# Patient Record
Sex: FEMALE | Race: WHITE | Employment: OTHER | URBAN - METROPOLITAN AREA
[De-identification: names, ages, dates, MRNs, and addresses within clinical notes are randomized per-mention and may not be internally consistent; named-entity substitution may affect disease eponyms.]

---

## 2017-06-09 ENCOUNTER — HOSPITAL ENCOUNTER (OUTPATIENT)
Dept: OTHER | Age: 67
Discharge: HOME | End: 2017-06-09
Attending: INTERNAL MEDICINE | Admitting: INTERNAL MEDICINE

## 2017-06-09 VITALS
DIASTOLIC BLOOD PRESSURE: 63 MMHG | RESPIRATION RATE: 16 BRPM | HEART RATE: 75 BPM | SYSTOLIC BLOOD PRESSURE: 131 MMHG | OXYGEN SATURATION: 100 %

## 2017-06-09 DIAGNOSIS — N18.6 END STAGE RENAL DISEASE (HCC): ICD-10-CM

## 2017-06-09 DIAGNOSIS — N18.6 ESRD (END STAGE RENAL DISEASE) (HCC): Primary | ICD-10-CM

## 2017-06-09 LAB
INR BLD: 1 (ref 0.85–1.15)
PLATELET # BLD: 226 K/UL (ref 135–450)
PROTHROMBIN TIME: 11.3 SEC (ref 9.6–13)

## 2017-06-09 RX ORDER — FENTANYL CITRATE 50 UG/ML
INJECTION, SOLUTION INTRAMUSCULAR; INTRAVENOUS
Status: COMPLETED | OUTPATIENT
Start: 2017-06-09 | End: 2017-06-09

## 2017-06-09 RX ORDER — MIDAZOLAM HYDROCHLORIDE 5 MG/ML
INJECTION INTRAMUSCULAR; INTRAVENOUS
Status: COMPLETED | OUTPATIENT
Start: 2017-06-09 | End: 2017-06-09

## 2017-06-09 RX ADMIN — FENTANYL CITRATE 25 MCG: 50 INJECTION, SOLUTION INTRAMUSCULAR; INTRAVENOUS at 15:56

## 2017-06-09 RX ADMIN — FENTANYL CITRATE 25 MCG: 50 INJECTION, SOLUTION INTRAMUSCULAR; INTRAVENOUS at 15:51

## 2017-06-09 RX ADMIN — MIDAZOLAM HYDROCHLORIDE 1 MG: 5 INJECTION INTRAMUSCULAR; INTRAVENOUS at 15:56

## 2017-06-09 RX ADMIN — MIDAZOLAM HYDROCHLORIDE 1 MG: 5 INJECTION INTRAMUSCULAR; INTRAVENOUS at 15:51

## 2017-06-09 ASSESSMENT — PAIN - FUNCTIONAL ASSESSMENT: PAIN_FUNCTIONAL_ASSESSMENT: 0-10

## 2017-09-19 ENCOUNTER — HOSPITAL ENCOUNTER (OUTPATIENT)
Dept: OTHER | Age: 67
Discharge: HOME | End: 2017-09-19
Attending: INTERNAL MEDICINE | Admitting: INTERNAL MEDICINE

## 2017-09-19 DIAGNOSIS — N18.6 ESRD (END STAGE RENAL DISEASE) (HCC): Primary | ICD-10-CM

## 2017-09-20 LAB
HEPATITIS B SURFACE ANTIGEN INTERPRETATION: NORMAL
HEPATITIS C ANTIBODY INTERPRETATION: NORMAL

## 2017-09-21 LAB — HEPATITIS B CORE TOTAL ANTIBODY: NEGATIVE

## 2017-10-24 ENCOUNTER — HOSPITAL ENCOUNTER (OUTPATIENT)
Dept: GENERAL RADIOLOGY | Age: 67
Discharge: HOME | End: 2017-10-24
Attending: NURSE PRACTITIONER | Admitting: NURSE PRACTITIONER

## 2017-10-24 DIAGNOSIS — Z78.0 POSTMENOPAUSAL: ICD-10-CM

## 2017-10-24 DIAGNOSIS — Z78.0 ASYMPTOMATIC MENOPAUSAL STATE: ICD-10-CM

## 2018-04-03 ENCOUNTER — HOSPITAL ENCOUNTER (OUTPATIENT)
Dept: SURGERY | Age: 68
Discharge: HOME | End: 2018-04-03
Attending: OPHTHALMOLOGY | Admitting: OPHTHALMOLOGY

## 2018-04-03 VITALS
OXYGEN SATURATION: 99 % | BODY MASS INDEX: 27.64 KG/M2 | RESPIRATION RATE: 16 BRPM | TEMPERATURE: 98.2 F | DIASTOLIC BLOOD PRESSURE: 70 MMHG | WEIGHT: 171.96 LBS | SYSTOLIC BLOOD PRESSURE: 153 MMHG | HEIGHT: 66 IN | HEART RATE: 73 BPM

## 2018-04-03 LAB
GLUCOSE BLD-MCNC: 95 MG/DL (ref 70–99)
PERFORMED ON: NORMAL

## 2018-04-03 RX ORDER — MOXIFLOXACIN 5 MG/ML
1 SOLUTION/ DROPS OPHTHALMIC SEE ADMIN INSTRUCTIONS
Status: DISCONTINUED | OUTPATIENT
Start: 2018-04-03 | End: 2018-04-04 | Stop reason: HOSPADM

## 2018-04-03 RX ORDER — SODIUM CHLORIDE 9 MG/ML
INJECTION, SOLUTION INTRAVENOUS CONTINUOUS
Status: DISCONTINUED | OUTPATIENT
Start: 2018-04-03 | End: 2018-04-04 | Stop reason: HOSPADM

## 2018-04-03 RX ORDER — PREDNISOLONE ACETATE 10 MG/ML
1 SUSPENSION/ DROPS OPHTHALMIC SEE ADMIN INSTRUCTIONS
Status: DISCONTINUED | OUTPATIENT
Start: 2018-04-03 | End: 2018-04-04 | Stop reason: HOSPADM

## 2018-04-03 RX ORDER — SODIUM CHLORIDE, SODIUM LACTATE, POTASSIUM CHLORIDE, CALCIUM CHLORIDE 600; 310; 30; 20 MG/100ML; MG/100ML; MG/100ML; MG/100ML
INJECTION, SOLUTION INTRAVENOUS CONTINUOUS
Status: DISCONTINUED | OUTPATIENT
Start: 2018-04-03 | End: 2018-04-04 | Stop reason: HOSPADM

## 2018-04-03 RX ORDER — SODIUM CHLORIDE 9 MG/ML
INJECTION, SOLUTION INTRAVENOUS
Status: COMPLETED
Start: 2018-04-03 | End: 2018-04-03

## 2018-04-03 RX ORDER — LIDOCAINE HYDROCHLORIDE 10 MG/ML
2 INJECTION, SOLUTION INFILTRATION; PERINEURAL
Status: COMPLETED | OUTPATIENT
Start: 2018-04-03 | End: 2018-04-03

## 2018-04-03 RX ADMIN — LIDOCAINE HYDROCHLORIDE 2 ML: 10 INJECTION, SOLUTION INFILTRATION; PERINEURAL at 06:39

## 2018-04-03 RX ADMIN — SODIUM CHLORIDE 1000 ML: 9 INJECTION, SOLUTION INTRAVENOUS at 06:53

## 2018-04-03 ASSESSMENT — PAIN SCALES - GENERAL: PAINLEVEL_OUTOF10: 0

## 2019-01-10 ENCOUNTER — HOSPITAL ENCOUNTER (INPATIENT)
Age: 69
LOS: 4 days | Discharge: HOME | DRG: 617 | End: 2019-01-14
Attending: EMERGENCY MEDICINE | Admitting: INTERNAL MEDICINE
Payer: MEDICARE

## 2019-01-10 ENCOUNTER — APPOINTMENT (OUTPATIENT)
Dept: GENERAL RADIOLOGY | Age: 69
DRG: 617 | End: 2019-01-10
Payer: MEDICARE

## 2019-01-10 DIAGNOSIS — Z99.2 ESRD ON DIALYSIS (HCC): ICD-10-CM

## 2019-01-10 DIAGNOSIS — L03.115 CELLULITIS OF RIGHT LEG: ICD-10-CM

## 2019-01-10 DIAGNOSIS — E11.8 TYPE 2 DIABETES MELLITUS WITH COMPLICATION, WITHOUT LONG-TERM CURRENT USE OF INSULIN (HCC): ICD-10-CM

## 2019-01-10 DIAGNOSIS — M86.9 TOE OSTEOMYELITIS, RIGHT (HCC): Primary | ICD-10-CM

## 2019-01-10 DIAGNOSIS — N18.6 ESRD ON DIALYSIS (HCC): ICD-10-CM

## 2019-01-10 LAB
ANION GAP SERPL CALCULATED.3IONS-SCNC: 11 MMOL/L (ref 3–16)
BASOPHILS ABSOLUTE: 0.1 K/UL (ref 0–0.2)
BASOPHILS RELATIVE PERCENT: 2.2 %
BUN BLDV-MCNC: 26 MG/DL (ref 7–20)
CALCIUM SERPL-MCNC: 9.2 MG/DL (ref 8.3–10.6)
CHLORIDE BLD-SCNC: 94 MMOL/L (ref 99–110)
CO2: 34 MMOL/L (ref 21–32)
CREAT SERPL-MCNC: 4.7 MG/DL (ref 0.6–1.2)
EOSINOPHILS ABSOLUTE: 0.2 K/UL (ref 0–0.6)
EOSINOPHILS RELATIVE PERCENT: 4.9 %
GFR AFRICAN AMERICAN: 11
GFR NON-AFRICAN AMERICAN: 9
GLUCOSE BLD-MCNC: 149 MG/DL (ref 70–99)
GLUCOSE BLD-MCNC: 161 MG/DL (ref 70–99)
GLUCOSE BLD-MCNC: 207 MG/DL (ref 70–99)
HCT VFR BLD CALC: 29.2 % (ref 36–48)
HEMOGLOBIN: 9.6 G/DL (ref 12–16)
LYMPHOCYTES ABSOLUTE: 0.8 K/UL (ref 1–5.1)
LYMPHOCYTES RELATIVE PERCENT: 18.3 %
MCH RBC QN AUTO: 30.6 PG (ref 26–34)
MCHC RBC AUTO-ENTMCNC: 32.9 G/DL (ref 31–36)
MCV RBC AUTO: 92.9 FL (ref 80–100)
MONOCYTES ABSOLUTE: 0.4 K/UL (ref 0–1.3)
MONOCYTES RELATIVE PERCENT: 8.2 %
NEUTROPHILS ABSOLUTE: 3.1 K/UL (ref 1.7–7.7)
NEUTROPHILS RELATIVE PERCENT: 66.4 %
PDW BLD-RTO: 16.7 % (ref 12.4–15.4)
PERFORMED ON: ABNORMAL
PERFORMED ON: ABNORMAL
PLATELET # BLD: 275 K/UL (ref 135–450)
PMV BLD AUTO: 8.7 FL (ref 5–10.5)
POTASSIUM SERPL-SCNC: 4.2 MMOL/L (ref 3.5–5.1)
RBC # BLD: 3.14 M/UL (ref 4–5.2)
SODIUM BLD-SCNC: 139 MMOL/L (ref 136–145)
WBC # BLD: 4.6 K/UL (ref 4–11)

## 2019-01-10 PROCEDURE — 6370000000 HC RX 637 (ALT 250 FOR IP): Performed by: PHYSICIAN ASSISTANT

## 2019-01-10 PROCEDURE — 87070 CULTURE OTHR SPECIMN AEROBIC: CPT

## 2019-01-10 PROCEDURE — 6360000002 HC RX W HCPCS: Performed by: EMERGENCY MEDICINE

## 2019-01-10 PROCEDURE — 80048 BASIC METABOLIC PNL TOTAL CA: CPT

## 2019-01-10 PROCEDURE — 83036 HEMOGLOBIN GLYCOSYLATED A1C: CPT

## 2019-01-10 PROCEDURE — 6360000002 HC RX W HCPCS: Performed by: PHYSICIAN ASSISTANT

## 2019-01-10 PROCEDURE — 2580000003 HC RX 258: Performed by: PHYSICIAN ASSISTANT

## 2019-01-10 PROCEDURE — 99223 1ST HOSP IP/OBS HIGH 75: CPT | Performed by: PHYSICIAN ASSISTANT

## 2019-01-10 PROCEDURE — 94761 N-INVAS EAR/PLS OXIMETRY MLT: CPT

## 2019-01-10 PROCEDURE — 2580000003 HC RX 258: Performed by: EMERGENCY MEDICINE

## 2019-01-10 PROCEDURE — 96365 THER/PROPH/DIAG IV INF INIT: CPT

## 2019-01-10 PROCEDURE — 99284 EMERGENCY DEPT VISIT MOD MDM: CPT

## 2019-01-10 PROCEDURE — 85025 COMPLETE CBC W/AUTO DIFF WBC: CPT

## 2019-01-10 PROCEDURE — 36415 COLL VENOUS BLD VENIPUNCTURE: CPT

## 2019-01-10 PROCEDURE — 96375 TX/PRO/DX INJ NEW DRUG ADDON: CPT

## 2019-01-10 PROCEDURE — 87205 SMEAR GRAM STAIN: CPT

## 2019-01-10 PROCEDURE — 1200000000 HC SEMI PRIVATE

## 2019-01-10 PROCEDURE — 73660 X-RAY EXAM OF TOE(S): CPT

## 2019-01-10 PROCEDURE — 87040 BLOOD CULTURE FOR BACTERIA: CPT

## 2019-01-10 RX ORDER — LEVOTHYROXINE SODIUM 0.05 MG/1
50 TABLET ORAL DAILY
Status: DISCONTINUED | OUTPATIENT
Start: 2019-01-11 | End: 2019-01-14 | Stop reason: HOSPADM

## 2019-01-10 RX ORDER — LEVOTHYROXINE SODIUM 0.05 MG/1
50 TABLET ORAL DAILY
COMMUNITY

## 2019-01-10 RX ORDER — ATORVASTATIN CALCIUM 10 MG/1
20 TABLET, FILM COATED ORAL NIGHTLY
Status: DISCONTINUED | OUTPATIENT
Start: 2019-01-11 | End: 2019-01-14 | Stop reason: HOSPADM

## 2019-01-10 RX ORDER — HYDRALAZINE HYDROCHLORIDE 25 MG/1
25 TABLET, FILM COATED ORAL 3 TIMES DAILY
Status: DISCONTINUED | OUTPATIENT
Start: 2019-01-10 | End: 2019-01-14 | Stop reason: HOSPADM

## 2019-01-10 RX ORDER — ONDANSETRON 2 MG/ML
4 INJECTION INTRAMUSCULAR; INTRAVENOUS EVERY 6 HOURS PRN
Status: DISCONTINUED | OUTPATIENT
Start: 2019-01-10 | End: 2019-01-14 | Stop reason: HOSPADM

## 2019-01-10 RX ORDER — METOPROLOL SUCCINATE 50 MG/1
50 TABLET, EXTENDED RELEASE ORAL DAILY
Status: DISCONTINUED | OUTPATIENT
Start: 2019-01-11 | End: 2019-01-14 | Stop reason: HOSPADM

## 2019-01-10 RX ORDER — HEPARIN SODIUM 5000 [USP'U]/ML
5000 INJECTION, SOLUTION INTRAVENOUS; SUBCUTANEOUS EVERY 8 HOURS SCHEDULED
Status: DISCONTINUED | OUTPATIENT
Start: 2019-01-10 | End: 2019-01-14 | Stop reason: HOSPADM

## 2019-01-10 RX ORDER — DEXTROSE MONOHYDRATE 50 MG/ML
100 INJECTION, SOLUTION INTRAVENOUS PRN
Status: DISCONTINUED | OUTPATIENT
Start: 2019-01-10 | End: 2019-01-14 | Stop reason: HOSPADM

## 2019-01-10 RX ORDER — SODIUM CHLORIDE 0.9 % (FLUSH) 0.9 %
10 SYRINGE (ML) INJECTION EVERY 12 HOURS SCHEDULED
Status: DISCONTINUED | OUTPATIENT
Start: 2019-01-10 | End: 2019-01-14 | Stop reason: HOSPADM

## 2019-01-10 RX ORDER — INSULIN GLARGINE 100 [IU]/ML
8 INJECTION, SOLUTION SUBCUTANEOUS EVERY MORNING
Status: DISCONTINUED | OUTPATIENT
Start: 2019-01-11 | End: 2019-01-10

## 2019-01-10 RX ORDER — OXYCODONE HYDROCHLORIDE AND ACETAMINOPHEN 5; 325 MG/1; MG/1
1 TABLET ORAL EVERY 6 HOURS PRN
Status: DISCONTINUED | OUTPATIENT
Start: 2019-01-10 | End: 2019-01-14 | Stop reason: HOSPADM

## 2019-01-10 RX ORDER — GABAPENTIN 300 MG/1
300 CAPSULE ORAL EVERY EVENING
Status: DISCONTINUED | OUTPATIENT
Start: 2019-01-10 | End: 2019-01-14 | Stop reason: HOSPADM

## 2019-01-10 RX ORDER — METOLAZONE 2.5 MG/1
10 TABLET ORAL DAILY
Status: DISCONTINUED | OUTPATIENT
Start: 2019-01-11 | End: 2019-01-14 | Stop reason: HOSPADM

## 2019-01-10 RX ORDER — NICOTINE POLACRILEX 4 MG
15 LOZENGE BUCCAL PRN
Status: DISCONTINUED | OUTPATIENT
Start: 2019-01-10 | End: 2019-01-14 | Stop reason: HOSPADM

## 2019-01-10 RX ORDER — FERROUS SULFATE 325(65) MG
325 TABLET ORAL 2 TIMES DAILY WITH MEALS
Status: DISCONTINUED | OUTPATIENT
Start: 2019-01-10 | End: 2019-01-14 | Stop reason: HOSPADM

## 2019-01-10 RX ORDER — AMLODIPINE BESYLATE 5 MG/1
5 TABLET ORAL DAILY
COMMUNITY
End: 2021-12-01

## 2019-01-10 RX ORDER — INSULIN GLARGINE 100 [IU]/ML
5 INJECTION, SOLUTION SUBCUTANEOUS EVERY MORNING
Status: DISCONTINUED | OUTPATIENT
Start: 2019-01-11 | End: 2019-01-14 | Stop reason: HOSPADM

## 2019-01-10 RX ORDER — LOSARTAN POTASSIUM 100 MG/1
100 TABLET ORAL NIGHTLY
Status: ON HOLD | COMMUNITY
End: 2022-06-22 | Stop reason: HOSPADM

## 2019-01-10 RX ORDER — HYDRALAZINE HYDROCHLORIDE 25 MG/1
25 TABLET, FILM COATED ORAL DAILY
COMMUNITY
End: 2021-12-01

## 2019-01-10 RX ORDER — ASPIRIN 81 MG/1
81 TABLET ORAL DAILY
Status: DISCONTINUED | OUTPATIENT
Start: 2019-01-11 | End: 2019-01-14 | Stop reason: HOSPADM

## 2019-01-10 RX ORDER — AMLODIPINE BESYLATE 5 MG/1
5 TABLET ORAL DAILY
Status: DISCONTINUED | OUTPATIENT
Start: 2019-01-11 | End: 2019-01-14 | Stop reason: HOSPADM

## 2019-01-10 RX ORDER — DEXTROSE MONOHYDRATE 25 G/50ML
12.5 INJECTION, SOLUTION INTRAVENOUS PRN
Status: DISCONTINUED | OUTPATIENT
Start: 2019-01-10 | End: 2019-01-14 | Stop reason: HOSPADM

## 2019-01-10 RX ORDER — SODIUM CHLORIDE 0.9 % (FLUSH) 0.9 %
10 SYRINGE (ML) INJECTION PRN
Status: DISCONTINUED | OUTPATIENT
Start: 2019-01-10 | End: 2019-01-14 | Stop reason: HOSPADM

## 2019-01-10 RX ORDER — LOSARTAN POTASSIUM 100 MG/1
100 TABLET ORAL DAILY
Status: DISCONTINUED | OUTPATIENT
Start: 2019-01-11 | End: 2019-01-14 | Stop reason: HOSPADM

## 2019-01-10 RX ORDER — ACETAMINOPHEN 325 MG/1
650 TABLET ORAL EVERY 4 HOURS PRN
Status: DISCONTINUED | OUTPATIENT
Start: 2019-01-10 | End: 2019-01-14 | Stop reason: HOSPADM

## 2019-01-10 RX ADMIN — FERROUS SULFATE TAB 325 MG (65 MG ELEMENTAL FE) 325 MG: 325 (65 FE) TAB at 16:45

## 2019-01-10 RX ADMIN — INSULIN LISPRO 1 UNITS: 100 INJECTION, SOLUTION INTRAVENOUS; SUBCUTANEOUS at 20:27

## 2019-01-10 RX ADMIN — PIPERACILLIN SODIUM AND TAZOBACTAM SODIUM 3.38 G: 3; .375 INJECTION, POWDER, LYOPHILIZED, FOR SOLUTION INTRAVENOUS at 23:52

## 2019-01-10 RX ADMIN — INSULIN LISPRO 2 UNITS: 100 INJECTION, SOLUTION INTRAVENOUS; SUBCUTANEOUS at 16:41

## 2019-01-10 RX ADMIN — Medication 10 ML: at 23:52

## 2019-01-10 RX ADMIN — GABAPENTIN 300 MG: 300 CAPSULE ORAL at 20:22

## 2019-01-10 RX ADMIN — HEPARIN SODIUM 5000 UNITS: 5000 INJECTION INTRAVENOUS; SUBCUTANEOUS at 15:12

## 2019-01-10 RX ADMIN — PIPERACILLIN SODIUM AND TAZOBACTAM SODIUM 2.25 G: 2; .25 INJECTION, POWDER, LYOPHILIZED, FOR SOLUTION INTRAVENOUS at 12:01

## 2019-01-10 RX ADMIN — HYDRALAZINE HYDROCHLORIDE 25 MG: 25 TABLET, FILM COATED ORAL at 20:22

## 2019-01-10 RX ADMIN — VANCOMYCIN HYDROCHLORIDE 1000 MG: 1 INJECTION, POWDER, LYOPHILIZED, FOR SOLUTION INTRAVENOUS at 12:26

## 2019-01-10 RX ADMIN — HEPARIN SODIUM 5000 UNITS: 5000 INJECTION INTRAVENOUS; SUBCUTANEOUS at 20:23

## 2019-01-10 ASSESSMENT — ENCOUNTER SYMPTOMS
EYES NEGATIVE: 1
RESPIRATORY NEGATIVE: 1
GASTROINTESTINAL NEGATIVE: 1

## 2019-01-11 ENCOUNTER — APPOINTMENT (OUTPATIENT)
Dept: GENERAL RADIOLOGY | Age: 69
DRG: 617 | End: 2019-01-11
Payer: MEDICARE

## 2019-01-11 ENCOUNTER — ANESTHESIA (OUTPATIENT)
Dept: OPERATING ROOM | Age: 69
DRG: 617 | End: 2019-01-11
Payer: MEDICARE

## 2019-01-11 ENCOUNTER — ANESTHESIA EVENT (OUTPATIENT)
Dept: OPERATING ROOM | Age: 69
DRG: 617 | End: 2019-01-11
Payer: MEDICARE

## 2019-01-11 VITALS — SYSTOLIC BLOOD PRESSURE: 145 MMHG | TEMPERATURE: 98.6 F | OXYGEN SATURATION: 100 % | DIASTOLIC BLOOD PRESSURE: 61 MMHG

## 2019-01-11 LAB
ANION GAP SERPL CALCULATED.3IONS-SCNC: 12 MMOL/L (ref 3–16)
BASOPHILS ABSOLUTE: 0.1 K/UL (ref 0–0.2)
BASOPHILS RELATIVE PERCENT: 2.5 %
BUN BLDV-MCNC: 37 MG/DL (ref 7–20)
CALCIUM SERPL-MCNC: 8.8 MG/DL (ref 8.3–10.6)
CHLORIDE BLD-SCNC: 92 MMOL/L (ref 99–110)
CO2: 31 MMOL/L (ref 21–32)
CREAT SERPL-MCNC: 6.4 MG/DL (ref 0.6–1.2)
EOSINOPHILS ABSOLUTE: 0.3 K/UL (ref 0–0.6)
EOSINOPHILS RELATIVE PERCENT: 7.3 %
ESTIMATED AVERAGE GLUCOSE: 111.2 MG/DL
GFR AFRICAN AMERICAN: 8
GFR NON-AFRICAN AMERICAN: 6
GLUCOSE BLD-MCNC: 100 MG/DL (ref 70–99)
GLUCOSE BLD-MCNC: 137 MG/DL (ref 70–99)
GLUCOSE BLD-MCNC: 140 MG/DL (ref 70–99)
GLUCOSE BLD-MCNC: 153 MG/DL (ref 70–99)
GLUCOSE BLD-MCNC: 184 MG/DL (ref 70–99)
GLUCOSE BLD-MCNC: 91 MG/DL (ref 70–99)
HBA1C MFR BLD: 5.5 %
HCT VFR BLD CALC: 26.3 % (ref 36–48)
HEMOGLOBIN: 8.8 G/DL (ref 12–16)
LYMPHOCYTES ABSOLUTE: 1.1 K/UL (ref 1–5.1)
LYMPHOCYTES RELATIVE PERCENT: 26.2 %
MCH RBC QN AUTO: 30.7 PG (ref 26–34)
MCHC RBC AUTO-ENTMCNC: 33.3 G/DL (ref 31–36)
MCV RBC AUTO: 92.3 FL (ref 80–100)
MONOCYTES ABSOLUTE: 0.4 K/UL (ref 0–1.3)
MONOCYTES RELATIVE PERCENT: 8.9 %
NEUTROPHILS ABSOLUTE: 2.4 K/UL (ref 1.7–7.7)
NEUTROPHILS RELATIVE PERCENT: 55.1 %
PDW BLD-RTO: 16.3 % (ref 12.4–15.4)
PERFORMED ON: ABNORMAL
PERFORMED ON: NORMAL
PLATELET # BLD: 255 K/UL (ref 135–450)
PMV BLD AUTO: 8.7 FL (ref 5–10.5)
POTASSIUM REFLEX MAGNESIUM: 3.9 MMOL/L (ref 3.5–5.1)
RBC # BLD: 2.85 M/UL (ref 4–5.2)
SODIUM BLD-SCNC: 135 MMOL/L (ref 136–145)
VANCOMYCIN RANDOM: 13.9 UG/ML
WBC # BLD: 4.3 K/UL (ref 4–11)

## 2019-01-11 PROCEDURE — 36415 COLL VENOUS BLD VENIPUNCTURE: CPT

## 2019-01-11 PROCEDURE — 2580000003 HC RX 258: Performed by: PODIATRIST

## 2019-01-11 PROCEDURE — 0Y6M0ZC DETACHMENT AT RIGHT FOOT, PARTIAL 3RD RAY, OPEN APPROACH: ICD-10-PCS | Performed by: PODIATRIST

## 2019-01-11 PROCEDURE — 80048 BASIC METABOLIC PNL TOTAL CA: CPT

## 2019-01-11 PROCEDURE — 94664 DEMO&/EVAL PT USE INHALER: CPT

## 2019-01-11 PROCEDURE — 6370000000 HC RX 637 (ALT 250 FOR IP): Performed by: PHYSICIAN ASSISTANT

## 2019-01-11 PROCEDURE — 3600000003 HC SURGERY LEVEL 3 BASE: Performed by: PODIATRIST

## 2019-01-11 PROCEDURE — 7100000011 HC PHASE II RECOVERY - ADDTL 15 MIN: Performed by: PODIATRIST

## 2019-01-11 PROCEDURE — 3600000013 HC SURGERY LEVEL 3 ADDTL 15MIN: Performed by: PODIATRIST

## 2019-01-11 PROCEDURE — 3700000000 HC ANESTHESIA ATTENDED CARE: Performed by: PODIATRIST

## 2019-01-11 PROCEDURE — 2500000003 HC RX 250 WO HCPCS: Performed by: PODIATRIST

## 2019-01-11 PROCEDURE — 2580000003 HC RX 258: Performed by: PHYSICIAN ASSISTANT

## 2019-01-11 PROCEDURE — 5A1D70Z PERFORMANCE OF URINARY FILTRATION, INTERMITTENT, LESS THAN 6 HOURS PER DAY: ICD-10-PCS | Performed by: INTERNAL MEDICINE

## 2019-01-11 PROCEDURE — 85025 COMPLETE CBC W/AUTO DIFF WBC: CPT

## 2019-01-11 PROCEDURE — 99232 SBSQ HOSP IP/OBS MODERATE 35: CPT | Performed by: INTERNAL MEDICINE

## 2019-01-11 PROCEDURE — 80202 ASSAY OF VANCOMYCIN: CPT

## 2019-01-11 PROCEDURE — 94761 N-INVAS EAR/PLS OXIMETRY MLT: CPT

## 2019-01-11 PROCEDURE — 6360000002 HC RX W HCPCS: Performed by: PHYSICIAN ASSISTANT

## 2019-01-11 PROCEDURE — 3700000001 HC ADD 15 MINUTES (ANESTHESIA): Performed by: PODIATRIST

## 2019-01-11 PROCEDURE — 2709999900 HC NON-CHARGEABLE SUPPLY: Performed by: PODIATRIST

## 2019-01-11 PROCEDURE — 1200000000 HC SEMI PRIVATE

## 2019-01-11 PROCEDURE — 88311 DECALCIFY TISSUE: CPT

## 2019-01-11 PROCEDURE — 2580000003 HC RX 258: Performed by: ANESTHESIOLOGY

## 2019-01-11 PROCEDURE — 6360000002 HC RX W HCPCS: Performed by: NURSE ANESTHETIST, CERTIFIED REGISTERED

## 2019-01-11 PROCEDURE — 2500000003 HC RX 250 WO HCPCS: Performed by: NURSE ANESTHETIST, CERTIFIED REGISTERED

## 2019-01-11 PROCEDURE — 88305 TISSUE EXAM BY PATHOLOGIST: CPT

## 2019-01-11 PROCEDURE — 7100000010 HC PHASE II RECOVERY - FIRST 15 MIN: Performed by: PODIATRIST

## 2019-01-11 PROCEDURE — 73620 X-RAY EXAM OF FOOT: CPT

## 2019-01-11 DEVICE — PATCH AMNION 2 LAYR PROTCT 4 X 4CM STERISHIELD II: Type: IMPLANTABLE DEVICE | Site: FOOT | Status: FUNCTIONAL

## 2019-01-11 RX ORDER — OXYCODONE HYDROCHLORIDE AND ACETAMINOPHEN 5; 325 MG/1; MG/1
1 TABLET ORAL PRN
Status: DISCONTINUED | OUTPATIENT
Start: 2019-01-11 | End: 2019-01-11 | Stop reason: HOSPADM

## 2019-01-11 RX ORDER — BUPIVACAINE HYDROCHLORIDE 5 MG/ML
INJECTION, SOLUTION EPIDURAL; INTRACAUDAL PRN
Status: DISCONTINUED | OUTPATIENT
Start: 2019-01-11 | End: 2019-01-11 | Stop reason: HOSPADM

## 2019-01-11 RX ORDER — SODIUM CHLORIDE 9 MG/ML
INJECTION, SOLUTION INTRAVENOUS
Status: DISPENSED
Start: 2019-01-11 | End: 2019-01-12

## 2019-01-11 RX ORDER — PROMETHAZINE HYDROCHLORIDE 25 MG/ML
6.25 INJECTION, SOLUTION INTRAMUSCULAR; INTRAVENOUS
Status: DISCONTINUED | OUTPATIENT
Start: 2019-01-11 | End: 2019-01-11 | Stop reason: HOSPADM

## 2019-01-11 RX ORDER — SODIUM CHLORIDE 9 MG/ML
INJECTION, SOLUTION INTRAVENOUS CONTINUOUS
Status: DISCONTINUED | OUTPATIENT
Start: 2019-01-11 | End: 2019-01-12

## 2019-01-11 RX ORDER — PROPOFOL 10 MG/ML
INJECTION, EMULSION INTRAVENOUS PRN
Status: DISCONTINUED | OUTPATIENT
Start: 2019-01-11 | End: 2019-01-11 | Stop reason: SDUPTHER

## 2019-01-11 RX ORDER — MORPHINE SULFATE 10 MG/ML
2 INJECTION, SOLUTION INTRAMUSCULAR; INTRAVENOUS EVERY 5 MIN PRN
Status: DISCONTINUED | OUTPATIENT
Start: 2019-01-11 | End: 2019-01-11 | Stop reason: HOSPADM

## 2019-01-11 RX ORDER — HYDROMORPHONE HCL 110MG/55ML
0.5 PATIENT CONTROLLED ANALGESIA SYRINGE INTRAVENOUS EVERY 5 MIN PRN
Status: DISCONTINUED | OUTPATIENT
Start: 2019-01-11 | End: 2019-01-11 | Stop reason: HOSPADM

## 2019-01-11 RX ORDER — ONDANSETRON 2 MG/ML
4 INJECTION INTRAMUSCULAR; INTRAVENOUS
Status: DISCONTINUED | OUTPATIENT
Start: 2019-01-11 | End: 2019-01-11 | Stop reason: HOSPADM

## 2019-01-11 RX ORDER — HYDRALAZINE HYDROCHLORIDE 20 MG/ML
5 INJECTION INTRAMUSCULAR; INTRAVENOUS EVERY 10 MIN PRN
Status: DISCONTINUED | OUTPATIENT
Start: 2019-01-11 | End: 2019-01-11 | Stop reason: HOSPADM

## 2019-01-11 RX ORDER — LIDOCAINE HYDROCHLORIDE 10 MG/ML
INJECTION, SOLUTION EPIDURAL; INFILTRATION; INTRACAUDAL; PERINEURAL PRN
Status: DISCONTINUED | OUTPATIENT
Start: 2019-01-11 | End: 2019-01-11 | Stop reason: HOSPADM

## 2019-01-11 RX ORDER — LIDOCAINE HYDROCHLORIDE 10 MG/ML
INJECTION, SOLUTION INFILTRATION; PERINEURAL PRN
Status: DISCONTINUED | OUTPATIENT
Start: 2019-01-11 | End: 2019-01-11 | Stop reason: SDUPTHER

## 2019-01-11 RX ORDER — LABETALOL HYDROCHLORIDE 5 MG/ML
5 INJECTION, SOLUTION INTRAVENOUS EVERY 10 MIN PRN
Status: DISCONTINUED | OUTPATIENT
Start: 2019-01-11 | End: 2019-01-11 | Stop reason: HOSPADM

## 2019-01-11 RX ORDER — HYDROMORPHONE HCL 110MG/55ML
0.25 PATIENT CONTROLLED ANALGESIA SYRINGE INTRAVENOUS EVERY 5 MIN PRN
Status: DISCONTINUED | OUTPATIENT
Start: 2019-01-11 | End: 2019-01-11 | Stop reason: HOSPADM

## 2019-01-11 RX ORDER — MEPERIDINE HYDROCHLORIDE 25 MG/ML
12.5 INJECTION INTRAMUSCULAR; INTRAVENOUS; SUBCUTANEOUS EVERY 5 MIN PRN
Status: DISCONTINUED | OUTPATIENT
Start: 2019-01-11 | End: 2019-01-11 | Stop reason: HOSPADM

## 2019-01-11 RX ORDER — OXYCODONE HYDROCHLORIDE AND ACETAMINOPHEN 5; 325 MG/1; MG/1
2 TABLET ORAL PRN
Status: DISCONTINUED | OUTPATIENT
Start: 2019-01-11 | End: 2019-01-11 | Stop reason: HOSPADM

## 2019-01-11 RX ORDER — MIDAZOLAM HYDROCHLORIDE 1 MG/ML
INJECTION INTRAMUSCULAR; INTRAVENOUS PRN
Status: DISCONTINUED | OUTPATIENT
Start: 2019-01-11 | End: 2019-01-11 | Stop reason: SDUPTHER

## 2019-01-11 RX ORDER — DIPHENHYDRAMINE HYDROCHLORIDE 50 MG/ML
12.5 INJECTION INTRAMUSCULAR; INTRAVENOUS
Status: DISCONTINUED | OUTPATIENT
Start: 2019-01-11 | End: 2019-01-11 | Stop reason: HOSPADM

## 2019-01-11 RX ORDER — MORPHINE SULFATE 10 MG/ML
1 INJECTION, SOLUTION INTRAMUSCULAR; INTRAVENOUS EVERY 5 MIN PRN
Status: DISCONTINUED | OUTPATIENT
Start: 2019-01-11 | End: 2019-01-11 | Stop reason: HOSPADM

## 2019-01-11 RX ORDER — MAGNESIUM HYDROXIDE 1200 MG/15ML
LIQUID ORAL CONTINUOUS PRN
Status: COMPLETED | OUTPATIENT
Start: 2019-01-11 | End: 2019-01-11

## 2019-01-11 RX ADMIN — LEVOTHYROXINE SODIUM 50 MCG: 0.05 TABLET ORAL at 06:15

## 2019-01-11 RX ADMIN — HEPARIN SODIUM 5000 UNITS: 5000 INJECTION INTRAVENOUS; SUBCUTANEOUS at 06:16

## 2019-01-11 RX ADMIN — PROPOFOL 60 MG: 10 INJECTION, EMULSION INTRAVENOUS at 12:46

## 2019-01-11 RX ADMIN — SODIUM CHLORIDE: 9 INJECTION, SOLUTION INTRAVENOUS at 12:32

## 2019-01-11 RX ADMIN — HYDRALAZINE HYDROCHLORIDE 25 MG: 25 TABLET, FILM COATED ORAL at 14:19

## 2019-01-11 RX ADMIN — PIPERACILLIN SODIUM AND TAZOBACTAM SODIUM 3.38 G: 3; .375 INJECTION, POWDER, LYOPHILIZED, FOR SOLUTION INTRAVENOUS at 22:24

## 2019-01-11 RX ADMIN — LIDOCAINE HYDROCHLORIDE 40 MG: 10 INJECTION, SOLUTION INFILTRATION; PERINEURAL at 12:46

## 2019-01-11 RX ADMIN — PIPERACILLIN SODIUM AND TAZOBACTAM SODIUM 3.38 G: 3; .375 INJECTION, POWDER, LYOPHILIZED, FOR SOLUTION INTRAVENOUS at 10:57

## 2019-01-11 RX ADMIN — GABAPENTIN 300 MG: 300 CAPSULE ORAL at 20:46

## 2019-01-11 RX ADMIN — ATORVASTATIN CALCIUM 20 MG: 10 TABLET, FILM COATED ORAL at 20:46

## 2019-01-11 RX ADMIN — HEPARIN SODIUM 5000 UNITS: 5000 INJECTION INTRAVENOUS; SUBCUTANEOUS at 14:21

## 2019-01-11 RX ADMIN — HEPARIN SODIUM 5000 UNITS: 5000 INJECTION INTRAVENOUS; SUBCUTANEOUS at 22:23

## 2019-01-11 RX ADMIN — OXYCODONE HYDROCHLORIDE AND ACETAMINOPHEN 1 TABLET: 5; 325 TABLET ORAL at 22:24

## 2019-01-11 RX ADMIN — SODIUM CHLORIDE: 9 INJECTION, SOLUTION INTRAVENOUS at 20:48

## 2019-01-11 RX ADMIN — HYDRALAZINE HYDROCHLORIDE 25 MG: 25 TABLET, FILM COATED ORAL at 20:46

## 2019-01-11 RX ADMIN — MIDAZOLAM 2 MG: 1 INJECTION INTRAMUSCULAR; INTRAVENOUS at 12:37

## 2019-01-11 RX ADMIN — VANCOMYCIN HYDROCHLORIDE 500 MG: 500 INJECTION, POWDER, LYOPHILIZED, FOR SOLUTION INTRAVENOUS at 17:12

## 2019-01-11 RX ADMIN — METOPROLOL SUCCINATE 50 MG: 50 TABLET, EXTENDED RELEASE ORAL at 14:20

## 2019-01-11 RX ADMIN — INSULIN LISPRO 1 UNITS: 100 INJECTION, SOLUTION INTRAVENOUS; SUBCUTANEOUS at 20:58

## 2019-01-11 RX ADMIN — SODIUM CHLORIDE: 9 INJECTION, SOLUTION INTRAVENOUS at 18:43

## 2019-01-11 RX ADMIN — FERROUS SULFATE TAB 325 MG (65 MG ELEMENTAL FE) 325 MG: 325 (65 FE) TAB at 17:12

## 2019-01-11 RX ADMIN — AMLODIPINE BESYLATE 5 MG: 5 TABLET ORAL at 14:20

## 2019-01-11 ASSESSMENT — PULMONARY FUNCTION TESTS
PIF_VALUE: 0

## 2019-01-11 ASSESSMENT — PAIN SCALES - GENERAL
PAINLEVEL_OUTOF10: 4
PAINLEVEL_OUTOF10: 0

## 2019-01-12 LAB
ALBUMIN SERPL-MCNC: 3.3 G/DL (ref 3.4–5)
ANION GAP SERPL CALCULATED.3IONS-SCNC: 8 MMOL/L (ref 3–16)
BUN BLDV-MCNC: 22 MG/DL (ref 7–20)
CALCIUM SERPL-MCNC: 8 MG/DL (ref 8.3–10.6)
CHLORIDE BLD-SCNC: 102 MMOL/L (ref 99–110)
CO2: 29 MMOL/L (ref 21–32)
CREAT SERPL-MCNC: 3.9 MG/DL (ref 0.6–1.2)
GFR AFRICAN AMERICAN: 14
GFR NON-AFRICAN AMERICAN: 11
GLUCOSE BLD-MCNC: 114 MG/DL (ref 70–99)
GLUCOSE BLD-MCNC: 122 MG/DL (ref 70–99)
GLUCOSE BLD-MCNC: 122 MG/DL (ref 70–99)
GLUCOSE BLD-MCNC: 128 MG/DL (ref 70–99)
GLUCOSE BLD-MCNC: 176 MG/DL (ref 70–99)
GRAM STAIN RESULT: ABNORMAL
PERFORMED ON: ABNORMAL
PHOSPHORUS: 4.1 MG/DL (ref 2.5–4.9)
POTASSIUM SERPL-SCNC: 4.8 MMOL/L (ref 3.5–5.1)
SODIUM BLD-SCNC: 139 MMOL/L (ref 136–145)
WOUND/ABSCESS: ABNORMAL

## 2019-01-12 PROCEDURE — 36415 COLL VENOUS BLD VENIPUNCTURE: CPT

## 2019-01-12 PROCEDURE — 2580000003 HC RX 258: Performed by: PHYSICIAN ASSISTANT

## 2019-01-12 PROCEDURE — 80069 RENAL FUNCTION PANEL: CPT

## 2019-01-12 PROCEDURE — 6370000000 HC RX 637 (ALT 250 FOR IP): Performed by: PHYSICIAN ASSISTANT

## 2019-01-12 PROCEDURE — 6360000002 HC RX W HCPCS: Performed by: PHYSICIAN ASSISTANT

## 2019-01-12 PROCEDURE — 1200000000 HC SEMI PRIVATE

## 2019-01-12 PROCEDURE — 2580000003 HC RX 258: Performed by: ANESTHESIOLOGY

## 2019-01-12 PROCEDURE — 2580000003 HC RX 258

## 2019-01-12 RX ORDER — SODIUM CHLORIDE 9 MG/ML
INJECTION, SOLUTION INTRAVENOUS
Status: COMPLETED
Start: 2019-01-12 | End: 2019-01-12

## 2019-01-12 RX ADMIN — PIPERACILLIN SODIUM AND TAZOBACTAM SODIUM 3.38 G: 3; .375 INJECTION, POWDER, LYOPHILIZED, FOR SOLUTION INTRAVENOUS at 22:42

## 2019-01-12 RX ADMIN — HYDRALAZINE HYDROCHLORIDE 25 MG: 25 TABLET, FILM COATED ORAL at 14:06

## 2019-01-12 RX ADMIN — ASPIRIN 81 MG: 81 TABLET, COATED ORAL at 08:37

## 2019-01-12 RX ADMIN — FERROUS SULFATE TAB 325 MG (65 MG ELEMENTAL FE) 325 MG: 325 (65 FE) TAB at 08:37

## 2019-01-12 RX ADMIN — SERTRALINE HYDROCHLORIDE 25 MG: 50 TABLET ORAL at 08:37

## 2019-01-12 RX ADMIN — SODIUM CHLORIDE: 9 INJECTION, SOLUTION INTRAVENOUS at 07:14

## 2019-01-12 RX ADMIN — METOLAZONE 10 MG: 2.5 TABLET ORAL at 08:37

## 2019-01-12 RX ADMIN — LEVOTHYROXINE SODIUM 50 MCG: 0.05 TABLET ORAL at 05:55

## 2019-01-12 RX ADMIN — METOPROLOL SUCCINATE 50 MG: 50 TABLET, EXTENDED RELEASE ORAL at 08:36

## 2019-01-12 RX ADMIN — HYDRALAZINE HYDROCHLORIDE 25 MG: 25 TABLET, FILM COATED ORAL at 20:29

## 2019-01-12 RX ADMIN — ATORVASTATIN CALCIUM 20 MG: 10 TABLET, FILM COATED ORAL at 20:29

## 2019-01-12 RX ADMIN — SODIUM CHLORIDE 250 ML: 9 INJECTION, SOLUTION INTRAVENOUS at 22:50

## 2019-01-12 RX ADMIN — GABAPENTIN 300 MG: 300 CAPSULE ORAL at 20:29

## 2019-01-12 RX ADMIN — LOSARTAN POTASSIUM 100 MG: 100 TABLET ORAL at 08:37

## 2019-01-12 RX ADMIN — HEPARIN SODIUM 5000 UNITS: 5000 INJECTION INTRAVENOUS; SUBCUTANEOUS at 14:06

## 2019-01-12 RX ADMIN — PIPERACILLIN SODIUM AND TAZOBACTAM SODIUM 3.38 G: 3; .375 INJECTION, POWDER, LYOPHILIZED, FOR SOLUTION INTRAVENOUS at 11:47

## 2019-01-12 RX ADMIN — HEPARIN SODIUM 5000 UNITS: 5000 INJECTION INTRAVENOUS; SUBCUTANEOUS at 22:41

## 2019-01-12 RX ADMIN — SODIUM CHLORIDE: 9 INJECTION, SOLUTION INTRAVENOUS at 11:48

## 2019-01-12 RX ADMIN — INSULIN GLARGINE 5 UNITS: 100 INJECTION, SOLUTION SUBCUTANEOUS at 08:46

## 2019-01-12 RX ADMIN — HEPARIN SODIUM 5000 UNITS: 5000 INJECTION INTRAVENOUS; SUBCUTANEOUS at 05:55

## 2019-01-12 RX ADMIN — Medication 10 ML: at 20:30

## 2019-01-12 RX ADMIN — Medication 10 ML: at 08:38

## 2019-01-12 RX ADMIN — HYDRALAZINE HYDROCHLORIDE 25 MG: 25 TABLET, FILM COATED ORAL at 08:37

## 2019-01-12 RX ADMIN — AMLODIPINE BESYLATE 5 MG: 5 TABLET ORAL at 08:37

## 2019-01-12 RX ADMIN — INSULIN LISPRO 1 UNITS: 100 INJECTION, SOLUTION INTRAVENOUS; SUBCUTANEOUS at 17:39

## 2019-01-12 ASSESSMENT — PAIN SCALES - GENERAL
PAINLEVEL_OUTOF10: 0

## 2019-01-13 LAB
GLUCOSE BLD-MCNC: 104 MG/DL (ref 70–99)
GLUCOSE BLD-MCNC: 113 MG/DL (ref 70–99)
GLUCOSE BLD-MCNC: 141 MG/DL (ref 70–99)
GLUCOSE BLD-MCNC: 184 MG/DL (ref 70–99)
PERFORMED ON: ABNORMAL

## 2019-01-13 PROCEDURE — 2580000003 HC RX 258: Performed by: PHYSICIAN ASSISTANT

## 2019-01-13 PROCEDURE — 6360000002 HC RX W HCPCS: Performed by: PHYSICIAN ASSISTANT

## 2019-01-13 PROCEDURE — 6370000000 HC RX 637 (ALT 250 FOR IP): Performed by: PHYSICIAN ASSISTANT

## 2019-01-13 PROCEDURE — 1200000000 HC SEMI PRIVATE

## 2019-01-13 RX ADMIN — LEVOTHYROXINE SODIUM 50 MCG: 0.05 TABLET ORAL at 06:01

## 2019-01-13 RX ADMIN — INSULIN GLARGINE 5 UNITS: 100 INJECTION, SOLUTION SUBCUTANEOUS at 08:14

## 2019-01-13 RX ADMIN — HEPARIN SODIUM 5000 UNITS: 5000 INJECTION INTRAVENOUS; SUBCUTANEOUS at 06:01

## 2019-01-13 RX ADMIN — HYDRALAZINE HYDROCHLORIDE 25 MG: 25 TABLET, FILM COATED ORAL at 14:32

## 2019-01-13 RX ADMIN — PIPERACILLIN SODIUM AND TAZOBACTAM SODIUM 3.38 G: 3; .375 INJECTION, POWDER, LYOPHILIZED, FOR SOLUTION INTRAVENOUS at 23:15

## 2019-01-13 RX ADMIN — METOPROLOL SUCCINATE 50 MG: 50 TABLET, EXTENDED RELEASE ORAL at 08:13

## 2019-01-13 RX ADMIN — LOSARTAN POTASSIUM 100 MG: 100 TABLET ORAL at 08:12

## 2019-01-13 RX ADMIN — Medication 10 ML: at 21:09

## 2019-01-13 RX ADMIN — INSULIN LISPRO 1 UNITS: 100 INJECTION, SOLUTION INTRAVENOUS; SUBCUTANEOUS at 21:12

## 2019-01-13 RX ADMIN — Medication 10 ML: at 08:13

## 2019-01-13 RX ADMIN — PIPERACILLIN SODIUM AND TAZOBACTAM SODIUM 3.38 G: 3; .375 INJECTION, POWDER, LYOPHILIZED, FOR SOLUTION INTRAVENOUS at 11:22

## 2019-01-13 RX ADMIN — HYDRALAZINE HYDROCHLORIDE 25 MG: 25 TABLET, FILM COATED ORAL at 21:08

## 2019-01-13 RX ADMIN — INSULIN LISPRO 1 UNITS: 100 INJECTION, SOLUTION INTRAVENOUS; SUBCUTANEOUS at 11:42

## 2019-01-13 RX ADMIN — AMLODIPINE BESYLATE 5 MG: 5 TABLET ORAL at 08:12

## 2019-01-13 RX ADMIN — ASPIRIN 81 MG: 81 TABLET, COATED ORAL at 08:13

## 2019-01-13 RX ADMIN — HYDRALAZINE HYDROCHLORIDE 25 MG: 25 TABLET, FILM COATED ORAL at 08:13

## 2019-01-13 RX ADMIN — HEPARIN SODIUM 5000 UNITS: 5000 INJECTION INTRAVENOUS; SUBCUTANEOUS at 14:32

## 2019-01-13 RX ADMIN — SERTRALINE HYDROCHLORIDE 25 MG: 50 TABLET ORAL at 08:13

## 2019-01-13 RX ADMIN — GABAPENTIN 300 MG: 300 CAPSULE ORAL at 21:09

## 2019-01-13 RX ADMIN — ATORVASTATIN CALCIUM 20 MG: 10 TABLET, FILM COATED ORAL at 21:09

## 2019-01-13 RX ADMIN — HEPARIN SODIUM 5000 UNITS: 5000 INJECTION INTRAVENOUS; SUBCUTANEOUS at 21:11

## 2019-01-13 RX ADMIN — METOLAZONE 10 MG: 2.5 TABLET ORAL at 08:13

## 2019-01-13 ASSESSMENT — PAIN SCALES - GENERAL
PAINLEVEL_OUTOF10: 0

## 2019-01-14 VITALS
RESPIRATION RATE: 16 BRPM | TEMPERATURE: 97.9 F | WEIGHT: 178.79 LBS | BODY MASS INDEX: 28.73 KG/M2 | SYSTOLIC BLOOD PRESSURE: 174 MMHG | HEART RATE: 80 BPM | DIASTOLIC BLOOD PRESSURE: 84 MMHG | HEIGHT: 66 IN | OXYGEN SATURATION: 92 %

## 2019-01-14 LAB
ALBUMIN SERPL-MCNC: 3.1 G/DL (ref 3.4–5)
ANION GAP SERPL CALCULATED.3IONS-SCNC: 16 MMOL/L (ref 3–16)
BASOPHILS ABSOLUTE: 0.1 K/UL (ref 0–0.2)
BASOPHILS RELATIVE PERCENT: 1.4 %
BUN BLDV-MCNC: 43 MG/DL (ref 7–20)
CALCIUM SERPL-MCNC: 8.7 MG/DL (ref 8.3–10.6)
CHLORIDE BLD-SCNC: 103 MMOL/L (ref 99–110)
CO2: 22 MMOL/L (ref 21–32)
CREAT SERPL-MCNC: 7.5 MG/DL (ref 0.6–1.2)
EOSINOPHILS ABSOLUTE: 0.4 K/UL (ref 0–0.6)
EOSINOPHILS RELATIVE PERCENT: 8.6 %
GFR AFRICAN AMERICAN: 6
GFR NON-AFRICAN AMERICAN: 5
GLUCOSE BLD-MCNC: 100 MG/DL (ref 70–99)
GLUCOSE BLD-MCNC: 103 MG/DL (ref 70–99)
GLUCOSE BLD-MCNC: 155 MG/DL (ref 70–99)
GLUCOSE BLD-MCNC: 97 MG/DL (ref 70–99)
HCT VFR BLD CALC: 25 % (ref 36–48)
HEMOGLOBIN: 8.1 G/DL (ref 12–16)
LYMPHOCYTES ABSOLUTE: 1 K/UL (ref 1–5.1)
LYMPHOCYTES RELATIVE PERCENT: 21.7 %
MCH RBC QN AUTO: 30.5 PG (ref 26–34)
MCHC RBC AUTO-ENTMCNC: 32.5 G/DL (ref 31–36)
MCV RBC AUTO: 93.6 FL (ref 80–100)
MONOCYTES ABSOLUTE: 0.3 K/UL (ref 0–1.3)
MONOCYTES RELATIVE PERCENT: 6.4 %
NEUTROPHILS ABSOLUTE: 2.9 K/UL (ref 1.7–7.7)
NEUTROPHILS RELATIVE PERCENT: 61.9 %
PDW BLD-RTO: 16.4 % (ref 12.4–15.4)
PERFORMED ON: ABNORMAL
PERFORMED ON: ABNORMAL
PERFORMED ON: NORMAL
PHOSPHORUS: 5.9 MG/DL (ref 2.5–4.9)
PLATELET # BLD: 118 K/UL (ref 135–450)
PMV BLD AUTO: 10 FL (ref 5–10.5)
POTASSIUM SERPL-SCNC: 4.6 MMOL/L (ref 3.5–5.1)
RBC # BLD: 2.67 M/UL (ref 4–5.2)
SODIUM BLD-SCNC: 141 MMOL/L (ref 136–145)
VANCOMYCIN RANDOM: 11.3 UG/ML
WBC # BLD: 4.6 K/UL (ref 4–11)

## 2019-01-14 PROCEDURE — 6360000002 HC RX W HCPCS: Performed by: PHYSICIAN ASSISTANT

## 2019-01-14 PROCEDURE — 2580000003 HC RX 258: Performed by: PHYSICIAN ASSISTANT

## 2019-01-14 PROCEDURE — 80069 RENAL FUNCTION PANEL: CPT

## 2019-01-14 PROCEDURE — 6370000000 HC RX 637 (ALT 250 FOR IP): Performed by: PHYSICIAN ASSISTANT

## 2019-01-14 PROCEDURE — 80202 ASSAY OF VANCOMYCIN: CPT

## 2019-01-14 PROCEDURE — 85025 COMPLETE CBC W/AUTO DIFF WBC: CPT

## 2019-01-14 PROCEDURE — 99238 HOSP IP/OBS DSCHRG MGMT 30/<: CPT | Performed by: INTERNAL MEDICINE

## 2019-01-14 PROCEDURE — 36415 COLL VENOUS BLD VENIPUNCTURE: CPT

## 2019-01-14 RX ORDER — CIPROFLOXACIN 500 MG/1
250 TABLET, FILM COATED ORAL 2 TIMES DAILY
Qty: 5 TABLET | Refills: 0 | Status: SHIPPED | OUTPATIENT
Start: 2019-01-14 | End: 2019-01-19

## 2019-01-14 RX ADMIN — HYDRALAZINE HYDROCHLORIDE 25 MG: 25 TABLET, FILM COATED ORAL at 08:43

## 2019-01-14 RX ADMIN — INSULIN GLARGINE 5 UNITS: 100 INJECTION, SOLUTION SUBCUTANEOUS at 08:47

## 2019-01-14 RX ADMIN — HEPARIN SODIUM 5000 UNITS: 5000 INJECTION INTRAVENOUS; SUBCUTANEOUS at 05:45

## 2019-01-14 RX ADMIN — SERTRALINE HYDROCHLORIDE 25 MG: 50 TABLET ORAL at 08:44

## 2019-01-14 RX ADMIN — LOSARTAN POTASSIUM 100 MG: 100 TABLET ORAL at 08:44

## 2019-01-14 RX ADMIN — METOPROLOL SUCCINATE 50 MG: 50 TABLET, EXTENDED RELEASE ORAL at 08:44

## 2019-01-14 RX ADMIN — Medication 10 ML: at 08:44

## 2019-01-14 RX ADMIN — METOLAZONE 10 MG: 2.5 TABLET ORAL at 08:43

## 2019-01-14 RX ADMIN — ASPIRIN 81 MG: 81 TABLET, COATED ORAL at 08:44

## 2019-01-14 RX ADMIN — AMLODIPINE BESYLATE 5 MG: 5 TABLET ORAL at 08:44

## 2019-01-14 RX ADMIN — LEVOTHYROXINE SODIUM 50 MCG: 0.05 TABLET ORAL at 05:45

## 2019-01-14 ASSESSMENT — PAIN SCALES - GENERAL: PAINLEVEL_OUTOF10: 0

## 2019-01-15 LAB
BLOOD CULTURE, ROUTINE: NORMAL
CULTURE, BLOOD 2: NORMAL

## 2021-05-25 ENCOUNTER — OFFICE VISIT (OUTPATIENT)
Dept: ORTHOPEDIC SURGERY | Age: 71
End: 2021-05-25
Payer: MEDICARE

## 2021-05-25 VITALS — BODY MASS INDEX: 28.61 KG/M2 | HEIGHT: 66 IN | WEIGHT: 178 LBS

## 2021-05-25 DIAGNOSIS — M17.12 PRIMARY OSTEOARTHRITIS OF LEFT KNEE: Primary | ICD-10-CM

## 2021-05-25 DIAGNOSIS — M25.562 ACUTE PAIN OF LEFT KNEE: ICD-10-CM

## 2021-05-25 PROCEDURE — 20610 DRAIN/INJ JOINT/BURSA W/O US: CPT | Performed by: ORTHOPAEDIC SURGERY

## 2021-05-25 PROCEDURE — 1036F TOBACCO NON-USER: CPT | Performed by: ORTHOPAEDIC SURGERY

## 2021-05-25 PROCEDURE — 99203 OFFICE O/P NEW LOW 30 MIN: CPT | Performed by: ORTHOPAEDIC SURGERY

## 2021-05-25 PROCEDURE — 4040F PNEUMOC VAC/ADMIN/RCVD: CPT | Performed by: ORTHOPAEDIC SURGERY

## 2021-05-25 PROCEDURE — 1123F ACP DISCUSS/DSCN MKR DOCD: CPT | Performed by: ORTHOPAEDIC SURGERY

## 2021-05-25 PROCEDURE — G8417 CALC BMI ABV UP PARAM F/U: HCPCS | Performed by: ORTHOPAEDIC SURGERY

## 2021-05-25 PROCEDURE — 3017F COLORECTAL CA SCREEN DOC REV: CPT | Performed by: ORTHOPAEDIC SURGERY

## 2021-05-25 PROCEDURE — 1090F PRES/ABSN URINE INCON ASSESS: CPT | Performed by: ORTHOPAEDIC SURGERY

## 2021-05-25 PROCEDURE — G8427 DOCREV CUR MEDS BY ELIG CLIN: HCPCS | Performed by: ORTHOPAEDIC SURGERY

## 2021-05-25 PROCEDURE — G8399 PT W/DXA RESULTS DOCUMENT: HCPCS | Performed by: ORTHOPAEDIC SURGERY

## 2021-05-25 RX ORDER — LIDOCAINE HYDROCHLORIDE 10 MG/ML
3 INJECTION, SOLUTION INFILTRATION; PERINEURAL ONCE
Status: COMPLETED | OUTPATIENT
Start: 2021-05-25 | End: 2021-05-25

## 2021-05-25 RX ADMIN — LIDOCAINE HYDROCHLORIDE 3 ML: 10 INJECTION, SOLUTION INFILTRATION; PERINEURAL at 09:45

## 2021-05-25 NOTE — PROGRESS NOTES
KNEE VISIT      HISTORY OF PRESENT ILLNESS    Marcel Herndon is a 66 y.o. female who presents for evaluation of her left knee. 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 She grades her pain 8/10 but says it is hurting her feet and other joints. ROS    Well-documented patient history form dated 5/25/2021  All other ROS negative except for above. Past Surgical history    Past Surgical History:   Procedure Laterality Date    DIALYSIS FISTULA CREATION Left 08/10/2016    Dr. Juan Bowden - upper arm, basilic vein transposition    EYE SURGERY Left 04/03/2018    catarct removal with lens implant     OTHER SURGICAL HISTORY Right 01/11/2019    partial foot amputation    TOE AMPUTATION Right 1/11/2019    PARTIAL FOOT AMPUTATION WITH GRAFT APPLICATION performed by Katherine Reyez DPM at [facility]    Past Medical History:   Diagnosis Date    Chronic kidney disease     Diabetes mellitus ([facility] [address]     Hyperkalemia 7/13/2016    Hyperlipidemia     Hypertension     Retinopathy     Sepsis (HCC)        Allergies    No Known Allergies    Meds    Current Outpatient Medications   Medication Sig Dispense Refill    levothyroxine (SYNTHROID) 50 MCG tablet Take 50 mcg by mouth Daily      amLODIPine (NORVASC) 5 MG tablet Take 5 mg by mouth daily      hydrALAZINE (APRESOLINE) 25 MG tablet Take 25 mg by mouth daily       losartan (COZAAR) 100 MG tablet Take 100 mg by mouth daily      insulin glargine (LANTUS) 100 UNIT/ML injection vial Inject 8 Units into the skin every morning 20 units if bs 150 or above, 15 units if below bs is below.  1 vial 0    metolazone (ZAROXOLYN) 10 MG tablet Take 1 tablet by mouth daily 30 tablet 1    aspirin 81 MG EC tablet Take 1 tablet by file.    PHYSICAL EXAM    Vital Signs:  Ht 5' 6\" (1.676 m)   Wt 178 lb (80.7 kg)   BMI 28.73 kg/m²   General Appearance:  Normal body habitus. Alert and oriented to person, place, and time. Affect:  Normal.   Gait: Antalgic with severe genu valgum on her left knee. Poor balance and coordination. Skin:  Intact. Sensation:  Intact. Strength:  Intact. Reflexes:  Intact. Pulses:  Intact. Knee Exam:    Effusion: 3+    Range of Motion Right Left   Extension 0 -5   Flexion 115 110     Provocative Test Right Left    Positive Negative Positive Negative   Anterior drawer [] [] [] []   Lachman [] [] [] []   Posterior drawer [] [] [] []   Varus testing [] [x] [x] []   Valgus testing [] [x] [x] []   Joint line tenderness [x] [] [x] []     Additional Exam Comments: Her neurocirculatory lymphatic exam appears normal symmetric both lower extremities. She has effusion crepitus in probably 20 degree valgus deformity of her left knee with mild stress. She is partially correctable. IMAGING STUDIES    X-rays 3 views of her left knee demonstrate severe end-stage lateral compartment arthritis with substantial 18 to 20 degree valgus deformity with stance. IMPRESSION    Left knee pain and effusion secondary to severe lateral compartment arthritis    PLAN      1. Conservative care options including physical therapy, NSAIDs, bracing, and activity modification were discussed. 2.  The indications for therapeutic injections were discussed. 3.  The indications for additional imaging studies were discussed. 4.  After considering the various options discussed, the patient elected to pursue a course that includes proceeding with left knee aspiration with Visco supplement injection. I told her that I would not recommend cortisone because of her diabetes and kidney issues or anti-inflammatories either.   I will give her a  brace to try to unload the lateral compartment as much as possible, but she will need to return to have that applied or fit and measured for. She will take this under advisement and would like to proceed with the recommendations above and hopefully avoid surgery    After informed consent was received from the patient, the lateral suprapatellar aspect of the left knee was then sterilely prepped using Betadine and draped using 1% Xylocaine as a local anesthetic and ethyl chloride as a topical refrigerant. The skin was numbed and then  a 60 mL syringe with an 18-gauge needle was then inserted into the region and the fluid was aspirated. .  The procedure was then aborted and the wound was cleaned and dressed. The patient appeared to tolerate this procedure well. Encounter Diagnoses   Name Primary?  Acute pain of left knee     Primary osteoarthritis of left knee Yes      Orders Placed This Encounter   Procedures    XR KNEE LEFT (3 VIEWS)     Standing Status:   Future     Number of Occurrences:   1     Standing Expiration Date:   5/24/2022    TX ARTHROCENTESIS ASPIR&/INJ MAJOR JT/BURSA W/O US      Recommendation is for viscosupplementation using Durolane. The left knee was injected with 3ml of Durolane from an anterolateral joint line approach using a 22-gauge needle under sterile Betadine prep, using ethyl chloride as a topical refrigerant, for a diagnosis of osteoarthritis. The patient appeared to tolerate it well. The patient should return here in 2 months.

## 2021-06-10 DIAGNOSIS — M17.12 PRIMARY OSTEOARTHRITIS OF LEFT KNEE: Primary | ICD-10-CM

## 2021-06-11 ENCOUNTER — TELEPHONE (OUTPATIENT)
Dept: ORTHOPEDIC SURGERY | Age: 71
End: 2021-06-11

## 2021-06-11 NOTE — TELEPHONE ENCOUNTER
6/11/21 DME    NO PRECERT REQUIRED      DEDUCTIBLE: NONE                              CO INSURANCE: 80/20%    OOP: $4200 IND/$4125.47 MET               PER NANCY TAYLOR @ [facility]  REF # [id number]  MP

## 2021-06-15 ENCOUNTER — CLINICAL DOCUMENTATION (OUTPATIENT)
Dept: OTHER | Age: 71
End: 2021-06-15

## 2021-06-17 ENCOUNTER — TELEPHONE (OUTPATIENT)
Dept: ORTHOPEDIC SURGERY | Age: 71
End: 2021-06-17

## 2021-06-24 ENCOUNTER — TELEPHONE (OUTPATIENT)
Dept: ORTHOPEDIC SURGERY | Age: 71
End: 2021-06-24

## 2021-06-29 DIAGNOSIS — M17.12 PRIMARY OSTEOARTHRITIS OF LEFT KNEE: ICD-10-CM

## 2021-06-29 PROCEDURE — L1851 KO SINGLE UPRIGHT PREFAB OTS: HCPCS | Performed by: ORTHOPAEDIC SURGERY

## 2021-08-03 ENCOUNTER — OFFICE VISIT (OUTPATIENT)
Dept: ORTHOPEDIC SURGERY | Age: 71
End: 2021-08-03
Payer: MEDICARE

## 2021-08-03 VITALS — WEIGHT: 178 LBS | BODY MASS INDEX: 28.61 KG/M2 | HEIGHT: 66 IN

## 2021-08-03 DIAGNOSIS — M17.12 PRIMARY OSTEOARTHRITIS OF LEFT KNEE: Primary | ICD-10-CM

## 2021-08-03 PROCEDURE — 1123F ACP DISCUSS/DSCN MKR DOCD: CPT | Performed by: ORTHOPAEDIC SURGERY

## 2021-08-03 PROCEDURE — 3017F COLORECTAL CA SCREEN DOC REV: CPT | Performed by: ORTHOPAEDIC SURGERY

## 2021-08-03 PROCEDURE — 1090F PRES/ABSN URINE INCON ASSESS: CPT | Performed by: ORTHOPAEDIC SURGERY

## 2021-08-03 PROCEDURE — G8417 CALC BMI ABV UP PARAM F/U: HCPCS | Performed by: ORTHOPAEDIC SURGERY

## 2021-08-03 PROCEDURE — G8399 PT W/DXA RESULTS DOCUMENT: HCPCS | Performed by: ORTHOPAEDIC SURGERY

## 2021-08-03 PROCEDURE — 4040F PNEUMOC VAC/ADMIN/RCVD: CPT | Performed by: ORTHOPAEDIC SURGERY

## 2021-08-03 PROCEDURE — 99212 OFFICE O/P EST SF 10 MIN: CPT | Performed by: ORTHOPAEDIC SURGERY

## 2021-08-03 PROCEDURE — G8427 DOCREV CUR MEDS BY ELIG CLIN: HCPCS | Performed by: ORTHOPAEDIC SURGERY

## 2021-08-03 PROCEDURE — 1036F TOBACCO NON-USER: CPT | Performed by: ORTHOPAEDIC SURGERY

## 2021-08-03 NOTE — LETTER
[address] [place] [address]                                                            PRE OP ORDERS  Date:  8/3/2021    Name: Lillian Alexander    : 1950    Please take this form with you. [facility]-3 WEEKS PRIOR TO SURGERY    THE FOLLOWING LABS NEED TO BE COMPLETED:    _x__UA  _x__URINE C/S (THIS NEEDS TO BE DONE EVEN IF THE UA IS NORMAL)  _x__CBC W/ DIFF  _x__PT/INR  _x__PTT  _x__TRANSFERRIN  _x__ALBUMIN  _x__CHEM 7  _x__HEMAGLOBIN A1-C if patient is diabetic  _x___EKG if NOT already completed by PCP/Cardiologist                           Diagnosis:  Left knee OSTEOARTHRITIS            RT KNEE OA M17.11      LT KNEE OA M17.12         RT HIP OA  M16.11         LT HIP OA M16.12         RT SHLD OA  M19.011   LT SHLD OA  M19.012             Z01.812  (Pre-op examination code)              Yuri Chris.  Kirk Ortiz MD        8/3/2021 11:25 AM

## 2021-08-03 NOTE — PROGRESS NOTES
She presents for reevaluation of her left knee. She has had multiple conservative interventions including injections bracing therapy and medication. At this time she persistent having pain and the inability to do anything that revolves around long-term outside community activities. Basically she says as long she just stays home and sits she is okay but anytime she goes get up despite the  brace she has she has persistent pain. I did review the x-rays that she had prior with her which shows end-stage arthritis mostly in the lateral compartment of the knee. She does have a valgus thrust with gait. In consideration of her failure to improve with 6+ months of conservative care having end-stage osteoarthritis with substantial community activity and ambulatory deficits and deficiencies, I would recommend proceeding with left total knee replacement. The patient was counseled at length about the risks of shayy Covid-19 during their perioperative period and any recovery window from their procedure. The patient was made aware that shayy Covid-19  may worsen their prognosis for recovering from their procedure  and lend to a higher morbidity and/or mortality risk. All material risks, benefits, and reasonable alternatives including postponing the procedure were discussed. The patient does wish to proceed with the procedure at this time. INFORMED CONSENT NOTE        We discussed the risks, benefits, and alternatives to the proposed procedure, as well as the necessity of other members of the healthcare team participating in the procedure. All questions were answered and the patient elected to proceed with the proposed procedure and signed the informed consent form.

## 2021-08-18 ENCOUNTER — TELEPHONE (OUTPATIENT)
Dept: ORTHOPEDIC SURGERY | Age: 71
End: 2021-08-18

## 2021-08-19 ENCOUNTER — HOSPITAL ENCOUNTER (OUTPATIENT)
Dept: GENERAL RADIOLOGY | Age: 71
Discharge: HOME | End: 2021-08-19
Payer: MEDICARE

## 2021-08-19 DIAGNOSIS — Z78.0 POST-MENOPAUSAL: ICD-10-CM

## 2021-08-19 PROCEDURE — 77080 DXA BONE DENSITY AXIAL: CPT

## 2021-08-20 ENCOUNTER — TELEPHONE (OUTPATIENT)
Dept: ORTHOPEDIC SURGERY | Age: 71
End: 2021-08-20

## 2021-08-27 DIAGNOSIS — M17.12 PRIMARY OSTEOARTHRITIS OF LEFT KNEE: Primary | ICD-10-CM

## 2021-09-02 ENCOUNTER — TELEPHONE (OUTPATIENT)
Dept: ORTHOPEDIC SURGERY | Age: 71
End: 2021-09-02

## 2021-09-02 DIAGNOSIS — M17.12 PRIMARY OSTEOARTHRITIS OF LEFT KNEE: Primary | ICD-10-CM

## 2021-09-02 NOTE — TELEPHONE ENCOUNTER
DID LEAVE MESSAGE FOR PATIENT, WILL NEED COVID TEST DONE 7 DAYS PRIOR TO SX, DID LEAVE DATE AND TIMES FOR LAB AT [facility] FOR TEST.

## 2021-09-14 ENCOUNTER — HOSPITAL ENCOUNTER (OUTPATIENT)
Age: 71
Discharge: HOME | End: 2021-09-14
Payer: MEDICARE

## 2021-09-14 ENCOUNTER — TELEPHONE (OUTPATIENT)
Dept: ORTHOPEDIC SURGERY | Age: 71
End: 2021-09-14

## 2021-09-14 LAB
ALBUMIN SERPL-MCNC: 4.1 G/DL (ref 3.4–5)
ANION GAP SERPL CALCULATED.3IONS-SCNC: 17 MMOL/L (ref 3–16)
APTT: 38.6 SEC (ref 26.2–38.6)
BACTERIA: ABNORMAL /HPF
BASOPHILS ABSOLUTE: 0.1 K/UL (ref 0–0.2)
BASOPHILS RELATIVE PERCENT: 1 %
BILIRUBIN URINE: NEGATIVE
BLOOD, URINE: ABNORMAL
BUN BLDV-MCNC: 28 MG/DL (ref 7–20)
CALCIUM SERPL-MCNC: 9.8 MG/DL (ref 8.3–10.6)
CHLORIDE BLD-SCNC: 97 MMOL/L (ref 99–110)
CLARITY: ABNORMAL
CO2: 22 MMOL/L (ref 21–32)
COLOR: YELLOW
CREAT SERPL-MCNC: 6 MG/DL (ref 0.6–1.2)
EKG ATRIAL RATE: 79 BPM
EKG DIAGNOSIS: NORMAL
EKG P AXIS: 62 DEGREES
EKG P-R INTERVAL: 144 MS
EKG Q-T INTERVAL: 386 MS
EKG QRS DURATION: 90 MS
EKG QTC CALCULATION (BAZETT): 442 MS
EKG R AXIS: 36 DEGREES
EKG T AXIS: 46 DEGREES
EKG VENTRICULAR RATE: 79 BPM
EOSINOPHILS ABSOLUTE: 0.3 K/UL (ref 0–0.6)
EOSINOPHILS RELATIVE PERCENT: 4.9 %
EPITHELIAL CELLS, UA: ABNORMAL /HPF (ref 0–5)
GFR AFRICAN AMERICAN: 8
GFR NON-AFRICAN AMERICAN: 7
GLUCOSE BLD-MCNC: 163 MG/DL (ref 70–99)
GLUCOSE URINE: NEGATIVE MG/DL
HCT VFR BLD CALC: 36.1 % (ref 36–48)
HEMOGLOBIN: 11.6 G/DL (ref 12–16)
INR BLD: 0.94 (ref 0.88–1.12)
KETONES, URINE: NEGATIVE MG/DL
LEUKOCYTE ESTERASE, URINE: ABNORMAL
LYMPHOCYTES ABSOLUTE: 1.4 K/UL (ref 1–5.1)
LYMPHOCYTES RELATIVE PERCENT: 22.4 %
MCH RBC QN AUTO: 31.3 PG (ref 26–34)
MCHC RBC AUTO-ENTMCNC: 32.3 G/DL (ref 31–36)
MCV RBC AUTO: 96.9 FL (ref 80–100)
MICROSCOPIC EXAMINATION: YES
MONOCYTES ABSOLUTE: 0.4 K/UL (ref 0–1.3)
MONOCYTES RELATIVE PERCENT: 6.4 %
NEUTROPHILS ABSOLUTE: 4.1 K/UL (ref 1.7–7.7)
NEUTROPHILS RELATIVE PERCENT: 65.3 %
NITRITE, URINE: NEGATIVE
PDW BLD-RTO: 17 % (ref 12.4–15.4)
PH UA: 6.5 (ref 5–8)
PLATELET # BLD: 207 K/UL (ref 135–450)
PMV BLD AUTO: 8.5 FL (ref 5–10.5)
POTASSIUM SERPL-SCNC: 4.7 MMOL/L (ref 3.5–5.1)
PROTEIN UA: >=300 MG/DL
PROTHROMBIN TIME: 10.6 SEC (ref 9.9–12.7)
RBC # BLD: 3.72 M/UL (ref 4–5.2)
RBC UA: ABNORMAL /HPF (ref 0–4)
SODIUM BLD-SCNC: 136 MMOL/L (ref 136–145)
SPECIFIC GRAVITY UA: 1.01 (ref 1–1.03)
TRANSFERRIN: 122 MG/DL (ref 200–360)
URINE TYPE: ABNORMAL
UROBILINOGEN, URINE: 0.2 E.U./DL
WBC # BLD: 6.4 K/UL (ref 4–11)
WBC UA: >100 /HPF (ref 0–5)

## 2021-09-14 PROCEDURE — 93010 ELECTROCARDIOGRAM REPORT: CPT | Performed by: INTERNAL MEDICINE

## 2021-09-14 PROCEDURE — 87088 URINE BACTERIA CULTURE: CPT

## 2021-09-14 PROCEDURE — 87186 SC STD MICRODIL/AGAR DIL: CPT

## 2021-09-14 PROCEDURE — 83036 HEMOGLOBIN GLYCOSYLATED A1C: CPT

## 2021-09-14 PROCEDURE — 82040 ASSAY OF SERUM ALBUMIN: CPT

## 2021-09-14 PROCEDURE — 85025 COMPLETE CBC W/AUTO DIFF WBC: CPT

## 2021-09-14 PROCEDURE — 84466 ASSAY OF TRANSFERRIN: CPT

## 2021-09-14 PROCEDURE — 81001 URINALYSIS AUTO W/SCOPE: CPT

## 2021-09-14 PROCEDURE — 36415 COLL VENOUS BLD VENIPUNCTURE: CPT

## 2021-09-14 PROCEDURE — 80048 BASIC METABOLIC PNL TOTAL CA: CPT

## 2021-09-14 PROCEDURE — 93005 ELECTROCARDIOGRAM TRACING: CPT | Performed by: ORTHOPAEDIC SURGERY

## 2021-09-14 PROCEDURE — 85730 THROMBOPLASTIN TIME PARTIAL: CPT

## 2021-09-14 PROCEDURE — 85610 PROTHROMBIN TIME: CPT

## 2021-09-14 PROCEDURE — 87086 URINE CULTURE/COLONY COUNT: CPT

## 2021-09-15 LAB
ORGANISM: ABNORMAL
URINE CULTURE, ROUTINE: ABNORMAL

## 2021-09-16 NOTE — TELEPHONE ENCOUNTER
Caitlyn Dawson [id number]    Date: 10/04/2021  Type of SX:  Inpatient  [place] [facility]  CPT: 72352   DX Code: M17.12  SX area: Lt knee  Insurance: [facility] Medicare

## 2021-09-17 ENCOUNTER — TELEPHONE (OUTPATIENT)
Dept: ORTHOPEDIC SURGERY | Age: 71
End: 2021-09-17

## 2021-09-17 NOTE — TELEPHONE ENCOUNTER
DID LEAVE MESSAGE FOR PATIENT THAT INPATIENT ELECTIVE SURGERIES  MAY BE CX PER [facility]. I WILL LET PATIENT KNOW NEXT WEEK. DID ASK FOR A RETURN CALL THAT SHE RECEIVED THIS MESSAGE.

## 2021-09-20 ENCOUNTER — TELEPHONE (OUTPATIENT)
Dept: ORTHOPEDIC SURGERY | Age: 71
End: 2021-09-20

## 2021-09-20 LAB
ESTIMATED AVERAGE GLUCOSE: 128.4 MG/DL
HBA1C MFR BLD: 6.1 %

## 2021-09-20 NOTE — TELEPHONE ENCOUNTER
DID LEAVE MESSAGE FOR PATIENT, ELECTIVE INPATIENT SX ARE CX PER [facility], WILL CONTACT WHEN WE CAN RESCHEDULE SX.

## 2021-10-26 ENCOUNTER — OFFICE VISIT (OUTPATIENT)
Dept: ORTHOPEDIC SURGERY | Age: 71
End: 2021-10-26
Payer: MEDICARE

## 2021-10-26 VITALS — HEIGHT: 66 IN | BODY MASS INDEX: 28.61 KG/M2 | WEIGHT: 178 LBS

## 2021-10-26 DIAGNOSIS — M17.12 PRIMARY OSTEOARTHRITIS OF LEFT KNEE: Primary | ICD-10-CM

## 2021-10-26 PROCEDURE — G8484 FLU IMMUNIZE NO ADMIN: HCPCS | Performed by: ORTHOPAEDIC SURGERY

## 2021-10-26 PROCEDURE — G8427 DOCREV CUR MEDS BY ELIG CLIN: HCPCS | Performed by: ORTHOPAEDIC SURGERY

## 2021-10-26 PROCEDURE — 99212 OFFICE O/P EST SF 10 MIN: CPT | Performed by: ORTHOPAEDIC SURGERY

## 2021-10-26 PROCEDURE — G8417 CALC BMI ABV UP PARAM F/U: HCPCS | Performed by: ORTHOPAEDIC SURGERY

## 2021-10-26 PROCEDURE — 1036F TOBACCO NON-USER: CPT | Performed by: ORTHOPAEDIC SURGERY

## 2021-10-26 PROCEDURE — 4040F PNEUMOC VAC/ADMIN/RCVD: CPT | Performed by: ORTHOPAEDIC SURGERY

## 2021-10-26 PROCEDURE — 3017F COLORECTAL CA SCREEN DOC REV: CPT | Performed by: ORTHOPAEDIC SURGERY

## 2021-10-26 PROCEDURE — G8399 PT W/DXA RESULTS DOCUMENT: HCPCS | Performed by: ORTHOPAEDIC SURGERY

## 2021-10-26 PROCEDURE — 1123F ACP DISCUSS/DSCN MKR DOCD: CPT | Performed by: ORTHOPAEDIC SURGERY

## 2021-10-26 PROCEDURE — 1090F PRES/ABSN URINE INCON ASSESS: CPT | Performed by: ORTHOPAEDIC SURGERY

## 2021-10-26 NOTE — PROGRESS NOTES
Basically she came today because she has been counseled for a left total knee replacement because of Covid admissions at [facility], where the hospital decided to cancel all elective surgeries that would require an overnight stay or an inpatient stay. I told her that I am backed up for the foreseeable future and cannot tell her why I would be able to do it at [place]. I did tell her though that Dr. Ricki Nuñez, one of our partners, and does operated [facility] and I will arrange for her to have an appointment with him next week and hopefully be scheduled for knee replacement surgery in the near future.

## 2021-11-02 ENCOUNTER — OFFICE VISIT (OUTPATIENT)
Dept: ORTHOPEDIC SURGERY | Age: 71
End: 2021-11-02
Payer: MEDICARE

## 2021-11-02 VITALS — HEIGHT: 66 IN | BODY MASS INDEX: 28.61 KG/M2 | WEIGHT: 178 LBS

## 2021-11-02 DIAGNOSIS — M17.12 PRIMARY OSTEOARTHRITIS OF LEFT KNEE: Primary | ICD-10-CM

## 2021-11-02 PROCEDURE — 3017F COLORECTAL CA SCREEN DOC REV: CPT | Performed by: ORTHOPAEDIC SURGERY

## 2021-11-02 PROCEDURE — G8417 CALC BMI ABV UP PARAM F/U: HCPCS | Performed by: ORTHOPAEDIC SURGERY

## 2021-11-02 PROCEDURE — G8484 FLU IMMUNIZE NO ADMIN: HCPCS | Performed by: ORTHOPAEDIC SURGERY

## 2021-11-02 PROCEDURE — 1036F TOBACCO NON-USER: CPT | Performed by: ORTHOPAEDIC SURGERY

## 2021-11-02 PROCEDURE — 1123F ACP DISCUSS/DSCN MKR DOCD: CPT | Performed by: ORTHOPAEDIC SURGERY

## 2021-11-02 PROCEDURE — 4040F PNEUMOC VAC/ADMIN/RCVD: CPT | Performed by: ORTHOPAEDIC SURGERY

## 2021-11-02 PROCEDURE — G8399 PT W/DXA RESULTS DOCUMENT: HCPCS | Performed by: ORTHOPAEDIC SURGERY

## 2021-11-02 PROCEDURE — G8427 DOCREV CUR MEDS BY ELIG CLIN: HCPCS | Performed by: ORTHOPAEDIC SURGERY

## 2021-11-02 PROCEDURE — 99214 OFFICE O/P EST MOD 30 MIN: CPT | Performed by: ORTHOPAEDIC SURGERY

## 2021-11-02 PROCEDURE — 1090F PRES/ABSN URINE INCON ASSESS: CPT | Performed by: ORTHOPAEDIC SURGERY

## 2021-11-03 ASSESSMENT — ENCOUNTER SYMPTOMS: COLOR CHANGE: 0

## 2021-11-03 NOTE — PROGRESS NOTES
ORTHOPAEDIC SURGERY INITIAL EVALUATION NOTE  Chief Complaint   Patient presents with    New Patient     L knee - Ref by Antonio Mcgregor for Total       HISTORY OF PRESENT ILLNESS:  69-year-old female presents for evaluation of longstanding left knee pain and deformity. She has had prior conservative management with Dr. Elyse Russell including bracing, oral anti-inflammatories, and injections. She has been managed with this over the past year or so. She continues to endorse significant pain interfering with her activities of daily living. She did not have dysesthesias. She does have some neuropathy in her lower extremities. She is diabetic but has had good control of her hemoglobin A1c. She is also is a renal dialysis patient with a fistula in her left upper extremity. She was previously scheduled for total knee arthroplasty with Dr. Elyse Russell, however due to the COVID-19 pandemic, she is unable to have this performed at [facility].  She experiences a significant sense of instability. She feels her gait is significantly impaired by her knee. She did have her preoperative testing which included a positive urinalysis. She has been treated with a course of antibiotics for this. Past Medical History:   Diagnosis Date    Chronic kidney disease     Diabetes mellitus ([facility] [address]     Hyperkalemia 7/13/2016    Hyperlipidemia     Hypertension     Retinopathy     Sepsis (HCC)        Current Outpatient Medications   Medication Sig Dispense Refill    mupirocin (BACTROBAN) 2 % ointment APPLY 1/2 INCH TO INSIDE OF EACH NOSTRIL Q 12 HR STARTING 5 DAYS PRIOR TO SURGERY INCLUDING MORNING OF SURGERY.  1 Tube 0    levothyroxine (SYNTHROID) 50 MCG tablet Take 50 mcg by mouth Daily      amLODIPine (NORVASC) 5 MG tablet Take 5 mg by mouth daily      hydrALAZINE (APRESOLINE) 25 MG tablet Take 25 mg by mouth daily       losartan (COZAAR) 100 MG tablet Take 100 mg by mouth daily      insulin glargine (LANTUS) 100 UNIT/ML injection vial Inject 8 Units into the skin every morning 20 units if bs 150 or above, 15 units if below bs is below. 1 vial 0    metolazone (ZAROXOLYN) 10 MG tablet Take 1 tablet by mouth daily 30 tablet 1    aspirin 81 MG EC tablet Take 1 tablet by mouth daily Take with food 30 tablet 0    metoprolol (TOPROL-XL) 50 MG XL tablet Take 50 mg by mouth daily      gabapentin (NEURONTIN) 300 MG capsule Take 300 mg by mouth every evening Take 1-2 tablets every evening before bed.  atorvastatin (LIPITOR) 20 MG tablet Take 20 mg by mouth daily      sertraline (ZOLOFT) 25 MG tablet Take 25 mg by mouth daily       No current facility-administered medications for this visit. Past Surgical History:   Procedure Laterality Date    DIALYSIS FISTULA CREATION Left 08/10/2016    Dr. Richard Serrano - upper arm, basilic vein transposition    EYE SURGERY Left 2018    catarct removal with lens implant     OTHER SURGICAL HISTORY Right 2019    partial foot amputation    TOE AMPUTATION Right 2019    PARTIAL FOOT AMPUTATION WITH GRAFT APPLICATION performed by Antonette Garcia DPM at [facility]       No Known Allergies    History reviewed. No pertinent family history.     Social History     Socioeconomic History    Marital status:      Spouse name: Not on file    Number of children: Not on file    Years of education: Not on file    Highest education level: Not on file   Occupational History    Not on file   Tobacco Use    Smoking status: Former Smoker     Quit date: 2014     Years since quittin.3    Smokeless tobacco: Never Used   Substance and Sexual Activity    Alcohol use: No    Drug use: No    Sexual activity: Never   Other Topics Concern    Not on file   Social History Narrative    Not on file     Social Determinants of Health     Financial Resource Strain:     Difficulty of Paying Living Expenses:    Food Insecurity:     Worried About Running Out of Food in the Last Year:     Felicia of Food in the Last Year:    Transportation Needs:     Lack of Transportation (Medical):  Lack of Transportation (Non-Medical):    Physical Activity:     Days of Exercise per Week:     Minutes of Exercise per Session:    Stress:     Feeling of Stress :    Social Connections:     Frequency of Communication with Friends and Family:     Frequency of Social Gatherings with Friends and Family:     Attends Shinto Services:     Active Member of Clubs or Organizations:     Attends Club or Organization Meetings:     Marital Status:    Intimate Partner Violence:     Fear of Current or Ex-Partner:     Emotionally Abused:     Physically Abused:     Sexually Abused:        Review of Systems   Constitutional: Negative for chills and fever. Cardiovascular: Positive for leg swelling. Musculoskeletal: Positive for arthralgias, gait problem, joint swelling and myalgias. Skin: Negative for color change, pallor, rash and wound. Neurological: Positive for weakness and numbness. PHYSICAL EXAM  Gen: awake, alert, oriented  HEENT: atraumatic, normocephalic  Neck: supple  Resp: equal chest rise bilaterally, no audible wheezes, no accessory muscle use. CV: Lower extremities warm and well perfused. Abd: soft, nontender   Focused examination of the left knee:  Clinically, there is severe valgus malalignment approximating 30 degrees. There are no cuts, open wounds, or abrasions to the left knee. There is a moderate effusion. There is tenderness to palpation about the lateral joint line. There is tenderness along the MCL. Valgus deformity is passively correctable with varus stress. This is correctable to near anatomic alignment. There is minor further valgus opening with valgus stress. There is a firm endpoint. There is negative anterior and posterior drawer test.  Negative Lachman. There is range of motion from full extension to 110 degrees of flexion. There is no hyperextension.  Sensation is intact to light touch in deep peroneal, superficial peroneal, tibial, sural, and saphenous nerve distributions. She does endorse chronic minor neuropathy to her lower extremity. Motor function is intact to EHL, FHL, tibialis anterior, and gastroc. There is brisk capillary refill to the toes and a strong palpable dorsalis pedis pulse. Compartments are soft and compressible. There is no calf tenderness and a negative Homans' sign. LABS:  WBC 6.4  Hemoglobin 11.6  Platelets 883  INR 6.30  Hemoglobin A1c 6.1  Albumin 4.1  Creatinine 6.0  Urinalysis 9/14/2021 demonstrates positive bacteria    RADIOGRAPHIC EXAM:  4 views of the left knee including weightbearing AP, tunnel, lateral, and sunrise x-rays demonstrate severe valgus deformity of the left knee approximating 30 degrees as measured in AP projection. There is complete loss of the lateral joint space with bone to bone apposition. There is erosion of the lateral tibial plateau. There is osteophyte formation in this area. There is a significant increase in medial joint space consistent with significant incompetence of the MCL. There is vascular calcifications present. There is subchondral cystic formation and sclerosis in this area. There is moderate anterior subluxation of the tibia in relation to the distal femur. Patellar tracking is appropriate. No significant lateral subluxation or tilt. ASSESSEMENT AND PLAN    66 y.o. female with the following orthopaedic surgery problems:    1. Left knee severe valgus deformity with degenerative joint disease. Recommend surgical intervention in the form of a total knee arthroplasty. I discussed the risks, benefits, and alternatives to surgical intervention. I discussed the nuances with a valgus deformity knee. We discussed the possibility of a foot drop and a common peroneal nerve injury. We discussed the continued incompetence of the MCL and the potential for instability.   Would recommend a increased level of constraint for her total knee arthroplasty. Hinged knee would be on standby in the event of continued ligamentous instability after positioning of the trial components. She was given the opportunity to ask questions. Informed consent was obtained. She will need to perform her preoperative testing since these lab results are more than 30 days in the past.  She will also need to see her primary care physician for operative clearance. We would plan to perform the surgery at [facility] in the near future. All questions were answered to the best of my ability.     Jabari Munson MD

## 2021-11-10 DIAGNOSIS — M17.12 PRIMARY OSTEOARTHRITIS OF LEFT KNEE: ICD-10-CM

## 2021-11-10 NOTE — PROGRESS NOTES
Marvel Leon    Age 66 y.o.    female    1950    MRN [id number]    12/6/2021  Arrival Time_____________  OR Time____________[address]     Procedure(s):  LEFT TOTAL KNEE ARTHROPLASTY               CLAIRE AVUGHN                      Spinal     Surgeon(s):  Fiordaliza Diaz, MD      DAY ADMIT ___  SDS/OP ___  OUTPT IN BED ___         Phone [phone] (home)    PCP _____________________ Phone_________________ Epic ( ) Epic CE ( ) Appt ________    ADDITIONAL INFO __________________________________ Cardio/Consult _____________    NOTES _____________________________________________________________________    ____________________________________________________________________________    PAT APPT DATE:________ TIME: ________  FAXED QAD: _______  (__) H&P w/ hospitalist  ____________________________________________________________________________    COVID TEST: Date/Location______________        NURSING HISTORY COMPLETE: _______  (__) CBC       (__) W/ DIFF ___________  (__)  ECHO    __________  (__) Hgb A1C    ___________  (__) CHEST X RAY   __________  (__) LIPID PROFILE  ___________  (__) EKG   __________  (__) PT/PTT   ___________  (__) PFT's   __________  (__) BMP   ___________  (__) CAROTIDS  __________  (__) CMP   ___________  (__) VEIN MAPPING  __________  (__) U/A   ___________  (__) HISTORY & PHYSICAL __________  (__) URINE C & S  ___________  (__) CARDIAC CLEARANCE __________  (__) U/A W/ FLEX  ___________  (__) PULM.  CLEARANCE __________  (__) SERUM PREGNANCY ___________  (__) Check Epic DOS orders __________  (__) TYPE & SCREEN ________ repeat ( ) (__)  __________________ __________  (__) ALBUMIN   ___________  (__)  __________________ __________  (__) TRANSFERRIN  ___________  (__)  __________________ __________  (__) LIVER PROFILE  ___________  (__)  __________________ __________  (__) CARBOXY HGB  ___________  (__) URINE PREG DOS __________  (__) NICOTINE & MET.  ___________  (__) BLOOD SUGAR DOS __________  (__) PREALBUMIN  ___________    (__) MRSA NASAL SWAB ___________  (__) BLOOD THINNERS __________  (__) ACE/ ARBS: _____________________    (__) BETABLOCKERS ___________________

## 2021-11-16 ENCOUNTER — TELEPHONE (OUTPATIENT)
Dept: ORTHOPEDIC SURGERY | Age: 71
End: 2021-11-16

## 2021-11-16 NOTE — TELEPHONE ENCOUNTER
Surgery cancelled due to elevated potassium on day of surgery. Pt is dialysis pt- MWF schedule. RN spoke with NP Lex Cook and Dr. Meg Caraballo prior to surgery regarding dialysis schedule following surgery. Dr. Flaco Bailon aware and to consult nephro when pt gets admitted. COVID:11/30/21- Negative      Orthopedic Nurse Navigator Summary  -  Patient Name: William Dao  Anticipated Date of Surgery:  Using [employer]? Yes, Are they Registered: Yes  Attended Pre-Op Education Class: No  If No, why not? PCP:  Phone #:  Date of PCP Visit for H&P: 11/23/2021  Any Noted Concerns from PCP prior to surgery: No  If Yes, what concerns?:  Is the Patient in a Pain Management Program?: No  Review of Past Medical History Reveals History of:  -  Critical Lab Values:  - Hemoglobin (g/dL): Date Value 11.6  - Hematocrit (%): Date Value  - HgbA1C : Date Value 6.1  - Albumin : Date Value 4.1  - BUN (mg/dL) : Date Value 28.0  - CREATININE (mg/dL) : Date Value 6.0  - BMI (kg/m2) : Date Value  -  Coronary Artery Disease/HTN/CHF History:  Cardiologist:  Cardiac Clearance Necessary:  Date of Cardiac Clearance Appt: On Plavix? If YES, when will they stop taking? Final Cardiac Recommendations: On any anticoagulation: No  -  Diabetes History: Yes  Most Recent HgbA1C: 6.1  PCP or Endocrine Recommendations:  Nutritionist/Dietician Consult Scheduled:  Final Plan For Diabetic Control:  Pulmonary: COPD/Emphysema/ Use of home oxygen: NONE  Alcohol use: Non-Drinker  -  DVT Risk Stratification: Low Risk  Vascular Consult Ordered:  Date of Vascular Appt:  Hematology/Oncology Consult Ordered:  Date of Hematology/Oncology Appt:  Final Recommendation For DVT Prophylaxis:  Smoking history: Non-Smoker  Use of Estrogen:  -  BMI Greater than 40 at time of scheduling?: No  Has Surgeon been notified of BMI concern?  No  Weight Loss Clinic Consult Ordered No  Date of Wt Loss Clinic Appt:  BMI at time of surgery (if went through [facility]):  -  Additional Medical Concerns:   Additional Recommendations for above concerns:  Attended Pre-Hab Program: Yes  Anticipated Discharge Disposition: Home with Aruna Shultz  Who will be with patient at home following discharge? son and daughter in law  Equipment patient already has: walker w wheels  Bedroom on first or second floor: first  Bathroom on first or second floor: first  Weight bearing status: full  Pre-op ambulatory status:  Number of entry steps: Ramp on back of home  Caregiver assistance: full time  [facility]  12/06/2021

## 2021-11-18 ENCOUNTER — TELEPHONE (OUTPATIENT)
Dept: ORTHOPEDIC SURGERY | Age: 71
End: 2021-11-18

## 2021-11-22 ENCOUNTER — HOSPITAL ENCOUNTER (OUTPATIENT)
Age: 71
Discharge: HOME | End: 2021-11-22
Payer: MEDICARE

## 2021-11-22 DIAGNOSIS — M17.12 PRIMARY OSTEOARTHRITIS OF LEFT KNEE: ICD-10-CM

## 2021-11-22 LAB
ALBUMIN SERPL-MCNC: 4.1 G/DL (ref 3.4–5)
ANION GAP SERPL CALCULATED.3IONS-SCNC: 16 MMOL/L (ref 3–16)
APTT: 35.9 SEC (ref 26.2–38.6)
BASOPHILS ABSOLUTE: 0.1 K/UL (ref 0–0.2)
BASOPHILS RELATIVE PERCENT: 1.3 %
BUN BLDV-MCNC: 22 MG/DL (ref 7–20)
CALCIUM SERPL-MCNC: 9.4 MG/DL (ref 8.3–10.6)
CHLORIDE BLD-SCNC: 99 MMOL/L (ref 99–110)
CO2: 23 MMOL/L (ref 21–32)
CREAT SERPL-MCNC: 5 MG/DL (ref 0.6–1.2)
EOSINOPHILS ABSOLUTE: 0.3 K/UL (ref 0–0.6)
EOSINOPHILS RELATIVE PERCENT: 5.5 %
GFR AFRICAN AMERICAN: 10
GFR NON-AFRICAN AMERICAN: 8
GLUCOSE BLD-MCNC: 116 MG/DL (ref 70–99)
HCT VFR BLD CALC: 31.6 % (ref 36–48)
HEMOGLOBIN: 10.1 G/DL (ref 12–16)
INR BLD: 0.94 (ref 0.88–1.12)
LYMPHOCYTES ABSOLUTE: 1.2 K/UL (ref 1–5.1)
LYMPHOCYTES RELATIVE PERCENT: 21.5 %
MCH RBC QN AUTO: 30.1 PG (ref 26–34)
MCHC RBC AUTO-ENTMCNC: 32.1 G/DL (ref 31–36)
MCV RBC AUTO: 93.9 FL (ref 80–100)
MONOCYTES ABSOLUTE: 0.4 K/UL (ref 0–1.3)
MONOCYTES RELATIVE PERCENT: 6.9 %
NEUTROPHILS ABSOLUTE: 3.5 K/UL (ref 1.7–7.7)
NEUTROPHILS RELATIVE PERCENT: 64.8 %
PDW BLD-RTO: 17.2 % (ref 12.4–15.4)
PLATELET # BLD: 236 K/UL (ref 135–450)
PMV BLD AUTO: 9.7 FL (ref 5–10.5)
POTASSIUM SERPL-SCNC: 4.6 MMOL/L (ref 3.5–5.1)
PROTHROMBIN TIME: 10.6 SEC (ref 9.9–12.7)
RBC # BLD: 3.36 M/UL (ref 4–5.2)
SODIUM BLD-SCNC: 138 MMOL/L (ref 136–145)
WBC # BLD: 5.5 K/UL (ref 4–11)

## 2021-11-22 PROCEDURE — 82040 ASSAY OF SERUM ALBUMIN: CPT

## 2021-11-22 PROCEDURE — 85610 PROTHROMBIN TIME: CPT

## 2021-11-22 PROCEDURE — 36415 COLL VENOUS BLD VENIPUNCTURE: CPT

## 2021-11-22 PROCEDURE — 85730 THROMBOPLASTIN TIME PARTIAL: CPT

## 2021-11-22 PROCEDURE — 83036 HEMOGLOBIN GLYCOSYLATED A1C: CPT

## 2021-11-22 PROCEDURE — 80048 BASIC METABOLIC PNL TOTAL CA: CPT

## 2021-11-22 PROCEDURE — 85025 COMPLETE CBC W/AUTO DIFF WBC: CPT

## 2021-11-23 LAB
ESTIMATED AVERAGE GLUCOSE: 139.9 MG/DL
HBA1C MFR BLD: 6.5 %

## 2021-11-30 ENCOUNTER — HOSPITAL ENCOUNTER (OUTPATIENT)
Age: 71
Discharge: HOME | End: 2021-11-30
Payer: MEDICARE

## 2021-11-30 ENCOUNTER — HOSPITAL ENCOUNTER (OUTPATIENT)
Dept: PHYSICAL THERAPY | Age: 71
Setting detail: THERAPIES SERIES
Discharge: HOME | End: 2021-11-30
Payer: MEDICARE

## 2021-11-30 PROCEDURE — 97110 THERAPEUTIC EXERCISES: CPT | Performed by: PHYSICAL THERAPIST

## 2021-11-30 PROCEDURE — 97161 PT EVAL LOW COMPLEX 20 MIN: CPT | Performed by: PHYSICAL THERAPIST

## 2021-11-30 PROCEDURE — U0003 INFECTIOUS AGENT DETECTION BY NUCLEIC ACID (DNA OR RNA); SEVERE ACUTE RESPIRATORY SYNDROME CORONAVIRUS 2 (SARS-COV-2) (CORONAVIRUS DISEASE [COVID-19]), AMPLIFIED PROBE TECHNIQUE, MAKING USE OF HIGH THROUGHPUT TECHNOLOGIES AS DESCRIBED BY CMS-2020-01-R: HCPCS

## 2021-11-30 PROCEDURE — U0005 INFEC AGEN DETEC AMPLI PROBE: HCPCS

## 2021-11-30 PROCEDURE — 97112 NEUROMUSCULAR REEDUCATION: CPT | Performed by: PHYSICAL THERAPIST

## 2021-11-30 NOTE — PLAN OF CARE
[address], [facility] [place]  Phone: [phone]  Fax [phone]     Physical Therapy Certification    Dear Referring Practitioner: Lindy Ba,    We had the pleasure of evaluating the following patient for physical therapy services at [address] [place] [address]. A summary of our findings can be found in the initial assessment below. This includes our plan of care. If you have any questions or concerns regarding these findings, please do not hesitate to contact me at the office phone number checked above. Thank you for the referral.       Physician Signature:_______________________________Date:__________________  By signing above (or electronic signature), therapists plan is approved by physician    Patient: Huong Ross   : 1950   MRN: [id number]  Referring Physician: Referring Practitioner: Lindy Ba      Evaluation Date: 2021      Medical Diagnosis Information:  Diagnosis: Left knee OA / Martine Sanchez / TKA   Treatment Diagnosis: M17.12                                         Insurance information: PT Insurance Information: [facility] Medicare     Precautions/ Contra-indications:     C-SSRS Triggered by Intake questionnaire (Past 2 wk assessment):   [x] No, Questionnaire did not trigger screening.   [] Yes, Patient intake triggered further evaluation      [] C-SSRS Screening completed  [] PCP notified via Plan of Care  [] Emergency services notified     Latex Allergy:  [x]NO      []YES  Preferred Language for Healthcare:   [x]English       []other:    SUBJECTIVE: Patient stated complaint: patient is here for her left knee prehab visit in hopes to have a TKA by Dr. Alyssa Mcdermott.      Relevant Medical History:Chronic knee OA  Functional Disability Index: LEFS: 73% (22/80)    Pain Scale: 6/10  Easing factors: rest, cane use on occasion  Provocative factors: walking, stairs, car mobility, bathing, cleaning Type: []Constant   []Intermittent  []Radiating []Localized []other:     Numbness/Tingling: Neuropathy B LE    Occupation/School: retired    Living Status/Prior Level of Function: Independent with ADLs and IADLs          Reflexes/Myotomes:   [x]Dermatomes/Myotomes intact    [x]Reflexes equal and normal bilaterally   []Other:    Joint mobility:    []Normal    []Hypo   []Hyper    Palpation: NT    Functional Mobility/Transfers: IND w/ SBA    Posture: Severe genu valgum (Left)    Bandages/Dressings/Incisions: na    Gait: (include devices/WB status) Increased left knee valgum, B trendelenburg     Orthopedic Special Tests: NT                       [x] Patient history, allergies, meds reviewed. Medical chart reviewed. See intake form. Review Of Systems (ROS):  [x]Performed Review of systems (Integumentary, CardioPulmonary, Neurological) by intake and observation. Intake form has been scanned into medical record. Patient has been instructed to contact their primary care physician regarding ROS issues if not already being addressed at this time.       Co-morbidities/Complexities (which will affect course of rehabilitation):   []None           Arthritic conditions   []Rheumatoid arthritis (M05.9)  [x]Osteoarthritis (M19.91)   Cardiovascular conditions   [x]Hypertension (I10)  []Hyperlipidemia (E78.5)  []Angina pectoris (I20)  []Atherosclerosis (I70)   Musculoskeletal conditions   []Disc pathology   []Congenital spine pathologies   []Prior surgical intervention  []Osteoporosis (M81.8)  []Osteopenia (M85.8)   Endocrine conditions   []Hypothyroid (E03.9)  []Hyperthyroid Gastrointestinal conditions   []Constipation (U50.77)   Metabolic conditions   []Morbid obesity (E66.01)  [x]Diabetes type 1(E10.65) or 2 (E11.65)   []Neuropathy (G60.9)     Pulmonary conditions   []Asthma (J45)  []Coughing   []COPD (J44.9)   Psychological Disorders  []Anxiety (F41.9)  []Depression (F32.9)   []Other:   []Other:          Barriers to/and or personal factors that will affect rehab potential:              []Age  []Sex              []Motivation/Lack of Motivation                        []Co-Morbidities              []Cognitive Function, education/learning barriers              []Environmental, home barriers              []profession/work barriers  []past PT/medical experience  []other:  Justification:     Falls Risk Assessment (30 days): See TUG above  [] Falls Risk assessed and no intervention required. [x] Falls Risk assessed and Patient requires intervention due to being higher risk   TUG score (>12s at risk):        ASSESSMENT:   Functional Impairments:     []Noted lumbar/proximal hip/LE joint hypomobility   [x]Decreased LE functional ROM   [x]Decreased core/proximal hip strength and neuromuscular control   [x]Decreased LE functional strength   [x]Reduced balance/proprioceptive control   []other:      Functional Activity Limitations (from functional questionnaire and intake)   []Reduced ability to tolerate prolonged functional positions   [x]Reduced ability or difficulty with changes of positions or transfers between positions   [x]Reduced ability to maintain good posture and demonstrate good body mechanics with sitting, bending, and lifting   [x]Reduced ability to sleep   [x] Reduced ability or tolerance with driving and/or computer work   [x]Reduced ability to perform lifting, carrying tasks   [x]Reduced ability to squat   [x]Reduced ability to forward bend   [x]Reduced ability to ambulate prolonged functional periods/distances/surfaces   [x]Reduced ability to ascend/descend stairs   [x]Reduced ability to run, hop, cut or jump   []other:    Participation Restrictions   []Reduced participation in self care activities   [x]Reduced participation in home management activities   [x]Reduced participation in work activities   [x]Reduced participation in social activities. [x]Reduced participation in sport/recreation activities.     Classification : []Signs/symptoms consistent with post-surgical status including decreased ROM, strength and function. []Signs/symptoms consistent with joint sprain/strain   []Signs/symptoms consistent with patella-femoral syndrome   [x]Signs/symptoms consistent with knee OA/hip OA   []Signs/symptoms consistent with internal derangement of knee/Hip   []Signs/symptoms consistent with functional hip weakness/NMR control      []Signs/symptoms consistent with tendinitis/tendinosis    []signs/symptoms consistent with pathology which may benefit from Dry needling      []other:      Prognosis/Rehab Potential:      []Excellent   [x]Good    []Fair   []Poor    Tolerance of evaluation/treatment:    []Excellent   [x]Good    []Fair   []Poor  Physical Therapy Evaluation Complexity Justification  [x] A history of present problem with:  [] no personal factors and/or comorbidities that impact the plan of care;  [x]1-2 personal factors and/or comorbidities that impact the plan of care  []3 personal factors and/or comorbidities that impact the plan of care  [x] An examination of body systems using standardized tests and measures addressing any of the following: body structures and functions (impairments), activity limitations, and/or participation restrictions;:  [] a total of 1-2 or more elements   [x] a total of 3 or more elements   [] a total of 4 or more elements   [x] A clinical presentation with:  [x] stable and/or uncomplicated characteristics   [] evolving clinical presentation with changing characteristics  [] unstable and unpredictable characteristics;   [x] Clinical decision making of [x] low, [] moderate, [] high complexity using standardized patient assessment instrument and/or measurable assessment of functional outcome.     [x] EVAL (LOW) 16967 (typically 20 minutes face-to-face)  [] EVAL (MOD) 12726 (typically 30 minutes face-to-face)  [] EVAL (HIGH) 69854 (typically 45 minutes face-to-face)  [] RE-EVAL     PLAN: Frequency/Duration:  1-2 days per week for 1-2 Weeks:  Interventions:  [x]  Therapeutic exercise including: strength training, ROM, for Lower extremity and core   [x]  NMR activation and proprioception for LE, Glutes and Core   [x]  Manual therapy as indicated for LE, Hip and spine to include: Dry Needling/IASTM, STM, PROM, Gr I-IV mobilizations, manipulation. [x] Modalities as needed that may include: thermal agents, E-stim, Biofeedback, US, iontophoresis as indicated  [x] Patient education on joint protection, postural re-education, activity modification, progression of HEP. [x] Patient appears to be functionally prepared for surgery and will continue with a home exercise program until surgery date. [] Patient will be ready for surgery with a short period of structured physical therapy  [] Patient does not appear to be functionally ready for surgery and requires a prolonged  structure program.    At this point, it appears patient's discharge status from hospital should be:   [] Home with home exercise program  [x] Home with home health care  [] Skilled nursing facility    GOALS:  Patient stated goal: \"Prepared for Surgery\"    Therapist goals for Patient:   Short Term Goals: To be achieved in: 2 weeks  1. Independent in HEP and progression per patient tolerance, in order to prevent re-injury.      Electronically signed by:  Gayathri Gayle PT

## 2021-11-30 NOTE — FLOWSHEET NOTE
[facility], [address] [place] [address] [address]  Phone: [phone]   Fax:     [phone]    Physical Therapy Daily Treatment Note  Date:  2021    Patient Name:  Eddie Mejia    :  1950  MRN: [id number]  Restrictions/Precautions:    Medical/Treatment Diagnosis Information:  · Diagnosis: Left knee OA / Prehab / TKA  · Treatment Diagnosis: Y91.38  Insurance/Certification information:  PT Insurance Information: [facility] Medicare  Physician Information:  Referring Practitioner: Otis Velazquez  Plan of care signed (Y/N):     Date of Patient follow up with Physician:     Progress Note: [x]  Yes  []  No      If Yes:  Date Range for reporting period:  Initial Eval: 2021  Beginnin2021 --- Endin21    Progress report will be due (10 Rx or 30 days whichever is less):      Recertification will be due (POC Duration  / 90 days whichever is less):       Latex Allergy:  [x]NO      []YES  Preferred Language for Healthcare:   [x]English       []other:     Visit # Insurance Allowable Auth Required   In Person 1 BMN []  Yes     []  No    [facility] 0  []  Yes     []  No    Total 1       Pain level:  5-6/10     SUBJECTIVE:  See eval    OBJECTIVE: See eval   Observation:    Test measurements:      RESTRICTIONS/PRECAUTIONS:     Exercises/Interventions:     Therapeutic Ex Sets/reps Notes        Seated HS stretch 3x30 sec HEP   Seated calf stretch 3x30 sec HEP   Seated QS BLEs 10 sec 10x HEP   SLR FLX supine 1x10 HEP   Seated heelslide with strap 10 sec 10x HEP   LAQ  1x10 HEP   minisquats Pain - avoided                                            Manual Intervention                         Patient education 5 min Provided biomechanics/ergonomics training to reduce stress across injured/healing structures.         Therapeutic Exercise and NMR EXR  [x] (48885) Provided verbal/tactile cueing for activities related to strengthening, flexibility, endurance, ROM for improvements in LE, proximal hip, and core control with self care, mobility, lifting, ambulation.  [] (09138) Provided verbal/tactile cueing for activities related to improving balance, coordination, kinesthetic sense, posture, motor skill, proprioception  to assist with LE, proximal hip, and core control in self care, mobility, lifting, ambulation and eccentric single leg control. NMR and Therapeutic Activities:    [] (00972 or 64404) Provided verbal/tactile cueing for activities related to improving balance, coordination, kinesthetic sense, posture, motor skill, proprioception and motor activation to allow for proper function of core, proximal hip and LE with self care and ADLs  [] (29573) Gait Re-education- Provided training and instruction to the patient for proper LE, core and proximal hip recruitment and positioning and eccentric body weight control with ambulation re-education including up and down stairs     Home Exercise Program:    [x] (68377) Reviewed/Progressed HEP activities related to strengthening, flexibility, endurance, ROM of core, proximal hip and LE for functional self-care, mobility, lifting and ambulation/stair navigation   [] (30879)Reviewed/Progressed HEP activities related to improving balance, coordination, kinesthetic sense, posture, motor skill, proprioception of core, proximal hip and LE for self care, mobility, lifting, and ambulation/stair navigation      Manual Treatments:  PROM / STM / Oscillations-Mobs:  G-I, II, III, IV (PA's, Inf., Post.)  [] (10815) Provided manual therapy to mobilize LE, proximal hip and/or LS spine soft tissue/joints for the purpose of modulating pain, promoting relaxation,  increasing ROM, reducing/eliminating soft tissue swelling/inflammation/restriction, improving soft tissue extensibility and allowing for proper ROM for normal function with self care, mobility, lifting and ambulation.      Modalities: Charges:  Timed Code Treatment Minutes: 30   Total Treatment Minutes:  50   BWC:  TE TIME:  NMR TIME:  MANUAL TIME:  UNTIMED MINUTES:  Medicare Total:                 [x] EVAL (LOW) 86495 (typically 20 minutes face-to-face)  [] EVAL (MOD) 20693 (typically 30 minutes face-to-face)  [] EVAL (HIGH) 00885 (typically 45 minutes face-to-face)  [] RE-EVAL     [x] FH(84601) x 1    [] IONTO  [x] NMR (60440) x 1     [] VASO  [] Manual (82510) x      [] Other:  [] TA x      [] Mech Traction (90976)  [] ES(attended) (59589)      [] ES (un) (75937):     GOALS:   Patient stated goal: To be prepared for surgery      Therapist goals for Patient:    Short Term Goals: To be achieved in: 1 week  1. Independent in HEP and progression per patient tolerance, in order to prevent re-injury and to prepare for surgery by strength and flexibility therex . Progression Towards Functional goals:  [] Patient is progressing as expected towards functional goals listed. [] Progression is slowed due to complexities listed. [] Progression has been slowed due to co-morbidities.   [x] Plan just implemented, too soon to assess goals progression  [] Other:     ASSESSMENT:  See eval    Treatment/Activity Tolerance:  [] Patient tolerated treatment well [] Patient limited by fatique  [] Patient limited by pain  [] Patient limited by other medical complications  [] Other:     Prognosis: [] Good [] Fair  [] Poor          Patient Requires Follow-up: [] Yes  [] No    PLAN: See eval       [] Continue per plan of care [] Alter current plan (see comments)  [] Plan of care initiated [] Discharge     Electronically signed by: Kevin Fields, PT, PT

## 2021-12-01 LAB — SARS-COV-2: NOT DETECTED

## 2021-12-01 RX ORDER — CARVEDILOL 12.5 MG/1
25 TABLET ORAL 2 TIMES DAILY WITH MEALS
Status: ON HOLD | COMMUNITY
End: 2022-06-22 | Stop reason: HOSPADM

## 2021-12-01 RX ORDER — FERRIC CITRATE 210 MG/1
3 TABLET, COATED ORAL
COMMUNITY

## 2021-12-01 NOTE — PROGRESS NOTES
Preoperative Screening for Elective Surgery/Invasive Procedures While COVID-19 present in the community     Have you had any of the following symptoms?none  o Fever, chills  o Cough  o Shortness of breath  o Muscle aches/pain  o Diarrhea  o Abdominal pain, nausea, vomiting  o Loss or decrease in taste and / or smell   Risk of Exposure  o Have you recently been hospitalized for COVID-19 or flu-like illness, if so when?no  o Recently diagnosed with COVID-19, if so when?no  o Recently tested for COVID-19, if so when?yes 11/30/21 not detected for preop  o Have you been in close contact with a person or family member who currently has or recently had COVID-19? If yes, when and in what context?no  o Do you live with anybody who in the last 14 days has had fever, chills, shortness of breath, muscle aches, flu-like illness?no  o Do you have any close contacts or family members who are currently in the hospital for COVID-19 or flu-like illness? If yes, assess recent close contact with this person. no    Indicate if the patient has a positive screen by answering yes to one or more of the above questions. Patients who test positive or screen positive prior to surgery or on the day of surgery should be evaluated in conjunction with the surgeon/proceduralist/anesthesiologist to determine the urgency of the procedure.

## 2021-12-01 NOTE — PROGRESS NOTES
1. Do not eat or drink anything after 12 midnight prior to surgery. This includes no water, chewing gum mints, or ice chips. You may brush your teeth and gargle the day of surgery but DO NOT SWALLOW THE WATER. 2. Please see your family doctor/pediatrician for a history and physical and/or concerning medications. Bring any test results/reports from your physician's office. If you are under the care of a heart doctor or specialist please be aware that you may be asked to see him or her for clearance. 3. You may be asked to stop blood thinners such as Coumadin, Plavix, Fragmin, and Lovenox or Anti-inflammatories such as Aspirin, Ibuprofen, Advil, and Naproxen prior to your surgery. Please check with your doctor before stopping these or any other medications. 4. Do not smoke, and do not drink any alcoholic beverages 24 hours prior to surgery. 5. You MUST make arrangements for a responsible adult to take you home after your surgery. For your safety, you will not be allowed to leave alone or drive yourself home. Your surgery will be cancelled if you do not have a ride home. Also for your safety, it is strongly suggested someone stay with you the first 24 hrs after your surgery. 6. A parent/legal guardian must accompany a child scheduled for surgery and plan to stay at the hospital until the child is discharged. Please do not bring other children with you. 7. For your comfort,please wear simple, loose fitting clothing to the hospital.  Please do not bring valuables (money, credit cards, checkbooks, etc.) Do not wear any makeup (including no eye makeup) or nail polish on your fingers or toes. 8. For your safety, please DO NOT wear any jewelry or piercings on day of surgery. All body piercing jewelry must be removed. 9. If you have dentures, they will be removed before going to the OR; for your convenience we will provide you with a container.   If you wear contact lenses or glasses, they will be removed, they will be removed, please bring a case for them. 10. If appicable,Please see your family doctor/pediatrician for a history & physical and/or concerning medications. Bring any test results/reports from your physician's office. 11. Remember to bring Blood Bank bracelet to the hospital on the day of surgery. 12. If you have a Living Will and Durable Power of  for Healthcare, please bring in a copy. 15. Notify your Surgeon if you develop any illness between now and surgery  time, cough, cold, fever, sore throat, nausea, vomiting, etc.  Please notify your surgeon if you experience dizziness, shortness of breath or blurred vision between now & the time of your surgery   14. DO NOT shave your operative site 96 hours prior to surgery. For face & neck surgery, men may use an electric razor 48 hours prior to surgery. 15. Shower the night before surgery with _x__Antibacterial soap _x__Hibiclens   16. To provide excellent care visitors will be limited to one in the room at any given time. 17.  Please bring picture ID and insurance card. 18.  Visit our web site for additional information:  [email or web address]. Patient instructed to take carvedilol am of surgery, no insulin am of surgery.  And to hold Losartan night before surgery per anesthesia request

## 2021-12-06 ENCOUNTER — HOSPITAL ENCOUNTER (INPATIENT)
Age: 71
LOS: 11 days | Discharge: NURSING, REHAB OR LONG-TERM CARE | DRG: 469 | End: 2021-12-17
Attending: ORTHOPAEDIC SURGERY | Admitting: ORTHOPAEDIC SURGERY
Payer: MEDICARE

## 2021-12-06 ENCOUNTER — ANESTHESIA EVENT (OUTPATIENT)
Dept: OPERATING ROOM | Age: 71
DRG: 469 | End: 2021-12-06
Payer: MEDICARE

## 2021-12-06 DIAGNOSIS — Z96.652 STATUS POST TOTAL LEFT KNEE REPLACEMENT: Primary | ICD-10-CM

## 2021-12-06 LAB
ABO/RH: NORMAL
ANION GAP SERPL CALCULATED.3IONS-SCNC: 13 MMOL/L (ref 3–16)
ANION GAP SERPL CALCULATED.3IONS-SCNC: 15 MMOL/L (ref 3–16)
ANTIBODY SCREEN: NORMAL
BUN BLDV-MCNC: 54 MG/DL (ref 7–20)
BUN BLDV-MCNC: 54 MG/DL (ref 7–20)
CALCIUM SERPL-MCNC: 9.2 MG/DL (ref 8.3–10.6)
CALCIUM SERPL-MCNC: 9.5 MG/DL (ref 8.3–10.6)
CHLORIDE BLD-SCNC: 100 MMOL/L (ref 99–110)
CHLORIDE BLD-SCNC: 101 MMOL/L (ref 99–110)
CO2: 19 MMOL/L (ref 21–32)
CO2: 22 MMOL/L (ref 21–32)
CREAT SERPL-MCNC: 7.1 MG/DL (ref 0.6–1.2)
CREAT SERPL-MCNC: 7.3 MG/DL (ref 0.6–1.2)
GFR AFRICAN AMERICAN: 7
GFR AFRICAN AMERICAN: 7
GFR NON-AFRICAN AMERICAN: 5
GFR NON-AFRICAN AMERICAN: 6
GLUCOSE BLD-MCNC: 123 MG/DL (ref 70–99)
GLUCOSE BLD-MCNC: 131 MG/DL (ref 70–99)
GLUCOSE BLD-MCNC: 140 MG/DL (ref 70–99)
PERFORMED ON: ABNORMAL
POTASSIUM REFLEX MAGNESIUM: 6.6 MMOL/L (ref 3.5–5.1)
POTASSIUM SERPL-SCNC: 5.7 MMOL/L (ref 3.5–5.1)
POTASSIUM SERPL-SCNC: 6.4 MMOL/L (ref 3.5–5.1)
SODIUM BLD-SCNC: 134 MMOL/L (ref 136–145)
SODIUM BLD-SCNC: 136 MMOL/L (ref 136–145)

## 2021-12-06 PROCEDURE — 36415 COLL VENOUS BLD VENIPUNCTURE: CPT

## 2021-12-06 PROCEDURE — 2580000003 HC RX 258: Performed by: ORTHOPAEDIC SURGERY

## 2021-12-06 PROCEDURE — 1200000000 HC SEMI PRIVATE

## 2021-12-06 PROCEDURE — 80048 BASIC METABOLIC PNL TOTAL CA: CPT

## 2021-12-06 PROCEDURE — 84132 ASSAY OF SERUM POTASSIUM: CPT

## 2021-12-06 PROCEDURE — 86900 BLOOD TYPING SEROLOGIC ABO: CPT

## 2021-12-06 PROCEDURE — 90935 HEMODIALYSIS ONE EVALUATION: CPT

## 2021-12-06 PROCEDURE — 86901 BLOOD TYPING SEROLOGIC RH(D): CPT

## 2021-12-06 PROCEDURE — 5A1D70Z PERFORMANCE OF URINARY FILTRATION, INTERMITTENT, LESS THAN 6 HOURS PER DAY: ICD-10-PCS | Performed by: ORTHOPAEDIC SURGERY

## 2021-12-06 PROCEDURE — 86850 RBC ANTIBODY SCREEN: CPT

## 2021-12-06 PROCEDURE — 3E0T3BZ INTRODUCTION OF ANESTHETIC AGENT INTO PERIPHERAL NERVES AND PLEXI, PERCUTANEOUS APPROACH: ICD-10-PCS | Performed by: ORTHOPAEDIC SURGERY

## 2021-12-06 PROCEDURE — 0SRD0J9 REPLACEMENT OF LEFT KNEE JOINT WITH SYNTHETIC SUBSTITUTE, CEMENTED, OPEN APPROACH: ICD-10-PCS | Performed by: ORTHOPAEDIC SURGERY

## 2021-12-06 RX ORDER — LIDOCAINE HYDROCHLORIDE 10 MG/ML
1 INJECTION, SOLUTION EPIDURAL; INFILTRATION; INTRACAUDAL; PERINEURAL
Status: DISCONTINUED | OUTPATIENT
Start: 2021-12-06 | End: 2021-12-06 | Stop reason: HOSPADM

## 2021-12-06 RX ORDER — POLYETHYLENE GLYCOL 3350 17 G/17G
17 POWDER, FOR SOLUTION ORAL DAILY PRN
Status: DISCONTINUED | OUTPATIENT
Start: 2021-12-06 | End: 2021-12-17 | Stop reason: HOSPADM

## 2021-12-06 RX ORDER — SODIUM CHLORIDE 0.9 % (FLUSH) 0.9 %
5-40 SYRINGE (ML) INJECTION EVERY 12 HOURS SCHEDULED
Status: DISCONTINUED | OUTPATIENT
Start: 2021-12-06 | End: 2021-12-10

## 2021-12-06 RX ORDER — SODIUM CHLORIDE 9 MG/ML
25 INJECTION, SOLUTION INTRAVENOUS PRN
Status: DISCONTINUED | OUTPATIENT
Start: 2021-12-06 | End: 2021-12-10

## 2021-12-06 RX ORDER — CALCIUM GLUCONATE 94 MG/ML
1000 INJECTION, SOLUTION INTRAVENOUS ONCE
Status: DISCONTINUED | OUTPATIENT
Start: 2021-12-06 | End: 2021-12-13

## 2021-12-06 RX ORDER — SODIUM CHLORIDE, SODIUM LACTATE, POTASSIUM CHLORIDE, CALCIUM CHLORIDE 600; 310; 30; 20 MG/100ML; MG/100ML; MG/100ML; MG/100ML
INJECTION, SOLUTION INTRAVENOUS CONTINUOUS
Status: DISCONTINUED | OUTPATIENT
Start: 2021-12-06 | End: 2021-12-07

## 2021-12-06 RX ORDER — SODIUM CHLORIDE 0.9 % (FLUSH) 0.9 %
10 SYRINGE (ML) INJECTION PRN
Status: DISCONTINUED | OUTPATIENT
Start: 2021-12-06 | End: 2021-12-06 | Stop reason: HOSPADM

## 2021-12-06 RX ORDER — ONDANSETRON 2 MG/ML
4 INJECTION INTRAMUSCULAR; INTRAVENOUS EVERY 6 HOURS PRN
Status: DISCONTINUED | OUTPATIENT
Start: 2021-12-06 | End: 2021-12-10

## 2021-12-06 RX ORDER — SODIUM CHLORIDE 0.9 % (FLUSH) 0.9 %
10 SYRINGE (ML) INJECTION EVERY 12 HOURS SCHEDULED
Status: DISCONTINUED | OUTPATIENT
Start: 2021-12-06 | End: 2021-12-06 | Stop reason: HOSPADM

## 2021-12-06 RX ORDER — SODIUM CHLORIDE 0.9 % (FLUSH) 0.9 %
5-40 SYRINGE (ML) INJECTION PRN
Status: DISCONTINUED | OUTPATIENT
Start: 2021-12-06 | End: 2021-12-17 | Stop reason: HOSPADM

## 2021-12-06 RX ORDER — SODIUM CHLORIDE 9 MG/ML
25 INJECTION, SOLUTION INTRAVENOUS PRN
Status: DISCONTINUED | OUTPATIENT
Start: 2021-12-06 | End: 2021-12-06 | Stop reason: HOSPADM

## 2021-12-06 RX ORDER — ONDANSETRON 8 MG/1
4 TABLET, ORALLY DISINTEGRATING ORAL EVERY 8 HOURS PRN
Status: DISCONTINUED | OUTPATIENT
Start: 2021-12-06 | End: 2021-12-10

## 2021-12-06 RX ORDER — HYDROCODONE BITARTRATE AND ACETAMINOPHEN 5; 325 MG/1; MG/1
1 TABLET ORAL EVERY 4 HOURS PRN
Status: DISCONTINUED | OUTPATIENT
Start: 2021-12-06 | End: 2021-12-10

## 2021-12-06 RX ORDER — HYDROCODONE BITARTRATE AND ACETAMINOPHEN 5; 325 MG/1; MG/1
2 TABLET ORAL EVERY 4 HOURS PRN
Status: DISCONTINUED | OUTPATIENT
Start: 2021-12-06 | End: 2021-12-10

## 2021-12-06 RX ADMIN — SODIUM CHLORIDE, PRESERVATIVE FREE 10 ML: 5 INJECTION INTRAVENOUS at 22:40

## 2021-12-06 ASSESSMENT — PAIN SCALES - GENERAL: PAINLEVEL_OUTOF10: 0

## 2021-12-06 ASSESSMENT — PAIN - FUNCTIONAL ASSESSMENT: PAIN_FUNCTIONAL_ASSESSMENT: 0-10

## 2021-12-06 NOTE — PROGRESS NOTES
Potassium repeat is high at 6.6  And no hemolysis  Ordered stat calcium gluconate  Spoke to dialysis co-ordinator  And requested to arrange dialysis ASAP.

## 2021-12-06 NOTE — CONSULTS
[facility]    [facility]              [phone]                         Interval History and plan:      Her potassium was high at 6.4 but hemolyzed  Repeat is pending  Plan:  If the repeat potassium is normal is okay to proceed with surgery otherwise we will do dialysis  Surgery done today will do dialysis after the surgery today  She is normally regular with dialysis and her potassium is usually normal                   Assessment :        ESRD: on hemodialysis  Dialysis center/schedule:   Monday Wednesday Friday at [facility]     Volume status  Diego Haney- will get from center   2D Echo: 45 to 50% EF and grade 1 diastolic dysfunction on 3/2790    Hypertension  BP: (185)/(73)  Pulse:  [68]   Goal around 287-529 systolic    Anemia of CKD  Hgb:   Hb on admission - NA  Optimize with iron, aranesp    Renal Osteodystrophy  PO4- goal of below 5.5  Monitor and adjust meds    Dialysis Access  AV fistula    Nutrition on dialysis  Lab Results   Component Value Date    LABALBU 4.1 11/22/2021     Monitor, and will give nepro when possible             [facility] would like to thank Gini Dance, MD   for opportunity to serve this patient      Please call with questions at-   24 Hrs Answering service [phone] or  7 am- 5 pm via Perfect serve or cell phone  Sj Gan MD          CC/reason for consult :     ESRD     HPI :     Martha Reeder is a 66 y.o. female presented to   the hospital on 12/6/2021  for elective knee replacement surgery. She is known to have ESRD gets dialysis Monday Wednesday Friday under my care at [facility] . She is very regular with dialysis and has no problem with potassium. Before the elective surgery, potassium was checked and it was high but hemolyzed because of which it is being repeated.     We are consulted for ESRD and related issues    ROS:     Seen with-no family    positives in bold   Constitutional:  fever, chills, weakness, flush 0.9 % injection 10 mL, 10 mL, IntraVENous, 2 times per day  sodium chloride flush 0.9 % injection 10 mL, 10 mL, IntraVENous, PRN  0.9 % sodium chloride infusion, 25 mL, IntraVENous, PRN  lidocaine PF 1 % injection 1 mL, 1 mL, IntraDERmal, Once PRN  CEFAZOLIN 2000 MG D5W 100 ML IVPB IVPB, , ,   sodium chloride flush 0.9 % injection 5-40 mL, 5-40 mL, IntraVENous, 2 times per day  sodium chloride flush 0.9 % injection 5-40 mL, 5-40 mL, IntraVENous, PRN  0.9 % sodium chloride infusion, 25 mL, IntraVENous, PRN  ondansetron (ZOFRAN-ODT) disintegrating tablet 4 mg, 4 mg, Oral, Q8H PRN **OR** ondansetron (ZOFRAN) injection 4 mg, 4 mg, IntraVENous, Q6H PRN  polyethylene glycol (GLYCOLAX) packet 17 g, 17 g, Oral, Daily PRN  HYDROcodone-acetaminophen (NORCO) 5-325 MG per tablet 1 tablet, 1 tablet, Oral, Q4H PRN **OR** HYDROcodone-acetaminophen (NORCO) 5-325 MG per tablet 2 tablet, 2 tablet, Oral, Q4H PRN       Vitals :     Vitals:    12/06/21 0816   BP: (!) 185/73   Pulse: 68   Resp: 18   Temp: 98.2 °F (36.8 °C)   SpO2: 99%       I & O :     No intake or output data in the 24 hours ending 12/06/21 1146     Physical Examination :     General appearance: Anxious- no, distressed- no, in good spirits- no  HEENT: Lips- normal, teeth- ok , oral mucosa- moist  Neck : Mass- no, appears symmetrical, JVD- not visible  Respiratory: Respiratory effort-  normal, wheeze- no, crackles - none  Cardiovascular:  Ausculation- No M/R/G, Edema none  Abdomen: visible mass- no, distention- no, scar- no, tenderness- no                            hepatosplenomegaly-  no  Musculoskeletal:  clubbing no,cyanosis- no , digital ischemia- no                           muscle strength- grossly normal , tone - grossly normal  Skin: rashes- no , ulcers- no, induration- no, tightening - no  Psychiatric:  Judgement and insight- normal           AAO X 3  Additional finding: -      LABS:     No results for input(s): WBC, HGB, HCT, PLT in the last 72 hours.   Recent Labs     12/06/21  0825   *   K 6.4*      CO2 19*   BUN 54*   CREATININE 7.1*   GLUCOSE 131*

## 2021-12-06 NOTE — PROGRESS NOTES
Anesthesia aware of K+. Case cancelled.  Surgeon to come to bedside and discuss further plans as pt needs dialysis

## 2021-12-06 NOTE — PROGRESS NOTES
NEPHROLOGIST DIALYSIS NOTE:    Seen during dialysis  Vitals and labs as given below. Potassium high  Doing with 2 K  BP high  Pull fluids as tolerated. Please see today's consult note for remaining details    Vitals:    12/06/21 1630   BP: (!) 188/86   Pulse: 79   Resp: 18   Temp: 97.5 °F (36.4 °C)   SpO2:      Lab Results   Component Value Date     12/06/2021    K 5.7 12/06/2021    K 6.6 12/06/2021     12/06/2021    CO2 22 12/06/2021    BUN 54 12/06/2021    CREATININE 7.3 12/06/2021    GLUCOSE 140 12/06/2021    CALCIUM 9.5 12/06/2021           Please call with questions at-  24 Hrs Answering service [phone]  Perfect serve, or cell phone 7 am - 5pm  Rick Mcallister MD   [facility] nephrology  [email or web address]

## 2021-12-06 NOTE — PROGRESS NOTES
Dr. Héctor Soto called about critical result potassium 6.6 and creatinine 7.3 and that lab states there is no hemolysis.  Awaiting further orders

## 2021-12-07 LAB
ANION GAP SERPL CALCULATED.3IONS-SCNC: 11 MMOL/L (ref 3–16)
BASOPHILS ABSOLUTE: 0.1 K/UL (ref 0–0.2)
BASOPHILS RELATIVE PERCENT: 1.4 %
BUN BLDV-MCNC: 29 MG/DL (ref 7–20)
CALCIUM SERPL-MCNC: 9.3 MG/DL (ref 8.3–10.6)
CHLORIDE BLD-SCNC: 102 MMOL/L (ref 99–110)
CO2: 20 MMOL/L (ref 21–32)
CREAT SERPL-MCNC: 5.2 MG/DL (ref 0.6–1.2)
EKG ATRIAL RATE: 79 BPM
EKG DIAGNOSIS: NORMAL
EKG P AXIS: 38 DEGREES
EKG P-R INTERVAL: 150 MS
EKG Q-T INTERVAL: 384 MS
EKG QRS DURATION: 84 MS
EKG QTC CALCULATION (BAZETT): 440 MS
EKG R AXIS: 16 DEGREES
EKG T AXIS: 28 DEGREES
EKG VENTRICULAR RATE: 79 BPM
EOSINOPHILS ABSOLUTE: 0.3 K/UL (ref 0–0.6)
EOSINOPHILS RELATIVE PERCENT: 5.1 %
ESTIMATED AVERAGE GLUCOSE: 119.8 MG/DL
GFR AFRICAN AMERICAN: 10
GFR NON-AFRICAN AMERICAN: 8
GLUCOSE BLD-MCNC: 113 MG/DL (ref 70–99)
GLUCOSE BLD-MCNC: 125 MG/DL (ref 70–99)
GLUCOSE BLD-MCNC: 131 MG/DL (ref 70–99)
GLUCOSE BLD-MCNC: 138 MG/DL (ref 70–99)
GLUCOSE BLD-MCNC: 147 MG/DL (ref 70–99)
HBA1C MFR BLD: 5.8 %
HCT VFR BLD CALC: 31.5 % (ref 36–48)
HEMOGLOBIN: 10.5 G/DL (ref 12–16)
LYMPHOCYTES ABSOLUTE: 1.1 K/UL (ref 1–5.1)
LYMPHOCYTES RELATIVE PERCENT: 20.3 %
MCH RBC QN AUTO: 31 PG (ref 26–34)
MCHC RBC AUTO-ENTMCNC: 33.3 G/DL (ref 31–36)
MCV RBC AUTO: 93.3 FL (ref 80–100)
MONOCYTES ABSOLUTE: 0.4 K/UL (ref 0–1.3)
MONOCYTES RELATIVE PERCENT: 8.1 %
NEUTROPHILS ABSOLUTE: 3.4 K/UL (ref 1.7–7.7)
NEUTROPHILS RELATIVE PERCENT: 65.1 %
PDW BLD-RTO: 17.8 % (ref 12.4–15.4)
PERFORMED ON: ABNORMAL
PLATELET # BLD: 244 K/UL (ref 135–450)
PMV BLD AUTO: 8.4 FL (ref 5–10.5)
POTASSIUM REFLEX MAGNESIUM: 5 MMOL/L (ref 3.5–5.1)
RBC # BLD: 3.37 M/UL (ref 4–5.2)
SODIUM BLD-SCNC: 133 MMOL/L (ref 136–145)
WBC # BLD: 5.2 K/UL (ref 4–11)

## 2021-12-07 PROCEDURE — 36415 COLL VENOUS BLD VENIPUNCTURE: CPT

## 2021-12-07 PROCEDURE — 6370000000 HC RX 637 (ALT 250 FOR IP): Performed by: INTERNAL MEDICINE

## 2021-12-07 PROCEDURE — 93010 ELECTROCARDIOGRAM REPORT: CPT | Performed by: INTERNAL MEDICINE

## 2021-12-07 PROCEDURE — 85025 COMPLETE CBC W/AUTO DIFF WBC: CPT

## 2021-12-07 PROCEDURE — 93005 ELECTROCARDIOGRAM TRACING: CPT | Performed by: NURSE PRACTITIONER

## 2021-12-07 PROCEDURE — 80048 BASIC METABOLIC PNL TOTAL CA: CPT

## 2021-12-07 PROCEDURE — 2580000003 HC RX 258: Performed by: ORTHOPAEDIC SURGERY

## 2021-12-07 PROCEDURE — 6360000002 HC RX W HCPCS: Performed by: INTERNAL MEDICINE

## 2021-12-07 PROCEDURE — 1200000000 HC SEMI PRIVATE

## 2021-12-07 PROCEDURE — 83036 HEMOGLOBIN GLYCOSYLATED A1C: CPT

## 2021-12-07 RX ORDER — LOSARTAN POTASSIUM 25 MG/1
50 TABLET ORAL DAILY
Status: DISCONTINUED | OUTPATIENT
Start: 2021-12-07 | End: 2021-12-14

## 2021-12-07 RX ORDER — LEVOTHYROXINE SODIUM 0.05 MG/1
50 TABLET ORAL DAILY
Status: DISCONTINUED | OUTPATIENT
Start: 2021-12-07 | End: 2021-12-17 | Stop reason: HOSPADM

## 2021-12-07 RX ORDER — HYDRALAZINE HYDROCHLORIDE 20 MG/ML
10 INJECTION INTRAMUSCULAR; INTRAVENOUS EVERY 4 HOURS PRN
Status: DISCONTINUED | OUTPATIENT
Start: 2021-12-07 | End: 2021-12-17 | Stop reason: HOSPADM

## 2021-12-07 RX ORDER — GABAPENTIN 300 MG/1
300 CAPSULE ORAL EVERY EVENING
Status: DISCONTINUED | OUTPATIENT
Start: 2021-12-07 | End: 2021-12-17 | Stop reason: HOSPADM

## 2021-12-07 RX ORDER — CARVEDILOL 6.25 MG/1
12.5 TABLET ORAL 2 TIMES DAILY WITH MEALS
Status: DISCONTINUED | OUTPATIENT
Start: 2021-12-07 | End: 2021-12-17 | Stop reason: HOSPADM

## 2021-12-07 RX ORDER — ATORVASTATIN CALCIUM 10 MG/1
10 TABLET, FILM COATED ORAL NIGHTLY
Status: DISCONTINUED | OUTPATIENT
Start: 2021-12-07 | End: 2021-12-17 | Stop reason: HOSPADM

## 2021-12-07 RX ORDER — LABETALOL HYDROCHLORIDE 5 MG/ML
20 INJECTION, SOLUTION INTRAVENOUS EVERY 4 HOURS PRN
Status: DISCONTINUED | OUTPATIENT
Start: 2021-12-07 | End: 2021-12-17 | Stop reason: HOSPADM

## 2021-12-07 RX ORDER — DEXTROSE MONOHYDRATE 50 MG/ML
100 INJECTION, SOLUTION INTRAVENOUS PRN
Status: DISCONTINUED | OUTPATIENT
Start: 2021-12-07 | End: 2021-12-17 | Stop reason: HOSPADM

## 2021-12-07 RX ORDER — DEXTROSE MONOHYDRATE 25 G/50ML
12.5 INJECTION, SOLUTION INTRAVENOUS PRN
Status: DISCONTINUED | OUTPATIENT
Start: 2021-12-07 | End: 2021-12-17 | Stop reason: HOSPADM

## 2021-12-07 RX ORDER — INSULIN GLARGINE 100 [IU]/ML
15 INJECTION, SOLUTION SUBCUTANEOUS NIGHTLY
Status: DISCONTINUED | OUTPATIENT
Start: 2021-12-07 | End: 2021-12-11

## 2021-12-07 RX ORDER — NICOTINE POLACRILEX 4 MG
15 LOZENGE BUCCAL PRN
Status: DISCONTINUED | OUTPATIENT
Start: 2021-12-07 | End: 2021-12-17 | Stop reason: HOSPADM

## 2021-12-07 RX ADMIN — GABAPENTIN 300 MG: 300 CAPSULE ORAL at 18:04

## 2021-12-07 RX ADMIN — HYDRALAZINE HYDROCHLORIDE 10 MG: 20 INJECTION INTRAMUSCULAR; INTRAVENOUS at 05:23

## 2021-12-07 RX ADMIN — LOSARTAN POTASSIUM 50 MG: 25 TABLET, FILM COATED ORAL at 10:20

## 2021-12-07 RX ADMIN — CARVEDILOL 12.5 MG: 6.25 TABLET, FILM COATED ORAL at 18:05

## 2021-12-07 RX ADMIN — HYDRALAZINE HYDROCHLORIDE 10 MG: 20 INJECTION INTRAMUSCULAR; INTRAVENOUS at 12:38

## 2021-12-07 RX ADMIN — CARVEDILOL 12.5 MG: 6.25 TABLET, FILM COATED ORAL at 09:01

## 2021-12-07 RX ADMIN — INSULIN LISPRO 4 UNITS: 100 INJECTION, SOLUTION INTRAVENOUS; SUBCUTANEOUS at 18:10

## 2021-12-07 RX ADMIN — INSULIN LISPRO 4 UNITS: 100 INJECTION, SOLUTION INTRAVENOUS; SUBCUTANEOUS at 09:04

## 2021-12-07 RX ADMIN — SODIUM CHLORIDE, PRESERVATIVE FREE 10 ML: 5 INJECTION INTRAVENOUS at 10:21

## 2021-12-07 RX ADMIN — ATORVASTATIN CALCIUM 10 MG: 10 TABLET, FILM COATED ORAL at 21:11

## 2021-12-07 RX ADMIN — INSULIN GLARGINE 15 UNITS: 100 INJECTION, SOLUTION SUBCUTANEOUS at 21:11

## 2021-12-07 RX ADMIN — SERTRALINE 25 MG: 50 TABLET, FILM COATED ORAL at 09:01

## 2021-12-07 RX ADMIN — LEVOTHYROXINE SODIUM 50 MCG: 0.05 TABLET ORAL at 06:36

## 2021-12-07 RX ADMIN — INSULIN LISPRO 4 UNITS: 100 INJECTION, SOLUTION INTRAVENOUS; SUBCUTANEOUS at 12:41

## 2021-12-07 RX ADMIN — SODIUM CHLORIDE, PRESERVATIVE FREE 10 ML: 5 INJECTION INTRAVENOUS at 23:42

## 2021-12-07 ASSESSMENT — PAIN SCALES - GENERAL
PAINLEVEL_OUTOF10: 0

## 2021-12-07 ASSESSMENT — ENCOUNTER SYMPTOMS: COLOR CHANGE: 0

## 2021-12-07 NOTE — PROGRESS NOTES
[facility]    [email or web address]              [phone]                         Interval History and plan:     Urgent dialysis done yesterday  Surgery had to be canceled  Potassium now 5  Blood pressure very high    Plan:  She is on Coreg 12.5 twice daily and losartan 50 mg daily at home( verified from dialysis center)  Resume dose  Also will give a dose of clonidine  We will do dialysis early morning tomorrow and remove fluid  If blood pressure continues to be high she will need clonidine as needed                   Assessment :        ESRD: on hemodialysis  Dialysis center/schedule:   Monday Wednesday Friday at [facility]    Volume status  [facility]- will get from center   2D Echo: 45 to 50% EF and grade 1 diastolic dysfunction on 7/2460    Hypertension  BP: (172-197)/(74-84)  Pulse:  [79-82]   Goal around 830-005 systolic    Anemia of CKD  Hgb:   Hb on admission - NA  Optimize with iron, aranesp    Renal Osteodystrophy  PO4- goal of below 5.5  Monitor and adjust meds    Dialysis Access  AV fistula    Nutrition on dialysis  Lab Results   Component Value Date    LABALBU 4.1 11/22/2021     Monitor, and will give nepro when possible             [facility] would like to thank Liz Wells MD   for opportunity to serve this patient      Please call with questions at-   24 Hrs Answering service [phone] or  7 am- 5 pm via Perfect serve or cell phone  Larry Hernandez MD          CC/reason for consult :     ESRD     HPI :     Cheryl Stearns is a 66 y.o. female presented to   the hospital on 12/6/2021  for elective knee replacement surgery. She is known to have ESRD gets dialysis Monday Wednesday Friday under my care at [facility]. She is very regular with dialysis and has no problem with potassium. Before the elective surgery, potassium was checked and it was high but hemolyzed because of which it is being repeated.     We are consulted for ESRD and related issues    ROS:     Seen with-no family    positives in bold   Constitutional:  fever, chills, weakness, weight change, fatigue  Skin:  rash, pruritus, hair loss, bruising, dry skin, petechiae  Head, Face, Neck   headaches, swelling,  cervical adenopathy  Respiratory: shortness of breath, cough, or wheezing  Cardiovascular: chest pain, palpitations, dizzy, edema  Gastrointestinal: nausea, vomiting, diarrhea, constipation,belly pain    Yellow skin, blood in stool  Musculoskeletal:  back pain, muscle weakness, gait problems,       joint pain or swelling. Genitourinary:  dysuria, poor urine flow, flank pain, blood in urine  Neurologic:  vertigo, TIA'S, syncope, seizures, focal weakness  Psychosocial:  insomnia, anxiety, or depression. Additional positive findings:                        All other remaining systems are negative or unable to obtain        PMH/PSH/SH/Family History:     Past Medical History:   Diagnosis Date    Arthritis     Chronic kidney disease     [facility]     Dialysis patient ([facility]     M W F    ESRD (end stage renal disease) ([facility]     Hemodialysis patient ([facility]     M-W-F    Hyperkalemia 07/13/2016    Hyperlipidemia     Hypertension     OA (osteoarthritis)     Retinopathy     Sepsis ([facility]     Thyroid disease     Wears dentures        Past Surgical History:   Procedure Laterality Date    DIALYSIS FISTULA CREATION Left 08/10/2016    Dr. Litzy Greenberg - upper arm, basilic vein transposition    EYE SURGERY Left 04/03/2018    catarct removal with lens implant     OTHER SURGICAL HISTORY Right 01/11/2019    partial foot amputation    TOE AMPUTATION Right 1/11/2019    PARTIAL FOOT AMPUTATION WITH GRAFT APPLICATION performed by Taylor Lambert DPM at [facility]        reports that she quit smoking about 7 years ago. She has never used smokeless tobacco. She reports that she does not drink alcohol and does not use drugs. family history is not on file.          Medication:     Current per 24 hour   Intake 500 ml   Output 1900 ml   Net -1400 ml        Physical Examination :     General appearance: Anxious- no, distressed- no, in good spirits- no  HEENT: Lips- normal, teeth- ok , oral mucosa- moist  Neck : Mass- no, appears symmetrical, JVD- not visible  Respiratory: Respiratory effort-  normal, wheeze- no, crackles - none  Cardiovascular:  Ausculation- No M/R/G, Edema none  Abdomen: visible mass- no, distention- no, scar- no, tenderness- no                            hepatosplenomegaly-  no  Musculoskeletal:  clubbing no,cyanosis- no , digital ischemia- no                           muscle strength- grossly normal , tone - grossly normal  Skin: rashes- no , ulcers- no, induration- no, tightening - no  Psychiatric:  Judgement and insight- normal           AAO X 3  Additional finding: -      LABS:     Recent Labs     12/07/21  0724   WBC 5.2   HGB 10.5*   HCT 31.5*        Recent Labs     12/06/21  0825 12/06/21  1109 12/06/21  1300 12/07/21  0724   * 136  --  133*   K 6.4* 6.6* 5.7* 5.0    101  --  102   CO2 19* 22  --  20*   BUN 54* 54*  --  29*   CREATININE 7.1* 7.3*  --  5.2*   GLUCOSE 131* 140*  --  147*

## 2021-12-07 NOTE — CONSULTS
Hospital Medicine  Consult History & Physical        Chief Complaint:  Left knee pain, Elevated BP    Date of Service: Pt seen/examined in consultation on 12/7/2021    History Of Present Illness:      66 y.o. female with a PMH of OA, ESRD on HD, HTN, Hypothyroidism, and DM who we are asked to see/evaluate by Liz Wells MD for medical management. Patient admitted for elective left knee replacement surgery. Surgery postponed due to abnl labs-hyperkalemia. Patient has dialysis on F. She had dialysis on 12/6, blood pressure remains elevated. Plan for OR on 12/8. Patient a/ox4, nad, denies N/V/D/F/C. Past Medical History:        Diagnosis Date    Arthritis     Chronic kidney disease     [facility]     [facility]ialysis patient [facility])     M W F    ESRD (end stage renal disease) [facility]     Hemodialysis patient ([facility]     M-W-F    Hyperkalemia 07/13/2016    Hyperlipidemia     Hypertension     OA (osteoarthritis)     Retinopathy     Sepsis ([facility]     Thyroid disease     Wears dentures        Past Surgical History:        Procedure Laterality Date    DIALYSIS FISTULA CREATION Left 08/10/2016    Dr. Rachel Randhawa - upper arm, basilic vein transposition    EYE SURGERY Left 04/03/2018    catarct removal with lens implant     OTHER SURGICAL HISTORY Right 01/11/2019    partial foot amputation    TOE AMPUTATION Right 1/11/2019    PARTIAL FOOT AMPUTATION WITH GRAFT APPLICATION performed [facility] Lang Cantu DPM at [facility]       Medications Prior to Admission:    Prior to Admission medications    Medication Sig Start Date End Date Taking?  Authorizing Provider   carvedilol (COREG) 12.5 MG tablet Take 12.5 mg by mouth 2 times daily (with meals)   Yes Historical Provider, MD   Ferric Citrate (AURYXIA) 1  MG(Fe) TABS Take 3 tablets by mouth 3 times daily (before meals)   Yes Historical Provider, MD   Lactobacillus-Inulin (CULTURELLE ADULT ULT BALANCE PO) Take 1 tablet by mouth daily as needed   Yes Historical Provider, MD   mupirocin (BACTROBAN) 2 % ointment APPLY 1/2 INCH TO INSIDE OF EACH NOSTRIL Q 12 HR STARTING 5 DAYS PRIOR TO SURGERY INCLUDING MORNING OF SURGERY. 9/29/21  Yes Kiley Koehler MD   levothyroxine (SYNTHROID) 50 MCG tablet Take 50 mcg by mouth Daily   Yes Historical Provider, MD   insulin glargine (LANTUS) 100 UNIT/ML injection vial Inject 8 Units into the skin every morning 20 units if bs 150 or above, 15 units if below bs is below. Patient taking differently: Inject 15 Units into the skin every morning 18 units if bs 150 or above, 15 units if below bs is below. 8/16/16  Yes Esther Stewart MD   gabapentin (NEURONTIN) 300 MG capsule Take 300 mg by mouth every evening Take 1-2 tablets every evening before bed. Yes Historical Provider, MD   sertraline (ZOLOFT) 25 MG tablet Take 25 mg by mouth daily   Yes Historical Provider, MD   losartan (COZAAR) 100 MG tablet Take 100 mg by mouth nightly     Historical Provider, MD   aspirin 81 MG EC tablet Take 1 tablet by mouth daily Take with food 1/11/16   Maryan Morel MD   atorvastatin (LIPITOR) 10 MG tablet Take 10 mg by mouth nightly     Historical Provider, MD       Allergies:  Patient has no known allergies. Social History:      The patient currently lives at home    TOBACCO:   reports that she quit smoking about 7 years ago. She has never used smokeless tobacco.  ETOH:   reports no history of alcohol use. Family History:     Reviewed in detail and negative for DM, CAD, Cancer, CVA. Positive as follows:    History reviewed. No pertinent family history. REVIEW OF SYSTEMS COMPLETED:   Pertinent positives as noted in the HPI. All other systems reviewed and negative. PHYSICAL EXAM PERFORMED:  BP (!) 172/74   Pulse 82   Temp 98.3 °F (36.8 °C) (Oral)   Resp 18   Ht 5' 7\" (1.702 m)   Wt 181 lb (82.1 kg)   SpO2 98%   BMI 28.35 kg/m²   General appearance: No apparent distress, appears stated age and cooperative.   HEENT: Normal cephalic, atraumatic without obvious deformity. Pupils equal, round, and reactive to light. Extra ocular muscles intact. Conjunctivae/corneas clear. Neck: Supple, with full range of motion. No jugular venous distention. Trachea midline. Respiratory:  Normal respiratory effort. Clear to auscultation, bilaterally without Rales/Wheezes/Rhonchi. Cardiovascular: Regular rate and rhythm with normal S1/S2 without murmurs, rubs or gallops. Abdomen: Soft, non-tender, non-distended with normal bowel sounds. Musculoskeletal: Left knee decreased rom, OA  Skin: Skin color, texture, turgor normal.  No rashes or lesions. Neurologic:  Neurovascularly intact without any focal sensory/motor deficits. Cranial nerves: II-XII intact, grossly non-focal.  Psychiatric: Alert and oriented, thought content appropriate, normal insight  Capillary Refill: Brisk,3 seconds, normal   Peripheral Pulses: +2 palpable, equal bilaterally     Labs:     Recent Labs     12/07/21  0724   WBC 5.2   HGB 10.5*   HCT 31.5*        Recent Labs     12/06/21  0825 12/06/21  1109 12/06/21  1300 12/07/21  0724   * 136  --  133*   K 6.4* 6.6* 5.7* 5.0    101  --  102   CO2 19* 22  --  20*   BUN 54* 54*  --  29*   CREATININE 7.1* 7.3*  --  5.2*   CALCIUM 9.2 9.5  --  9.3     No results for input(s): AST, ALT, BILIDIR, BILITOT, ALKPHOS in the last 72 hours. No results for input(s): INR in the last 72 hours. No results for input(s): Patrisha Meigs in the last 72 hours.     Urinalysis:    Lab Results   Component Value Date    NITRU Negative 09/14/2021    WBCUA >100 09/14/2021    BACTERIA 3+ 09/14/2021    RBCUA 11-20 09/14/2021    BLOODU MODERATE 09/14/2021    SPECGRAV 1.015 09/14/2021    GLUCOSEU Negative 09/14/2021       Radiology: I have reviewed the radiology reports with the following interpretation:     No orders to display          EKG:  I have reviewed the EKG with the following interpretation:          ASSESSMENT:    Active Hospital Problems Diagnosis Date Noted    Primary osteoarthritis of left knee [M17.12] 05/25/2021       PLAN:    ESRD on HD  -Nephrology following    HTN  -Resume cozaar/bb    Left knee OA  -Plan for left total knee replacement 12/8  -pain control  -PT/OT    DM  -Fsbs ac/hs  -Lantus  -SSI  -CCHO diet  -Neurontin    HLD  -Statin    Hypothyroidism  -Synthroid    DVT Prophylaxis: scds  Diet: ADULT DIET; Regular; 3 carb choices (45 gm/meal); Low Fat/Low Chol/High Fiber/MEGHAN; Low Potassium (Less than 3000 mg/day);  Low Phosphorus (Less than 1000 mg)  Code Status: Full Code    PT/OT Eval Status: Active and ongoing    Dispo - possibly Friday 12/10    Thank you for the consultation, will follow up as needed    Electronically signed by ELANA Roberto - CNP on 12/7/21 at 8:52 AM EST

## 2021-12-07 NOTE — CARE COORDINATION
CASE MANAGEMENT INITIAL ASSESSMENT      Reviewed chart and completed assessment with patient:   Explained Case Management role/services. Primary contact information: Daughter [facility] Decision Maker :   Primary Decision Maker: Isrrael Veronica - Shannan - [phone]          Can this person be reached and be able to respond quickly, such as within a few minutes or hours? Yes    Admit date/status: IP, 12/7/21  Diagnosis: Primary osteoarthritis of left knee  Is this a Readmission?:  Yes      Insurance: [facility] Medicare   Precert required for SNF: Yes       3 night stay required: No    Living arrangements, Adls, care needs, prior to admission: Lives in a ranch house with ramp entrance alone. Son and daughter in law lives two house away and can help when needed. Transportation: family     Durable Medical Equipment at home:  Walker_X_Cane_X_RTS__ BSC__Shower Chair_X_  02__ HHN__ CPAP__  BiPap__  Hospital Bed__ W/C___ Other__________    Services in the home and/or outpatient, prior to admission: [employer], transportation. Dialysis Facility (if applicable)   · Name: Heriberto Conklin  · Address: [facility]  · Dialysis Schedule:MWF 0930  · Phone: [phone]  · Fax:    PT/OT recs: None seen at this time. Hospital Exemption Notification (HEN): Needed for SNF, not initiated. Barriers to discharge: None    Plan/comments: CM met with pt at bedside for initial assessment. Per Ortho, \"Will plan for surgery tomorrow after her dialysis\" pt planning on returning home with skilled [place] and transportation thru [facility] and son to help as well. Pt has no skilled [place] agency preference. CM will follow post-op course and watch for therapy recs.  CM following-Armida Hilliard RN      ECOC on chart for MD signature

## 2021-12-07 NOTE — PROGRESS NOTES
Department of Orthopedic Surgery  Physician Assistant   Progress Note    Subjective:       Systemic or Specific Complaints: Surgery cancelled yesterday due to renal issues with high K.  Had dialysis yesterday. Objective:     Patient Vitals for the past 24 hrs:   BP Temp Temp src Pulse Resp SpO2 Weight   12/07/21 1247 (!) 170/69 -- -- 79 -- -- --   12/07/21 1240 (!) 177/72 -- -- 78 -- -- --   12/07/21 1233 (!) 182/63 98.4 °F (36.9 °C) Oral 79 14 97 % --   12/07/21 1019 (!) 161/72 -- -- 81 -- -- --   12/07/21 0759 (!) 172/74 98.3 °F (36.8 °C) Oral 82 18 98 % --   12/07/21 0458 (!) 197/74 98.1 °F (36.7 °C) Oral 79 16 -- --   12/07/21 0457 (!) 197/84 -- -- 79 -- -- --   12/07/21 0340 (!) 197/84 98.1 °F (36.7 °C) Oral 79 16 98 % --   12/06/21 2302 (!) 190/74 98.3 °F (36.8 °C) Oral 80 14 97 % --   12/06/21 1830 (!) 195/81 97.9 °F (36.6 °C) Oral 82 12 99 % --   12/06/21 1630 (!) 188/86 97.5 °F (36.4 °C) -- 79 18 -- 181 lb (82.1 kg)       General: alert, appears stated age and cooperative   Wound: Motion: Painful range of Motion in affected extremity   DVT Exam: No evidence of DVT seen on physical exam.     Additional exam:     Data Review  CBC:   Lab Results   Component Value Date    WBC 5.2 12/07/2021    RBC 3.37 12/07/2021    HGB 10.5 12/07/2021    HCT 31.5 12/07/2021     12/07/2021       Renal:   Lab Results   Component Value Date     12/07/2021    K 5.0 12/07/2021     12/07/2021    CO2 20 12/07/2021    BUN 29 12/07/2021    CREATININE 5.2 12/07/2021    GLUCOSE 147 12/07/2021    CALCIUM 9.3 12/07/2021            Assessment:      left knee OA. Plan:      1:  Will plan for surgery tomorrow after her dialysis. NPO after MN. Consent previously obtained. Ancef on call.   2:  Continue Deep venous thrombosis prophylaxis  3:  Continue Pain Control    BRIGETTE Salazar

## 2021-12-08 ENCOUNTER — ANESTHESIA (OUTPATIENT)
Dept: OPERATING ROOM | Age: 71
DRG: 469 | End: 2021-12-08
Payer: MEDICARE

## 2021-12-08 ENCOUNTER — APPOINTMENT (OUTPATIENT)
Dept: GENERAL RADIOLOGY | Age: 71
DRG: 469 | End: 2021-12-08
Attending: ORTHOPAEDIC SURGERY
Payer: MEDICARE

## 2021-12-08 VITALS
SYSTOLIC BLOOD PRESSURE: 144 MMHG | DIASTOLIC BLOOD PRESSURE: 66 MMHG | OXYGEN SATURATION: 100 % | RESPIRATION RATE: 3 BRPM | TEMPERATURE: 100.6 F

## 2021-12-08 LAB
ALBUMIN SERPL-MCNC: 3.5 G/DL (ref 3.4–5)
ANION GAP SERPL CALCULATED.3IONS-SCNC: 12 MMOL/L (ref 3–16)
BASOPHILS ABSOLUTE: 0.1 K/UL (ref 0–0.2)
BASOPHILS RELATIVE PERCENT: 1.1 %
BUN BLDV-MCNC: 42 MG/DL (ref 7–20)
CALCIUM SERPL-MCNC: 8.8 MG/DL (ref 8.3–10.6)
CHLORIDE BLD-SCNC: 100 MMOL/L (ref 99–110)
CO2: 20 MMOL/L (ref 21–32)
CREAT SERPL-MCNC: 7.3 MG/DL (ref 0.6–1.2)
EOSINOPHILS ABSOLUTE: 0.2 K/UL (ref 0–0.6)
EOSINOPHILS RELATIVE PERCENT: 4.8 %
GFR AFRICAN AMERICAN: 7
GFR NON-AFRICAN AMERICAN: 5
GLUCOSE BLD-MCNC: 108 MG/DL (ref 70–99)
GLUCOSE BLD-MCNC: 133 MG/DL (ref 70–99)
GLUCOSE BLD-MCNC: 148 MG/DL (ref 70–99)
GLUCOSE BLD-MCNC: 162 MG/DL (ref 70–99)
GLUCOSE BLD-MCNC: 221 MG/DL (ref 70–99)
HCT VFR BLD CALC: 33.3 % (ref 36–48)
HEMOGLOBIN: 10.7 G/DL (ref 12–16)
LYMPHOCYTES ABSOLUTE: 1.2 K/UL (ref 1–5.1)
LYMPHOCYTES RELATIVE PERCENT: 23.9 %
MCH RBC QN AUTO: 30.5 PG (ref 26–34)
MCHC RBC AUTO-ENTMCNC: 32.1 G/DL (ref 31–36)
MCV RBC AUTO: 95.1 FL (ref 80–100)
MONOCYTES ABSOLUTE: 0.5 K/UL (ref 0–1.3)
MONOCYTES RELATIVE PERCENT: 9.3 %
NEUTROPHILS ABSOLUTE: 3.1 K/UL (ref 1.7–7.7)
NEUTROPHILS RELATIVE PERCENT: 60.9 %
PDW BLD-RTO: 18.1 % (ref 12.4–15.4)
PERFORMED ON: ABNORMAL
PHOSPHORUS: 4.4 MG/DL (ref 2.5–4.9)
PLATELET # BLD: 245 K/UL (ref 135–450)
PMV BLD AUTO: 7.9 FL (ref 5–10.5)
POTASSIUM SERPL-SCNC: 5.6 MMOL/L (ref 3.5–5.1)
RBC # BLD: 3.5 M/UL (ref 4–5.2)
SODIUM BLD-SCNC: 132 MMOL/L (ref 136–145)
WBC # BLD: 5.1 K/UL (ref 4–11)

## 2021-12-08 PROCEDURE — 7100000000 HC PACU RECOVERY - FIRST 15 MIN: Performed by: ORTHOPAEDIC SURGERY

## 2021-12-08 PROCEDURE — 2580000003 HC RX 258: Performed by: ANESTHESIOLOGY

## 2021-12-08 PROCEDURE — 3600000014 HC SURGERY LEVEL 4 ADDTL 15MIN: Performed by: ORTHOPAEDIC SURGERY

## 2021-12-08 PROCEDURE — 7100000001 HC PACU RECOVERY - ADDTL 15 MIN: Performed by: ORTHOPAEDIC SURGERY

## 2021-12-08 PROCEDURE — 73560 X-RAY EXAM OF KNEE 1 OR 2: CPT

## 2021-12-08 PROCEDURE — 94761 N-INVAS EAR/PLS OXIMETRY MLT: CPT

## 2021-12-08 PROCEDURE — 6360000002 HC RX W HCPCS: Performed by: ORTHOPAEDIC SURGERY

## 2021-12-08 PROCEDURE — 2720000010 HC SURG SUPPLY STERILE: Performed by: ORTHOPAEDIC SURGERY

## 2021-12-08 PROCEDURE — 2500000003 HC RX 250 WO HCPCS: Performed by: ORTHOPAEDIC SURGERY

## 2021-12-08 PROCEDURE — 6360000002 HC RX W HCPCS: Performed by: NURSE ANESTHETIST, CERTIFIED REGISTERED

## 2021-12-08 PROCEDURE — 2709999900 HC NON-CHARGEABLE SUPPLY: Performed by: ORTHOPAEDIC SURGERY

## 2021-12-08 PROCEDURE — C1776 JOINT DEVICE (IMPLANTABLE): HCPCS | Performed by: ORTHOPAEDIC SURGERY

## 2021-12-08 PROCEDURE — 6360000002 HC RX W HCPCS: Performed by: ANESTHESIOLOGY

## 2021-12-08 PROCEDURE — 6370000000 HC RX 637 (ALT 250 FOR IP): Performed by: INTERNAL MEDICINE

## 2021-12-08 PROCEDURE — 2580000003 HC RX 258: Performed by: ORTHOPAEDIC SURGERY

## 2021-12-08 PROCEDURE — 90935 HEMODIALYSIS ONE EVALUATION: CPT

## 2021-12-08 PROCEDURE — 2500000003 HC RX 250 WO HCPCS: Performed by: ANESTHESIOLOGY

## 2021-12-08 PROCEDURE — 85025 COMPLETE CBC W/AUTO DIFF WBC: CPT

## 2021-12-08 PROCEDURE — 6370000000 HC RX 637 (ALT 250 FOR IP): Performed by: ORTHOPAEDIC SURGERY

## 2021-12-08 PROCEDURE — 2700000000 HC OXYGEN THERAPY PER DAY

## 2021-12-08 PROCEDURE — 36415 COLL VENOUS BLD VENIPUNCTURE: CPT

## 2021-12-08 PROCEDURE — C1713 ANCHOR/SCREW BN/BN,TIS/BN: HCPCS | Performed by: ORTHOPAEDIC SURGERY

## 2021-12-08 PROCEDURE — 6360000002 HC RX W HCPCS: Performed by: PHYSICIAN ASSISTANT

## 2021-12-08 PROCEDURE — 80069 RENAL FUNCTION PANEL: CPT

## 2021-12-08 PROCEDURE — 2580000003 HC RX 258: Performed by: NURSE ANESTHETIST, CERTIFIED REGISTERED

## 2021-12-08 PROCEDURE — 3700000000 HC ANESTHESIA ATTENDED CARE: Performed by: ORTHOPAEDIC SURGERY

## 2021-12-08 PROCEDURE — 2500000003 HC RX 250 WO HCPCS: Performed by: NURSE ANESTHETIST, CERTIFIED REGISTERED

## 2021-12-08 PROCEDURE — 3600000004 HC SURGERY LEVEL 4 BASE: Performed by: ORTHOPAEDIC SURGERY

## 2021-12-08 PROCEDURE — 1200000000 HC SEMI PRIVATE

## 2021-12-08 PROCEDURE — C9290 INJ, BUPIVACAINE LIPOSOME: HCPCS | Performed by: ORTHOPAEDIC SURGERY

## 2021-12-08 PROCEDURE — 3700000001 HC ADD 15 MINUTES (ANESTHESIA): Performed by: ORTHOPAEDIC SURGERY

## 2021-12-08 DEVICE — LEGION POSTERIOR STABILIZED                                    OXINIUM FEMORAL SIZE 5 LEFT
Type: IMPLANTABLE DEVICE | Site: KNEE | Status: FUNCTIONAL
Brand: LEGION

## 2021-12-08 DEVICE — LEGION REVISION TIBIAL BASEPLATE                                    SIZE 4 LEFT
Type: IMPLANTABLE DEVICE | Site: KNEE | Status: FUNCTIONAL
Brand: LEGION

## 2021-12-08 DEVICE — GENESIS II CONSTRAINED ARTICULAR                                    INSERT SIZE 3-4 11MM
Type: IMPLANTABLE DEVICE | Site: KNEE | Status: FUNCTIONAL
Brand: GENESIS II

## 2021-12-08 DEVICE — LEGION LOCKING SET SCREW
Type: IMPLANTABLE DEVICE | Site: KNEE | Status: FUNCTIONAL
Brand: LEGION

## 2021-12-08 DEVICE — LEGION PRESSFIT STEM 16MM X 160MM
Type: IMPLANTABLE DEVICE | Site: KNEE | Status: FUNCTIONAL
Brand: LEGION

## 2021-12-08 DEVICE — RALLY HV BONE CEMENT 40 GRAMS
Type: IMPLANTABLE DEVICE | Site: KNEE | Status: FUNCTIONAL
Brand: RALLY

## 2021-12-08 DEVICE — GENESIS II RESURFACING PATELLAR 35MM
Type: IMPLANTABLE DEVICE | Site: KNEE | Status: FUNCTIONAL
Brand: GENESIS II

## 2021-12-08 RX ORDER — SODIUM CHLORIDE 0.9 % (FLUSH) 0.9 %
5-40 SYRINGE (ML) INJECTION PRN
Status: DISCONTINUED | OUTPATIENT
Start: 2021-12-08 | End: 2021-12-17 | Stop reason: HOSPADM

## 2021-12-08 RX ORDER — OXYCODONE HYDROCHLORIDE AND ACETAMINOPHEN 5; 325 MG/1; MG/1
2 TABLET ORAL PRN
Status: DISCONTINUED | OUTPATIENT
Start: 2021-12-08 | End: 2021-12-08 | Stop reason: HOSPADM

## 2021-12-08 RX ORDER — SODIUM CHLORIDE 9 MG/ML
25 INJECTION, SOLUTION INTRAVENOUS PRN
Status: DISCONTINUED | OUTPATIENT
Start: 2021-12-08 | End: 2021-12-17 | Stop reason: HOSPADM

## 2021-12-08 RX ORDER — ONDANSETRON 4 MG/1
4 TABLET, ORALLY DISINTEGRATING ORAL EVERY 8 HOURS PRN
Status: DISCONTINUED | OUTPATIENT
Start: 2021-12-08 | End: 2021-12-17 | Stop reason: HOSPADM

## 2021-12-08 RX ORDER — SODIUM CHLORIDE 0.9 % (FLUSH) 0.9 %
10 SYRINGE (ML) INJECTION EVERY 12 HOURS SCHEDULED
Status: DISCONTINUED | OUTPATIENT
Start: 2021-12-08 | End: 2021-12-08 | Stop reason: SDUPTHER

## 2021-12-08 RX ORDER — VANCOMYCIN HYDROCHLORIDE 1 G/20ML
INJECTION, POWDER, LYOPHILIZED, FOR SOLUTION INTRAVENOUS PRN
Status: DISCONTINUED | OUTPATIENT
Start: 2021-12-08 | End: 2021-12-08 | Stop reason: HOSPADM

## 2021-12-08 RX ORDER — ONDANSETRON 2 MG/ML
4 INJECTION INTRAMUSCULAR; INTRAVENOUS
Status: DISCONTINUED | OUTPATIENT
Start: 2021-12-08 | End: 2021-12-08 | Stop reason: HOSPADM

## 2021-12-08 RX ORDER — SODIUM CHLORIDE 9 MG/ML
25 INJECTION, SOLUTION INTRAVENOUS PRN
Status: DISCONTINUED | OUTPATIENT
Start: 2021-12-08 | End: 2021-12-08 | Stop reason: SDUPTHER

## 2021-12-08 RX ORDER — OXYCODONE HYDROCHLORIDE 5 MG/1
10 TABLET ORAL EVERY 4 HOURS PRN
Status: DISCONTINUED | OUTPATIENT
Start: 2021-12-08 | End: 2021-12-17 | Stop reason: HOSPADM

## 2021-12-08 RX ORDER — ONDANSETRON 2 MG/ML
4 INJECTION INTRAMUSCULAR; INTRAVENOUS EVERY 6 HOURS PRN
Status: DISCONTINUED | OUTPATIENT
Start: 2021-12-08 | End: 2021-12-17 | Stop reason: HOSPADM

## 2021-12-08 RX ORDER — MEPERIDINE HYDROCHLORIDE 50 MG/ML
12.5 INJECTION INTRAMUSCULAR; INTRAVENOUS; SUBCUTANEOUS EVERY 5 MIN PRN
Status: DISCONTINUED | OUTPATIENT
Start: 2021-12-08 | End: 2021-12-08 | Stop reason: HOSPADM

## 2021-12-08 RX ORDER — FENTANYL CITRATE 50 UG/ML
INJECTION, SOLUTION INTRAMUSCULAR; INTRAVENOUS PRN
Status: DISCONTINUED | OUTPATIENT
Start: 2021-12-08 | End: 2021-12-08 | Stop reason: SDUPTHER

## 2021-12-08 RX ORDER — SODIUM CHLORIDE 9 MG/ML
INJECTION, SOLUTION INTRAVENOUS CONTINUOUS PRN
Status: DISCONTINUED | OUTPATIENT
Start: 2021-12-08 | End: 2021-12-08 | Stop reason: SDUPTHER

## 2021-12-08 RX ORDER — MORPHINE SULFATE 2 MG/ML
2 INJECTION, SOLUTION INTRAMUSCULAR; INTRAVENOUS EVERY 5 MIN PRN
Status: DISCONTINUED | OUTPATIENT
Start: 2021-12-08 | End: 2021-12-08 | Stop reason: HOSPADM

## 2021-12-08 RX ORDER — SODIUM CHLORIDE 0.9 % (FLUSH) 0.9 %
10 SYRINGE (ML) INJECTION PRN
Status: DISCONTINUED | OUTPATIENT
Start: 2021-12-08 | End: 2021-12-08 | Stop reason: SDUPTHER

## 2021-12-08 RX ORDER — CLONIDINE HYDROCHLORIDE 0.1 MG/1
0.1 TABLET ORAL ONCE
Status: COMPLETED | OUTPATIENT
Start: 2021-12-08 | End: 2021-12-08

## 2021-12-08 RX ORDER — SODIUM CHLORIDE 0.9 % (FLUSH) 0.9 %
5-40 SYRINGE (ML) INJECTION EVERY 12 HOURS SCHEDULED
Status: DISCONTINUED | OUTPATIENT
Start: 2021-12-08 | End: 2021-12-17 | Stop reason: HOSPADM

## 2021-12-08 RX ORDER — MORPHINE SULFATE 2 MG/ML
1 INJECTION, SOLUTION INTRAMUSCULAR; INTRAVENOUS EVERY 5 MIN PRN
Status: DISCONTINUED | OUTPATIENT
Start: 2021-12-08 | End: 2021-12-08 | Stop reason: HOSPADM

## 2021-12-08 RX ORDER — DEXAMETHASONE SODIUM PHOSPHATE 4 MG/ML
INJECTION, SOLUTION INTRA-ARTICULAR; INTRALESIONAL; INTRAMUSCULAR; INTRAVENOUS; SOFT TISSUE PRN
Status: DISCONTINUED | OUTPATIENT
Start: 2021-12-08 | End: 2021-12-08 | Stop reason: SDUPTHER

## 2021-12-08 RX ORDER — OXYCODONE HYDROCHLORIDE AND ACETAMINOPHEN 5; 325 MG/1; MG/1
1 TABLET ORAL PRN
Status: DISCONTINUED | OUTPATIENT
Start: 2021-12-08 | End: 2021-12-08 | Stop reason: HOSPADM

## 2021-12-08 RX ORDER — ACETAMINOPHEN 325 MG/1
650 TABLET ORAL EVERY 6 HOURS
Status: DISCONTINUED | OUTPATIENT
Start: 2021-12-08 | End: 2021-12-14

## 2021-12-08 RX ORDER — MAGNESIUM HYDROXIDE 1200 MG/15ML
LIQUID ORAL CONTINUOUS PRN
Status: COMPLETED | OUTPATIENT
Start: 2021-12-08 | End: 2021-12-08

## 2021-12-08 RX ORDER — PROPOFOL 10 MG/ML
INJECTION, EMULSION INTRAVENOUS PRN
Status: DISCONTINUED | OUTPATIENT
Start: 2021-12-08 | End: 2021-12-08 | Stop reason: SDUPTHER

## 2021-12-08 RX ORDER — ROCURONIUM BROMIDE 10 MG/ML
INJECTION, SOLUTION INTRAVENOUS PRN
Status: DISCONTINUED | OUTPATIENT
Start: 2021-12-08 | End: 2021-12-08 | Stop reason: SDUPTHER

## 2021-12-08 RX ORDER — SODIUM CHLORIDE, SODIUM LACTATE, POTASSIUM CHLORIDE, CALCIUM CHLORIDE 600; 310; 30; 20 MG/100ML; MG/100ML; MG/100ML; MG/100ML
INJECTION, SOLUTION INTRAVENOUS CONTINUOUS
Status: DISCONTINUED | OUTPATIENT
Start: 2021-12-08 | End: 2021-12-08

## 2021-12-08 RX ORDER — OXYCODONE HYDROCHLORIDE 5 MG/1
5 TABLET ORAL EVERY 4 HOURS PRN
Status: DISCONTINUED | OUTPATIENT
Start: 2021-12-08 | End: 2021-12-17 | Stop reason: HOSPADM

## 2021-12-08 RX ORDER — ONDANSETRON 2 MG/ML
INJECTION INTRAMUSCULAR; INTRAVENOUS PRN
Status: DISCONTINUED | OUTPATIENT
Start: 2021-12-08 | End: 2021-12-08 | Stop reason: SDUPTHER

## 2021-12-08 RX ORDER — LIDOCAINE HYDROCHLORIDE 20 MG/ML
INJECTION, SOLUTION INFILTRATION; PERINEURAL PRN
Status: DISCONTINUED | OUTPATIENT
Start: 2021-12-08 | End: 2021-12-08 | Stop reason: SDUPTHER

## 2021-12-08 RX ADMIN — SERTRALINE 25 MG: 50 TABLET, FILM COATED ORAL at 07:45

## 2021-12-08 RX ADMIN — INSULIN LISPRO 1 UNITS: 100 INJECTION, SOLUTION INTRAVENOUS; SUBCUTANEOUS at 20:58

## 2021-12-08 RX ADMIN — SODIUM CHLORIDE: 9 INJECTION, SOLUTION INTRAVENOUS at 14:00

## 2021-12-08 RX ADMIN — FAMOTIDINE 20 MG: 10 INJECTION, SOLUTION INTRAVENOUS at 13:26

## 2021-12-08 RX ADMIN — GABAPENTIN 300 MG: 300 CAPSULE ORAL at 18:50

## 2021-12-08 RX ADMIN — SODIUM CHLORIDE, POTASSIUM CHLORIDE, SODIUM LACTATE AND CALCIUM CHLORIDE: 600; 310; 30; 20 INJECTION, SOLUTION INTRAVENOUS at 18:24

## 2021-12-08 RX ADMIN — FENTANYL CITRATE 50 MCG: 50 INJECTION, SOLUTION INTRAMUSCULAR; INTRAVENOUS at 15:51

## 2021-12-08 RX ADMIN — LEVOTHYROXINE SODIUM 50 MCG: 0.05 TABLET ORAL at 06:17

## 2021-12-08 RX ADMIN — PROPOFOL 140 MG: 10 INJECTION, EMULSION INTRAVENOUS at 14:05

## 2021-12-08 RX ADMIN — FENTANYL CITRATE 50 MCG: 50 INJECTION, SOLUTION INTRAMUSCULAR; INTRAVENOUS at 16:41

## 2021-12-08 RX ADMIN — DEXAMETHASONE SODIUM PHOSPHATE 8 MG: 4 INJECTION, SOLUTION INTRAMUSCULAR; INTRAVENOUS at 14:15

## 2021-12-08 RX ADMIN — CEFAZOLIN 2000 MG: 10 INJECTION, POWDER, FOR SOLUTION INTRAVENOUS at 06:18

## 2021-12-08 RX ADMIN — ROCURONIUM BROMIDE 10 MG: 10 SOLUTION INTRAVENOUS at 15:14

## 2021-12-08 RX ADMIN — CLONIDINE HYDROCHLORIDE 0.1 MG: 0.1 TABLET ORAL at 11:11

## 2021-12-08 RX ADMIN — FENTANYL CITRATE 50 MCG: 50 INJECTION, SOLUTION INTRAMUSCULAR; INTRAVENOUS at 14:42

## 2021-12-08 RX ADMIN — HYDROMORPHONE HYDROCHLORIDE 0.5 MG: 1 INJECTION, SOLUTION INTRAMUSCULAR; INTRAVENOUS; SUBCUTANEOUS at 17:26

## 2021-12-08 RX ADMIN — LIDOCAINE HYDROCHLORIDE 60 MG: 20 INJECTION, SOLUTION INFILTRATION; PERINEURAL at 14:05

## 2021-12-08 RX ADMIN — FENTANYL CITRATE 50 MCG: 50 INJECTION, SOLUTION INTRAMUSCULAR; INTRAVENOUS at 14:12

## 2021-12-08 RX ADMIN — ATORVASTATIN CALCIUM 10 MG: 10 TABLET, FILM COATED ORAL at 20:42

## 2021-12-08 RX ADMIN — ROCURONIUM BROMIDE 10 MG: 10 SOLUTION INTRAVENOUS at 15:50

## 2021-12-08 RX ADMIN — CARVEDILOL 12.5 MG: 6.25 TABLET, FILM COATED ORAL at 18:50

## 2021-12-08 RX ADMIN — CEFAZOLIN 2000 MG: 10 INJECTION, POWDER, FOR SOLUTION INTRAVENOUS at 21:55

## 2021-12-08 RX ADMIN — SODIUM CHLORIDE, PRESERVATIVE FREE 10 ML: 5 INJECTION INTRAVENOUS at 07:45

## 2021-12-08 RX ADMIN — HYDROCODONE BITARTRATE AND ACETAMINOPHEN 2 TABLET: 5; 325 TABLET ORAL at 21:04

## 2021-12-08 RX ADMIN — CEFAZOLIN 2 G: 10 INJECTION, POWDER, FOR SOLUTION INTRAVENOUS at 14:00

## 2021-12-08 RX ADMIN — ROCURONIUM BROMIDE 50 MG: 10 SOLUTION INTRAVENOUS at 14:05

## 2021-12-08 RX ADMIN — FENTANYL CITRATE 50 MCG: 50 INJECTION, SOLUTION INTRAMUSCULAR; INTRAVENOUS at 14:05

## 2021-12-08 RX ADMIN — LOSARTAN POTASSIUM 50 MG: 25 TABLET, FILM COATED ORAL at 07:45

## 2021-12-08 RX ADMIN — ONDANSETRON 4 MG: 2 INJECTION INTRAMUSCULAR; INTRAVENOUS at 16:08

## 2021-12-08 RX ADMIN — SODIUM CHLORIDE, PRESERVATIVE FREE 10 ML: 5 INJECTION INTRAVENOUS at 20:43

## 2021-12-08 RX ADMIN — INSULIN GLARGINE 15 UNITS: 100 INJECTION, SOLUTION SUBCUTANEOUS at 20:58

## 2021-12-08 RX ADMIN — SUGAMMADEX 200 MG: 100 INJECTION, SOLUTION INTRAVENOUS at 16:36

## 2021-12-08 RX ADMIN — CARVEDILOL 12.5 MG: 6.25 TABLET, FILM COATED ORAL at 07:44

## 2021-12-08 ASSESSMENT — PULMONARY FUNCTION TESTS
PIF_VALUE: 8
PIF_VALUE: 2
PIF_VALUE: 22
PIF_VALUE: 21
PIF_VALUE: 18
PIF_VALUE: 22
PIF_VALUE: 22
PIF_VALUE: 19
PIF_VALUE: 22
PIF_VALUE: 22
PIF_VALUE: 21
PIF_VALUE: 22
PIF_VALUE: 19
PIF_VALUE: 23
PIF_VALUE: 21
PIF_VALUE: 22
PIF_VALUE: 21
PIF_VALUE: 21
PIF_VALUE: 22
PIF_VALUE: 1
PIF_VALUE: 22
PIF_VALUE: 22
PIF_VALUE: 21
PIF_VALUE: 22
PIF_VALUE: 28
PIF_VALUE: 22
PIF_VALUE: 9
PIF_VALUE: 22
PIF_VALUE: 19
PIF_VALUE: 23
PIF_VALUE: 21
PIF_VALUE: 1
PIF_VALUE: 22
PIF_VALUE: 21
PIF_VALUE: 21
PIF_VALUE: 22
PIF_VALUE: 21
PIF_VALUE: 22
PIF_VALUE: 21
PIF_VALUE: 22
PIF_VALUE: 22
PIF_VALUE: 21
PIF_VALUE: 21
PIF_VALUE: 22
PIF_VALUE: 21
PIF_VALUE: 21
PIF_VALUE: 22
PIF_VALUE: 22
PIF_VALUE: 21
PIF_VALUE: 22
PIF_VALUE: 21
PIF_VALUE: 21
PIF_VALUE: 23
PIF_VALUE: 21
PIF_VALUE: 22
PIF_VALUE: 22
PIF_VALUE: 21
PIF_VALUE: 23
PIF_VALUE: 22
PIF_VALUE: 21
PIF_VALUE: 22
PIF_VALUE: 22
PIF_VALUE: 21
PIF_VALUE: 22
PIF_VALUE: 21
PIF_VALUE: 22
PIF_VALUE: 22
PIF_VALUE: 21
PIF_VALUE: 22
PIF_VALUE: 21
PIF_VALUE: 22
PIF_VALUE: 21
PIF_VALUE: 18
PIF_VALUE: 21
PIF_VALUE: 22
PIF_VALUE: 22
PIF_VALUE: 9
PIF_VALUE: 22
PIF_VALUE: 4
PIF_VALUE: 22
PIF_VALUE: 22
PIF_VALUE: 20
PIF_VALUE: 19
PIF_VALUE: 22
PIF_VALUE: 22
PIF_VALUE: 14
PIF_VALUE: 22
PIF_VALUE: 21
PIF_VALUE: 22
PIF_VALUE: 22
PIF_VALUE: 21
PIF_VALUE: 22
PIF_VALUE: 18
PIF_VALUE: 22
PIF_VALUE: 21
PIF_VALUE: 18
PIF_VALUE: 21
PIF_VALUE: 21
PIF_VALUE: 22
PIF_VALUE: 22
PIF_VALUE: 21
PIF_VALUE: 19
PIF_VALUE: 22
PIF_VALUE: 12
PIF_VALUE: 22
PIF_VALUE: 1
PIF_VALUE: 13
PIF_VALUE: 19
PIF_VALUE: 26
PIF_VALUE: 18
PIF_VALUE: 21
PIF_VALUE: 4
PIF_VALUE: 21
PIF_VALUE: 22
PIF_VALUE: 22
PIF_VALUE: 18
PIF_VALUE: 22
PIF_VALUE: 25
PIF_VALUE: 22
PIF_VALUE: 21
PIF_VALUE: 21
PIF_VALUE: 22
PIF_VALUE: 21
PIF_VALUE: 22
PIF_VALUE: 22
PIF_VALUE: 21
PIF_VALUE: 22
PIF_VALUE: 21
PIF_VALUE: 22
PIF_VALUE: 22
PIF_VALUE: 21
PIF_VALUE: 13
PIF_VALUE: 22
PIF_VALUE: 21
PIF_VALUE: 21
PIF_VALUE: 22
PIF_VALUE: 22
PIF_VALUE: 1

## 2021-12-08 ASSESSMENT — PAIN SCALES - GENERAL
PAINLEVEL_OUTOF10: 7
PAINLEVEL_OUTOF10: 0
PAINLEVEL_OUTOF10: 7
PAINLEVEL_OUTOF10: 7
PAINLEVEL_OUTOF10: 0
PAINLEVEL_OUTOF10: 0

## 2021-12-08 ASSESSMENT — PAIN DESCRIPTION - PAIN TYPE
TYPE: SURGICAL PAIN
TYPE: SURGICAL PAIN

## 2021-12-08 ASSESSMENT — PAIN DESCRIPTION - LOCATION
LOCATION: KNEE
LOCATION: KNEE

## 2021-12-08 ASSESSMENT — PAIN DESCRIPTION - ORIENTATION: ORIENTATION: LEFT

## 2021-12-08 ASSESSMENT — ENCOUNTER SYMPTOMS: SHORTNESS OF BREATH: 0

## 2021-12-08 ASSESSMENT — LIFESTYLE VARIABLES: SMOKING_STATUS: 0

## 2021-12-08 NOTE — FLOWSHEET NOTE
Treatment time:3.15 hours  Net UF: 1902 ml    Pre weight: 83.3 kg   Post weight: 80.4 kg  EDW: 82.5 kg    Access used: KIRK AVF  Access function: Good with  ml/min    Medications or blood products given: Clonidine 0.1mg po    Regular outpatient schedule: MWF    Summary of response to treatment: Blood returned with rinseback, cannulation needles d/c'd, hemastasis achieved, dressings to sites noted to be clean, dry and intact upon d/c from [facility], VSS, alert, pt leaving room to go to same day surgery for L knee replacement, report called to primary nurse and to Davi Betancourt in same day surgery. Copy of dialysis treatment record placed in chart, to be scanned into EMR.        12/08/21 0853 12/08/21 1214   Vital Signs   BP (!) 162/86 (!) 148/69   Temp 98.6 °F (37 °C) 98.2 °F (36.8 °C)   Pulse 81 76   Resp 16 16   Weight 183 lb 10.3 oz (83.3 kg) 177 lb 4 oz (80.4 kg)   Weight Method Standing scale  --    Percent Weight Change 1.46 -3.48   Dry Weight 181 lb 14.1 oz (82.5 kg) 181 lb 14.1 oz (82.5 kg)

## 2021-12-08 NOTE — ANESTHESIA PRE PROCEDURE
Department of Anesthesiology  Preprocedure Note       Name:  Radha Tran   Age:  66 y.o.  :  1950                                          MRN:  [id number]         Date:  2021      Surgeon: Za Crespo):  Leon Alberto MD    Procedure: Procedure(s):  LEFT TOTAL KNEE ARTHROPLASTY WITH ADDUCTOR CANAL BLOCK FOR PAIN CONTROL               [employer]    Medications prior to admission:   Prior to Admission medications    Medication Sig Start Date End Date Taking? Authorizing Provider   carvedilol (COREG) 12.5 MG tablet Take 12.5 mg by mouth 2 times daily (with meals)   Yes Historical Provider, MD   Ferric Citrate (AURYXIA) 1  MG(Fe) TABS Take 3 tablets by mouth 3 times daily (before meals)   Yes Historical Provider, MD   Lactobacillus-Inulin (CULTURELLE ADULT ULT BALANCE PO) Take 1 tablet by mouth daily as needed   Yes Historical Provider, MD   mupirocin (BACTROBAN) 2 % ointment APPLY 1/2 INCH TO INSIDE OF EACH NOSTRIL Q 12 HR STARTING 5 DAYS PRIOR TO SURGERY INCLUDING MORNING OF SURGERY. 21  Yes Lisbet Oakes MD   levothyroxine (SYNTHROID) 50 MCG tablet Take 50 mcg by mouth Daily   Yes Historical Provider, MD   insulin glargine (LANTUS) 100 UNIT/ML injection vial Inject 8 Units into the skin every morning 20 units if bs 150 or above, 15 units if below bs is below. Patient taking differently: Inject 15 Units into the skin every morning 18 units if bs 150 or above, 15 units if below bs is below. 16  Yes Manju Chopra MD   gabapentin (NEURONTIN) 300 MG capsule Take 300 mg by mouth every evening Take 1-2 tablets every evening before bed.    Yes Historical Provider, MD   sertraline (ZOLOFT) 25 MG tablet Take 25 mg by mouth daily   Yes Historical Provider, MD   losartan (COZAAR) 100 MG tablet Take 100 mg by mouth nightly     Historical Provider, MD   aspirin 81 MG EC tablet Take 1 tablet by mouth daily Take with food 16   Kirstin Tubbs MD   atorvastatin (LIPITOR) 10 MG tablet Take 10 mg by mouth nightly     Historical MD Rona       Current medications:    Current Facility-Administered Medications   Medication Dose Route Frequency Provider Last Rate Last Admin    lactated ringers infusion   IntraVENous Continuous Que Burch MD        atorvastatin (LIPITOR) tablet 10 mg  10 mg Oral Nightly Naina Irving MD   10 mg at 12/07/21 2111    carvedilol (COREG) tablet 12.5 mg  12.5 mg Oral BID  Naina Irving MD   12.5 mg at 12/08/21 0744    gabapentin (NEURONTIN) capsule 300 mg  300 mg Oral QPM Naina Irving MD   300 mg at 12/07/21 1804    levothyroxine (SYNTHROID) tablet 50 mcg  50 mcg Oral Daily Naina Irving MD   50 mcg at 12/08/21 0617    sertraline (ZOLOFT) tablet 25 mg  25 mg Oral Daily Naina Irving MD   25 mg at 12/08/21 0745    glucose (GLUTOSE) 40 % oral gel 15 g  15 g Oral PRN Naina Irving MD        dextrose 50 % IV solution  12.5 g IntraVENous PRN Naina Irving MD        glucagon (rDNA) injection 1 mg  1 mg IntraMUSCular PRN Naina Irving MD        dextrose 5 % solution  100 mL/hr IntraVENous PRN Naina Irving MD        insulin glargine (LANTUS) injection vial 15 Units  15 Units SubCUTAneous Nightly Naina Irving MD   15 Units at 12/07/21 2111    insulin lispro (HUMALOG) injection vial 4 Units  4 Units SubCUTAneous TID BRUCE Irving MD   4 Units at 12/07/21 1810    insulin lispro (HUMALOG) injection vial 0-6 Units  0-6 Units SubCUTAneous TID BRUCE Irving MD        insulin lispro (HUMALOG) injection vial 0-3 Units  0-3 Units SubCUTAneous Nightly Naina Irving MD        hydrALAZINE (APRESOLINE) injection 10 mg  10 mg IntraVENous Q4H PRN Naina Irving MD   10 mg at 12/07/21 1238    labetalol (NORMODYNE;TRANDATE) injection 20 mg  20 mg IntraVENous Q4H PRN Naina Irving MD        losartan (COZAAR) tablet 50 mg  50 mg Oral Daily Betty Brower MD   50 mg at 12/08/21 0745    sodium chloride flush 0.9 % injection 5-40 mL  5-40 mL IntraVENous 2 times per day Soraya Martinez MD   10 mL at 12/08/21 0745    sodium chloride flush 0.9 % injection 5-40 mL  5-40 mL IntraVENous PRN Soraya Martinez MD        0.9 % sodium chloride infusion  25 mL IntraVENous PRN Soraya Martinez MD        ondansetron (ZOFRAN-ODT) disintegrating tablet 4 mg  4 mg Oral Q8H PRN Soraya Martinez MD        Or    ondansetron [facility] injection 4 mg  4 mg IntraVENous Q6H PRN Soraya Martinez MD        polyethylene glycol [facility]) packet 17 g  17 g Oral Daily PRN Soraya Martinez MD        HYDROcodone-acetaminophen [facility]) 5-325 MG per tablet 1 tablet  1 tablet Oral Q4H PRN Soraya Martinez MD        Or    HYDROcodone-acetaminophen [facility]) 5-325 MG per tablet 2 tablet  2 tablet Oral Q4H PRN Soraya Martinez MD        calcium gluconate 10 % injection 1,000 mg  1,000 mg IntraVENous Once Stef Isaac MD           Allergies:  No Known Allergies    Problem List:    Patient Active Problem List   Diagnosis Code    Cellulitis L03.90    MEY (acute kidney injury) ([facility] [address] N17.9    Type 2 diabetes mellitus with complication, without long-term current use of insulin ([facility] [address] E11.8    Secondary hypertension I15.9    Hyperlipidemia E78.5    Hypoglycemia E16.2    Anxiety about health F41.8    Nephrotic syndrome N04.9    Essential hypertension I10    Hyperkalemia E87.5    Diabetic ulcer of left foot associated with type 2 diabetes mellitus ([facility] [address] E11.621, L97.529    CKD (chronic kidney disease), stage IV (HCC) S38.4    Diastolic dysfunction with acute on chronic heart failure (HCC) I50.33    Acute on chronic diastolic congestive heart failure (HCC) I50.33    ESRD on dialysis ([facility] [address] N18.6, Z99.2    Hypoxia R09.02    Hypervolemia E87.70    Anemia due to chronic kidney disease N18.9, D63.1    Chronic kidney disease (CKD), stage V (HCC) N18.5    Toe osteomyelitis, right (HCC) M86.9    Cellulitis of right foot L03.115    Cellulitis of right leg L03.115    Primary osteoarthritis of left knee M17.12    Acute pain of left knee M25.562       Past Medical History:        Diagnosis Date    Arthritis     Chronic kidney disease     Diabetes mellitus ([facility] [facility]     Dialysis patient ([facility] [address] [facility]     M W F    ESRD (end stage renal disease) ([facility] [facility]     Hemodialysis patient ([facility] [address] [facility]     M-W-F    Hyperkalemia 2016    Hyperlipidemia     Hypertension     OA (osteoarthritis)     Retinopathy     Sepsis ([facility] [address]     Thyroid disease     Wears dentures        Past Surgical History:        Procedure Laterality Date    DIALYSIS FISTULA CREATION Left 08/10/2016    Dr. Irvin Fofana - upper arm, basilic vein transposition    EYE SURGERY Left 2018    catarct removal with lens implant     OTHER SURGICAL HISTORY Right 2019    partial foot amputation    TOE AMPUTATION Right 2019    PARTIAL FOOT AMPUTATION WITH GRAFT APPLICATION performed by Fabrizio Thacker DPM at [place] [address] History:    Social History     Tobacco Use    Smoking status: Former Smoker     Quit date: 2014     Years since quittin.4    Smokeless tobacco: Never Used   Substance Use Topics    Alcohol use:  No                                Counseling given: Not Answered      Vital Signs (Current):   Vitals:    21 0730 21 0853 21 1214 21 1327   BP: (!) 186/82 (!) 162/86 (!) 148/69 (!) 173/71   Pulse: 82 81 76 82   Resp: 18 16 16    Temp: 98 °F (36.7 °C) 98.6 °F (37 °C) 98.2 °F (36.8 °C)    TempSrc: Oral      SpO2: 96%      Weight:  183 lb 10.3 oz (83.3 kg) 177 lb 4 oz (80.4 kg)    Height:                                                  BP Readings from Last 3 Encounters:   21 (!) 173/71   19 (!) 174/84   19 (!) 145/61       NPO Status: Time of last liquid consumption: 2200                        Time of last solid consumption: 1800                        Date of last liquid consumption: 21                        Date of last solid food consumption: 12/05/21    BMI:   Wt Readings from Last 3 Encounters:   12/08/21 177 lb 4 oz (80.4 kg)   11/02/21 178 lb (80.7 kg)   10/26/21 178 lb (80.7 kg)     Body mass index is 27.76 kg/m². CBC:   Lab Results   Component Value Date    WBC 5.1 12/08/2021    RBC 3.50 12/08/2021    HGB 10.7 12/08/2021    HCT 33.3 12/08/2021    MCV 95.1 12/08/2021    RDW 18.1 12/08/2021     12/08/2021       CMP:   Lab Results   Component Value Date     12/08/2021    K 5.6 12/08/2021    K 5.0 12/07/2021     12/08/2021    CO2 20 12/08/2021    BUN 42 12/08/2021    CREATININE 7.3 12/08/2021    GFRAA 7 12/08/2021    AGRATIO 0.6 08/06/2016    LABGLOM 5 12/08/2021    GLUCOSE 148 12/08/2021    PROT 6.9 08/06/2016    CALCIUM 8.8 12/08/2021    BILITOT 0.3 08/06/2016    ALKPHOS 161 08/06/2016    AST 43 08/06/2016    ALT 51 08/06/2016       POC Tests:   Recent Labs     12/08/21  1330   POCGLU 108*       Coags:   Lab Results   Component Value Date    PROTIME 10.6 11/22/2021    INR 0.94 11/22/2021    APTT 35.9 11/22/2021       HCG (If Applicable): No results found for: PREGTESTUR, PREGSERUM, HCG, HCGQUANT     ABGs: No results found for: PHART, PO2ART, VBE7QML, ECE0JFT, BEART, G6WWLRLK     Type & Screen (If Applicable):  No results found for: LABABO, LABRH    Drug/Infectious Status (If Applicable):  No results found for: HIV, HEPCAB    COVID-19 Screening (If Applicable):   Lab Results   Component Value Date    COVID19 Not Detected 11/30/2021           Anesthesia Evaluation  Patient summary reviewed and Nursing notes reviewed no history of anesthetic complications:   Airway: Mallampati: II  TM distance: >3 FB   Neck ROM: full  Mouth opening: > = 3 FB Dental:    (+) upper dentures and lower dentures      Pulmonary:Negative Pulmonary ROS breath sounds clear to auscultation      (-) COPD, asthma, shortness of breath, recent URI, sleep apnea and not a current smoker          Patient did not smoke on day of surgery. Cardiovascular:    (+) hypertension:, CHF: diastolic, hyperlipidemia    (-) pacemaker, valvular problems/murmurs, past MI, CAD, CABG/stent, dysrhythmias and  angina    ECG reviewed  Rhythm: regular  Rate: normal  Echocardiogram reviewed         Beta Blocker:  Dose within 24 Hrs         Neuro/Psych:   (+) neuromuscular disease (FEET, NEUROPATHY):,    (-) seizures, TIA, CVA and psychiatric history           GI/Hepatic/Renal:   (+) renal disease (HD TODAY): ESRD and dialysis,      (-) GERD, liver disease, bowel prep and no morbid obesity       Endo/Other:    (+) DiabetesType II DM, , blood dyscrasia (ANEMIA): arthritis:., no malignancy/cancer. (-) hypothyroidism, hyperthyroidism, no malignancy/cancer               Abdominal:       Abdomen: soft. Vascular: negative vascular ROS. Other Findings: LUE AVF , HD , PALP THRILL            Anesthesia Plan      general     ASA 3       Induction: intravenous. MIPS: Postoperative opioids intended and Prophylactic antiemetics administered. Anesthetic plan and risks discussed with patient. Plan discussed with CRNA. This pre-anesthesia assessment may be used as a history and physical.    DOS STAFF ADDENDUM:    Pt seen and examined, chart reviewed (including anesthesia, drug and allergy history). No interval changes to history and physical examination. Anesthetic plan, risks, benefits, alternatives, and personnel involved discussed with patient. Patient verbalized an understanding and agrees to proceed.       Adriana Gudino MD  December 8, 2021  1:44 PM      Adriana Gudino MD   12/8/2021

## 2021-12-08 NOTE — BRIEF OP NOTE
Brief Postoperative Note      Patient: Compa Nevarez  YOB: 1950  MRN: [id number]    Date of Procedure: 12/6/2021    Pre-Op Diagnosis: LEFT KNEE VALGUS DEFORMITY, OSTEOARTHRITIS, INSTABILITY    Post-Op Diagnosis: Same       Procedure(s):  LEFT TOTAL KNEE ARTHROPLASTY WITH ADDUCTOR CANAL BLOCK FOR PAIN CONTROL               Alva Muniz & RODERICK    Surgeon(s):  Suyapa Kc MD    Assistant:  * No surgical staff found *    Anesthesia: General    Estimated Blood Loss (mL): 180     Complications: None    Specimens:   * No specimens in log *    Implants:  * No implants in log *      Tourniquet time: 2 hours. Drains:   Hemodialysis Arteriovenous fistula Left Arm (Active)   Site Assessment Clean; Dry; Intact 12/08/21 1214   Thrill Present 12/08/21 1214   Bruit Present 12/08/21 1214   Access Interventions Aseptic Technique 12/08/21 1214   Dressing Status Clean; Dry; Intact 12/08/21 1214       Findings: severe valgus deformity. Absent ACL, contracted tight lateral tissues. Lax MCL. severe osteoarthritis with erosion of posterolateral plateau.     Electronically signed by Suyapa Kc MD on 12/8/2021 at 4:29 PM

## 2021-12-08 NOTE — PLAN OF CARE
Problem: Falls - Risk of:  Goal: Will remain free from falls  Description: Will remain free from falls  12/8/2021 1031 by Rex Mcclelland RN  Outcome: Ongoing     Problem: Falls - Risk of:  Goal: Absence of physical injury  Description: Absence of physical injury  Outcome: Ongoing

## 2021-12-08 NOTE — PROGRESS NOTES
Patient admitted from OR to PACU. Bedside report received. Patient immediatly hooked up to heart monitor. Sachin Chan RN

## 2021-12-08 NOTE — PROGRESS NOTES
Hospitalist Progress Note      PCP: Raad West, APRN - CNP    Date of Admission: 12/6/2021    Chief Complaint: left knee pain. Elevated bp    Hospital Course: 66 y.o. female with a PMH of OA, ESRD on HD, HTN, Hypothyroidism, and DM who we are asked to see/evaluate by Fiordaliza Diaz MD for medical management. Patient admitted for elective left knee replacement surgery. Surgery postponed due to abnl labs-hyperkalemia. Patient has dialysis on MWF. She had dialysis on 12/6, blood pressure remains elevated. Plan for OR on 12/8. Patient a/ox4, nad, denies N/V/D/F/C.     Subjective: HD today, then anticipate left knee surgery      Medications:  Reviewed    Infusion Medications    dextrose      sodium chloride       Scheduled Medications    cloNIDine  0.1 mg Oral Once    atorvastatin  10 mg Oral Nightly    carvedilol  12.5 mg Oral BID WC    gabapentin  300 mg Oral QPM    levothyroxine  50 mcg Oral Daily    sertraline  25 mg Oral Daily    insulin glargine  15 Units SubCUTAneous Nightly    insulin lispro  4 Units SubCUTAneous TID WC    insulin lispro  0-6 Units SubCUTAneous TID WC    insulin lispro  0-3 Units SubCUTAneous Nightly    losartan  50 mg Oral Daily    sodium chloride flush  5-40 mL IntraVENous 2 times per day    calcium gluconate  1,000 mg IntraVENous Once     PRN Meds: glucose, dextrose, glucagon (rDNA), dextrose, hydrALAZINE, labetalol, sodium chloride flush, sodium chloride, ondansetron **OR** ondansetron, polyethylene glycol, HYDROcodone 5 mg - acetaminophen **OR** HYDROcodone 5 mg - acetaminophen      Intake/Output Summary (Last 24 hours) at 12/8/2021 1051  Last data filed at 12/7/2021 1412  Gross per 24 hour   Intake 240 ml   Output --   Net 240 ml       Physical Exam Performed:    BP (!) 162/86   Pulse 81   Temp 98.6 °F (37 °C)   Resp 16   Ht 5' 7\" (1.702 m)   Wt 183 lb 10.3 oz (83.3 kg)   SpO2 96%   BMI 28.76 kg/m²     General appearance: No apparent distress, appears stated age and cooperative. HEENT: Normal cephalic, atraumatic without obvious deformity. Pupils equal, round, and reactive to light. Extra ocular muscles intact. Conjunctivae/corneas clear. Neck: Supple, with full range of motion. No jugular venous distention. Trachea midline. Respiratory:  Normal respiratory effort. Clear to auscultation, bilaterally without Rales/Wheezes/Rhonchi. Cardiovascular: Regular rate and rhythm with normal S1/S2 without murmurs, rubs or gallops. Abdomen: Soft, non-tender, non-distended with normal bowel sounds. Musculoskeletal: Left knee decreased rom, OA  Skin: Skin color, texture, turgor normal.  No rashes or lesions. Neurologic:  Neurovascularly intact without any focal sensory/motor deficits. Cranial nerves: II-XII intact, grossly non-focal.  Psychiatric: Alert and oriented, thought content appropriate, normal insight  Capillary Refill: Brisk,3 seconds, normal   Peripheral Pulses: +2 palpable, equal bilaterally    Labs:   Recent Labs     12/07/21  0724 12/08/21  0813   WBC 5.2 5.1   HGB 10.5* 10.7*   HCT 31.5* 33.3*    245     Recent Labs     12/06/21  0825 12/06/21  1109 12/06/21  1300 12/07/21  0724 12/08/21  0813   NA  --  136  --  133* 132*   K   < > 6.6* 5.7* 5.0 5.6*   CL  --  101  --  102 100   CO2  --  22  --  20* 20*   BUN  --  54*  --  29* 42*   CREATININE  --  7.3*  --  5.2* 7.3*   CALCIUM  --  9.5  --  9.3 8.8   PHOS  --   --   --   --  4.4    < > = values in this interval not displayed. No results for input(s): AST, ALT, BILIDIR, BILITOT, ALKPHOS in the last 72 hours. No results for input(s): INR in the last 72 hours. No results for input(s): [place] in the last 72 hours.     Urinalysis:      Lab Results   Component Value Date    NITRU Negative 09/14/2021    WBCUA >100 09/14/2021    BACTERIA 3+ 09/14/2021    RBCUA 11-20 09/14/2021    BLOODU MODERATE 09/14/2021    SPECGRAV 1.015 09/14/2021    GLUCOSEU Negative 09/14/2021       Radiology:  No orders

## 2021-12-08 NOTE — INTERVAL H&P NOTE
Update History & Physical    The patient's History and Physical of December 8, 2021 was reviewed with the patient and I examined the patient. There was no change. The surgical site was confirmed by the patient and me. Plan: The risks, benefits, expected outcome, and alternative to the recommended procedure have been discussed with the patient. Patient understands and wants to proceed with the procedure. Plan to proceed with L TKA today under general anesthesia. She had dialysis this morning.     Electronically signed by Gini Dance, MD on 12/8/2021 at 1:45 PM

## 2021-12-08 NOTE — PROGRESS NOTES
Patient arrived to . Pt alert and oriented, lethargic, VSS, bp elevated 174/68. Pt nauseous 2 episodes of emesis. Antiemetic cannot be given due to adminstration in PACU at 1608.  SCDs applied and IS given and shown how to use

## 2021-12-08 NOTE — H&P
ORTHOPAEDIC SURGERY HISTORY AND PHYSICAL      CHIEF COMPLAINT: Left leg pain    HISTORY OF PRESENT ILLNESS:  69-year-old female presents to the hospital for surgical intervention of her left knee. She has had longstanding history of knee pain and deformity. She has had prior conservative management with Dr. Mitzi Rivas including bracing oral anti-inflammatories and injections. She has had pain that interferes with her activities of daily living. Upon presentation for elective total knee arthroplasty, she was noted to have a elevated potassium level. She is a renal dialysis patient with a fistula in her upper extremity. She is also diabetic patient but has had good control of her hemoglobin A1c. It was felt that the patient would be best served by admission for dialysis given her elevated potassium with attempts at surgical intervention if this can be corrected. She continues to have severe knee pain and a sense of instability. Her gait is significantly impaired by her knee. She was previously scheduled for total knee arthroplasty, however this was canceled by the COVID-19 pandemic.     Past Medical History:   Diagnosis Date    Arthritis     Chronic kidney disease     Diabetes mellitus ([facility]     Dialysis patient ([facility]     M W F    ESRD (end stage renal disease) ([facility]     Hemodialysis patient ([facility]     M-W-F    Hyperkalemia 07/13/2016    Hyperlipidemia     Hypertension     OA (osteoarthritis)     Retinopathy     Sepsis ([facility]     Thyroid disease     Wears dentures        Current Facility-Administered Medications   Medication Dose Route Frequency Provider Last Rate Last Admin    atorvastatin (LIPITOR) tablet 10 mg  10 mg Oral Nightly Chi Cross MD   10 mg at 12/07/21 2111    carvedilol (COREG) tablet 12.5 mg  12.5 mg Oral BID  Chi Cross MD   12.5 mg at 12/07/21 1805    gabapentin (NEURONTIN) capsule 300 mg  300 mg Oral QPM Chi Cross MD   300 mg at 12/07/21 1804    levothyroxine (SYNTHROID) tablet 50 mcg  50 mcg Oral Daily Jeri Andino MD   50 mcg at 12/07/21 0636    sertraline (ZOLOFT) tablet 25 mg  25 mg Oral Daily Jeri Andino MD   25 mg at 12/07/21 0901    glucose (GLUTOSE) 40 % oral gel 15 g  15 g Oral PRN Jeri Andino MD        dextrose 50 % IV solution  12.5 g IntraVENous PRN Jeri Andino MD        glucagon (rDNA) injection 1 mg  1 mg IntraMUSCular PRN Jeri Andino MD        dextrose 5 % solution  100 mL/hr IntraVENous PRN Jeri Andino MD        insulin glargine (LANTUS) injection vial 15 Units  15 Units SubCUTAneous Nightly Jeri Andino MD   15 Units at 12/07/21 2111    insulin lispro (HUMALOG) injection vial 4 Units  4 Units SubCUTAneous TID  Jeri Andino MD   4 Units at 12/07/21 1810    insulin lispro (HUMALOG) injection vial 0-6 Units  0-6 Units SubCUTAneous TID  Jeri Andino MD        insulin lispro (HUMALOG) injection vial 0-3 Units  0-3 Units SubCUTAneous Nightly Jeri Andino MD        hydrALAZINE (APRESOLINE) injection 10 mg  10 mg IntraVENous Q4H PRN Jeri Andino MD   10 mg at 12/07/21 1238    labetalol (NORMODYNE;TRANDATE) injection 20 mg  20 mg IntraVENous Q4H PRN Jeri Andino MD        losartan (COZAAR) tablet 50 mg  50 mg Oral Daily James Glass MD   50 mg at 12/07/21 1020    [START ON 12/8/2021] ceFAZolin (ANCEF) 2000 mg in dextrose 5 % 100 mL IVPB  2,000 mg IntraVENous On Call to Wilver Mares, PA        sodium chloride flush 0.9 % injection 5-40 mL  5-40 mL IntraVENous 2 times per day Gopi Wang MD   10 mL at 12/07/21 1021    sodium chloride flush 0.9 % injection 5-40 mL  5-40 mL IntraVENous PRN Gopi Wang MD        0.9 % sodium chloride infusion  25 mL IntraVENous PRN Gopi Wang MD        ondansetron (ZOFRAN-ODT) disintegrating tablet 4 mg  4 mg Oral Q8H PRN Gopi Wang MD        Or    ondansetron [facility]) injection 4 mg  4 mg IntraVENous Q6H PRN Gopi Wang MD  polyethylene glycol (GLYCOLAX) packet 17 g  17 g Oral Daily PRN Lindy Ba MD        HYDROcodone-acetaminophen [facility]) 5-325 MG per tablet 1 tablet  1 tablet Oral Q4H PRN Lindy Ba MD        Or    HYDROcodone-acetaminophen [facility]) 5-325 MG per tablet 2 tablet  2 tablet Oral Q4H PRN Lindy Ba MD        calcium gluconate 10 % injection 1,000 mg  1,000 mg IntraVENous Once Steph Baugh MD            Past Surgical History:   Procedure Laterality Date    DIALYSIS FISTULA CREATION Left 08/10/2016    Dr. Litzy Greenberg - upper arm, basilic vein transposition    EYE SURGERY Left 2018    catarct removal with lens implant     OTHER SURGICAL HISTORY Right 2019    partial foot amputation    TOE AMPUTATION Right 2019    PARTIAL FOOT AMPUTATION WITH GRAFT APPLICATION performed by Taylor Lambert DPM at [facility]       No Known Allergies    History reviewed. No pertinent family history. Social History     Socioeconomic History    Marital status:      Spouse name: Not on file    Number of children: Not on file    Years of education: Not on file    Highest education level: Not on file   Occupational History    Not on file   Tobacco Use    Smoking status: Former Smoker     Quit date: 2014     Years since quittin.4    Smokeless tobacco: Never Used   Vaping Use    Vaping Use: Never used   Substance and Sexual Activity    Alcohol use: No    Drug use: No    Sexual activity: Never   Other Topics Concern    Not on file   Social History Narrative    Not on file     Social Determinants of Health     Financial Resource Strain:     Difficulty of Paying Living Expenses: Not on file   Food Insecurity:     Worried About [address] in the Last Year: Not on file    Felicia of Food in the Last Year: Not on file   Transportation Needs:     Lack of Transportation (Medical): Not on file    Lack of Transportation (Non-Medical):  Not on file   Physical Activity:     Days of Exercise per Week: Not on file    Minutes of Exercise per Session: Not on file   Stress:     Feeling of Stress : Not on file   Social Connections:     Frequency of Communication with Friends and Family: Not on file    Frequency of Social Gatherings with Friends and Family: Not on file    Attends Zoroastrian Services: Not on file    Active Member of Clubs or Organizations: Not on file    Attends Club or Organization Meetings: Not on file    Marital Status: Not on file   Intimate Partner Violence:     Fear of Current or Ex-Partner: Not on file    Emotionally Abused: Not on file    Physically Abused: Not on file    Sexually Abused: Not on file   Housing Stability:     Unable to Pay for Housing in the Last Year: Not on file    Number of [place] in the Last Year: Not on file    Unstable Housing in the Last Year: Not on file       Review of Systems   Constitutional: Negative for chills and fever. Cardiovascular: Positive for leg swelling. Musculoskeletal: Positive for arthralgias, gait problem, joint swelling and myalgias. Skin: Negative for color change, pallor, rash and wound. Neurological: Positive for weakness. Negative for numbness. PHYSICAL EXAM  Gen: awake, alert, oriented  HEENT: atraumatic, normocephalic  Neck: supple  Resp: equal chest rise bilaterally, no audible wheezes, no accessory muscle use. CV: Lower extremities warm and well perfused. Abd: soft, nontender   Focused examination of the left knee:  Clinically, there is severe valgus malalignment approximating 30 degrees. There are no cuts, open wounds, or abrasions to the left knee. There is a moderate effusion. There is tenderness to palpation about the lateral joint line. There is tenderness along the MCL. Valgus deformity is passively correctable with varus stress. This is correctable to near anatomic alignment. There is minor further valgus opening with valgus stress. There is a firm endpoint.   There is negative anterior and posterior drawer test.  Negative Lachman. There is range of motion from full extension to 110 degrees of flexion. There is no hyperextension. Sensation is intact to light touch in deep peroneal, superficial peroneal, tibial, sural, and saphenous nerve distributions. She does endorse chronic minor neuropathy to her lower extremity. Motor function is intact to EHL, FHL, tibialis anterior, and gastroc.  There is brisk capillary refill to the toes and a strong palpable dorsalis pedis pulse.  Compartments are soft and compressible. There is no calf tenderness and a negative Homans' sign. LABS:  Sodium 134  Potassium 6.4  Chloride 100  CO2 19  Anion gap 15  Glucose 131  BUN 54  Creatinine 7.1  GFR 6  Calcium 9.2  COVID-19 negative     RADIOGRAPHIC EXAM:  No new x-rays obtained today. ASSESSEMENT AND PLAN    66 y.o. female with the following orthopaedic surgery problems:    1. Left knee osteoarthritis, severe valgus deformity  2. Hyperkalemia    The patient will be admitted to the hospital for renal dialysis today. Her nephrology team will be consulted as well as internal medicine. The patient will be optimized for surgery. Plan to proceed with surgery on Wednesday, 12/8/2021 at 1 PM if medically optimized. This was discussed with the patient. She will be allowed full weightbearing as tolerated in the interim. She will be n.p.o. at midnight in preparation for her procedure. She will receive IV Ancef on-call to the OR. Hold anticoagulants the morning of surgery. All questions were answered to the best my ability.     Otis Velazquez MD

## 2021-12-09 LAB
ANION GAP SERPL CALCULATED.3IONS-SCNC: 14 MMOL/L (ref 3–16)
BUN BLDV-MCNC: 29 MG/DL (ref 7–20)
CALCIUM SERPL-MCNC: 8.3 MG/DL (ref 8.3–10.6)
CHLORIDE BLD-SCNC: 99 MMOL/L (ref 99–110)
CO2: 18 MMOL/L (ref 21–32)
CREAT SERPL-MCNC: 5.4 MG/DL (ref 0.6–1.2)
GFR AFRICAN AMERICAN: 9
GFR NON-AFRICAN AMERICAN: 8
GLUCOSE BLD-MCNC: 159 MG/DL (ref 70–99)
GLUCOSE BLD-MCNC: 170 MG/DL (ref 70–99)
GLUCOSE BLD-MCNC: 195 MG/DL (ref 70–99)
GLUCOSE BLD-MCNC: 223 MG/DL (ref 70–99)
GLUCOSE BLD-MCNC: 228 MG/DL (ref 70–99)
GLUCOSE BLD-MCNC: 245 MG/DL (ref 70–99)
HCT VFR BLD CALC: 30 % (ref 36–48)
HEMOGLOBIN: 9.6 G/DL (ref 12–16)
MCH RBC QN AUTO: 31.1 PG (ref 26–34)
MCHC RBC AUTO-ENTMCNC: 32.2 G/DL (ref 31–36)
MCV RBC AUTO: 96.8 FL (ref 80–100)
PDW BLD-RTO: 18.5 % (ref 12.4–15.4)
PERFORMED ON: ABNORMAL
PLATELET # BLD: 247 K/UL (ref 135–450)
PMV BLD AUTO: 8.5 FL (ref 5–10.5)
POTASSIUM SERPL-SCNC: 5.2 MMOL/L (ref 3.5–5.1)
POTASSIUM SERPL-SCNC: 5.4 MMOL/L (ref 3.5–5.1)
POTASSIUM SERPL-SCNC: 5.8 MMOL/L (ref 3.5–5.1)
RBC # BLD: 3.1 M/UL (ref 4–5.2)
SODIUM BLD-SCNC: 131 MMOL/L (ref 136–145)
WBC # BLD: 8.4 K/UL (ref 4–11)

## 2021-12-09 PROCEDURE — 27447 TOTAL KNEE ARTHROPLASTY: CPT | Performed by: ORTHOPAEDIC SURGERY

## 2021-12-09 PROCEDURE — 1200000000 HC SEMI PRIVATE

## 2021-12-09 PROCEDURE — 85027 COMPLETE CBC AUTOMATED: CPT

## 2021-12-09 PROCEDURE — 97535 SELF CARE MNGMENT TRAINING: CPT

## 2021-12-09 PROCEDURE — 97530 THERAPEUTIC ACTIVITIES: CPT

## 2021-12-09 PROCEDURE — 6360000002 HC RX W HCPCS: Performed by: ORTHOPAEDIC SURGERY

## 2021-12-09 PROCEDURE — 84132 ASSAY OF SERUM POTASSIUM: CPT

## 2021-12-09 PROCEDURE — 97167 OT EVAL HIGH COMPLEX 60 MIN: CPT

## 2021-12-09 PROCEDURE — 2580000003 HC RX 258: Performed by: ORTHOPAEDIC SURGERY

## 2021-12-09 PROCEDURE — 80048 BASIC METABOLIC PNL TOTAL CA: CPT

## 2021-12-09 PROCEDURE — 6370000000 HC RX 637 (ALT 250 FOR IP): Performed by: ORTHOPAEDIC SURGERY

## 2021-12-09 PROCEDURE — 6370000000 HC RX 637 (ALT 250 FOR IP): Performed by: INTERNAL MEDICINE

## 2021-12-09 PROCEDURE — 97163 PT EVAL HIGH COMPLEX 45 MIN: CPT

## 2021-12-09 PROCEDURE — 97116 GAIT TRAINING THERAPY: CPT

## 2021-12-09 PROCEDURE — 36415 COLL VENOUS BLD VENIPUNCTURE: CPT

## 2021-12-09 PROCEDURE — 97110 THERAPEUTIC EXERCISES: CPT

## 2021-12-09 RX ORDER — HEPARIN SODIUM 5000 [USP'U]/ML
5000 INJECTION, SOLUTION INTRAVENOUS; SUBCUTANEOUS EVERY 8 HOURS SCHEDULED
Status: DISCONTINUED | OUTPATIENT
Start: 2021-12-09 | End: 2021-12-17 | Stop reason: HOSPADM

## 2021-12-09 RX ORDER — HYDROCODONE BITARTRATE AND ACETAMINOPHEN 5; 325 MG/1; MG/1
1 TABLET ORAL EVERY 4 HOURS PRN
Qty: 42 TABLET | Refills: 0 | Status: SHIPPED | OUTPATIENT
Start: 2021-12-09 | End: 2021-12-15 | Stop reason: HOSPADM

## 2021-12-09 RX ADMIN — INSULIN LISPRO 4 UNITS: 100 INJECTION, SOLUTION INTRAVENOUS; SUBCUTANEOUS at 08:23

## 2021-12-09 RX ADMIN — OXYCODONE 5 MG: 5 TABLET ORAL at 00:26

## 2021-12-09 RX ADMIN — OXYCODONE 5 MG: 5 TABLET ORAL at 05:47

## 2021-12-09 RX ADMIN — ACETAMINOPHEN 650 MG: 325 TABLET ORAL at 12:52

## 2021-12-09 RX ADMIN — ACETAMINOPHEN 650 MG: 325 TABLET ORAL at 20:27

## 2021-12-09 RX ADMIN — INSULIN LISPRO 1 UNITS: 100 INJECTION, SOLUTION INTRAVENOUS; SUBCUTANEOUS at 20:22

## 2021-12-09 RX ADMIN — INSULIN LISPRO 2 UNITS: 100 INJECTION, SOLUTION INTRAVENOUS; SUBCUTANEOUS at 17:59

## 2021-12-09 RX ADMIN — HYDROCODONE BITARTRATE AND ACETAMINOPHEN 1 TABLET: 5; 325 TABLET ORAL at 14:57

## 2021-12-09 RX ADMIN — ACETAMINOPHEN 650 MG: 325 TABLET ORAL at 00:26

## 2021-12-09 RX ADMIN — INSULIN LISPRO 2 UNITS: 100 INJECTION, SOLUTION INTRAVENOUS; SUBCUTANEOUS at 12:01

## 2021-12-09 RX ADMIN — GABAPENTIN 300 MG: 300 CAPSULE ORAL at 20:26

## 2021-12-09 RX ADMIN — SODIUM ZIRCONIUM CYCLOSILICATE 10 G: 10 POWDER, FOR SUSPENSION ORAL at 12:48

## 2021-12-09 RX ADMIN — HYDROMORPHONE HYDROCHLORIDE 0.5 MG: 1 INJECTION, SOLUTION INTRAMUSCULAR; INTRAVENOUS; SUBCUTANEOUS at 13:48

## 2021-12-09 RX ADMIN — HEPARIN SODIUM 5000 UNITS: 5000 INJECTION INTRAVENOUS; SUBCUTANEOUS at 20:43

## 2021-12-09 RX ADMIN — INSULIN LISPRO 4 UNITS: 100 INJECTION, SOLUTION INTRAVENOUS; SUBCUTANEOUS at 12:02

## 2021-12-09 RX ADMIN — INSULIN LISPRO 1 UNITS: 100 INJECTION, SOLUTION INTRAVENOUS; SUBCUTANEOUS at 08:23

## 2021-12-09 RX ADMIN — SODIUM CHLORIDE, PRESERVATIVE FREE 10 ML: 5 INJECTION INTRAVENOUS at 21:42

## 2021-12-09 RX ADMIN — CARVEDILOL 12.5 MG: 6.25 TABLET, FILM COATED ORAL at 18:06

## 2021-12-09 RX ADMIN — SODIUM ZIRCONIUM CYCLOSILICATE 10 G: 10 POWDER, FOR SUSPENSION ORAL at 21:38

## 2021-12-09 RX ADMIN — INSULIN GLARGINE 15 UNITS: 100 INJECTION, SOLUTION SUBCUTANEOUS at 20:21

## 2021-12-09 RX ADMIN — Medication 10 ML: at 21:41

## 2021-12-09 RX ADMIN — SERTRALINE 25 MG: 50 TABLET, FILM COATED ORAL at 08:19

## 2021-12-09 RX ADMIN — HEPARIN SODIUM 5000 UNITS: 5000 INJECTION INTRAVENOUS; SUBCUTANEOUS at 10:50

## 2021-12-09 RX ADMIN — INSULIN LISPRO 4 UNITS: 100 INJECTION, SOLUTION INTRAVENOUS; SUBCUTANEOUS at 17:58

## 2021-12-09 RX ADMIN — ACETAMINOPHEN 650 MG: 325 TABLET ORAL at 05:48

## 2021-12-09 RX ADMIN — LEVOTHYROXINE SODIUM 50 MCG: 0.05 TABLET ORAL at 05:47

## 2021-12-09 RX ADMIN — CEFAZOLIN 2000 MG: 10 INJECTION, POWDER, FOR SOLUTION INTRAVENOUS at 05:48

## 2021-12-09 RX ADMIN — ATORVASTATIN CALCIUM 10 MG: 10 TABLET, FILM COATED ORAL at 20:26

## 2021-12-09 RX ADMIN — Medication 10 ML: at 08:20

## 2021-12-09 ASSESSMENT — PAIN SCALES - GENERAL
PAINLEVEL_OUTOF10: 6
PAINLEVEL_OUTOF10: 5
PAINLEVEL_OUTOF10: 10
PAINLEVEL_OUTOF10: 6
PAINLEVEL_OUTOF10: 10
PAINLEVEL_OUTOF10: 0
PAINLEVEL_OUTOF10: 5
PAINLEVEL_OUTOF10: 5
PAINLEVEL_OUTOF10: 10
PAINLEVEL_OUTOF10: 5
PAINLEVEL_OUTOF10: 6
PAINLEVEL_OUTOF10: 4
PAINLEVEL_OUTOF10: 10
PAINLEVEL_OUTOF10: 6
PAINLEVEL_OUTOF10: 0

## 2021-12-09 ASSESSMENT — PAIN DESCRIPTION - PAIN TYPE
TYPE: ACUTE PAIN
TYPE: SURGICAL PAIN
TYPE: ACUTE PAIN
TYPE: ACUTE PAIN

## 2021-12-09 ASSESSMENT — PAIN DESCRIPTION - LOCATION
LOCATION: FOOT
LOCATION: KNEE
LOCATION: KNEE
LOCATION: FOOT
LOCATION: KNEE
LOCATION: KNEE
LOCATION: FOOT

## 2021-12-09 ASSESSMENT — PAIN DESCRIPTION - DESCRIPTORS: DESCRIPTORS: BURNING

## 2021-12-09 ASSESSMENT — PAIN DESCRIPTION - ORIENTATION
ORIENTATION: LEFT

## 2021-12-09 NOTE — PROGRESS NOTES
Physical Therapy    Facility/Department: [facility] - MED SURG/ORTHO  Initial Assessment and treatment    NAME: Juju Chirinos  : 1950  MRN: [id number]    Date of Service: 2021    Discharge Recommendations:  IP Rehab   PT Equipment Recommendations  Equipment Needed: No (defer)    Assessment   Body structures, Functions, Activity limitations: Decreased functional mobility ; Decreased endurance; Decreased ROM; Decreased balance; Decreased strength  Assessment: Pt referred for PT evaluation during current hospital stay s/p L TKA on . Pt is easily distractible with tangential conversation and is currently functioning well below baseline. Pt is currently at [facility] for bed mobility, but requiring mod A for sit<>stand. Pt is unable to place enough weight through LLE to ambulate, himanshu with cues to unweight LLE with UE's. Pt took 1 very small step with each of 2 trials with min to mod A and blocking of L knee. Pt reports she is not afraid that it will hurt but is not confident that her leg will hold her. Pt would benefit from skilled acute PT to address deficits. Recommend IP rehab at [facility] from acute setting as she lives alone and is independent at [facility]. Treatment Diagnosis: impaired mobility  Prognosis: Good  Decision Making: High Complexity  PT Education: Goals; Gait Training; PT Role; Disease Specific Education; Plan of Care;  Functional Mobility Training; Home Exercise Program; Precautions; Transfer Training; Weight-bearing Education  Patient Education: pt verbalized understanding of importance of OOB activity  Barriers to Learning: no  REQUIRES PT FOLLOW UP: Yes  Activity Tolerance  Activity Tolerance: Patient limited by fatigue  Activity Tolerance: SpO2 95% on RA, HR 82; BP seated /52; seated EOB after standing for approx 5 min 84/44; seated EOB after 2nd stand 97/58; semireclined in bed at /58, felt lightheaded and shaky during first standing trial, no symptoms with 2nd standing trial Patient Diagnosis(es): The encounter diagnosis was Status post total left knee replacement. has a past medical history of Arthritis, Chronic kidney disease, Diabetes mellitus ([facility] [address] Dialysis patient ([facility] [address] ESRD (end stage renal disease) ([facility] [address] [facility] Hemodialysis patient ([facility] [address] [facility] Hyperkalemia, Hyperlipidemia, Hypertension, OA (osteoarthritis), Retinopathy, Sepsis ([facility] [address] Thyroid disease, and Wears dentures. has a past surgical history that includes Dialysis fistula creation (Left, 08/10/2016); eye surgery (Left, 04/03/2018); other surgical history (Right, 01/11/2019); Toe amputation (Right, 1/11/2019); and Total knee arthroplasty (Left, 12/8/2021). Restrictions  Restrictions/Precautions  Restrictions/Precautions: Weight Bearing  Lower Extremity Weight Bearing Restrictions  Left Lower Extremity Weight Bearing: Weight Bearing As Tolerated  Position Activity Restriction  Other position/activity restrictions: HD M/W/F; 1)  Dangle at edge of bed, progress to stand, and take a few steps with assistive device. 2)  Encourage ankle pumps and quad sets. 3)  Up in bedside chair as tolerated. 4)  Commode privileges with assistance.   Vision/Hearing  Vision: Impaired  Vision Exceptions: Wears glasses for reading  Hearing: Within functional limits     Subjective  General  Chart Reviewed: Yes  Patient assessed for rehabilitation services?: Yes  Response To Previous Treatment: Not applicable  Family / Caregiver Present: No  Referring Practitioner: Otis Velazquez MD  Referral Date : 12/08/21  Diagnosis: left knee OA  Follows Commands: Within Functional Limits  General Comment  Comments: Pt resting in bed upon entry, RN cleared pt for therapy  Subjective  Subjective: Pt agreeable to PT  Pain Screening  Patient Currently in Pain: No  Pain Assessment  Pain Assessment: 0-10  Pain Level: 5  Pain Type: Surgical pain  Pain Location: Knee  Pain Orientation: Left  Non-Pharmaceutical Pain Intervention(s): Ambulation/Increased Activity; Repositioned; Cold applied; Elevation; Therapeutic presence  Vital Signs  Patient Currently in Pain: Yes  Pre Treatment Pain Screening  Intervention List: Patient able to continue with treatment    Orientation  Orientation  Overall Orientation Status: Within Normal Limits  Social/Functional History  Social/Functional History  Lives With: Alone  Type of Home: House  Home Layout: One level  Home Access: Ramped entrance  Entrance Stairs - Number of Steps: ramp through garage  Bathroom Shower/Tub: Tub/Shower unit, Shower chair with back  [employer]: Handicap height  Bathroom Equipment: Grab bars in shower, Tub transfer bench, Shower chair, 3-in-1 commode (pt was not using DME, has from daughter who passed away)  Home Equipment: [address], Rolling walker  ADL Assistance: Independent  Homemaking Assistance: Independent  Homemaking Responsibilities: Yes  Ambulation Assistance: Independent  Transfer Assistance: Independent  Active : Yes  Occupation: Retired  Type of occupation: insurance  Leisure & Hobbies: see my granddaughters play sports, flowers  Additional Comments: pt reports family lives nearby and that she is \"refusing to go to rehab\"         Objective          AROM RLE (degrees)  RLE AROM: WNL  AROM LLE (degrees)  LLE General AROM: decreased at knee due to recent sx, hip and ankle [facility]  Strength RLE  Strength RLE: WNL  Comment: grossly 4+ to 5/5 through  Strength LLE  Comment: not formally assessed, observed to be at least 3+/5 throughout        Bed mobility  Supine to Sit: Stand by assistance  Sit to Supine: Stand by assistance  Scooting: Stand by assistance (to EOB and laterally while EOB)  Transfers  Sit to Stand: Moderate Assistance  Stand to sit: Contact guard assistance  Bed to Chair: Unable to assess (pt unable to place enough weight through LLE to take steps for ambulation)  Ambulation  Ambulation? :  (pt took 1 very small step during 2 separate trials of ambulation, unable to consistently bear weight through LLE to actually ambulate any distance, performed weight shifting 2 x 10 reps for pre gait activity but not able to place much weight through LLE)  WB Status: FWBAT  Stairs/Curb  Stairs?: No     Balance  Posture: Fair  Sitting - Static: Good  Sitting - Dynamic: Good  Standing - Static: Fair; -  Standing - Dynamic: Fair; -  Exercises  Straight Leg Raise: x 10 L  Quad Sets: x 10 B  Heelslides: x 10 L with assist  Gluteal Sets: x 10 B  Knee Short Arc Quad: x 10 B  Ankle Pumps: x 10 B     Plan   Plan  Times per week: BID x 7  Times per day: Twice a day  Current Treatment Recommendations: Strengthening, Home Exercise Program, ROM, Balance Training, Endurance Training, Functional Mobility Training, Transfer Training, Gait Training, Stair training  Safety Devices  Type of devices:  All fall risk precautions in place, Bed alarm in place, Call light within reach, Gait belt, Left in bed, Nurse notified      AM-PAC Score  AM-PAC Inpatient Mobility Raw Score : 11 (12/09/21 1314)  AM-PAC Inpatient T-Scale Score : 33.86 (12/09/21 1314)  Mobility Inpatient CMS 0-100% Score: 72.57 (12/09/21 1314)  Mobility Inpatient CMS G-Code Modifier : CL (12/09/21 1314)          Goals  Short term goals  Time Frame for Short term goals: 12/13/21 unless noted  Short term goal 1: Pt will perform bed mobility with SBA by 12/12/21  Short term goal 2: Pt will perform transfers with SBA  Short term goal 3: Pt will ambulate 50 ft with RW and SBA  Short term goal 4: Pt will negotiate 1 curb step with LRAD and CGA  Short term goal 5: Pt will perform 12+ reps of LE exercise for strengthening and balance  Patient Goals   Patient goals : \"to be able to get up to the chair with only touching help by tomorrow (12/10/21)\"       Therapy Time   Individual Concurrent Group Co-treatment   Time In 0942         Time Out 1045         Minutes 63         Timed Code Treatment Minutes: 53 Minutes    If pt is discharged prior to next therapy session, this note will serve as discharge summary.     Eleni Lin, PT

## 2021-12-09 NOTE — PROGRESS NOTES
Physical Therapy  Facility/Department: [facility] C5 - MED SURG/ORTHO  Daily Treatment Note  NAME: Tessa Zamora  : 1950  MRN: [id number]    Date of Service: 2021    Discharge Recommendations:  IP Rehab   PT Equipment Recommendations  Equipment Needed: No (defer)    Assessment   Body structures, Functions, Activity limitations: Decreased functional mobility ; Decreased endurance; Decreased ROM; Decreased balance; Decreased strength  Assessment: Pt seen for second session this date; and reporting 10/10 foot pain with inability to perform ankle pumps. Removed ACE wrap and surgical dressing with relief by EOS and pt able to perform ankle pumps. Pt still unable to ambulate this date, and required Ninfa for transfers and mod A for static standing with RW. Cont per POC 2 x /day to address deficits  Treatment Diagnosis: impaired mobility  Prognosis: Good  Decision Making: High Complexity  PT Education: Goals; Gait Training; PT Role; Disease Specific Education; Plan of Care; Functional Mobility Training; Home Exercise Program; Precautions; Transfer Training; Weight-bearing Education  Patient Education: pt verbalized understanding of importance of OOB activity  Barriers to Learning: no  REQUIRES PT FOLLOW UP: Yes  Activity Tolerance  Activity Tolerance: Patient limited by pain  Activity Tolerance: BP resting in bed 155/64; seated EOB after standing  no symptoms 89/53; seated EOB after second trial of standing 109/61     Patient Diagnosis(es): The encounter diagnosis was Status post total left knee replacement. has a past medical history of Arthritis, Chronic kidney disease, Diabetes mellitus ([facility] [address] Dialysis patient ([facility] [address] ESRD (end stage renal disease) ([facility] [address] Hemodialysis patient ([facility] [address] Hyperkalemia, Hyperlipidemia, Hypertension, OA (osteoarthritis), Retinopathy, Sepsis ([address] Thyroid disease, and Wears dentures.    has a past surgical history that includes Dialysis fistula creation (Left, 08/10/2016); eye surgery (Left, 04/03/2018); other surgical history (Right, 01/11/2019); Toe amputation (Right, 1/11/2019); and Total knee arthroplasty (Left, 12/8/2021). Restrictions  Restrictions/Precautions  Restrictions/Precautions: Weight Bearing  Lower Extremity Weight Bearing Restrictions  Left Lower Extremity Weight Bearing: Weight Bearing As Tolerated  Position Activity Restriction  Other position/activity restrictions: HD M/W/F; 1)  Dangle at edge of bed, progress to stand, and take a few steps with assistive device. 2)  Encourage ankle pumps and quad sets. 3)  Up in bedside chair as tolerated. 4)  Commode privileges with assistance. Subjective   General  Chart Reviewed: Yes  Response To Previous Treatment: Patient reporting fatigue but able to participate. Family / Caregiver Present: No  Referring Practitioner: Allen Kiran MD  Subjective  Subjective: Pt agreeable to PT, reporting unable to do her ankle pumps and has 10/10 foot pain. Removed ACE wrap with pt reporting decrease in foot pain to 0/10 at EOB with pt able to perform ankle pumps. General Comment  Comments: Pt resting in bed upon entry, RN cleared pt for therapy  Pain Screening  Patient Currently in Pain: Yes  Pain Assessment  Pain Assessment: 0-10  Pain Level: 10  Pain Type: Acute pain  Pain Location: Foot  Pain Orientation: Left  Pain Descriptors: Burning  Non-Pharmaceutical Pain Intervention(s): Repositioned; Ambulation/Increased Activity;  Therapeutic presence (removed ACE wrap and surgical dressing)  Response to Pain Intervention: Patient Satisfied  Vital Signs  Patient Currently in Pain: Yes  Pre Treatment Pain Screening  Intervention List: Patient able to continue with treatment    Orientation  Orientation  Overall Orientation Status: Within Normal Limits       Objective   Bed mobility  Supine to Sit: Stand by assistance  Sit to Supine: Stand by assistance  Scooting: Stand by assistance (to EOB)  Transfers  Sit to Stand: Minimal Assistance  Stand to sit: Minimal Assistance  Bed to Chair: Unable to assess (pt unable to balance with RW or put weight through LLE to take steps to chair)  Ambulation  Ambulation? :  (performed pre gait weight shifting, attempted several times to take a step, pt unable to plac enough weight through LLE to take step even with WB through UE's with walker)  WB Status: FWBAT  Stairs/Curb  Stairs?: No     Balance  Posture: Fair  Sitting - Static: Good  Sitting - Dynamic: Good  Standing - Static: Poor; +  Standing - Dynamic: Poor; +  Comments: mod A for standing balance with RW, leaning R  Exercises  Straight Leg Raise: x 10 L  Quad Sets: x 10 B  Heelslides: x 10 L with assist  Gluteal Sets: x 10 B  Knee Short Arc Quad: x 10 B  Ankle Pumps: x 10 B with assist, x 10 B without assist at EOS after ACE wrap removed   AROM RLE (degrees)  RLE AROM: WNL  AROM LLE (degrees)  LLE General AROM: decreased at knee due to recent sx, hip and ankle [facility]  Strength RLE  Strength RLE: WNL  Comment: grossly 4+ to 5/5 through  Strength LLE  Comment: not formally assessed, observed to be at least 3+/5 throughout       AM-PAC Score  AM-PAC Inpatient Mobility Raw Score : 11 (12/09/21 1709)  AM-PAC Inpatient T-Scale Score : 33.86 (12/09/21 1709)  Mobility Inpatient CMS 0-100% Score: 72.57 (12/09/21 1709)  Mobility Inpatient CMS G-Code Modifier : CL (12/09/21 1709)          Goals  Short term goals  Time Frame for Short term goals: 12/13/21 unless noted  Short term goal 1: Pt will perform bed mobility with SBA by 12/12/21  Short term goal 2: Pt will perform transfers with SBA; 12/9 PM Ninfa  Short term goal 3: Pt will ambulate 50 ft with RW and SBA; 12/9 pt unable  Short term goal 4: Pt will negotiate 1 curb step with LRAD and CGA; 12/9 pt unable  Short term goal 5: Pt will perform 12+ reps of LE exercise for strengthening and balance; 12/9 10 reps  Patient Goals   Patient goals : \"to be able to get up to the chair with only touching help by tomorrow (12/10/21)\"    Plan    Plan  Times per week: BID x 7  Times per day: Twice a day  Current Treatment Recommendations: Strengthening, Home Exercise Program, ROM, Balance Training, Endurance Training, Functional Mobility Training, Transfer Training, Gait Training, Stair training  Safety Devices  Type of devices: All fall risk precautions in place, Bed alarm in place, Call light within reach, Gait belt, Left in bed, Nurse notified     Therapy Time   Individual Concurrent Group Co-treatment   Time In 1505         Time Out 1602         Minutes 57         Timed Code Treatment Minutes: 62 Minutes    If pt is discharged prior to next therapy session, this note will serve as discharge summary.     Faisal Geller, PT

## 2021-12-09 NOTE — PROGRESS NOTES
12/9/21 3891 -Physical medicine rehab consult completed and RNs number provided for callback.    -Evangelina Read, PCA

## 2021-12-09 NOTE — CARE COORDINATION
CM notes therapy recs for IPR, spoke to LUCAS Frazier NP and OK for referral to A/ARU spoke to [facility] in admissions and PMR order placed. Met with pt at bedside as well and she is in agreement.  CM following-Armida Hilliard RN

## 2021-12-09 NOTE — PROGRESS NOTES
ORTHO NOTE    S: Pain well controlled with oral medications. Did have a couple episodes of emesis last night. No fevers, chills, night sweats. She has not been out of bed yet with therapy. Denies numbness or tingling. She does have some baseline neuropathy but this is unchanged. O:  Vitals:    12/08/21 1815 12/08/21 1855 12/08/21 2032 12/09/21 0815   BP: (!) 174/68 (!) 167/67 125/85 (!) 97/50   Pulse: 85 78 81 78   Resp: 16 16 18 18   Temp: 98.1 °F (36.7 °C) 97.9 °F (36.6 °C) 98.2 °F (36.8 °C) 98.2 °F (36.8 °C)   TempSrc: Oral Oral Oral Oral   SpO2: 97% 99%  98%   Weight:       Height:         LLE: Dressing clean, dry, and intact. No saturation compartments soft and compressible. Knee positioned in 20 degrees of flexion. Sensation is intact to light touch in deep peroneal, superficial peroneal, tibial, sural, and saphenous nerve distributions. Motor function is intact to EHL, FHL, tibialis anterior, and gastroc. There is brisk capillary refill to the toes and a strong palpable dorsalis pedis pulse. Compartments are soft and compressible. There is no calf tenderness and a negative Homans' sign. Labs:  WBC 8.4  Hemoglobin 9.6  Platelets 750  Sodium 131  Potassium 5.8   creatinine 5.4    Radiographic exam:  AP and lateral projections of the left knee demonstrate total knee arthroplasty in anatomic alignment. There is improvement in valgus deformity. Implants are well fixed. No evidence of loosening. No periimplant fractures. Diffuse soft tissue swelling consistent with prior surgical intervention.     A/P:  POD #1 status post left total knee arthroplasty     WBAT LLE  PT/OT  Pain control  Ancef IV x24 hours postop for procedural prophylaxis  DVT prophylaxis: Subcutaneous heparin, ambulation, SCDs  Mepilex dressing to remain in place x7 days postop   Anticipate discharge later today versus tomorrow morning pending progress with pain control, medical stability, progress with physical

## 2021-12-09 NOTE — CARE COORDINATION
After review, this patient is felt to be:       [x]  Appropriate for Acute Inpatient Rehab    []  Appropriate for Acute Inpatient Rehab Pending Insurance Authorization    []  Not appropriate for Acute Inpatient Rehab    []  Not appropriate at this time, however evaluation ongoing          Precert initiated 29/6 for ARU admission. Pt in agreement per  conversation with pt this AM.  Will notify DCP with further updates.  Thank you for the referral.       Zack Rich MPH, RRT  Clinical Liaison [address] [place] [address]  H)[phone] (o)[phone]   Electronically signed by Zack Rich on 12/9/2021 at 4:16 PM

## 2021-12-09 NOTE — PROGRESS NOTES
[facility]    [facility]              [phone]                         Interval History and plan:     Dialysis done yesterday  Also had surgery done yesterday  Potassium is still high at 5.8-on regular diet  Blood pressure has come down significantly since yesterday  Plan:  Change diet to low potassium diet  She also has hyperglycemia-correcting that will help hyperkalemia  She is on heparin which can also cause hyperkalemia  Start her on Lokelma to help hyperkalemia  Will dialysis first shift in the morning  We will repeat potassium to make sure is not getting worse                   Assessment :        ESRD: on hemodialysis  Dialysis center/schedule:   Monday Wednesday Friday at [facility]    Volume status  [facility]- will get from center   2D Echo: 45 to 50% EF and grade 1 diastolic dysfunction on 8/5941    Hypertension  BP: (97)/(50)  Pulse:  [78]   Goal around 239-803 systolic    Anemia of CKD  Hgb:   Hb on admission - NA  Optimize with iron, aranesp    Renal Osteodystrophy  PO4- goal of below 5.5  Monitor and adjust meds    Dialysis Access  AV fistula    Nutrition on dialysis  Lab Results   Component Value Date    LABALBU 3.5 12/08/2021     Monitor, and will give nepro when possible             [facility] would like to thank Ricki Nuñez MD   for opportunity to serve this patient      Please call with questions at-   24 Hrs Answering service [phone] or  7 am- 5 pm via Perfect serve or cell phone  Tanisha Stovall MD          CC/reason for consult :     ESRD     HPI :     Francisco Fry is a 66 y.o. female presented to   the hospital on 12/6/2021  for elective knee replacement surgery. She is known to have ESRD gets dialysis Monday Wednesday Friday under my care at [facility]. She is very regular with dialysis and has no problem with potassium.   Before the elective surgery, potassium was checked and it was high but hemolyzed because of which it is being repeated. We are consulted for ESRD and related issues    ROS:     Seen with-no family    positives in bold   Constitutional:  fever, chills, weakness, weight change, fatigue  Skin:  rash, pruritus, hair loss, bruising, dry skin, petechiae  Head, Face, Neck   headaches, swelling,  cervical adenopathy  Respiratory: shortness of breath, cough, or wheezing  Cardiovascular: chest pain, palpitations, dizzy, edema  Gastrointestinal: nausea, vomiting, diarrhea, constipation,belly pain    Yellow skin, blood in stool  Musculoskeletal:  back pain, muscle weakness, gait problems,       joint pain or swelling. Genitourinary:  dysuria, poor urine flow, flank pain, blood in urine  Neurologic:  vertigo, TIA'S, syncope, seizures, focal weakness  Psychosocial:  insomnia, anxiety, or depression. Additional positive findings:                        All other remaining systems are negative or unable to obtain        PMH/PSH/SH/Family History:     Past Medical History:   Diagnosis Date    Arthritis     Chronic kidney disease     Diabetes mellitus ([facility] [address]     Dialysis patient ([facility] [address]     M W F    ESRD (end stage renal disease) ([facility] [address]     Hemodialysis patient ([facility] [address]     M-W-F    Hyperkalemia 07/13/2016    Hyperlipidemia     Hypertension     OA (osteoarthritis)     Retinopathy     Sepsis ([address]     Thyroid disease     Wears dentures        Past Surgical History:   Procedure Laterality Date    DIALYSIS FISTULA CREATION Left 08/10/2016    Dr. Any Crawford - upper arm, basilic vein transposition    EYE SURGERY Left 04/03/2018    catarct removal with lens implant     OTHER SURGICAL HISTORY Right 01/11/2019    partial foot amputation    TOE AMPUTATION Right 1/11/2019    PARTIAL FOOT AMPUTATION WITH GRAFT APPLICATION performed by Marylou Sifuentes DPM at [facility]        reports that she quit smoking about 7 years ago. She has never used smokeless tobacco. She reports that she does not drink alcohol and does not use drugs.     family history is not on file.          Medication:     Current Facility-Administered Medications: heparin (porcine) injection 5,000 Units, 5,000 Units, SubCUTAneous, 3 times per day  NONFORMULARY 10 g, 10 g, Oral, BID  sodium chloride flush 0.9 % injection 5-40 mL, 5-40 mL, IntraVENous, 2 times per day  sodium chloride flush 0.9 % injection 5-40 mL, 5-40 mL, IntraVENous, PRN  0.9 % sodium chloride infusion, 25 mL, IntraVENous, PRN  acetaminophen (TYLENOL) tablet 650 mg, 650 mg, Oral, Q6H  ondansetron (ZOFRAN-ODT) disintegrating tablet 4 mg, 4 mg, Oral, Q8H PRN **OR** ondansetron (ZOFRAN) injection 4 mg, 4 mg, IntraVENous, Q6H PRN  oxyCODONE (ROXICODONE) immediate release tablet 5 mg, 5 mg, Oral, Q4H PRN **OR** oxyCODONE (ROXICODONE) immediate release tablet 10 mg, 10 mg, Oral, Q4H PRN  HYDROmorphone (DILAUDID) injection 0.5 mg, 0.5 mg, IntraVENous, Q3H PRN  atorvastatin (LIPITOR) tablet 10 mg, 10 mg, Oral, Nightly  carvedilol (COREG) tablet 12.5 mg, 12.5 mg, Oral, BID WC  gabapentin (NEURONTIN) capsule 300 mg, 300 mg, Oral, QPM  levothyroxine (SYNTHROID) tablet 50 mcg, 50 mcg, Oral, Daily  sertraline (ZOLOFT) tablet 25 mg, 25 mg, Oral, Daily  glucose (GLUTOSE) 40 % oral gel 15 g, 15 g, Oral, PRN  dextrose 50 % IV solution, 12.5 g, IntraVENous, PRN  glucagon (rDNA) injection 1 mg, 1 mg, IntraMUSCular, PRN  dextrose 5 % solution, 100 mL/hr, IntraVENous, PRN  insulin glargine (LANTUS) injection vial 15 Units, 15 Units, SubCUTAneous, Nightly  insulin lispro (HUMALOG) injection vial 4 Units, 4 Units, SubCUTAneous, TID WC  insulin lispro (HUMALOG) injection vial 0-6 Units, 0-6 Units, SubCUTAneous, TID WC  insulin lispro (HUMALOG) injection vial 0-3 Units, 0-3 Units, SubCUTAneous, Nightly  hydrALAZINE (APRESOLINE) injection 10 mg, 10 mg, IntraVENous, Q4H PRN  labetalol (NORMODYNE;TRANDATE) injection 20 mg, 20 mg, IntraVENous, Q4H PRN  losartan (COZAAR) tablet 50 mg, 50 mg, Oral, Daily  sodium chloride flush 0.9 % injection 5-40 mL, 5-40 mL, IntraVENous, 2 times per day  sodium chloride flush 0.9 % injection 5-40 mL, 5-40 mL, IntraVENous, PRN  0.9 % sodium chloride infusion, 25 mL, IntraVENous, PRN  ondansetron (ZOFRAN-ODT) disintegrating tablet 4 mg, 4 mg, Oral, Q8H PRN **OR** ondansetron (ZOFRAN) injection 4 mg, 4 mg, IntraVENous, Q6H PRN  polyethylene glycol (GLYCOLAX) packet 17 g, 17 g, Oral, Daily PRN  HYDROcodone-acetaminophen (NORCO) 5-325 MG per tablet 1 tablet, 1 tablet, Oral, Q4H PRN **OR** HYDROcodone-acetaminophen (NORCO) 5-325 MG per tablet 2 tablet, 2 tablet, Oral, Q4H PRN  calcium gluconate 10 % injection 1,000 mg, 1,000 mg, IntraVENous, Once       Vitals :     Vitals:    12/09/21 0815   BP: (!) 97/50   Pulse: 78   Resp: 18   Temp: 98.2 °F (36.8 °C)   SpO2: 98%       I & O :       Intake/Output Summary (Last 24 hours) at 12/9/2021 1047  Last data filed at 12/8/2021 1807  Gross per 24 hour   Intake 525 ml   Output --   Net 525 ml        Physical Examination :     General appearance: Anxious- no, distressed- no, in good spirits- no  HEENT: Lips- normal, teeth- ok , oral mucosa- moist  Neck : Mass- no, appears symmetrical, JVD- not visible  Respiratory: Respiratory effort-  normal, wheeze- no, crackles - none  Cardiovascular:  Ausculation- No M/R/G, Edema none  Abdomen: visible mass- no, distention- no, scar- no, tenderness- no                            hepatosplenomegaly-  no  Musculoskeletal:  clubbing no,cyanosis- no , digital ischemia- no                           muscle strength- grossly normal , tone - grossly normal  Skin: rashes- no , ulcers- no, induration- no, tightening - no  Psychiatric:  Judgement and insight- normal           AAO X 3  Additional finding: -      LABS:     Recent Labs     12/07/21  0724 12/08/21  0813 12/09/21  0632   WBC 5.2 5.1 8.4   HGB 10.5* 10.7* 9.6*   HCT 31.5* 33.3* 30.0*    245 247     Recent Labs     12/07/21  0724 12/08/21  0813 12/09/21  0632   * 132* 131*   K 5.0 5.6* 5.8*  100 99   CO2 20* 20* 18*   BUN 29* 42* 29*   CREATININE 5.2* 7.3* 5.4*   GLUCOSE 147* 148* 223*   PHOS  --  4.4  --

## 2021-12-09 NOTE — OP NOTE
Operative Note      Patient: Adiel Dunlap  YOB: 1950  MRN: [id number]    Date of Procedure: 12/6/2021    Pre-Op Diagnosis: LEFT KNEE VALGUS OSTEOARTHRITIS, INSTABILITY    Post-Op Diagnosis: Same       Procedure(s):  LEFT TOTAL KNEE ARTHROPLASTY WITH ADDUCTOR CANAL BLOCK FOR PAIN CONTROL               CLAIRE & NEPHTWAN    Surgeon(s):  Mala Angel MD    Assistant:   * No surgical staff found *    Anesthesia: General    Estimated Blood Loss (mL): 629     Complications: None    Specimens:   * No specimens in log *    Implants:  * No implants in log *      Drains:   Hemodialysis Arteriovenous fistula Left Arm (Active)   Site Assessment Clean; Dry; Intact 12/08/21 1214   Thrill Present 12/08/21 1214   Bruit Present 12/08/21 1214   Access Interventions Aseptic Technique 12/08/21 1214   Dressing Status Clean; Dry; Intact 12/08/21 1214       Findings: severe valgus deformity. Absent ACL, contracted tight lateral tissues. Lax MCL. severe osteoarthritis with erosion of posterolateral plateau. Detailed Description of Procedure:   Patient was positively identified in the preoperative holding area by attending surgeon. Informed consent was verified and placed on the chart. The left lower extremity was marked appropriately. Patient was then taken to the operating room by the anesthesia team.  General anesthesia was induced. She was laid supine with all bony prominences well-padded. 2 g of IV Ancef were given for antibiotic prophylaxis. A nonsterile tourniquet was placed high on the left thigh but not yet inflated. At this point the knee was examined. There was severe valgus instability to the knee as well as a resting valgus malalignment approximately 30 degrees clinically. This is passively correctable to near neutral.  There is anterior subluxation of the tibia. At this point, the left lower extremity was prepped and draped in standard sterile fashion.   A timeout was held to verify the correct patient, operative site, laterality, and procedure. The limb was exsanguinated via an Esmarch and the tourniquet was inflated to 300 mmHg. Everyone in the room was in agreement. At this point, a standard anterior midline approach the knee was utilized extending from the medial aspect of the tibial tubercle to 2 fingerbreadths proximal to the superior pole of the patella. Dissection was carried sharply through the skin and subcutaneous tissues down to the level of the extensor mechanism. Conservative medial lateral skin flaps were raised for adequate visualization. Next, a standard medial parapatellar arthrotomy was carried out. There was a large effusion of normal-appearing joint fluid. This was evacuated from the knee. There was underlying significant synovitis. This was excised using combination of scalpel and rondure. At this point, the patella was everted and the knee was flexed. The ACL was absent. The PCL was sacrificed at this point. A  hole was drilled in the distal femur 1 cm anterior to the PCL insertion. Next, a intramedullary jose c was placed down the femoral canal.  A 6 degree valgus cutting block was provisionally pinned in place. An audra wing was used to double check the resection. Next, the distal femoral cut was made. There is noted to the lateral femoral condyle was hypoplastic. There is severe osteoarthritis with eburnated bone in the lateral compartment. There was deficiency of the posterior lateral tibial plateau as a result of osteoarthritic wear/instability. At this point, the posterior referencing block was applied to the distal femur. It was externally rotated 60 degrees in order to align this with the epicondylar axis and perpendicular to [employer]. The femur was sized to a size 5 using the stylus.  holes were made in the distal femur. The 4-in-1 cutting block size #5 was provisionally pinned to the femur.   The anterior, posterior, and chamfer cuts were made. Excess bone was removed using flat osteotome. A curved osteotome was used to remove posterior condylar osteophytes. The trial femoral component was placed and it seemed to fit well. The box was reamed for the posterior stabilized femoral component. Excess bone was removed. Next, attention was turned to the tibia. A PCL retractor was placed to sublux the tibia forward. Hohmann retractors were placed on the medial and lateral aspects of the tibial plateau. The medial and lateral meniscus were excised sharply using a scalpel. Next, a  hole was made in the tibial plateau 1 cm posterior to the anterior articular margin and in line with the lateral tibial spine. Intramedullary referencing was also used. The revision tibial component was utilized. Next, hand reamers were used to ream the intramedullary canal to a depth of 120. This was sequentially done until endosteal chatter was achieved. A size 16 was felt to be appropriate. This intramedullary reamer was left in place and the tibial cutting block for the revision tibial component was placed using this for reference. 2 mm revision stylus was utilized for a cutting guide. This was referenced off the medial side which was the low side. At this point the cut was made. It was felt that this did not adequately resect to the lateral tibial plateau even though this was deficient. The tibial guide block ultimately was dropped an additional 4 mm to achieve an appropriate tibial resection. The gap block was placed to ensure that appropriate extension could be achieved. Once this was done all soft tissue was excised from the tibial plateau. The tibia was sized to a #4. Great care was taken to reproduce appropriate external rotation in line with the medial third of the tibial tubercle. There is adequate coverage with a size 4. The tibial component was adequately positioned without need for an offset . This was pinned in place. Next trial components were inserted into the knee. The lateral side of the knee was significantly tight. A pie crusting technique was utilized to release the lateral soft tissues including the lateral collateral ligament and iliotibial band. A 15 blade scalpel was used for this. This did appropriately improve gap balancing. Ultimately, the 11 mm thickness constrained insert was able to be inserted with a reasonably balanced knee. There was full extension and excellent range of motion. Native patellar tracking was appropriate. Next, attention was turned to the patellar component. A freehand technique was used to resect the articular surface. The patella was sized and lug holes were punched. All components were removed from the knee. The knee was copiously irrigated with sterile saline solution via pulse lavage. The implants were sequentially cemented into position taking great care to remove excess cement. The trial component was inserted while the cement was allowed to cure. Once this was done. Excess cement was removed. The knee was trialed with different thickness polyethylene components. Ultimately the 11 mm thickness constrained insert reproduced full extension, excellent flexion, excellent patellar tracking, and ligamentous stability to varus/valgus stress. The wound was once again copiously irrigated with sterile saline solution. An Exparel/Marcaine mixture was distributed about the soft tissues of the knee. 1 g of vancomycin powder was placed deep within the wound. The arthrotomy was closed using 0 Vicryl in a figure-of-eight fashion. This was reinforced with #1 strata fix. The wound was then closed in a layered fashion using 2-0 Vicryl a subcuticular 3-0 Monocryl and Dermabond glue. A sterile Mepilex dressing was applied. The tourniquet was let down. The patient was then awakened from general anesthesia and transitioned back to the hospital bed.   She was taken to the postoperative recovery area in apparent stable condition. There were no immediate complications. All sponge and needle counts were correct. Postoperative plan:  Weightbearing as tolerated left lower extremity  PT/OT  Pain control  Ancef IV x24 hours postop  DVT prophylaxis: Subcutaneous heparin, ambulation, SCDs. Mepilex to remain in place x7 days postoperatively unless saturation. Anticipate discharge in 1 to 2 days pending progress with therapy, pain control, medical stability.     Electronically signed by Martínez Bangura MD on 12/9/2021 at 10:20 AM

## 2021-12-09 NOTE — CARE COORDINATION
After review, this patient is felt to be:       []  Appropriate for Acute Inpatient Rehab    []  Appropriate for Acute Inpatient Rehab Pending Insurance Authorization    []  Not appropriate for Acute Inpatient Rehab    [x]  Evaluation ongoing           Referral received for IPR. Await OT note. Will notify DCP with further updates.  Thank you for the referral.    Julia Menjivar MPH, RRT  Clinical Liaison [facility]  (h)[phone] (q)[phone]   Electronically signed by Julia Menjivar on 12/9/2021 at 3:58 PM

## 2021-12-09 NOTE — PROGRESS NOTES
Occupational Therapy   Occupational Therapy Initial Assessment and treatment    Date: 2021   Patient Name: Mary Lemons  MRN: [id number]     : 1950    Date of Service: 2021    Discharge Recommendations:  Subacute/Skilled Nursing Facility       Assessment   Performance deficits / Impairments: Decreased functional mobility ; Decreased ADL status; Decreased strength; Decreased safe awareness; Decreased cognition; Decreased endurance; Decreased balance  Assessment: Pt admitted for elective L TKR. Pt anxious and talkative, needs redirection to task. Pt orthostatic with positional changes this date and only able to tolerate brief standing before becoming symptomatic. RN notified. Recommend SNF at discharge, as pt lives alone and needs assist with care and mobility at this time. Prognosis: Fair; Good  OT Education: OT Role; Plan of Care; ADL Adaptive Strategies; Transfer Training  Patient Education: disease specific: ADls, transfers, mobility, WB status  Barriers to Learning: anxiety  REQUIRES OT FOLLOW UP: Yes  Activity Tolerance  Activity Tolerance: Treatment limited secondary to medical complications (free text)  Activity Tolerance: [id number] HR 82, /66 HR 86, seated after brief stand 82/43 HR 86, in semifowlers 90/46 HR 97, RN notified  Safety Devices  Safety Devices in place: Yes  Type of devices: Gait belt; Bed alarm in place; Call light within reach; Left in bed; Nurse notified           Patient Diagnosis(es): The encounter diagnosis was Status post total left knee replacement. has a past medical history of Arthritis, Chronic kidney disease, Diabetes mellitus ([address] Dialysis patient ([address] ESRD (end stage renal disease) ([address] Hemodialysis patient ([address] Hyperkalemia, Hyperlipidemia, Hypertension, OA (osteoarthritis), Retinopathy, Sepsis ([address] Thyroid disease, and Wears dentures.    has a past surgical history that includes Dialysis fistula creation (Left, 08/10/2016); eye surgery (Left, 04/03/2018); other surgical history (Right, 01/11/2019); Toe amputation (Right, 1/11/2019); and Total knee arthroplasty (Left, 12/8/2021). Restrictions  Restrictions/Precautions  Restrictions/Precautions: Weight Bearing  Lower Extremity Weight Bearing Restrictions  Left Lower Extremity Weight Bearing: Weight Bearing As Tolerated  Position Activity Restriction  Other position/activity restrictions: HD M/W/F; 1)  Dangle at edge of bed, progress to stand, and take a few steps with assistive device. 2)  Encourage ankle pumps and quad sets. 3)  Up in bedside chair as tolerated. 4)  Commode privileges with assistance. Subjective   General  Chart Reviewed: Yes  Patient assessed for rehabilitation services?: Yes  Subjective  Subjective: Columba Briscoe MD  General Comment  Comments: L TKA  Patient Currently in Pain: Yes  Pain Assessment  Pain Assessment: 0-10  Pain Level: 10  Pain Type: Acute pain  Pain Location: Foot  Pain Orientation: Left  Pain Descriptors: Burning  Non-Pharmaceutical Pain Intervention(s): Repositioned; Ambulation/Increased Activity;  Therapeutic presence (removed ACE wrap and surgical dressing)  Response to Pain Intervention: Patient Satisfied  Vital Signs  Patient Currently in Pain: Yes  Social/Functional History  Social/Functional History  Lives With: Alone  Type of Home: House  Home Layout: One level  Home Access: Ramped entrance  Entrance Stairs - Number of Steps: ramp through garage  Bathroom Shower/Tub: Tub/Shower unit, Shower chair with back  [employer]: Handicap height  Bathroom Equipment: Grab bars in shower, Tub transfer bench, Shower chair, 3-in-1 commode (pt was not using DME, has from daughter who passed away)  Home Equipment: [employer], Rolling walker  ADL Assistance: [address]: Independent  Homemaking Responsibilities: Yes  Ambulation Assistance: Independent  Transfer Assistance: Independent  Active : Yes  Occupation: Retired  Type of occupation: insurance  Leisure & Hobbies: see my granddaughters play sports, flowers  Additional Comments: pt reports family lives nearby and that she is \"refusing to go to rehab\"       Objective   Vision: Impaired  Vision Exceptions: Wears glasses for reading  Hearing: Within functional limits             ADL  Feeding: Independent  Grooming: Supervision  LE Dressing: Maximum assistance  Toileting:  (pt reports she makes minimal urine on HD)           Transfers  Sit to stand: Contact guard assistance; Minimal assistance  Stand to sit: Contact guard assistance; Minimal assistance  Transfer Comments: poor standing and activity tolerance, unable to tolerate taking steps d/t symtomatic and orthostatic BPs     Cognition  Overall Cognitive Status: Exceptions  Following Commands: Follows multistep commands with repitition; Follows multistep commands with increased time  Attention Span: Attends with cues to redirect  Memory: Decreased short term memory  Safety Judgement: Decreased awareness of need for safety  Problem Solving: Decreased awareness of errors  Insights: Decreased awareness of deficits  Initiation: Requires cues for some  Sequencing: Requires cues for some  Cognition Comment: pt anxious, talkative, needs redirection to task.  pt able to attend with redirection to task                 LUE AROM (degrees)  LUE AROM : WFL  Left Hand AROM (degrees)  Left Hand AROM: WFL  RUE AROM (degrees)  RUE AROM : WFL  Right Hand AROM (degrees)  Right Hand AROM: WFL  LUE Strength  Gross LUE Strength: WFL  RUE Strength  Gross RUE Strength: WFL        Plan   Plan  Times per week: 4-6x    AM-PAC Score        AM-[facility] Inpatient Daily Activity Raw Score: 19 (12/09/21 1025)  AM-PAC Inpatient ADL T-Scale Score : 40.22 (12/09/21 1025)  ADL Inpatient CMS 0-100% Score: 42.8 (12/09/21 1025)  ADL Inpatient CMS G-Code Modifier : CK (12/09/21 1025)    Goals  Short term goals  Time Frame for Short term goals: 1 week by 12/16  Short term goal 1: Pt will complete LB ADls with SPV with AE as needed  Short term goal 2: Pt will complete toileting with SPV  Short term goal 3: Pt will complete functional transfers wtih SPV  Short term goal 4: Pt wilson verb/demo car transfer tech with min cues  Short term goal 5: Pt will complete B UE ex x 20 reps w/o fatigue  Patient Goals   Patient goals : \"Walk to the bathroom with a walker\"       Therapy Time   Individual Concurrent Group Co-treatment   Time In 0814         Time Out 0918         Minutes 64         Timed Code Treatment Minutes: 54 Minutes (10 min eval)      If pt discharges prior to next session, this note will serve as discharge summary. See case management note for discharge disposition.      Era Rivera, OT

## 2021-12-09 NOTE — PLAN OF CARE
Problem: Falls - Risk of:  Goal: Will remain free from falls  Description: Will remain free from falls  12/9/2021 0356 by Lesley Brothers  Outcome: Ongoing  12/9/2021 0356 by Lesley Brothers  Outcome: Ongoing     Problem: Skin Integrity:  Goal: Will show no infection signs and symptoms  Description: Will show no infection signs and symptoms  Outcome: Ongoing     Problem: Pain:  Description: Pain management should include both nonpharmacologic and pharmacologic interventions.   Goal: Pain level will decrease  Description: Pain level will decrease  12/9/2021 0356 by Lesley Brothers  Outcome: Ongoing  12/9/2021 0356 by Lesley Brothers  Outcome: Ongoing     Problem: Discharge Planning:  Goal: Discharged to appropriate level of care  Description: Discharged to appropriate level of care  Outcome: Ongoing     Problem: Mobility - Impaired:  Goal: Mobility will improve  Description: Mobility will improve  Outcome: Ongoing

## 2021-12-09 NOTE — PROGRESS NOTES
Hospitalist Progress Note      PCP: Demetrio Sifuentes, ELANA - CNP    Date of Admission: 12/6/2021    Chief Complaint: left knee pain. Elevated bp    Hospital Course: 66 y.o. female with a PMH of OA, ESRD on HD, HTN, Hypothyroidism, and DM who we are asked to see/evaluate by Lubna Lewis MD for medical management. Patient admitted for elective left knee replacement surgery. Surgery postponed due to abnl labs-hyperkalemia. Patient has dialysis on MWF. She had dialysis on 12/6, blood pressure remains elevated. Plan for OR on 12/8. Patient a/ox4, nad, denies N/V/D/F/C. Subjective: HD/ s/p left knee surgery 12/8, Patient glucose was low this am, felt dizzy with PT, now doing better.  Plan for HD 12/10      Medications:  Reviewed    Infusion Medications    sodium chloride      dextrose      sodium chloride       Scheduled Medications    heparin (porcine)  5,000 Units SubCUTAneous 3 times per day    sodium zirconium cyclosilicate  10 g Oral BID    sodium chloride flush  5-40 mL IntraVENous 2 times per day    acetaminophen  650 mg Oral Q6H    atorvastatin  10 mg Oral Nightly    carvedilol  12.5 mg Oral BID WC    gabapentin  300 mg Oral QPM    levothyroxine  50 mcg Oral Daily    sertraline  25 mg Oral Daily    insulin glargine  15 Units SubCUTAneous Nightly    insulin lispro  4 Units SubCUTAneous TID WC    insulin lispro  0-6 Units SubCUTAneous TID WC    insulin lispro  0-3 Units SubCUTAneous Nightly    losartan  50 mg Oral Daily    sodium chloride flush  5-40 mL IntraVENous 2 times per day    calcium gluconate  1,000 mg IntraVENous Once     PRN Meds: sodium chloride flush, sodium chloride, ondansetron **OR** ondansetron, oxyCODONE **OR** oxyCODONE, HYDROmorphone, glucose, dextrose, glucagon (rDNA), dextrose, hydrALAZINE, labetalol, sodium chloride flush, sodium chloride, ondansetron **OR** ondansetron, polyethylene glycol, HYDROcodone 5 mg - acetaminophen **OR** HYDROcodone 5 mg - acetaminophen      Intake/Output Summary (Last 24 hours) at 12/9/2021 1301  Last data filed at 12/8/2021 1807  Gross per 24 hour   Intake 525 ml   Output --   Net 525 ml       Physical Exam Performed:    BP (!) 115/57   Pulse 82   Temp 98.3 °F (36.8 °C) (Oral)   Resp 18   Ht 5' 7\" (1.702 m)   Wt 177 lb 4 oz (80.4 kg)   SpO2 94%   BMI 27.76 kg/m²     General appearance: No apparent distress, appears stated age and cooperative. HEENT: Normal cephalic, atraumatic without obvious deformity. Pupils equal, round, and reactive to light. Extra ocular muscles intact. Conjunctivae/corneas clear. Neck: Supple, with full range of motion. No jugular venous distention. Trachea midline. Respiratory:  Normal respiratory effort. Clear to auscultation, bilaterally without Rales/Wheezes/Rhonchi. Cardiovascular: Regular rate and rhythm with normal S1/S2 without murmurs, rubs or gallops. Abdomen: Soft, non-tender, non-distended with normal bowel sounds. Musculoskeletal: Left knee decreased rom, DSG CDI  Skin: Skin color, texture, turgor normal.  No rashes or lesions. Neurologic:  Neurovascularly intact without any focal sensory/motor deficits. Cranial nerves: II-XII intact, grossly non-focal.  Psychiatric: Alert and oriented, thought content appropriate, normal insight  Capillary Refill: Brisk,3 seconds, normal   Peripheral Pulses: +2 palpable, equal bilaterally    Labs:   Recent Labs     12/07/21  0724 12/08/21  0813 12/09/21  0632   WBC 5.2 5.1 8.4   HGB 10.5* 10.7* 9.6*   HCT 31.5* 33.3* 30.0*    245 247     Recent Labs     12/07/21  0724 12/08/21  0813 12/09/21  0632   * 132* 131*   K 5.0 5.6* 5.8*    100 99   CO2 20* 20* 18*   BUN 29* 42* 29*   CREATININE 5.2* 7.3* 5.4*   CALCIUM 9.3 8.8 8.3   PHOS  --  4.4  --      No results for input(s): AST, ALT, BILIDIR, BILITOT, ALKPHOS in the last 72 hours. No results for input(s): INR in the last 72 hours.   No results for input(s): Shital Cameron in the last 72 hours. Urinalysis:      Lab Results   Component Value Date    NITRU Negative 09/14/2021    WBCUA >100 09/14/2021    BACTERIA 3+ 09/14/2021    RBCUA 11-20 09/14/2021    BLOODU MODERATE 09/14/2021    SPECGRAV 1.015 09/14/2021    GLUCOSEU Negative 09/14/2021       Radiology:  XR KNEE LEFT (1-2 VIEWS)   Final Result      Left total knee arthroplasty which is in anatomic alignment with no evidence   of fracture or loosening. Soft tissue air is consistent with the recent   surgery. Head                 Assessment/Plan:    Active Hospital Problems    Diagnosis     Primary osteoarthritis of left knee [M17.12]      ESRD on HD, Hyperkalemia  -Nephrology following  -Dialysis today  -K 5.8 lokelma ordered    HTN  -Resume cozaar/bb  -Clonidine     Left knee OA  -Plan for left total knee replacement 12/8  -pain control  -PT/OT     DM  -Fsbs ac/hs  -Lantus  -SSI  -CCHO diet  -Neurontin     HLD  -Statin     Hypothyroidism  -Synthroid    DVT Prophylaxis: heparin sq post op  Diet: ADULT DIET;  Regular; Low Potassium (Less than 3000 mg/day)  Code Status: Full Code    PT/OT Eval Status: consulted    Dispo - pending course, anticipate 12/10    ELANA Wilkinson - CNP

## 2021-12-09 NOTE — PROGRESS NOTES
12/9/21 1600 -Physical medicine rehab consult perfect served to Dr. Derrell Gaspar and RNs number provided for callback.    -Noyola Fraction, PCA

## 2021-12-09 NOTE — PLAN OF CARE
PT discharge reccs- Inpatient rehab  Spoke with Dr Alva Canchola, he is agreeable to IPR for pt if she qualifies  Spoke with CM, they will start precert

## 2021-12-10 LAB
ANION GAP SERPL CALCULATED.3IONS-SCNC: 13 MMOL/L (ref 3–16)
BASOPHILS ABSOLUTE: 0.1 K/UL (ref 0–0.2)
BASOPHILS RELATIVE PERCENT: 1.3 %
BUN BLDV-MCNC: 34 MG/DL (ref 7–20)
CALCIUM SERPL-MCNC: 8.2 MG/DL (ref 8.3–10.6)
CHLORIDE BLD-SCNC: 102 MMOL/L (ref 99–110)
CO2: 18 MMOL/L (ref 21–32)
CREAT SERPL-MCNC: 5.2 MG/DL (ref 0.6–1.2)
EOSINOPHILS ABSOLUTE: 0.1 K/UL (ref 0–0.6)
EOSINOPHILS RELATIVE PERCENT: 2.6 %
GFR AFRICAN AMERICAN: 10
GFR NON-AFRICAN AMERICAN: 8
GLUCOSE BLD-MCNC: 124 MG/DL (ref 70–99)
GLUCOSE BLD-MCNC: 144 MG/DL (ref 70–99)
GLUCOSE BLD-MCNC: 148 MG/DL (ref 70–99)
GLUCOSE BLD-MCNC: 149 MG/DL (ref 70–99)
GLUCOSE BLD-MCNC: 159 MG/DL (ref 70–99)
HCT VFR BLD CALC: 22.6 % (ref 36–48)
HEMOGLOBIN: 7.5 G/DL (ref 12–16)
LYMPHOCYTES ABSOLUTE: 0.8 K/UL (ref 1–5.1)
LYMPHOCYTES RELATIVE PERCENT: 15 %
MCH RBC QN AUTO: 31.3 PG (ref 26–34)
MCHC RBC AUTO-ENTMCNC: 33.1 G/DL (ref 31–36)
MCV RBC AUTO: 94.4 FL (ref 80–100)
MONOCYTES ABSOLUTE: 0.5 K/UL (ref 0–1.3)
MONOCYTES RELATIVE PERCENT: 9.4 %
NEUTROPHILS ABSOLUTE: 3.8 K/UL (ref 1.7–7.7)
NEUTROPHILS RELATIVE PERCENT: 71.7 %
PDW BLD-RTO: 18.1 % (ref 12.4–15.4)
PERFORMED ON: ABNORMAL
PLATELET # BLD: 161 K/UL (ref 135–450)
PMV BLD AUTO: 8.5 FL (ref 5–10.5)
POTASSIUM REFLEX MAGNESIUM: 3.9 MMOL/L (ref 3.5–5.1)
RBC # BLD: 2.4 M/UL (ref 4–5.2)
SODIUM BLD-SCNC: 133 MMOL/L (ref 136–145)
WBC # BLD: 5.2 K/UL (ref 4–11)

## 2021-12-10 PROCEDURE — 6360000002 HC RX W HCPCS: Performed by: ORTHOPAEDIC SURGERY

## 2021-12-10 PROCEDURE — 84100 ASSAY OF PHOSPHORUS: CPT

## 2021-12-10 PROCEDURE — 80048 BASIC METABOLIC PNL TOTAL CA: CPT

## 2021-12-10 PROCEDURE — 97530 THERAPEUTIC ACTIVITIES: CPT

## 2021-12-10 PROCEDURE — 80053 COMPREHEN METABOLIC PANEL: CPT

## 2021-12-10 PROCEDURE — 6370000000 HC RX 637 (ALT 250 FOR IP): Performed by: ORTHOPAEDIC SURGERY

## 2021-12-10 PROCEDURE — 83735 ASSAY OF MAGNESIUM: CPT

## 2021-12-10 PROCEDURE — 90935 HEMODIALYSIS ONE EVALUATION: CPT

## 2021-12-10 PROCEDURE — 36415 COLL VENOUS BLD VENIPUNCTURE: CPT

## 2021-12-10 PROCEDURE — 2580000003 HC RX 258: Performed by: ORTHOPAEDIC SURGERY

## 2021-12-10 PROCEDURE — 97535 SELF CARE MNGMENT TRAINING: CPT

## 2021-12-10 PROCEDURE — 1200000000 HC SEMI PRIVATE

## 2021-12-10 PROCEDURE — 97110 THERAPEUTIC EXERCISES: CPT

## 2021-12-10 PROCEDURE — 85025 COMPLETE CBC W/AUTO DIFF WBC: CPT

## 2021-12-10 RX ADMIN — ACETAMINOPHEN 650 MG: 325 TABLET ORAL at 14:31

## 2021-12-10 RX ADMIN — GABAPENTIN 300 MG: 300 CAPSULE ORAL at 17:45

## 2021-12-10 RX ADMIN — INSULIN LISPRO 4 UNITS: 100 INJECTION, SOLUTION INTRAVENOUS; SUBCUTANEOUS at 13:14

## 2021-12-10 RX ADMIN — LOSARTAN POTASSIUM 50 MG: 25 TABLET, FILM COATED ORAL at 11:57

## 2021-12-10 RX ADMIN — CARVEDILOL 12.5 MG: 6.25 TABLET, FILM COATED ORAL at 11:57

## 2021-12-10 RX ADMIN — SERTRALINE 25 MG: 50 TABLET, FILM COATED ORAL at 11:58

## 2021-12-10 RX ADMIN — LEVOTHYROXINE SODIUM 50 MCG: 0.05 TABLET ORAL at 05:45

## 2021-12-10 RX ADMIN — ACETAMINOPHEN 650 MG: 325 TABLET ORAL at 05:45

## 2021-12-10 RX ADMIN — HYDROCODONE BITARTRATE AND ACETAMINOPHEN 2 TABLET: 5; 325 TABLET ORAL at 09:37

## 2021-12-10 RX ADMIN — HYDROCODONE BITARTRATE AND ACETAMINOPHEN 1 TABLET: 5; 325 TABLET ORAL at 02:13

## 2021-12-10 RX ADMIN — HEPARIN SODIUM 5000 UNITS: 5000 INJECTION INTRAVENOUS; SUBCUTANEOUS at 15:20

## 2021-12-10 RX ADMIN — ACETAMINOPHEN 650 MG: 325 TABLET ORAL at 02:13

## 2021-12-10 RX ADMIN — ATORVASTATIN CALCIUM 10 MG: 10 TABLET, FILM COATED ORAL at 20:46

## 2021-12-10 RX ADMIN — CARVEDILOL 12.5 MG: 6.25 TABLET, FILM COATED ORAL at 16:42

## 2021-12-10 RX ADMIN — HEPARIN SODIUM 5000 UNITS: 5000 INJECTION INTRAVENOUS; SUBCUTANEOUS at 20:46

## 2021-12-10 RX ADMIN — INSULIN LISPRO 1 UNITS: 100 INJECTION, SOLUTION INTRAVENOUS; SUBCUTANEOUS at 18:51

## 2021-12-10 RX ADMIN — INSULIN LISPRO 4 UNITS: 100 INJECTION, SOLUTION INTRAVENOUS; SUBCUTANEOUS at 18:51

## 2021-12-10 RX ADMIN — OXYCODONE 10 MG: 5 TABLET ORAL at 16:41

## 2021-12-10 RX ADMIN — INSULIN LISPRO 1 UNITS: 100 INJECTION, SOLUTION INTRAVENOUS; SUBCUTANEOUS at 20:49

## 2021-12-10 RX ADMIN — HEPARIN SODIUM 5000 UNITS: 5000 INJECTION INTRAVENOUS; SUBCUTANEOUS at 05:45

## 2021-12-10 RX ADMIN — INSULIN GLARGINE 15 UNITS: 100 INJECTION, SOLUTION SUBCUTANEOUS at 20:49

## 2021-12-10 RX ADMIN — Medication 10 ML: at 21:01

## 2021-12-10 ASSESSMENT — PAIN SCALES - WONG BAKER: WONGBAKER_NUMERICALRESPONSE: 4

## 2021-12-10 ASSESSMENT — PAIN SCALES - GENERAL
PAINLEVEL_OUTOF10: 4
PAINLEVEL_OUTOF10: 4
PAINLEVEL_OUTOF10: 10
PAINLEVEL_OUTOF10: 8
PAINLEVEL_OUTOF10: 7
PAINLEVEL_OUTOF10: 3
PAINLEVEL_OUTOF10: 4

## 2021-12-10 ASSESSMENT — PAIN DESCRIPTION - ORIENTATION
ORIENTATION: LEFT
ORIENTATION: LEFT

## 2021-12-10 ASSESSMENT — PAIN DESCRIPTION - LOCATION
LOCATION: FOOT
LOCATION: KNEE

## 2021-12-10 ASSESSMENT — PAIN DESCRIPTION - PAIN TYPE: TYPE: CHRONIC PAIN

## 2021-12-10 NOTE — PROGRESS NOTES
Department of Orthopedic Surgery  Physician Assistant   Progress Note    Subjective:       Systemic or Specific Complaints:Pain Control and feeling very overwhelmed. Pt was tearful. She had dialysis this AM and did not sleep well. She feels overwhelmed with the amount of people she has caring for her. She was unable to participate with PT today, OT at bedside at time of interview    Objective:     Patient Vitals for the past 24 hrs:   BP Temp Temp src Pulse Resp SpO2 Weight   12/10/21 1313 136/60 -- -- -- -- 98 % --   12/10/21 1132 (!) 136/55 98.1 °F (36.7 °C) Oral 91 16 95 % --   12/10/21 0740 (!) 125/53 98.1 °F (36.7 °C) -- 82 18 -- 186 lb 11.7 oz (84.7 kg)   12/10/21 0330 (!) 111/51 100 °F (37.8 °C) Oral 83 16 95 % --   12/10/21 0055 (!) 127/56 98.6 °F (37 °C) Oral 82 16 98 % --   12/09/21 2015 -- -- Oral -- 18 -- --   12/09/21 1802 118/63 -- -- -- -- -- --   12/09/21 1438 (!) 144/71 98.2 °F (36.8 °C) Oral -- -- 97 % --       General: alert, appears stated age and cooperative   Wound: Wound clean and dry no evidence of infection. and small nickel sized dried blood at distal end of mepilex   Motion: Painful range of Motion in affected extremity, knee is held in approx 20 degrees of flexion   DVT Exam: No evidence of DVT seen on physical exam.  Negative Evans's sign.      Additional exam: Pt is laying in bed, knee in slight flexed position  Moderate swelling to left leg, no erythema or ecchymosis  Diffusely palpably tender throughout left lower extremity  FHL, EHL, ant tib, and gastroc motor intact  Sensation intact to LLE, pt has neuropathy at baseline  Distal pulses 2+    Data Review  CBC:   Lab Results   Component Value Date    WBC 5.2 12/10/2021    RBC 2.40 12/10/2021    HGB 7.5 12/10/2021    HCT 22.6 12/10/2021     12/10/2021       Renal:   Lab Results   Component Value Date     12/10/2021    K 3.9 12/10/2021     12/10/2021    CO2 18 12/10/2021    BUN 34 12/10/2021    CREATININE 5.2 12/10/2021    GLUCOSE 159 12/10/2021    CALCIUM 8.2 12/10/2021            Assessment:     s/p left total knee arthroplasty. POD2    Plan:      1:  WBAT LLE, pt will need to continue to work with PT/OT. ARU consulted, but unclear at this time if pt will be motivated for ARU criteria.  Possible SNF placement  2:  Continue Deep venous thrombosis prophylaxis- heparin, SCDs, ambulation  3:  Continue Pain Control- BRIGETTE Bruce

## 2021-12-10 NOTE — PROGRESS NOTES
Physical Therapy  Facility/Department: [place] [address] - MED SURG/ORTHO  Daily Treatment Note  NAME: Eddie Mejia  : 1950  MRN: [id number]    Date of Service: 12/10/2021    Discharge Recommendations:  Continue to assess pending progress   PT Equipment Recommendations  Equipment Needed: No    Assessment   Assessment: Pt is pleasant and non combative but not progressing well and has a severe mental block with getting OOB. Pt states \" I can't I'm too weak\" resisting assistance from therapist when attempting pivot transfers. Pt was returned to the supine position; RN was made aware of performance. Treatment Diagnosis: impaired mobility  Prognosis: Good  Decision Making: High Complexity  PT Education: Goals; Gait Training; PT Role; Disease Specific Education; Plan of Care; Functional Mobility Training; Home Exercise Program; Precautions; Transfer Training; Weight-bearing Education  Patient Education: pt verbalized understanding of importance of OOB activity  Barriers to Learning: no  REQUIRES PT FOLLOW UP: Yes     Patient Diagnosis(es): The encounter diagnosis was Status post total left knee replacement. has a past medical history of Arthritis, Chronic kidney disease, Diabetes mellitus ([address] Dialysis patient ([address] ESRD (end stage renal disease) ([address] Hemodialysis patient ([address] Hyperkalemia, Hyperlipidemia, Hypertension, OA (osteoarthritis), Retinopathy, Sepsis ([address] Thyroid disease, and Wears dentures. has a past surgical history that includes Dialysis fistula creation (Left, 08/10/2016); eye surgery (Left, 2018); other surgical history (Right, 2019); Toe amputation (Right, 2019); and Total knee arthroplasty (Left, 2021).     Restrictions  Restrictions/Precautions  Restrictions/Precautions: Weight Bearing  Lower Extremity Weight Bearing Restrictions  Left Lower Extremity Weight Bearing: Weight Bearing As Tolerated  Position Activity Restriction  Other position/activity restrictions: HD M/W/F; 1)  Dangle at edge of bed, progress to stand, and take a few steps with assistive device. 2)  Encourage ankle pumps and quad sets. 3)  Up in bedside chair as tolerated. 4)  Commode privileges with assistance. Subjective   General  Chart Reviewed: Yes  Referring Practitioner: Kaia Argueta MD  Subjective  Subjective: Pt was agreeable upon arrival with moderate pain. Orientation  Orientation  Overall Orientation Status: Within Normal Limits  Objective   Bed mobility  Supine to Sit: Stand by assistance (lots of cues)  Sit to supine: Min assist  Scooting: Stand by assistance  Transfers  Sit to Stand: Minimal Assistance  Stand to sit: Minimal Assistance  Bed to Chair: Unable to assess (refused)  Stand Pivot Transfers: Unable to assess (refused)  Squat Pivot Transfers: Unable to assess (refused)  Ambulation  Ambulation?: No  Balance  Sitting - Static: Good  Sitting - Dynamic: Fair  Standing - Static: Poor  Standing - Dynamic: Poor  Exercises  Straight Leg Raise: x10 PROM L  Gluteal Sets: 15x5 sec B  Extension hang with foot prop: x2 mins   Comments: sitting EOB for approx 30 mins attempting to get OOB.    AM-PAC Score  -PAC Inpatient Mobility Raw Score : 9 (12/10/21 1307)  -PAC Inpatient T-Scale Score : 30.55 (12/10/21 1307)  Mobility Inpatient CMS 0-100% Score: 81.38 (12/10/21 1307)  Mobility Inpatient CMS G-Code Modifier : CM (12/10/21 1307)    Goals  Short term goals  Time Frame for Short term goals: 12/13/21 unless noted  Short term goal 1: Pt will perform bed mobility with SBA by 12/12/21  Short term goal 2: Pt will perform transfers with SBA; 12/9 PM Ninfa  Short term goal 3: Pt will ambulate 50 ft with RW and SBA; 12/9 pt unable  Short term goal 4: Pt will negotiate 1 curb step with LRAD and CGA; 12/9 pt unable  Short term goal 5: Pt will perform 12+ reps of LE exercise for strengthening and balance; 12/9 10 reps  Patient Goals   Patient goals : \"to be able to get up to the chair with only touching help by tomorrow (12/10/21)\"    Plan    Plan  Times per week: BID x 7  Times per day: Twice a day  Current Treatment Recommendations: Strengthening, Home Exercise Program, ROM, Balance Training, Endurance Training, Functional Mobility Training, Transfer Training, Gait Training, Stair training  Safety Devices  Type of devices:  All fall risk precautions in place, Bed alarm in place, Call light within reach, Gait belt, Left in bed, Nurse notified     Therapy Time   Individual Concurrent Group Co-treatment   Time In 1200         Time Out 1255         Minutes 55         Timed Code Treatment Minutes: [address], PTA

## 2021-12-10 NOTE — PLAN OF CARE
Problem: Mobility - Impaired:  Goal: Mobility will improve  Description: Mobility will improve  Outcome: Ongoing   PT/OT consulted. Pt able to sit up in chair. Return to bed with an assist x2 with gait belt and walker.

## 2021-12-10 NOTE — PROGRESS NOTES
chills, weakness, weight change, fatigue  Skin:  rash, pruritus, hair loss, bruising, dry skin, petechiae  Head, Face, Neck   headaches, swelling,  cervical adenopathy  Respiratory: shortness of breath, cough, or wheezing  Cardiovascular: chest pain, palpitations, dizzy, edema  Gastrointestinal: nausea, vomiting, diarrhea, constipation,belly pain    Yellow skin, blood in stool  Musculoskeletal:  back pain, muscle weakness, gait problems,       joint pain or swelling. Genitourinary:  dysuria, poor urine flow, flank pain, blood in urine  Neurologic:  vertigo, TIA'S, syncope, seizures, focal weakness  Psychosocial:  insomnia, anxiety, or depression. Additional positive findings:                        All other remaining systems are negative or unable to obtain        PMH/PSH/SH/Family History:     Past Medical History:   Diagnosis Date    Arthritis     Chronic kidney disease     Diabetes mellitus ([facility]     [facility] patient [facility])     M W F    ESRD (end stage renal disease) ([facility]     Hemodialysis patient ([facility]     M-W-F    Hyperkalemia 07/13/2016    Hyperlipidemia     Hypertension     OA (osteoarthritis)     Retinopathy     [facility]     Thyroid disease     Wears dentures        Past Surgical History:   Procedure Laterality Date    DIALYSIS FISTULA CREATION Left 08/10/2016    Dr. Julienne Collins - upper arm, basilic vein transposition    EYE SURGERY Left 04/03/2018    catarct removal with lens implant     OTHER SURGICAL HISTORY Right 01/11/2019    partial foot amputation    TOE AMPUTATION Right 1/11/2019    PARTIAL FOOT AMPUTATION WITH GRAFT APPLICATION performed by Jax Bajwa DPM at [address] [place] [address] Left 12/8/2021    LEFT TOTAL KNEE ARTHROPLASTY performed by Mala Angel MD at [facility]        reports that she quit smoking about 7 years ago. She has never used smokeless tobacco. She reports that she does not drink alcohol and does not use drugs. family history is not on file. Medication:     Current Facility-Administered Medications: heparin (porcine) injection 5,000 Units, 5,000 Units, SubCUTAneous, 3 times per day  sodium chloride flush 0.9 % injection 5-40 mL, 5-40 mL, IntraVENous, 2 times per day  sodium chloride flush 0.9 % injection 5-40 mL, 5-40 mL, IntraVENous, PRN  0.9 % sodium chloride infusion, 25 mL, IntraVENous, PRN  acetaminophen (TYLENOL) tablet 650 mg, 650 mg, Oral, Q6H  ondansetron (ZOFRAN-ODT) disintegrating tablet 4 mg, 4 mg, Oral, Q8H PRN **OR** ondansetron (ZOFRAN) injection 4 mg, 4 mg, IntraVENous, Q6H PRN  oxyCODONE (ROXICODONE) immediate release tablet 5 mg, 5 mg, Oral, Q4H PRN **OR** oxyCODONE (ROXICODONE) immediate release tablet 10 mg, 10 mg, Oral, Q4H PRN  HYDROmorphone (DILAUDID) injection 0.5 mg, 0.5 mg, IntraVENous, Q3H PRN  atorvastatin (LIPITOR) tablet 10 mg, 10 mg, Oral, Nightly  carvedilol (COREG) tablet 12.5 mg, 12.5 mg, Oral, BID WC  gabapentin (NEURONTIN) capsule 300 mg, 300 mg, Oral, QPM  levothyroxine (SYNTHROID) tablet 50 mcg, 50 mcg, Oral, Daily  sertraline (ZOLOFT) tablet 25 mg, 25 mg, Oral, Daily  glucose (GLUTOSE) 40 % oral gel 15 g, 15 g, Oral, PRN  dextrose 50 % IV solution, 12.5 g, IntraVENous, PRN  glucagon (rDNA) injection 1 mg, 1 mg, IntraMUSCular, PRN  dextrose 5 % solution, 100 mL/hr, IntraVENous, PRN  insulin glargine (LANTUS) injection vial 15 Units, 15 Units, SubCUTAneous, Nightly  insulin lispro (HUMALOG) injection vial 4 Units, 4 Units, SubCUTAneous, TID WC  insulin lispro (HUMALOG) injection vial 0-6 Units, 0-6 Units, SubCUTAneous, TID WC  insulin lispro (HUMALOG) injection vial 0-3 Units, 0-3 Units, SubCUTAneous, Nightly  hydrALAZINE (APRESOLINE) injection 10 mg, 10 mg, IntraVENous, Q4H PRN  labetalol (NORMODYNE;TRANDATE) injection 20 mg, 20 mg, IntraVENous, Q4H PRN  losartan (COZAAR) tablet 50 mg, 50 mg, Oral, Daily  sodium chloride flush 0.9 % injection 5-40 mL, 5-40 mL, IntraVENous, 2 times per day  sodium chloride flush 0.9 % injection 5-40 mL, 5-40 mL, IntraVENous, PRN  0.9 % sodium chloride infusion, 25 mL, IntraVENous, PRN  ondansetron (ZOFRAN-ODT) disintegrating tablet 4 mg, 4 mg, Oral, Q8H PRN **OR** ondansetron (ZOFRAN) injection 4 mg, 4 mg, IntraVENous, Q6H PRN  polyethylene glycol (GLYCOLAX) packet 17 g, 17 g, Oral, Daily PRN  HYDROcodone-acetaminophen (NORCO) 5-325 MG per tablet 1 tablet, 1 tablet, Oral, Q4H PRN **OR** HYDROcodone-acetaminophen (NORCO) 5-325 MG per tablet 2 tablet, 2 tablet, Oral, Q4H PRN  calcium gluconate 10 % injection 1,000 mg, 1,000 mg, IntraVENous, Once       Vitals :     Vitals:    12/10/21 0740   BP: (!) 125/53   Pulse: 82   Resp: 18   Temp: 98.1 °F (36.7 °C)   SpO2:        I & O :     No intake or output data in the 24 hours ending 12/10/21 1114     Physical Examination :     General appearance: Anxious- no, distressed- no, in good spirits- no  HEENT: Lips- normal, teeth- ok , oral mucosa- moist  Neck : Mass- no, appears symmetrical, JVD- not visible  Respiratory: Respiratory effort-  normal, wheeze- no, crackles - none  Cardiovascular:  Ausculation- No M/R/G, Edema none  Abdomen: visible mass- no, distention- no, scar- no, tenderness- no                            hepatosplenomegaly-  no  Musculoskeletal:  clubbing no,cyanosis- no , digital ischemia- no                           muscle strength- grossly normal , tone - grossly normal  Skin: rashes- no , ulcers- no, induration- no, tightening - no  Psychiatric:  Judgement and insight- normal           AAO X 3  Additional finding: -      LABS:     Recent Labs     12/08/21  0813 12/09/21  0632 12/10/21  0821   WBC 5.1 8.4 5.2   HGB 10.7* 9.6* 7.5*   HCT 33.3* 30.0* 22.6*    247 161     Recent Labs     12/08/21  0813 12/08/21  0813 12/09/21  6717 12/09/21  0632 12/09/21  1442 12/09/21  1836 12/10/21  0821   *  --  131*  --   --   --  133*   K 5.6*   < > 5.8*   < > 5.4* 5.2* 3.9     --  99  --   --   --  102   CO2 20*  --  18*  --   -- --  18*   BUN 42*  --  29*  --   --   --  34*   CREATININE 7.3*  --  5.4*  --   --   --  5.2*   GLUCOSE 148*  --  223*  --   --   --  159*   PHOS 4.4  --   --   --   --   --   --     < > = values in this interval not displayed.

## 2021-12-10 NOTE — PLAN OF CARE
Problem: Discharge Planning:  Goal: Discharged to appropriate level of care  Description: Discharged to appropriate level of care  Outcome: Ongoing     Problem: Mobility - Impaired:  Goal: Mobility will improve  Description: Mobility will improve  Outcome: Ongoing     Problem: Infection - Surgical Site:  Goal: Will show no infection signs and symptoms  Description: Will show no infection signs and symptoms  Outcome: Ongoing

## 2021-12-10 NOTE — PROGRESS NOTES
Occupational Therapy  Facility/Department: [facility] - MED SURG/ORTHO  Daily Treatment Note  NAME: Lafe Snellen  : 1950  MRN: [id number]    Date of Service: 12/10/2021    Discharge Recommendations:  IP Rehab       Assessment   Performance deficits / Impairments: Decreased functional mobility ; Decreased ADL status; Decreased strength; Decreased safe awareness; Decreased cognition; Decreased endurance; Decreased balance; Decreased high-level IADLs  Assessment: Pt demos fair tolerance of OT treatment, demos need for repetetion and excessive increased time to attend to task and single step commands. Pt requires encouragement throughout, verbalizes being very fearful of pain. Pt transferred from EOB to bedside chair with SW and varying level of assist min-max A. Pt leaning heavily to R suddenly during transfer and writer pivoted pt remainder of transfer into chair. Pt functioning below her baseline and would benefit from continued skilled OT in IPR setting at d/c. Prognosis: Good  OT Education: OT Role; Plan of Care; ADL Adaptive Strategies; Transfer Training  Disease Specific Education: Pt educated on weight bearing status, post-op precautions, appropriate DME, and safe mobility with AD. Pt verbalized understanding. Barriers to Learning: cognition  REQUIRES OT FOLLOW UP: Yes  Activity Tolerance  Activity Tolerance: Treatment limited secondary to decreased cognition; Patient Tolerated treatment well  Activity Tolerance: Vitals: BP= 136/60, stable 90s/50s after extended time in chair, pt asymptomatic, HR= 103, SPO2=97%  Safety Devices  Safety Devices in place: Yes  Type of devices: Left in chair; Chair alarm in place; Call light within reach; Nurse notified; Gait belt         Patient Diagnosis(es): The encounter diagnosis was Status post total left knee replacement.       has a past medical history of Arthritis, Chronic kidney disease, Diabetes mellitus ([facility] Dialysis patient ([facility] ESRD (end stage renal disease) ([facility] Hemodialysis patient ([facility] Hyperkalemia, Hyperlipidemia, Hypertension, OA (osteoarthritis), Retinopathy, Sepsis ([facility] Thyroid disease, and Wears dentures. has a past surgical history that includes Dialysis fistula creation (Left, 08/10/2016); eye surgery (Left, 04/03/2018); other surgical history (Right, 01/11/2019); Toe amputation (Right, 1/11/2019); and Total knee arthroplasty (Left, 12/8/2021). Restrictions  Restrictions/Precautions  Restrictions/Precautions: Weight Bearing  Lower Extremity Weight Bearing Restrictions  Left Lower Extremity Weight Bearing: Weight Bearing As Tolerated  Position Activity Restriction  Other position/activity restrictions: HD M/W/F; 1)  Dangle at edge of bed, progress to stand, and take a few steps with assistive device. 2)  Encourage ankle pumps and quad sets. 3)  Up in bedside chair as tolerated. 4)  Commode privileges with assistance. Subjective   General  Chart Reviewed: Yes  Patient assessed for rehabilitation services?: Yes  Response to previous treatment: Patient with no complaints from previous session  Family / Caregiver Present: No    Subjective  Subjective: Pt resting in bed, verbalizes feeling overwhelmed but agreeable to OT treatment and getting up to chair for lunch.     Pain Assessment  Pain Assessment: Faces  Franco-Baker Pain Rating: Hurts little more  Pain Location: Knee  Pain Orientation: Left  Vital Signs  Patient Currently in Pain: Yes     Orientation  Orientation  Overall Orientation Status: Within Functional Limits     Objective    ADL  Feeding: Setup; Verbal cueing (pt demos excessive time and VCs to redirect attention to task while opening condiment packages and making sandwich, required assist to open some packaging)     Balance  Sitting Balance: Supervision  Standing Balance: Maximum assistance (varies from CGA to max A with SW)  Standing Balance  Activity: bed to chair stand step transfer with SW    Bed mobility  Supine to Sit: Minimal assistance (to L with HOB elevated)     Transfers  Sit to stand: Moderate assistance  Stand to sit: Maximum assistance  Transfer Comments: Pt demos poor ability to follow directions and problem solving skilled with stepping to chair after 2 steps required max A to pivot remainder of transfer. Cognition  Overall Cognitive Status: Exceptions  Following Commands: Follows one step commands with increased time;  Follows one step commands with repetition  Attention Span: Attends with cues to redirect  Problem Solving: Assistance required to identify errors made; Assistance required to correct errors made; Assistance required to generate solutions; Assistance required to implement solutions; Decreased awareness of errors  Insights: Decreased awareness of deficits  Initiation: Requires cues for all  Sequencing: Requires cues for some        Plan   Plan  Times per week: 4-6x    AM-PAC Score  AM-PAC Inpatient Daily Activity Raw Score: 15 (12/10/21 1438)  AM-PAC Inpatient ADL T-Scale Score : 34.69 (12/10/21 1438)  ADL Inpatient CMS 0-100% Score: 56.46 (12/10/21 1438)  ADL Inpatient CMS G-Code Modifier : CK (12/10/21 1438)    Goals  Short term goals  Time Frame for Short term goals: 1 week by 12/16  Short term goal 1: Pt will complete LB ADLs with SPV with AE as needed-- ongoing 12/10/21  Short term goal 2: Pt will complete toileting with SPV-- ongoing 12/10/21  Short term goal 3: Pt will complete functional transfers wtih SPV-- ongoing 12/10/21  Short term goal 4: Pt wilson verb/demo car transfer tech with min cues-- ongoing 12/10/21  Short term goal 5: Pt will complete B UE ex x 20 reps w/o fatigue-- ongoing 12/10/21  Patient Goals   Patient goals : Griselda Nanas to the bathroom with a walker\"-- ongoing, pt tolerated bed to chair transfer only 12/10/21       Therapy Time   Individual Concurrent Group Co-treatment   Time In 1310         Time Out 1405         Minutes [address], [place]/L

## 2021-12-10 NOTE — CARE COORDINATION
CM received VM from Bhargavarlyn Lucero in admissions with [facility] and updated that she did reach out to payor and precert is still pending. Met with pt to discuss snf placement if payor denies. Pt thinking about SNF. Pt has HD arranged at [facility] in [facility]. Closest facility is [facility], referral faxed as well.  CM following-Armida Hilliard RN

## 2021-12-10 NOTE — CONSULTS
Patient: Sharon D aSilva  [id number]  Date: 12/10/2021      Chief Complaint: TKA    History of Present Illness/Hospital Course:  Ms Aleksander Stahl is a 66year old female admitted 12/6/2021 for elective left total knee arthroplasty; surgery was initially delayed due to hyperkalemia, but was performed on 12/8 by Dr. Flaco Bailon. Hospitalsts following for medical management, nephrologists for ESRD. Generally SBA-CGA for therapy. has a past medical history of Arthritis, Chronic kidney disease, Diabetes mellitus ([address] Dialysis patient ([address] ESRD (end stage renal disease) ([address] Hemodialysis patient ([address] Hyperkalemia, Hyperlipidemia, Hypertension, OA (osteoarthritis), Retinopathy, Sepsis ([address] Thyroid disease, and Wears dentures. has a past surgical history that includes Dialysis fistula creation (Left, 08/10/2016); eye surgery (Left, 04/03/2018); other surgical history (Right, 01/11/2019); Toe amputation (Right, 1/11/2019); and Total knee arthroplasty (Left, 12/8/2021). reports that she quit smoking about 7 years ago. She has never used smokeless tobacco. She reports that she does not drink alcohol and does not use drugs. family history is not on file. REVIEW OF SYSTEMS:   CONSTITUTIONAL: negative for fevers, chills, diaphoresis, activity change, appetite change, fatigue, night sweats and unexpected weight change.    EYES: negative for blurred vision, eye discharge, visual disturbance and icterus  HEENT: negative for hearing loss, tinnitus, ear drainage, sinus pressure, nasal congestion, epistaxis and snoring  RESPIRATORY: Negative for hemoptysis, cough, sputum production  CARDIOVASCULAR: negative for chest pain, palpitations, exertional chest pressure/discomfort, edema, syncope  GASTROINTESTINAL: negative for nausea, vomiting, diarrhea, constipation, blood in stool and abdominal pain  GENITOURINARY: negative for frequency, dysuria, urinary incontinence, decreased urine volume, and hematuria  HEMATOLOGIC/LYMPHATIC: negative for easy bruising, bleeding and lymphadenopathy  ALLERGIC/IMMUNOLOGIC: negative for recurrent infections, angioedema, anaphylaxis and drug reactions  ENDOCRINE: negative for weight changes and diabetic symptoms including polyuria, polydipsia and polyphagia  MUSCULOSKELETAL: negative for pain, joint swelling, decreased range of motion and muscle weakness  NEUROLOGICAL: negative for headaches, slurred speech, unilateral weakness  PSYCHIATRIC/BEHAVIORAL: negative for hallucinations, behavioral problems, confusion and agitation. Physical Examination:  Vitals: Patient Vitals for the past 24 hrs:   BP Temp Temp src Pulse Resp SpO2 Weight   12/10/21 0740 (!) 125/53 98.1 °F (36.7 °C) -- 82 18 -- 186 lb 11.7 oz (84.7 kg)   12/10/21 0330 (!) 111/51 100 °F (37.8 °C) Oral 83 16 95 % --   12/10/21 0055 (!) 127/56 98.6 °F (37 °C) Oral 82 16 98 % --   12/09/21 2015 -- -- Oral -- 18 -- --   12/09/21 1802 118/63 -- -- -- -- -- --   12/09/21 1438 (!) 144/71 98.2 °F (36.8 °C) Oral -- -- 97 % --   12/09/21 1151 (!) 115/57 98.3 °F (36.8 °C) Oral 82 18 94 % --     Mood: Stable  Const: No distress  ENT: Oral mucosa moist  Eyes: No discharge or injection  CV: Regular rate and rhythm, no murmur rub or gallop noted  Resp: Lungs clear to auscultation bilaterally, no rales wheezes or ronchi  GI: Soft, nontender, nondistended. Normal bowel sounds. Neuro: Alert, oriented, appropriate. No cranial nerve deficits appreciated. Skin: No lesions or rashes noted.        Lab Results   Component Value Date    WBC 5.2 12/10/2021    HGB 7.5 (L) 12/10/2021    HCT 22.6 (L) 12/10/2021    MCV 94.4 12/10/2021     12/10/2021     Lab Results   Component Value Date    INR 0.94 11/22/2021    INR 0.94 09/14/2021    INR 1.00 06/09/2017    PROTIME 10.6 11/22/2021    PROTIME 10.6 09/14/2021    PROTIME 11.3 06/09/2017     Lab Results   Component Value Date    CREATININE 5.2 ([place]) 12/10/2021    BUN 34 (H) 12/10/2021  (L) 12/10/2021    K 3.9 12/10/2021     12/10/2021    CO2 18 (L) 12/10/2021     Lab Results   Component Value Date    ALT 51 (H) 08/06/2016    AST 43 (H) 08/06/2016    ALKPHOS 161 (H) 08/06/2016    BILITOT 0.3 08/06/2016     XR KNEE LEFT (1-2 VIEWS)   Final Result       Left total knee arthroplasty which is in anatomic alignment with no evidence   of fracture or loosening. Soft tissue air is consistent with the recent   surgery. Assessment:  1. Left knee DJD s/p TKA   2. ESRD on HD  3. DM      Recommendations:  Patient is a reasonable candidate for rehab if she is agreeable; she has vacillated a bit regarding her motivation to admit to rehab. Thank you for this consult. Please contact me with any questions or concerns. Nima Reyna MD 12/10/2021, 10:06 AM

## 2021-12-10 NOTE — PROGRESS NOTES
Hospitalist Progress Note      PCP: ELANA Bills - CNP    Date of Admission: 12/6/2021    Chief Complaint: left knee pain. Elevated bp    Hospital Course: 66 y.o. female with a PMH of OA, ESRD on HD, HTN, Hypothyroidism, and DM who we are asked to see/evaluate by Gini Dance, MD for medical management. Patient admitted for elective left knee replacement surgery. Surgery postponed due to abnl labs-hyperkalemia. Patient has dialysis on MWF. She had dialysis on 12/6, blood pressure remains elevated. Plan for OR on 12/8. Patient a/ox4, nad, denies N/V/D/F/C.     Subjective: HD/ s/p left knee surgery 12/8, HD 12/10      Medications:  Reviewed    Infusion Medications    sodium chloride      dextrose      sodium chloride       Scheduled Medications    heparin (porcine)  5,000 Units SubCUTAneous 3 times per day    sodium chloride flush  5-40 mL IntraVENous 2 times per day    acetaminophen  650 mg Oral Q6H    atorvastatin  10 mg Oral Nightly    carvedilol  12.5 mg Oral BID WC    gabapentin  300 mg Oral QPM    levothyroxine  50 mcg Oral Daily    sertraline  25 mg Oral Daily    insulin glargine  15 Units SubCUTAneous Nightly    insulin lispro  4 Units SubCUTAneous TID WC    insulin lispro  0-6 Units SubCUTAneous TID WC    insulin lispro  0-3 Units SubCUTAneous Nightly    losartan  50 mg Oral Daily    sodium chloride flush  5-40 mL IntraVENous 2 times per day    calcium gluconate  1,000 mg IntraVENous Once     PRN Meds: sodium chloride flush, sodium chloride, ondansetron **OR** ondansetron, oxyCODONE **OR** oxyCODONE, HYDROmorphone, glucose, dextrose, glucagon (rDNA), dextrose, hydrALAZINE, labetalol, sodium chloride flush, sodium chloride, ondansetron **OR** ondansetron, polyethylene glycol, HYDROcodone 5 mg - acetaminophen **OR** HYDROcodone 5 mg - acetaminophen    No intake or output data in the 24 hours ending 12/10/21 1333    Physical Exam Performed:    /60   Pulse 91   Temp 98.1 °F (36.7 °C) (Oral)   Resp 16   Ht 5' 7\" (1.702 m)   Wt 186 lb 11.7 oz (84.7 kg)   SpO2 98%   BMI 29.25 kg/m²     General appearance: No apparent distress, appears stated age and cooperative. HEENT: Normal cephalic, atraumatic without obvious deformity. Pupils equal, round, and reactive to light. Extra ocular muscles intact. Conjunctivae/corneas clear. Neck: Supple, with full range of motion. No jugular venous distention. Trachea midline. Respiratory:  Normal respiratory effort. Clear to auscultation, bilaterally without Rales/Wheezes/Rhonchi. Cardiovascular: Regular rate and rhythm with normal S1/S2 without murmurs, rubs or gallops. Abdomen: Soft, non-tender, non-distended with normal bowel sounds. Musculoskeletal: Left knee decreased rom, DSG CDI  Skin: Skin color, texture, turgor normal.  No rashes or lesions. Neurologic:  Neurovascularly intact without any focal sensory/motor deficits. Cranial nerves: II-XII intact, grossly non-focal.  Psychiatric: Alert and oriented, thought content appropriate, normal insight  Capillary Refill: Brisk,3 seconds, normal   Peripheral Pulses: +2 palpable, equal bilaterally    Labs:   Recent Labs     12/08/21  0813 12/09/21  0632 12/10/21  0821   WBC 5.1 8.4 5.2   HGB 10.7* 9.6* 7.5*   HCT 33.3* 30.0* 22.6*    247 161     Recent Labs     12/08/21  0813 12/08/21  0813 12/09/21  5527 12/09/21  0632 12/09/21  1442 12/09/21  1836 12/10/21  0821   *  --  131*  --   --   --  133*   K 5.6*   < > 5.8*   < > 5.4* 5.2* 3.9     --  99  --   --   --  102   CO2 20*  --  18*  --   --   --  18*   BUN 42*  --  29*  --   --   --  34*   CREATININE 7.3*  --  5.4*  --   --   --  5.2*   CALCIUM 8.8  --  8.3  --   --   --  8.2*   PHOS 4.4  --   --   --   --   --   --     < > = values in this interval not displayed. No results for input(s): AST, ALT, BILIDIR, BILITOT, ALKPHOS in the last 72 hours. No results for input(s): INR in the last 72 hours.   No results for input(s): Vaishnavi Rivas in the last 72 hours. Urinalysis:      Lab Results   Component Value Date    NITRU Negative 09/14/2021    WBCUA >100 09/14/2021    BACTERIA 3+ 09/14/2021    RBCUA 11-20 09/14/2021    BLOODU MODERATE 09/14/2021    SPECGRAV 1.015 09/14/2021    GLUCOSEU Negative 09/14/2021       Radiology:  XR KNEE LEFT (1-2 VIEWS)   Final Result      Left total knee arthroplasty which is in anatomic alignment with no evidence   of fracture or loosening. Soft tissue air is consistent with the recent   surgery. Head                 Assessment/Plan:    Active Hospital Problems    Diagnosis     Primary osteoarthritis of left knee [M17.12]      ESRD on HD, Hyperkalemia  -Nephrology following  -Dialysis today  -K 5.8->3.9 today    Anemia 2/2 CKD/post operative state  -Hgb 10.5->7.5  -repeat in am    HTN  -Resume cozaar/bb  -Clonidine     Left knee OA  -Plan for left total knee replacement 12/8  -pain control  -PT/OT     DM  -Fsbs ac/hs  -Lantus  -SSI  -CCHO diet  -Neurontin     HLD  -Statin     Hypothyroidism  -Synthroid    DVT Prophylaxis: heparin sq post op  Diet: ADULT DIET;  Regular; Low Potassium (Less than 3000 mg/day)  Code Status: Full Code    PT/OT Eval Status: consulted    Dispo - pending course, possibly 12/11    ELANA Gutiérrez - CNP

## 2021-12-10 NOTE — PROGRESS NOTES
Treatment time: 195min    Net UF: 1000ml    Pre weight: 84.7k (bed scale)  Post weight: 83.5k  EDW: 82.5k    Access used: KIRK AVF  Access function: Good    Medications or blood products given: None    Regular outpatient schedule: MWF    Summary of response to treatment: Tolerated well. Copy of dialysis treatment record placed in chart, to be scanned into EMR.

## 2021-12-10 NOTE — PROGRESS NOTES
per week: BID x 7  Times per day: Twice a day  Current Treatment Recommendations: Strengthening, Home Exercise Program, ROM, Balance Training, Endurance Training, Functional Mobility Training, Transfer Training, Gait Training, Stair training  Safety Devices  Type of devices:  All fall risk precautions in place, Bed alarm in place, Call light within reach, Gait belt, Left in bed, Nurse notified     Therapy Time   Individual Concurrent Group Co-treatment   Time In [phone]         Time Out 1520         Minutes 43         Timed Code Treatment Minutes: [address], [facility]

## 2021-12-10 NOTE — CARE COORDINATION
Checked on precert and it is still pending, updated GILES Huffman.via voicemail, will inform GILES of updates.  Remy Urbina RN

## 2021-12-11 LAB
A/G RATIO: 1 (ref 1.1–2.2)
ALBUMIN SERPL-MCNC: 3.3 G/DL (ref 3.4–5)
ALP BLD-CCNC: 86 U/L (ref 40–129)
ALT SERPL-CCNC: <5 U/L (ref 10–40)
ANION GAP SERPL CALCULATED.3IONS-SCNC: 14 MMOL/L (ref 3–16)
ANION GAP SERPL CALCULATED.3IONS-SCNC: 16 MMOL/L (ref 3–16)
AST SERPL-CCNC: 12 U/L (ref 15–37)
BILIRUB SERPL-MCNC: 0.3 MG/DL (ref 0–1)
BUN BLDV-MCNC: 26 MG/DL (ref 7–20)
BUN BLDV-MCNC: 33 MG/DL (ref 7–20)
CALCIUM SERPL-MCNC: 8.4 MG/DL (ref 8.3–10.6)
CALCIUM SERPL-MCNC: 8.7 MG/DL (ref 8.3–10.6)
CHLORIDE BLD-SCNC: 95 MMOL/L (ref 99–110)
CHLORIDE BLD-SCNC: 99 MMOL/L (ref 99–110)
CO2: 21 MMOL/L (ref 21–32)
CO2: 23 MMOL/L (ref 21–32)
CREAT SERPL-MCNC: 4.4 MG/DL (ref 0.6–1.2)
CREAT SERPL-MCNC: 5.6 MG/DL (ref 0.6–1.2)
GFR AFRICAN AMERICAN: 12
GFR AFRICAN AMERICAN: 9
GFR NON-AFRICAN AMERICAN: 10
GFR NON-AFRICAN AMERICAN: 7
GLUCOSE BLD-MCNC: 136 MG/DL (ref 70–99)
GLUCOSE BLD-MCNC: 151 MG/DL (ref 70–99)
GLUCOSE BLD-MCNC: 179 MG/DL (ref 70–99)
GLUCOSE BLD-MCNC: 361 MG/DL (ref 70–99)
GLUCOSE BLD-MCNC: 50 MG/DL (ref 70–99)
GLUCOSE BLD-MCNC: 53 MG/DL (ref 70–99)
GLUCOSE BLD-MCNC: 75 MG/DL (ref 70–99)
GLUCOSE BLD-MCNC: 78 MG/DL (ref 70–99)
HCT VFR BLD CALC: 25.1 % (ref 36–48)
HEMOGLOBIN: 8.3 G/DL (ref 12–16)
MAGNESIUM: 1.9 MG/DL (ref 1.8–2.4)
MCH RBC QN AUTO: 31 PG (ref 26–34)
MCHC RBC AUTO-ENTMCNC: 33.1 G/DL (ref 31–36)
MCV RBC AUTO: 93.7 FL (ref 80–100)
PDW BLD-RTO: 18 % (ref 12.4–15.4)
PERFORMED ON: ABNORMAL
PERFORMED ON: NORMAL
PERFORMED ON: NORMAL
PHOSPHORUS: 3.3 MG/DL (ref 2.5–4.9)
PLATELET # BLD: 183 K/UL (ref 135–450)
PMV BLD AUTO: 8.1 FL (ref 5–10.5)
POTASSIUM REFLEX MAGNESIUM: 3.6 MMOL/L (ref 3.5–5.1)
POTASSIUM SERPL-SCNC: 4.1 MMOL/L (ref 3.5–5.1)
RBC # BLD: 2.68 M/UL (ref 4–5.2)
SODIUM BLD-SCNC: 132 MMOL/L (ref 136–145)
SODIUM BLD-SCNC: 136 MMOL/L (ref 136–145)
TOTAL PROTEIN: 6.7 G/DL (ref 6.4–8.2)
WBC # BLD: 8.9 K/UL (ref 4–11)

## 2021-12-11 PROCEDURE — 36415 COLL VENOUS BLD VENIPUNCTURE: CPT

## 2021-12-11 PROCEDURE — 6370000000 HC RX 637 (ALT 250 FOR IP): Performed by: ORTHOPAEDIC SURGERY

## 2021-12-11 PROCEDURE — 6370000000 HC RX 637 (ALT 250 FOR IP): Performed by: SPECIALIST/TECHNOLOGIST

## 2021-12-11 PROCEDURE — 1200000000 HC SEMI PRIVATE

## 2021-12-11 PROCEDURE — 80048 BASIC METABOLIC PNL TOTAL CA: CPT

## 2021-12-11 PROCEDURE — 85027 COMPLETE CBC AUTOMATED: CPT

## 2021-12-11 PROCEDURE — 97530 THERAPEUTIC ACTIVITIES: CPT

## 2021-12-11 PROCEDURE — 97110 THERAPEUTIC EXERCISES: CPT

## 2021-12-11 PROCEDURE — 2580000003 HC RX 258: Performed by: ORTHOPAEDIC SURGERY

## 2021-12-11 PROCEDURE — 97535 SELF CARE MNGMENT TRAINING: CPT

## 2021-12-11 PROCEDURE — 6360000002 HC RX W HCPCS: Performed by: ORTHOPAEDIC SURGERY

## 2021-12-11 PROCEDURE — 6370000000 HC RX 637 (ALT 250 FOR IP): Performed by: NURSE PRACTITIONER

## 2021-12-11 RX ORDER — CYCLOBENZAPRINE HCL 10 MG
5 TABLET ORAL 3 TIMES DAILY PRN
Status: DISCONTINUED | OUTPATIENT
Start: 2021-12-11 | End: 2021-12-17 | Stop reason: HOSPADM

## 2021-12-11 RX ORDER — INSULIN GLARGINE 100 [IU]/ML
10 INJECTION, SOLUTION SUBCUTANEOUS NIGHTLY
Status: DISCONTINUED | OUTPATIENT
Start: 2021-12-11 | End: 2021-12-17 | Stop reason: HOSPADM

## 2021-12-11 RX ADMIN — INSULIN LISPRO 5 UNITS: 100 INJECTION, SOLUTION INTRAVENOUS; SUBCUTANEOUS at 17:44

## 2021-12-11 RX ADMIN — ACETAMINOPHEN 650 MG: 325 TABLET ORAL at 06:28

## 2021-12-11 RX ADMIN — SERTRALINE 25 MG: 50 TABLET, FILM COATED ORAL at 09:54

## 2021-12-11 RX ADMIN — HEPARIN SODIUM 5000 UNITS: 5000 INJECTION INTRAVENOUS; SUBCUTANEOUS at 06:32

## 2021-12-11 RX ADMIN — CYCLOBENZAPRINE 5 MG: 10 TABLET, FILM COATED ORAL at 12:51

## 2021-12-11 RX ADMIN — HEPARIN SODIUM 5000 UNITS: 5000 INJECTION INTRAVENOUS; SUBCUTANEOUS at 22:04

## 2021-12-11 RX ADMIN — HEPARIN SODIUM 5000 UNITS: 5000 INJECTION INTRAVENOUS; SUBCUTANEOUS at 14:39

## 2021-12-11 RX ADMIN — LOSARTAN POTASSIUM 50 MG: 25 TABLET, FILM COATED ORAL at 09:54

## 2021-12-11 RX ADMIN — INSULIN GLARGINE 10 UNITS: 100 INJECTION, SOLUTION SUBCUTANEOUS at 22:08

## 2021-12-11 RX ADMIN — ACETAMINOPHEN 650 MG: 325 TABLET ORAL at 18:10

## 2021-12-11 RX ADMIN — LEVOTHYROXINE SODIUM 50 MCG: 0.05 TABLET ORAL at 06:28

## 2021-12-11 RX ADMIN — INSULIN LISPRO 1 UNITS: 100 INJECTION, SOLUTION INTRAVENOUS; SUBCUTANEOUS at 22:07

## 2021-12-11 RX ADMIN — ATORVASTATIN CALCIUM 10 MG: 10 TABLET, FILM COATED ORAL at 22:04

## 2021-12-11 RX ADMIN — GABAPENTIN 300 MG: 300 CAPSULE ORAL at 18:10

## 2021-12-11 RX ADMIN — Medication 10 ML: at 22:04

## 2021-12-11 RX ADMIN — CARVEDILOL 12.5 MG: 6.25 TABLET, FILM COATED ORAL at 09:54

## 2021-12-11 RX ADMIN — Medication 10 ML: at 09:55

## 2021-12-11 RX ADMIN — ACETAMINOPHEN 650 MG: 325 TABLET ORAL at 12:51

## 2021-12-11 RX ADMIN — OXYCODONE 10 MG: 5 TABLET ORAL at 09:54

## 2021-12-11 RX ADMIN — INSULIN LISPRO 4 UNITS: 100 INJECTION, SOLUTION INTRAVENOUS; SUBCUTANEOUS at 17:44

## 2021-12-11 RX ADMIN — ACETAMINOPHEN 650 MG: 325 TABLET ORAL at 02:46

## 2021-12-11 ASSESSMENT — PAIN SCALES - GENERAL
PAINLEVEL_OUTOF10: 3
PAINLEVEL_OUTOF10: 0
PAINLEVEL_OUTOF10: 3
PAINLEVEL_OUTOF10: 5
PAINLEVEL_OUTOF10: 6
PAINLEVEL_OUTOF10: 4
PAINLEVEL_OUTOF10: 7
PAINLEVEL_OUTOF10: 3
PAINLEVEL_OUTOF10: 6
PAINLEVEL_OUTOF10: 5

## 2021-12-11 ASSESSMENT — PAIN DESCRIPTION - ORIENTATION
ORIENTATION: LEFT
ORIENTATION: LEFT

## 2021-12-11 ASSESSMENT — PAIN DESCRIPTION - LOCATION
LOCATION: GENERALIZED
LOCATION: KNEE
LOCATION: KNEE

## 2021-12-11 ASSESSMENT — PAIN - FUNCTIONAL ASSESSMENT
PAIN_FUNCTIONAL_ASSESSMENT: ACTIVITIES ARE NOT PREVENTED
PAIN_FUNCTIONAL_ASSESSMENT: ACTIVITIES ARE NOT PREVENTED

## 2021-12-11 ASSESSMENT — PAIN DESCRIPTION - DESCRIPTORS: DESCRIPTORS: BURNING

## 2021-12-11 ASSESSMENT — PAIN DESCRIPTION - PROGRESSION
CLINICAL_PROGRESSION: NOT CHANGED

## 2021-12-11 ASSESSMENT — PAIN DESCRIPTION - PAIN TYPE
TYPE: CHRONIC PAIN
TYPE: SURGICAL PAIN
TYPE: SURGICAL PAIN

## 2021-12-11 NOTE — PROGRESS NOTES
Physical Therapy  Facility/Department: [facility] C5 - MED SURG/ORTHO  Daily Treatment Note  NAME: Sharon Da Silva  : 1950  MRN: [id number]    Date of Service: 2021    Discharge Recommendations:  Subacute/Skilled Nursing Facility   PT Equipment Recommendations  Equipment Needed: No    Assessment   Body structures, Functions, Activity limitations: Decreased functional mobility ; Decreased endurance; Decreased ROM; Decreased balance; Decreased strength  Assessment: Pt was limited this date by low BG leading to limited progress this date. Slight gains in active participaton in LE exercises, however, was demonstrated with increased knee flexion to about 65 degrees and improved quad control. Pt will continue to benefit form skilled PT services and will progress per POC as medically appropriate. Treatment Diagnosis: functional mobility deficit; impaired ROM  Prognosis: Good  Decision Making: Medium Complexity  PT Education: Goals; PT Role; Disease Specific Education; Plan of Care; Home Exercise Program; Precautions; Transfer Training  Patient Education: Pt verbalized understanding  Barriers to Learning: none  REQUIRES PT FOLLOW UP: Yes  Activity Tolerance  Activity Tolerance: Treatment limited secondary to medical complications (free text)  Activity Tolerance: BP was 181/68, HR 95 BPM and SPO2 93-95%; BG was 50 upon RN assessing d/t complaints of hot and sweaty and increased lethargy. Patient Diagnosis(es): The encounter diagnosis was Status post total left knee replacement. has a past medical history of Arthritis, Chronic kidney disease, Diabetes mellitus ([address] Dialysis patient ([address] ESRD (end stage renal disease) ([facility] Hemodialysis patient ([facility] Hyperkalemia, Hyperlipidemia, Hypertension, OA (osteoarthritis), Retinopathy, Sepsis ([address] Thyroid disease, and Wears dentures.    has a past surgical history that includes Dialysis fistula creation (Left, 08/10/2016); eye surgery (Left, 2018); other surgical history (Right, 01/11/2019); Toe amputation (Right, 1/11/2019); and Total knee arthroplasty (Left, 12/8/2021). Restrictions  Restrictions/Precautions  Restrictions/Precautions: Weight Bearing  Lower Extremity Weight Bearing Restrictions  Left Lower Extremity Weight Bearing: Weight Bearing As Tolerated  Position Activity Restriction  Other position/activity restrictions: HD M/W/F; 1)  Dangle at edge of bed, progress to stand, and take a few steps with assistive device. 2)  Encourage ankle pumps and quad sets. 3)  Up in bedside chair as tolerated. 4)  Commode privileges with assistance. Subjective   General  Chart Reviewed: Yes  Response To Previous Treatment: Patient reporting fatigue but able to participate. Family / Caregiver Present: No  Referring Practitioner: Milla Barrett MD  Subjective  Subjective: Pt was in bed upon arrival to room and agreeable to PT treatment but reporting increased fatigue and feeling hot and sweaty. General Comment  Comments: RN aware of PT treatment and informed of Pt complaints  Pain Screening  Patient Currently in Pain: Yes  Pain Assessment  Pain Assessment: 0-10  Pain Level: 3  Pain Type: Surgical pain  Pain Location: Knee  Pain Orientation: Left  Clinical Progression: Not changed  Functional Pain Assessment: Activities are not prevented  Non-Pharmaceutical Pain Intervention(s): Ambulation/Increased Activity  Response to Pain Intervention: Patient Satisfied  Vital Signs  Patient Currently in Pain: Yes       Orientation  Orientation  Overall Orientation Status: Within Functional Limits  Cognition      Objective   Bed mobility  Comment: No functional mobility performed this date as Pt with BG of 50.               Exercises  Straight Leg Raise: x10 reps AAROM LLE  Quad Sets: 10 reps LLE with tactile cues for technique  Heelslides: x10 reps AAROM LLE  Gluteal Sets: x10 reps BLEs  Hip Abduction: x10 reps AAROM LLE  Knee Short Arc Quad: x10 reps AAROM LLE  Ankle Pumps: x10 reps BLEs; verbal cues for technique  Comments: Increased time for all exercises d/t lethargy and falling asleep along with rest breaks d/t pain.                         AM-PAC Score  AM-PAC Inpatient Mobility Raw Score : 6 (12/11/21 1138)  AM-PAC Inpatient T-Scale Score : 23.55 (12/11/21 1138)  Mobility Inpatient CMS 0-100% Score: 100 (12/11/21 1138)  Mobility Inpatient CMS G-Code Modifier : CN (12/11/21 1138)          Goals  Short term goals  Time Frame for Short term goals: 12/13/21 unless noted  Short term goal 1: Pt will perform bed mobility with SBA by 12/12/21: 12/11: Not performed  Short term goal 2: Pt will perform transfers with SBA; 12/11: Not performed  Short term goal 3: Pt will ambulate 50 ft with RW and SBA; 12/11: Not performed  Short term goal 4: Pt will negotiate 1 curb step with LRAD and CGA; 12/11: Not performed  Short term goal 5: Pt will perform 12+ reps of LE exercise for strengthening and balance; 12/11:Progressing  Patient Goals   Patient goals : \"to be able to get up to the chair with only touching help by tomorrow (12/10/21)\"    Plan    Plan  Times per week: BID x 7  Times per day: Twice a day  Current Treatment Recommendations: Strengthening, Home Exercise Program, ROM, Balance Training, Endurance Training, Functional Mobility Training, Transfer Training, Gait Training, Stair training  Safety Devices  Type of devices: Bed alarm in place, Nurse notified     Therapy Time   Individual Concurrent Group Co-treatment   Time In 1007         Time Out 1035         Minutes 28         Timed Code Treatment Minutes: [address] [facility], [address], DPT

## 2021-12-11 NOTE — PROGRESS NOTES
ESRD (end stage renal disease) ([facility] Hemodialysis patient ([facility] Hyperkalemia, Hyperlipidemia, Hypertension, OA (osteoarthritis), Retinopathy, Sepsis ([facility] Thyroid disease, and Wears dentures. has a past surgical history that includes Dialysis fistula creation (Left, 08/10/2016); eye surgery (Left, 04/03/2018); other surgical history (Right, 01/11/2019); Toe amputation (Right, 1/11/2019); and Total knee arthroplasty (Left, 12/8/2021). Restrictions  Restrictions/Precautions  Restrictions/Precautions: Weight Bearing  Lower Extremity Weight Bearing Restrictions  Left Lower Extremity Weight Bearing: Weight Bearing As Tolerated  Position Activity Restriction  Other position/activity restrictions: HD M/W/F; 1)  Dangle at edge of bed, progress to stand, and take a few steps with assistive device. 2)  Encourage ankle pumps and quad sets. 3)  Up in bedside chair as tolerated. 4)  Commode privileges with assistance. Subjective   General  Chart Reviewed: Yes  Patient assessed for rehabilitation services?: Yes  Response to previous treatment: Patient with no complaints from previous session  Family / Caregiver Present: No  Subjective  Subjective: Pt resting in bed, verbalizes feeling overwhelmed but agreeable to OT treatment and getting up to chair for lunch. General Comment  Comments: L TKA  Vital Signs  Patient Currently in Pain: Denies       Orientation  Orientation  Overall Orientation Status: Within Functional Limits         Objective        ADL  Feeding: Setup; Verbal cueing        Balance  Sitting Balance: Supervision (seated EOB)  Standing Balance  Time: ~20  Activity: seated EOB for feeding  Bed mobility  Supine to Sit: Moderate assistance (increased time for processsing)  Sit to Supine: Unable to assess (up in chair at end of session)                          Cognition  Overall Cognitive Status: Exceptions  Following Commands: Follows one step commands with increased time;  Follows one step commands with repetition  Attention Span: Attends with cues to redirect  Memory: Decreased short term memory  Safety Judgement: Decreased awareness of need for safety  Problem Solving: Assistance required to identify errors made; Assistance required to correct errors made; Assistance required to generate solutions; Assistance required to implement solutions; Decreased awareness of errors  Insights: Decreased awareness of deficits  Initiation: Requires cues for all  Sequencing: Requires cues for some  Cognition Comment: Pt is very verbose and needs redirection back to task                                         Plan   Plan  Times per week: 4-6x    AM-PAC Score        AM-PAC Inpatient Daily Activity Raw Score: 15 (12/11/21 1503)  AM-PAC Inpatient ADL T-Scale Score : 34.69 (12/11/21 1503)  ADL Inpatient CMS 0-100% Score: 56.46 (12/11/21 1503)  ADL Inpatient CMS G-Code Modifier : CK (12/11/21 1503)    Goals  Short term goals  Time Frame for Short term goals: 1 week by 12/16  Short term goal 1: Pt will complete LB ADLs with SPV with AE as needed-- ongoing 12/11/21  Short term goal 2: Pt will complete toileting with SPV-- ongoing 12/11/21  Short term goal 3: Pt will complete functional transfers wtih SPV-- ongoing 12/11/21  Short term goal 4: Pt wilson verb/demo car transfer tech with min cues-- ongoing 12/11/21  Short term goal 5: Pt will complete B UE ex x 20 reps w/o fatigue-- ongoing 12/10/21  Patient Goals   Patient goals : Guera Gails to the bathroom with a walker\"-- ongoing, pt tolerated bed to chair transfer only 12/10/21       Therapy Time   Individual Concurrent Group Co-treatment   Time In 1303         Time Out 1338         Minutes 35         Timed Code Treatment Minutes: [address], OTR/L

## 2021-12-11 NOTE — PROGRESS NOTES
Hospitalist Progress Note      PCP: ELANA Carroll - CNP    Date of Admission: 12/6/2021    Chief Complaint: left knee pain. Elevated bp    Hospital Course: 66 y.o. female with a PMH of OA, ESRD on HD, HTN, Hypothyroidism, and DM who we are asked to see/evaluate by Jostin Ramires MD for medical management. Patient admitted for elective left knee replacement surgery. Surgery postponed due to abnl labs-hyperkalemia. Patient has dialysis on MWF. She had dialysis on 12/6, blood pressure remains elevated. Plan for OR on 12/8. Patient a/ox4, nad, denies N/V/D/F/C.     Subjective: HD/ s/p left knee surgery 12/8, HD 12/10, patient c/o of feeling sweaty this am, POC BS 50, working with PT      Medications:  Reviewed    Infusion Medications    sodium chloride      dextrose       Scheduled Medications    heparin (porcine)  5,000 Units SubCUTAneous 3 times per day    sodium chloride flush  5-40 mL IntraVENous 2 times per day    acetaminophen  650 mg Oral Q6H    atorvastatin  10 mg Oral Nightly    carvedilol  12.5 mg Oral BID WC    gabapentin  300 mg Oral QPM    levothyroxine  50 mcg Oral Daily    sertraline  25 mg Oral Daily    insulin glargine  15 Units SubCUTAneous Nightly    insulin lispro  4 Units SubCUTAneous TID WC    insulin lispro  0-6 Units SubCUTAneous TID WC    insulin lispro  0-3 Units SubCUTAneous Nightly    losartan  50 mg Oral Daily    calcium gluconate  1,000 mg IntraVENous Once     PRN Meds: cyclobenzaprine, sodium chloride flush, sodium chloride, ondansetron **OR** ondansetron, oxyCODONE **OR** oxyCODONE, glucose, dextrose, glucagon (rDNA), dextrose, hydrALAZINE, labetalol, sodium chloride flush, polyethylene glycol      Intake/Output Summary (Last 24 hours) at 12/11/2021 1149  Last data filed at 12/11/2021 0954  Gross per 24 hour   Intake 590 ml   Output --   Net 590 ml       Physical Exam Performed:    BP (!) 159/62   Pulse 101   Temp 100 °F (37.8 °C) (Oral)   Resp 18   Ht 5' 7\" (1.702 m)   Wt 186 lb 11.7 oz (84.7 kg)   SpO2 99%   BMI 29.25 kg/m²     General appearance: mild distress, appears stated age and cooperative. HEENT: Normal cephalic, atraumatic without obvious deformity. Pupils equal, round, and reactive to light. Extra ocular muscles intact. Conjunctivae/corneas clear. Neck: Supple, with full range of motion. No jugular venous distention. Trachea midline. Respiratory:  Normal respiratory effort. Clear to auscultation, bilaterally without Rales/Wheezes/Rhonchi. Cardiovascular: Regular rate and rhythm with normal S1/S2 without murmurs, rubs or gallops. Abdomen: Soft, non-tender, non-distended with normal bowel sounds. Musculoskeletal: Left knee decreased rom, DSG CDI, moderate swelling  Skin: Skin color, texture, turgor normal.  No rashes or lesions. Neurologic:  Neurovascularly intact without any focal sensory/motor deficits. Cranial nerves: II-XII intact, grossly non-focal.  Psychiatric: Alert and oriented, thought content appropriate, normal insight  Capillary Refill: Brisk,3 seconds, normal   Peripheral Pulses: +2 palpable, equal bilaterally    Labs:   Recent Labs     12/09/21  0632 12/10/21  0821 12/11/21  1039   WBC 8.4 5.2 8.9   HGB 9.6* 7.5* 8.3*   HCT 30.0* 22.6* 25.1*    161 183     Recent Labs     12/10/21  0821 12/10/21  2344 12/11/21  1039   * 132* 136   K 3.9 4.1 3.6    95* 99   CO2 18* 23 21   BUN 34* 26* 33*   CREATININE 5.2* 4.4* 5.6*   CALCIUM 8.2* 8.4 8.7   PHOS  --  3.3  --      Recent Labs     12/10/21  2344   AST 12*   ALT <5*   BILITOT 0.3   ALKPHOS 86     No results for input(s): INR in the last 72 hours. No results for input(s): Vaishnavi Parisian in the last 72 hours.     Urinalysis:      Lab Results   Component Value Date    NITRU Negative 09/14/2021    WBCUA >100 09/14/2021    BACTERIA 3+ 09/14/2021    RBCUA 11-20 09/14/2021    BLOODU MODERATE 09/14/2021    SPECGRAV 1.015 09/14/2021    GLUCOSEU Negative 09/14/2021 Radiology:  XR KNEE LEFT (1-2 VIEWS)   Final Result      Left total knee arthroplasty which is in anatomic alignment with no evidence   of fracture or loosening. Soft tissue air is consistent with the recent   surgery. Head                 Assessment/Plan:    Active Hospital Problems    Diagnosis     Primary osteoarthritis of left knee [M17.12]      ESRD on HD, Hyperkalemia  -Nephrology following  -Dialysis today  -K 5.8->3.6 today    Anemia 2/2 CKD/post operative state  -Hgb 10.5->7.5->8.3  -repeat in am    HTN  -Resume cozaar/bb  -Clonidine     Left knee OA  -Plan for left total knee replacement 12/8  -pain control  -PT/OT     DM  -Fsbs ac/hs  -Lantus decrease dose d/t am hypoglycemia  -SSI  -CCHO diet  -Neurontin     HLD  -Statin     Hypothyroidism  -Synthroid    DVT Prophylaxis: heparin sq post op  Diet: ADULT DIET;  Regular; Low Potassium (Less than 3000 mg/day)  Code Status: Full Code    PT/OT Eval Status: consulted    Dispo - pending course, snf vs home    ELANA Poe - CNP

## 2021-12-11 NOTE — PROGRESS NOTES
Department of Orthopedic Surgery  Physician Assistant   Progress Note    Subjective:       Systemic or Specific Complaints:Pain Control but feels she was well rested overnight. Knee in flexed position in bed. States she has pain that is mostly in her knee to her foot, this is on top of baseline neuropathy    Objective:     Patient Vitals for the past 24 hrs:   BP Temp Temp src Pulse Resp SpO2   12/11/21 0803 (!) 146/55 100 °F (37.8 °C) Oral 89 18 99 %   12/10/21 2318 (!) 140/55 99.7 °F (37.6 °C) Oral 97 18 (!) 88 %   12/10/21 2030 (!) 123/55 99.8 °F (37.7 °C) Oral 94 16 --   12/10/21 1640 (!) 144/63 -- -- 100 -- --   12/10/21 1554 (!) 132/55 99 °F (37.2 °C) Oral 92 16 95 %   12/10/21 1313 136/60 -- -- -- -- 98 %   12/10/21 1132 (!) 136/55 98.1 °F (36.7 °C) Oral 91 16 95 %       General: alert, appears stated age and cooperative   Wound: Wound clean and dry no evidence of infection. and small nickel sized dried blood at distal end of mepilex   Motion: Painful range of Motion in affected extremity, knee is held in approx 20 degrees of flexion   DVT Exam: No evidence of DVT seen on physical exam.  Negative Evans's sign. Additional exam: Pt is laying in bed, knee in slight flexed position with pillow under knee.  SCD sleeves in place  Moderate swelling to left leg, no erythema or ecchymosis  Diffusely palpably tender throughout left lower extremity  Knee is very stiff, unable to obtain full extension on exam.  FHL, EHL, ant tib, and gastroc motor intact  Sensation intact to LLE, pt has neuropathy at baseline  Distal pulses 2+    Data Review  CBC:   Lab Results   Component Value Date    WBC 5.2 12/10/2021    RBC 2.40 12/10/2021    HGB 7.5 12/10/2021    HCT 22.6 12/10/2021     12/10/2021       Renal:   Lab Results   Component Value Date     12/10/2021    K 4.1 12/10/2021    K 3.9 12/10/2021    CL 95 12/10/2021    CO2 23 12/10/2021    BUN 26 12/10/2021    CREATININE 4.4 12/10/2021    GLUCOSE 151 12/10/2021    CALCIUM 8.4 12/10/2021            Assessment:     s/p left total knee arthroplasty. POD3  Acute blood loss anemia - expected after surgery. Will monitor Hgb. Plan:      1:  WBAT LLE, pt will need to continue to work with PT/OT. Slow progress at this time. Nursing communication placed to have pillow propping up leg at ankle, do not prop up leg with pillow under the knee. 2:  Hgb 7.5 yesterday, will order CBC and reassess. Appreciate IM following pt for needs  3:  Continue Deep venous thrombosis prophylaxis- heparin, SCDs, ambulation  4:  Continue Pain Control- oxycodone, tylenol.  Added flexeril for muscle spasm  5:  DC likely in the next 1-2 days, awaiting precert and patient progress with therapy    BRIGETTE Hidalgo

## 2021-12-11 NOTE — PROGRESS NOTES
Physical Therapy  Facility/Department: [facility] C5 - MED SURG/ORTHO  Daily Treatment Note  NAME: Manuel Lugo  : 1950  MRN: [id number]    Date of Service: 2021    Discharge Recommendations:  Subacute/Skilled Nursing Facility   PT Equipment Recommendations  Equipment Needed: No (defer to facility)    Assessment   Body structures, Functions, Activity limitations: Decreased functional mobility ; Decreased endurance; Decreased ROM; Decreased balance; Decreased strength; Decreased cognition  Assessment: Pt with improved medical status in PM session allowing for mobility progression, however, Pt required increased assistance with all functional transfers this date with poor motor planning and delayed processing limiting further progression. Pt remains well below her baseline level of function and is at increased risk of falls warranting continued skilled PT intervention. Will continue to progress per POC as able. Treatment Diagnosis: functional mobility deficit; impaired ROM  Prognosis: Good  Decision Making: Medium Complexity  PT Education: Goals; PT Role; Disease Specific Education; Plan of Care; Home Exercise Program; Precautions; Transfer Training; Functional Mobility Training; Family Education; Weight-bearing Education  Patient Education: Pt will require reinforcement; poor carryover despite increased time and cues  Barriers to Learning: none  REQUIRES PT FOLLOW UP: Yes  Activity Tolerance  Activity Tolerance: Patient limited by cognitive status  Activity Tolerance: VS stable throughout     Patient Diagnosis(es): The encounter diagnosis was Status post total left knee replacement. has a past medical history of Arthritis, Chronic kidney disease, Diabetes mellitus ([facility] [address] Dialysis patient ([facility] [address] ESRD (end stage renal disease) ([facility] [address] Hemodialysis patient ([facility] [address] Hyperkalemia, Hyperlipidemia, Hypertension, OA (osteoarthritis), Retinopathy, Sepsis ([facility] [address] Thyroid disease, and Wears dentures.    has a past surgical history that includes Dialysis fistula creation (Left, 08/10/2016); eye surgery (Left, 04/03/2018); other surgical history (Right, 01/11/2019); Toe amputation (Right, 1/11/2019); and Total knee arthroplasty (Left, 12/8/2021). Restrictions  Restrictions/Precautions  Restrictions/Precautions: Weight Bearing  Lower Extremity Weight Bearing Restrictions  Left Lower Extremity Weight Bearing: Weight Bearing As Tolerated  Position Activity Restriction  Other position/activity restrictions: HD M/W/F; 1)  Dangle at edge of bed, progress to stand, and take a few steps with assistive device. 2)  Encourage ankle pumps and quad sets. 3)  Up in bedside chair as tolerated. 4)  Commode privileges with assistance. Subjective   General  Chart Reviewed: Yes  Response To Previous Treatment: Patient with no complaints from previous session. Family / Caregiver Present: Yes (Son)  Referring Practitioner: [place], MD  Subjective  Subjective: Pt was sitting at EOB upon arrival to room and agreeable to PT treatment with minimal encouragement; Pt's son reporting he is concerned about her cognition as this is \"not how she usually is\"  General Comment  Comments: RN aware of PT treatment. OT ending session upon arrival  Pain Screening  Patient Currently in Pain: Yes  Pain Assessment  Pain Assessment: 0-10  Pain Level: 5  Pain Type: Surgical pain  Pain Location: Knee (and B feet)  Pain Orientation: Left  Pain Descriptors: Burning  Clinical Progression: Not changed  Functional Pain Assessment: Activities are not prevented  Non-Pharmaceutical Pain Intervention(s): Repositioned  Response to Pain Intervention: Patient Satisfied  Vital Signs  Patient Currently in Pain: Yes       Orientation  Orientation  Overall Orientation Status: Within Functional Limits  Cognition   Cognition  Overall Cognitive Status: Exceptions  Following Commands: Follows one step commands with increased time;  Follows one step commands with repetition  Attention Span: Attends with cues to redirect  Memory: Decreased short term memory  Safety Judgement: Decreased awareness of need for safety  Problem Solving: Assistance required to identify errors made; Assistance required to correct errors made; Assistance required to generate solutions; Assistance required to implement solutions; Decreased awareness of errors  Insights: Decreased awareness of deficits  Initiation: Requires cues for all  Sequencing: Requires cues for some  Cognition Comment: Pt is very verbose and needs redirection back to task  Objective   Bed mobility  Supine to Sit: Unable to assess  Sit to Supine: Unable to assess  Scooting: Stand by assistance (increased time to complete with cues for sequence)  Comment: Pt sitmting up at EOB upon arrival  Transfers  Sit to Stand: Maximum Assistance; Moderate Assistance (Max A x 2 reps progressing to Mod A on third rep; verbal and tactile cues required for proper foot placement and overall sequence)  Stand to sit: Moderate Assistance; Maximum Assistance  Bed to Chair: Maximum assistance (stand step; flexed posture and strong posterior lean; therapist facilitating weight shift and guiding hips to chair)  Ambulation  Ambulation?: No  Stairs/Curb  Stairs?: No     Balance  Posture: Fair  Sitting - Static: Good  Sitting - Dynamic: Fair; +  Standing - Static: Poor  Standing - Dynamic: Poor  Comments: Mod to Max A for all standing balance; posterior lean  Exercises  Straight Leg Raise: x10 reps AAROM LLE  Quad Sets: 10 reps LLE with tactile cues for technique  Heelslides: x10 reps AAROM LLE  Gluteal Sets: x10 reps BLEs  Hip Abduction: x10 reps AAROM LLE  Knee Short Arc Quad: x10 reps AAROM LLE  Ankle Pumps: x10 reps BLEs; verbal cues for technique  Comments: Increased time for all exercises d/t lethargy and falling asleep along with rest breaks d/t pain.                                   AM-PAC Score  AM-PAC Inpatient Mobility Raw Score : 12 (12/11/21 1414)  AM-PAC Inpatient T-Scale Score : 35.33 (12/11/21 1414)  Mobility Inpatient CMS 0-100% Score: 68.66 (12/11/21 1414)  Mobility Inpatient CMS G-Code Modifier : CL (12/11/21 1414)          Goals  Short term goals  Time Frame for Short term goals: 12/13/21 unless noted  Short term goal 1: Pt will perform bed mobility with SBA by 12/12/21: 12/11: Not performed, Pt sitting at EOB and left in chair  Short term goal 2: Pt will perform transfers with SBA; 12/11: Max A stand step with RW  Short term goal 3: Pt will ambulate 50 ft with RW and SBA; 12/11: Not performed  Short term goal 4: Pt will negotiate 1 curb step with LRAD and CGA; 12/11: Not performed  Short term goal 5: Pt will perform 12+ reps of LE exercise for strengthening and balance; 12/11:Progressing  Patient Goals   Patient goals : \"to be able to get up to the chair with only touching help by tomorrow (12/10/21)\"    Plan    Plan  Times per week: BID x 7  Times per day: Twice a day  Current Treatment Recommendations: Strengthening, Home Exercise Program, ROM, Balance Training, Endurance Training, Functional Mobility Training, Transfer Training, Gait Training, Stair training  Safety Devices  Type of devices: Nurse notified, All fall risk precautions in place, Call light within reach, Left in chair, Chair alarm in place, Patient at risk for falls, Gait belt  Restraints  Initially in place: No     Therapy Time   Individual Concurrent Group Co-treatment   Time In 1337         Time Out 1415         Minutes 38         Timed Code Treatment Minutes: [address]ontes, [address], DPT

## 2021-12-12 LAB
ANION GAP SERPL CALCULATED.3IONS-SCNC: 17 MMOL/L (ref 3–16)
BUN BLDV-MCNC: 50 MG/DL (ref 7–20)
CALCIUM SERPL-MCNC: 8.6 MG/DL (ref 8.3–10.6)
CHLORIDE BLD-SCNC: 94 MMOL/L (ref 99–110)
CO2: 21 MMOL/L (ref 21–32)
CREAT SERPL-MCNC: 7.2 MG/DL (ref 0.6–1.2)
GFR AFRICAN AMERICAN: 7
GFR NON-AFRICAN AMERICAN: 6
GLUCOSE BLD-MCNC: 120 MG/DL (ref 70–99)
GLUCOSE BLD-MCNC: 146 MG/DL (ref 70–99)
GLUCOSE BLD-MCNC: 177 MG/DL (ref 70–99)
GLUCOSE BLD-MCNC: 187 MG/DL (ref 70–99)
GLUCOSE BLD-MCNC: 196 MG/DL (ref 70–99)
HCT VFR BLD CALC: 24 % (ref 36–48)
HEMOGLOBIN: 7.8 G/DL (ref 12–16)
MCH RBC QN AUTO: 30.3 PG (ref 26–34)
MCHC RBC AUTO-ENTMCNC: 32.3 G/DL (ref 31–36)
MCV RBC AUTO: 93.8 FL (ref 80–100)
PDW BLD-RTO: 18.2 % (ref 12.4–15.4)
PERFORMED ON: ABNORMAL
PLATELET # BLD: 167 K/UL (ref 135–450)
PMV BLD AUTO: 8.6 FL (ref 5–10.5)
POTASSIUM REFLEX MAGNESIUM: 4.5 MMOL/L (ref 3.5–5.1)
RBC # BLD: 2.56 M/UL (ref 4–5.2)
SODIUM BLD-SCNC: 132 MMOL/L (ref 136–145)
WBC # BLD: 9.9 K/UL (ref 4–11)

## 2021-12-12 PROCEDURE — 80048 BASIC METABOLIC PNL TOTAL CA: CPT

## 2021-12-12 PROCEDURE — 6370000000 HC RX 637 (ALT 250 FOR IP): Performed by: SPECIALIST/TECHNOLOGIST

## 2021-12-12 PROCEDURE — 6370000000 HC RX 637 (ALT 250 FOR IP): Performed by: ORTHOPAEDIC SURGERY

## 2021-12-12 PROCEDURE — 36415 COLL VENOUS BLD VENIPUNCTURE: CPT

## 2021-12-12 PROCEDURE — 1200000000 HC SEMI PRIVATE

## 2021-12-12 PROCEDURE — 6370000000 HC RX 637 (ALT 250 FOR IP): Performed by: NURSE PRACTITIONER

## 2021-12-12 PROCEDURE — 6360000002 HC RX W HCPCS: Performed by: ORTHOPAEDIC SURGERY

## 2021-12-12 PROCEDURE — 85027 COMPLETE CBC AUTOMATED: CPT

## 2021-12-12 PROCEDURE — 97110 THERAPEUTIC EXERCISES: CPT

## 2021-12-12 PROCEDURE — 2580000003 HC RX 258: Performed by: ORTHOPAEDIC SURGERY

## 2021-12-12 PROCEDURE — 97530 THERAPEUTIC ACTIVITIES: CPT

## 2021-12-12 RX ORDER — DOCUSATE SODIUM 100 MG/1
100 CAPSULE, LIQUID FILLED ORAL 2 TIMES DAILY PRN
Status: DISCONTINUED | OUTPATIENT
Start: 2021-12-12 | End: 2021-12-17 | Stop reason: HOSPADM

## 2021-12-12 RX ADMIN — INSULIN LISPRO 4 UNITS: 100 INJECTION, SOLUTION INTRAVENOUS; SUBCUTANEOUS at 13:42

## 2021-12-12 RX ADMIN — ACETAMINOPHEN 650 MG: 325 TABLET ORAL at 00:33

## 2021-12-12 RX ADMIN — ATORVASTATIN CALCIUM 10 MG: 10 TABLET, FILM COATED ORAL at 20:37

## 2021-12-12 RX ADMIN — Medication 10 ML: at 20:37

## 2021-12-12 RX ADMIN — ACETAMINOPHEN 650 MG: 325 TABLET ORAL at 18:40

## 2021-12-12 RX ADMIN — HEPARIN SODIUM 5000 UNITS: 5000 INJECTION INTRAVENOUS; SUBCUTANEOUS at 20:37

## 2021-12-12 RX ADMIN — SERTRALINE 25 MG: 50 TABLET, FILM COATED ORAL at 09:16

## 2021-12-12 RX ADMIN — INSULIN LISPRO 4 UNITS: 100 INJECTION, SOLUTION INTRAVENOUS; SUBCUTANEOUS at 18:42

## 2021-12-12 RX ADMIN — CYCLOBENZAPRINE 5 MG: 10 TABLET, FILM COATED ORAL at 13:40

## 2021-12-12 RX ADMIN — LEVOTHYROXINE SODIUM 50 MCG: 0.05 TABLET ORAL at 06:50

## 2021-12-12 RX ADMIN — ACETAMINOPHEN 650 MG: 325 TABLET ORAL at 13:37

## 2021-12-12 RX ADMIN — INSULIN LISPRO 1 UNITS: 100 INJECTION, SOLUTION INTRAVENOUS; SUBCUTANEOUS at 13:43

## 2021-12-12 RX ADMIN — Medication 10 ML: at 09:16

## 2021-12-12 RX ADMIN — HEPARIN SODIUM 5000 UNITS: 5000 INJECTION INTRAVENOUS; SUBCUTANEOUS at 06:50

## 2021-12-12 RX ADMIN — GABAPENTIN 300 MG: 300 CAPSULE ORAL at 18:40

## 2021-12-12 RX ADMIN — HEPARIN SODIUM 5000 UNITS: 5000 INJECTION INTRAVENOUS; SUBCUTANEOUS at 13:42

## 2021-12-12 RX ADMIN — INSULIN LISPRO 4 UNITS: 100 INJECTION, SOLUTION INTRAVENOUS; SUBCUTANEOUS at 09:19

## 2021-12-12 RX ADMIN — INSULIN GLARGINE 10 UNITS: 100 INJECTION, SOLUTION SUBCUTANEOUS at 21:49

## 2021-12-12 RX ADMIN — INSULIN LISPRO 1 UNITS: 100 INJECTION, SOLUTION INTRAVENOUS; SUBCUTANEOUS at 09:19

## 2021-12-12 RX ADMIN — INSULIN LISPRO 1 UNITS: 100 INJECTION, SOLUTION INTRAVENOUS; SUBCUTANEOUS at 18:42

## 2021-12-12 RX ADMIN — ACETAMINOPHEN 650 MG: 325 TABLET ORAL at 06:50

## 2021-12-12 ASSESSMENT — PAIN SCALES - GENERAL
PAINLEVEL_OUTOF10: 8
PAINLEVEL_OUTOF10: 0
PAINLEVEL_OUTOF10: 8
PAINLEVEL_OUTOF10: 6
PAINLEVEL_OUTOF10: 0

## 2021-12-12 ASSESSMENT — PAIN DESCRIPTION - ORIENTATION: ORIENTATION: LEFT

## 2021-12-12 ASSESSMENT — PAIN DESCRIPTION - PAIN TYPE: TYPE: ACUTE PAIN;SURGICAL PAIN

## 2021-12-12 ASSESSMENT — PAIN DESCRIPTION - DESCRIPTORS: DESCRIPTORS: ACHING;SORE

## 2021-12-12 ASSESSMENT — PAIN DESCRIPTION - LOCATION: LOCATION: KNEE

## 2021-12-12 NOTE — PROGRESS NOTES
Department of Orthopedic Surgery  Physician Assistant   Progress Note    Subjective:       Systemic or Specific Complaints:Pt sitting up in bed, no complaints at this time. BG was a little low this AM at 50, had some trouble participating in therapy due to low BG. Objective:     Patient Vitals for the past 24 hrs:   BP Temp Temp src Pulse Resp SpO2   12/12/21 0855 (!) 104/46 99.1 °F (37.3 °C) Oral 92 18 95 %   12/12/21 0845 (!) 89/48 -- -- -- -- --   12/12/21 0820 (!) 110/49 -- -- 95 -- 97 %   12/12/21 0332 110/62 98.8 °F (37.1 °C) Axillary 88 16 92 %   12/12/21 0215 -- 99.8 °F (37.7 °C) Oral -- -- --   12/11/21 2340 132/63 101.9 °F (38.8 °C) Oral 102 18 95 %   12/11/21 2200 (!) 129/52 101.3 °F (38.5 °C) Oral 99 18 91 %   12/11/21 1552 105/61 100 °F (37.8 °C) Oral 92 16 97 %   12/11/21 1244 (!) 130/53 98.8 °F (37.1 °C) Oral 87 18 100 %       General: alert, appears stated age and cooperative   Wound: Wound clean and dry no evidence of infection. and small nickel sized dried blood at distal end of mepilex   Motion: Painful range of Motion in affected extremity, knee is held in approx 15 degrees of flexion   DVT Exam: No evidence of DVT seen on physical exam.  Negative Evans's sign. Additional exam: Pt is laying in bed, lower leg propped with pillow to encourage extension, wedge pillow in place laterally. SCD sleeves in place  Moderate swelling to left leg, no erythema or ecchymosis  nontender to palpation around anterolateral thigh  Knee is very stiff, unable to obtain full extension on exam, but improving.   FHL, EHL, ant tib, and gastroc motor intact  Sensation intact to LLE, pt has neuropathy at baseline  Distal pulses 2+    Data Review  CBC:   Lab Results   Component Value Date    WBC 9.9 12/12/2021    RBC 2.56 12/12/2021    HGB 7.8 12/12/2021    HCT 24.0 12/12/2021     12/12/2021       Renal:   Lab Results   Component Value Date     12/12/2021    K 4.5 12/12/2021    CL 94 12/12/2021    CO2 21 12/12/2021    BUN 50 12/12/2021    CREATININE 7.2 12/12/2021    GLUCOSE 146 12/12/2021    CALCIUM 8.6 12/12/2021            Assessment:     s/p left total knee arthroplasty. POD4  Acute blood loss anemia - expected after surgery. Will monitor Hgb. Plan:      1:  WBAT LLE, pt will need to continue to work with PT/OT. Slow progress at this time. Nursing communication placed to have pillow propping up leg at ankle, do not prop up leg with pillow under the knee. Wedge pillow placed by PT to prevent ER of hip. 2:  Hgb stable at 7.8 today. Appreciate IM following pt for needs  3:  Continue Deep venous thrombosis prophylaxis- heparin, SCDs, ambulation  4:  Continue Pain Control- oxycodone, tylenol. Added flexeril for muscle spasm  5:  DC likely in the next 1-2 days, awaiting precert and patient progress with therapy. IPR denied by [employer].  Hospitalist NP will call for peer to peer    BRIGETTE Ellis

## 2021-12-12 NOTE — PROGRESS NOTES
Hospitalist Progress Note      PCP: Reena Kelley, APRN - CNP    Date of Admission: 12/6/2021    Chief Complaint: left knee pain. Elevated bp    Hospital Course: 66 y.o. female with a PMH of OA, ESRD on HD, HTN, Hypothyroidism, and DM who we are asked to see/evaluate by Ricki Nuñez MD for medical management. Patient admitted for elective left knee replacement surgery. Surgery postponed due to abnl labs-hyperkalemia. Patient has dialysis on MWF. She had dialysis on 12/6, blood pressure remains elevated. Plan for OR on 12/8. Patient a/ox4, nad, denies N/V/D/F/C. Subjective:  HD planned for 12/13 s/p left knee surgery 12/8, HD 12/10, feels better this am, working with PT. Updated on plan of care. Slow to mobilize.       Medications:  Reviewed    Infusion Medications    sodium chloride      dextrose       Scheduled Medications    insulin glargine  10 Units SubCUTAneous Nightly    heparin (porcine)  5,000 Units SubCUTAneous 3 times per day    sodium chloride flush  5-40 mL IntraVENous 2 times per day    acetaminophen  650 mg Oral Q6H    atorvastatin  10 mg Oral Nightly    carvedilol  12.5 mg Oral BID WC    gabapentin  300 mg Oral QPM    levothyroxine  50 mcg Oral Daily    sertraline  25 mg Oral Daily    insulin lispro  4 Units SubCUTAneous TID WC    insulin lispro  0-6 Units SubCUTAneous TID WC    insulin lispro  0-3 Units SubCUTAneous Nightly    losartan  50 mg Oral Daily    calcium gluconate  1,000 mg IntraVENous Once     PRN Meds: cyclobenzaprine, sodium chloride flush, sodium chloride, ondansetron **OR** ondansetron, oxyCODONE **OR** oxyCODONE, glucose, dextrose, glucagon (rDNA), dextrose, hydrALAZINE, labetalol, sodium chloride flush, polyethylene glycol      Intake/Output Summary (Last 24 hours) at 12/12/2021 1241  Last data filed at 12/12/2021 0646  Gross per 24 hour   Intake 940 ml   Output --   Net 940 ml       Physical Exam Performed:    BP (!) 104/46   Pulse 92   Temp 99.1 °F (37.3 °C) (Oral)   Resp 18   Ht 5' 7\" (1.702 m)   Wt 186 lb 11.7 oz (84.7 kg)   SpO2 95%   BMI 29.25 kg/m²     General appearance: mild distress, appears stated age and cooperative. HEENT: Normal cephalic, atraumatic without obvious deformity. Pupils equal, round, and reactive to light. Extra ocular muscles intact. Conjunctivae/corneas clear. Neck: Supple, with full range of motion. No jugular venous distention. Trachea midline. Respiratory:  Normal respiratory effort. Clear to auscultation, bilaterally without Rales/Wheezes/Rhonchi. Cardiovascular: Regular rate and rhythm with normal S1/S2 without murmurs, rubs or gallops. Abdomen: Soft, non-tender, non-distended with normal bowel sounds. Musculoskeletal: Left knee decreased rom, DSG CDI, moderate swelling  Skin: Skin color, texture, turgor normal.  No rashes or lesions. Neurologic:  Neurovascularly intact without any focal sensory/motor deficits. Cranial nerves: II-XII intact, grossly non-focal.  Psychiatric: Alert and oriented, thought content appropriate, normal insight  Capillary Refill: Brisk,3 seconds, normal   Peripheral Pulses: +2 palpable, equal bilaterally    Labs:   Recent Labs     12/10/21  0821 12/11/21  1039 12/12/21  0748   WBC 5.2 8.9 9.9   HGB 7.5* 8.3* 7.8*   HCT 22.6* 25.1* 24.0*    183 167     Recent Labs     12/10/21  2344 12/11/21  1039 12/12/21  0748   * 136 132*   K 4.1 3.6 4.5   CL 95* 99 94*   CO2 23 21 21   BUN 26* 33* 50*   CREATININE 4.4* 5.6* 7.2*   CALCIUM 8.4 8.7 8.6   PHOS 3.3  --   --      Recent Labs     12/10/21  2344   AST 12*   ALT <5*   BILITOT 0.3   ALKPHOS 86     No results for input(s): INR in the last 72 hours. No results for input(s): Shelly Height in the last 72 hours.     Urinalysis:      Lab Results   Component Value Date    NITRU Negative 09/14/2021    WBCUA >100 09/14/2021    BACTERIA 3+ 09/14/2021    RBCUA 11-20 09/14/2021    BLOODU MODERATE 09/14/2021    SPECGRAV 1.015 09/14/2021 GLUCOSEU Negative 09/14/2021       Radiology:  XR KNEE LEFT (1-2 VIEWS)   Final Result      Left total knee arthroplasty which is in anatomic alignment with no evidence   of fracture or loosening. Soft tissue air is consistent with the recent   surgery. Head                 Assessment/Plan:    Active Hospital Problems    Diagnosis     Primary osteoarthritis of left knee [M17.12]      ESRD on HD, Hyperkalemia  -Nephrology following  -Dialysis MWF  -K 5.8->4.5 today  -AG 16->17    Anemia 2/2 CKD/post operative state  -Hgb 10.5->7.5->8.3->7.8  -repeat in am    HTN  -Resume cozaar/bb (held today for hypotension)  -Clonidine     Left knee OA  -Plan for left total knee replacement 12/8  -pain control  -PT/OT  -bowel regimen     DM  -Fsbs ac/hs  -Lantus decrease dose d/t am hypoglycemia  -SSI  -CCHO diet  -Neurontin     HLD  -Statin     Hypothyroidism  -Synthroid    DVT Prophylaxis: heparin sq post op  Diet: ADULT DIET;  Regular; Low Potassium (Less than 3000 mg/day)  Code Status: Full Code    PT/OT Eval Status: consulted    Dispo - pending course-placement, P2P on 12/12- denied d/t patient not medically stable for discharge and limited mobility, anticipate snf, HD mwf    ELANA Medrano - CNP

## 2021-12-12 NOTE — PROGRESS NOTES
Physical Therapy  Facility/Department: [facility] C5 - MED SURG/ORTHO  Daily Treatment Note  NAME: Fritz Cheney  : 1950  MRN: [id number]    Date of Service: 2021    Discharge Recommendations:  Subacute/Skilled Nursing Facility   PT Equipment Recommendations  Equipment Needed: No  Other: defer to next level of care    Assessment   Body structures, Functions, Activity limitations: Decreased functional mobility ; Decreased endurance; Decreased ROM; Decreased balance; Decreased strength; Decreased cognition  Assessment: Pt seen in am for PT tx. Pt with improved performance with bed mobility and STS but continues with difficulty with motor sequencing for SPT. Pt also with decreased BP this date down to 90/43 following stand, RN aware. Pt educated on positioning in bed to improve ROM as pt noted with LLE in hip ER and knee flexion in bed on arrival. Pillow under ankle and wedge placed laterally to LLE to decreased hip ER. Pt requires Ninfa to modA for bed mobility, modA for STS and trials at pivot however pt is unable to safely pivot to chair d/t poor sequencing and BP. Pt will benefit from continued skilled PT in acute care setting to address above deficits. Continue to recommend SNF at d/c. Treatment Diagnosis: functional mobility deficit; impaired ROM  Specific instructions for Next Treatment: Progress mobility as tolerated  Prognosis: Good  Decision Making: Medium Complexity  PT Education: Goals; PT Role; Disease Specific Education; Plan of Care; Home Exercise Program; Precautions; Transfer Training; Functional Mobility Training; Family Education; Weight-bearing Education; General Safety; Injury Prevention  Patient Education: Educatedin importance of OOB mobility and t/f, safety with functional mobility and use of AD. Educated in positioning of knee to improve ROM.  Pt will benefit from further reinfrocement  Barriers to Learning: none  REQUIRES PT FOLLOW UP: Yes  Activity Tolerance  Activity Tolerance: Patient limited by fatigue; Patient limited by endurance; Patient limited by cognitive status; Treatment limited secondary to medical complications (free text)  Activity Tolerance: Pt with improved alertness although fatigue noted with progression of session. BP seated EOB 98/48, following stand 90/43. D/t decreased BP and sequencing unable to get pt to chair and leave seated in chair (RN aware of BP)     Patient Diagnosis(es): The encounter diagnosis was Status post total left knee replacement. has a past medical history of Arthritis, Chronic kidney disease, Diabetes mellitus ([address] Dialysis patient ([address] ESRD (end stage renal disease) ([facility] [address] Hemodialysis patient ([address] Hyperkalemia, Hyperlipidemia, Hypertension, OA (osteoarthritis), Retinopathy, Sepsis ([address] Thyroid disease, and Wears dentures. has a past surgical history that includes Dialysis fistula creation (Left, 08/10/2016); eye surgery (Left, 04/03/2018); other surgical history (Right, 01/11/2019); Toe amputation (Right, 1/11/2019); and Total knee arthroplasty (Left, 12/8/2021). Restrictions  Restrictions/Precautions  Restrictions/Precautions: Weight Bearing  Lower Extremity Weight Bearing Restrictions  Left Lower Extremity Weight Bearing: Weight Bearing As Tolerated  Position Activity Restriction  Other position/activity restrictions: HD M/W/F; 1)  Dangle at edge of bed, progress to stand, and take a few steps with assistive device. 2)  Encourage ankle pumps and quad sets. 3)  Up in bedside chair as tolerated. 4)  Commode privileges with assistance. Subjective   General  Chart Reviewed: Yes  Response To Previous Treatment: Patient with no complaints from previous session.   Family / Caregiver Present: No  Referring Practitioner: Leon Alberto MD  Subjective  Subjective: Pt supine in bed on approach, pleasant and agreeable to PT tx  General Comment  Comments: RN cleared pt for session  Pain Screening  Patient Currently in Pain: Yes  Pain Assessment  Pain Assessment: 0-10  Pain Level: 6  Pain Type: Acute pain; Surgical pain  Pain Location: Knee  Pain Orientation: Left  Pain Descriptors: Aching; Sore  Non-Pharmaceutical Pain Intervention(s): Ambulation/Increased Activity; Repositioned; Therapeutic presence; Distraction  Vital Signs  Pulse: 95  Heart Rate Source: Monitor  BP: (!) 110/49  BP Location: Right upper arm  Patient Position: Semi fowlers  Patient Currently in Pain: Yes  Oxygen Therapy  SpO2: 97 %  Pulse Oximeter Device Mode: Intermittent  O2 Device: None (Room air)       Orientation  Orientation  Overall Orientation Status: Within Functional Limits    Objective   Bed mobility  Rolling to Left: Minimal assistance (with BR)  Rolling to Right: Minimal assistance (with BR)  Supine to Sit: Minimal assistance (HOB elevated, use of BR, Ninfa at trunk, increased time to complete)  Sit to Supine: Moderate assistance (modA for BLE, increased time to complete, cues for sequence, use of BR)  Scooting: Stand by assistance; Dependent/Total (SBA to scoot to EOB in sitting, TD x 2 to scoot to [facility] in supine)     Transfers  Sit to Stand: Moderate Assistance  Stand to sit: Moderate Assistance  Comment: Pt completes x 2 bout STS from EOB to SW, on both trials attempted pivot to chair however pt with poor sequencing and inability to pivot to chair with pt sitting back down on EOB. Demo of technique provided prior to second attempt.  Pt with increased fatigue and BP reassessed at 90/43 so pt assisted back to supine     Ambulation  Ambulation?: No (unsafe to attempt, unable to step and pivot over to chair with assist)  Stairs/Curb  Stairs?: No (deferred d/t safety)     Balance  Posture: Fair  Sitting - Static: Good  Sitting - Dynamic: Fair; +  Standing - Static: Fair; -  Standing - Dynamic: Poor; +  Comments: Variable SBA to Ninfa sitting balance at EOB, increased posterior lean at times, modA standing balance with SW, increased posterior lean     Exercises  Straight Leg Raise: Supine LLE x 10 AAROM  Quad Sets: Supine LLE x 15 with pillow under ankle  Heelslides: Supine LLE x 15 AAROM  Gluteal Sets: Supine BLE x 10  Hip Abduction: Supine BLE x 15  Knee Short Arc Quad: Supine LLE x 15  Ankle Pumps: Supine BLE x 15  Comments: Cues for sequence/technique, cues for PLB throughout d/t pain                        Addendum: 2nd session:  Pt seen in pm for second PT session. Pt pleasant and agreeable but appears more drowsy and with increased pain in pm. Pt with increased difficulty with sitting EOB, unable to maintain upright positioning without modA for balance, t/f deferred for safety this afternoon. Pt reports L knee pain with mobility, denies pain at rest. Improved LLE positioning on arrival for second session although continued ER at L hip. Pt requires maxA for bed mobility and requires frequent cues for completion of exercises d/t lethargy and cognition. Pt reports she received medication just prior to PT that makes her drowsy. Bed mobility:  Supine to/from sit, maxA, HOB elevated, use of BR, max cues for sequence. Balance:  Pt sitting EOB x 10 minutes with variable modA to maxA for balance, increased posterior and R lateral lean despite maximal cues. Unable to maintain midline and upright positioning despite maximal cues without assistance, limited by fatigue and drowsiness. Completed to improve strength and tolerance to upright for progression of functional mobility. Therapeutic exercise:  Quad Sets: Supine LLE x 15 with pillow under ankle  Heelslides: Supine LLE x 10 AAROM  Gluteal Sets: Supine BLE x 15  Knee Short Arc Quad: Supine LLE x 10  Ankle Pumps: Supine BLE x 15  Comments: Cues for sequence/technique, cues for PLB throughout d/t pain. Increased time to complete with increased cues to stay on task.     AM-PAC Score  AM-PAC Inpatient Mobility Raw Score : 12 (12/12/21 3462)  AM-PAC Inpatient T-Scale Score : 35.33 (12/12/21 4171)  Mobility Inpatient CMS 0-100% Score: 68.66 (12/12/21 9074)  Mobility Inpatient CMS G-Code Modifier : CL (12/12/21 9038)          Goals  Short term goals  Time Frame for Short term goals: 12/13/21 unless noted  Short term goal 1: Pt will perform bed mobility with SBA by 12/12/21 -- 12/12: goal not met but progressing Ninfa to [address] with BR  Short term goal 2: Pt will perform transfers with SBA; 12/12: modA STS, unable to pivot  Short term goal 3: Pt will ambulate 50 ft with RW and SBA; -- 12/12: LUANA  Short term goal 4: Pt will negotiate 1 curb step with LRAD and CGA; -- 12/12: LUANA  Short term goal 5: Pt will perform 12+ reps of LE exercise for strengthening and balance; -- 12/12: GOAL MET but continue x 15 reps supine BLE exercises  Patient Goals   Patient goals : \"to be able to get up to the chair with only touching help by tomorrow (12/10/21)\" -- 12/12: modA STS, unable to pivot to chair    Plan    Plan  Times per week: BID x 7  Times per day: Twice a day  Specific instructions for Next Treatment: Progress mobility as tolerated  Current Treatment Recommendations: Strengthening, Home Exercise Program, ROM, Balance Training, Endurance Training, Functional Mobility Training, Transfer Training, Gait Training, Stair training, Safety Education & Training, Patient/Caregiver Education & Training, Positioning  Safety Devices  Type of devices: Nurse notified, All fall risk precautions in place, Call light within reach, Patient at risk for falls, Gait belt, Bed alarm in place, Left in bed  Restraints  Initially in place: No     Therapy Time   Individual Concurrent Group Co-treatment   Time In 0817         Time Out 0906         Minutes 49         Timed Code Treatment Minutes: [place] Minutes         Second Session Therapy Time:   Individual Concurrent Group Co-treatment   Time In 1547         Time Out 1610         Minutes 23           Timed Code Treatment Minutes:  23 minutes    If pt is unable to be seen after this session, please let this note serve as discharge summary. Please see case management note for discharge disposition. Thank you.     Shalom Camejo, PT, DPT

## 2021-12-13 ENCOUNTER — APPOINTMENT (OUTPATIENT)
Dept: CT IMAGING | Age: 71
DRG: 469 | End: 2021-12-13
Attending: ORTHOPAEDIC SURGERY
Payer: MEDICARE

## 2021-12-13 ENCOUNTER — APPOINTMENT (OUTPATIENT)
Dept: GENERAL RADIOLOGY | Age: 71
DRG: 469 | End: 2021-12-13
Attending: ORTHOPAEDIC SURGERY
Payer: MEDICARE

## 2021-12-13 LAB
ALBUMIN SERPL-MCNC: 2.9 G/DL (ref 3.4–5)
ANION GAP SERPL CALCULATED.3IONS-SCNC: 17 MMOL/L (ref 3–16)
BASOPHILS ABSOLUTE: 0.1 K/UL (ref 0–0.2)
BASOPHILS RELATIVE PERCENT: 1 %
BUN BLDV-MCNC: 71 MG/DL (ref 7–20)
CALCIUM SERPL-MCNC: 8.6 MG/DL (ref 8.3–10.6)
CHLORIDE BLD-SCNC: 94 MMOL/L (ref 99–110)
CO2: 19 MMOL/L (ref 21–32)
CREAT SERPL-MCNC: 9.2 MG/DL (ref 0.6–1.2)
EOSINOPHILS ABSOLUTE: 0.4 K/UL (ref 0–0.6)
EOSINOPHILS RELATIVE PERCENT: 4.5 %
GFR AFRICAN AMERICAN: 5
GFR NON-AFRICAN AMERICAN: 4
GLUCOSE BLD-MCNC: 117 MG/DL (ref 70–99)
GLUCOSE BLD-MCNC: 162 MG/DL (ref 70–99)
GLUCOSE BLD-MCNC: 167 MG/DL (ref 70–99)
GLUCOSE BLD-MCNC: 198 MG/DL (ref 70–99)
GLUCOSE BLD-MCNC: 232 MG/DL (ref 70–99)
HCT VFR BLD CALC: 21.9 % (ref 36–48)
HEMOGLOBIN: 7.2 G/DL (ref 12–16)
LYMPHOCYTES ABSOLUTE: 0.6 K/UL (ref 1–5.1)
LYMPHOCYTES RELATIVE PERCENT: 6.7 %
MCH RBC QN AUTO: 30.7 PG (ref 26–34)
MCHC RBC AUTO-ENTMCNC: 33.1 G/DL (ref 31–36)
MCV RBC AUTO: 92.9 FL (ref 80–100)
MONOCYTES ABSOLUTE: 0.8 K/UL (ref 0–1.3)
MONOCYTES RELATIVE PERCENT: 8.9 %
NEUTROPHILS ABSOLUTE: 7.2 K/UL (ref 1.7–7.7)
NEUTROPHILS RELATIVE PERCENT: 78.9 %
PDW BLD-RTO: 18 % (ref 12.4–15.4)
PERFORMED ON: ABNORMAL
PHOSPHORUS: 6.3 MG/DL (ref 2.5–4.9)
PLATELET # BLD: 198 K/UL (ref 135–450)
PMV BLD AUTO: 8.9 FL (ref 5–10.5)
POTASSIUM SERPL-SCNC: 4.9 MMOL/L (ref 3.5–5.1)
RBC # BLD: 2.36 M/UL (ref 4–5.2)
SARS-COV-2, NAAT: NOT DETECTED
SODIUM BLD-SCNC: 130 MMOL/L (ref 136–145)
WBC # BLD: 9.1 K/UL (ref 4–11)

## 2021-12-13 PROCEDURE — 6360000002 HC RX W HCPCS: Performed by: REGISTERED NURSE

## 2021-12-13 PROCEDURE — 36415 COLL VENOUS BLD VENIPUNCTURE: CPT

## 2021-12-13 PROCEDURE — 6360000002 HC RX W HCPCS: Performed by: ORTHOPAEDIC SURGERY

## 2021-12-13 PROCEDURE — 6360000002 HC RX W HCPCS: Performed by: INTERNAL MEDICINE

## 2021-12-13 PROCEDURE — 1200000000 HC SEMI PRIVATE

## 2021-12-13 PROCEDURE — 97116 GAIT TRAINING THERAPY: CPT

## 2021-12-13 PROCEDURE — 97110 THERAPEUTIC EXERCISES: CPT

## 2021-12-13 PROCEDURE — 80069 RENAL FUNCTION PANEL: CPT

## 2021-12-13 PROCEDURE — 87040 BLOOD CULTURE FOR BACTERIA: CPT

## 2021-12-13 PROCEDURE — 85025 COMPLETE CBC W/AUTO DIFF WBC: CPT

## 2021-12-13 PROCEDURE — 6360000004 HC RX CONTRAST MEDICATION: Performed by: REGISTERED NURSE

## 2021-12-13 PROCEDURE — 90935 HEMODIALYSIS ONE EVALUATION: CPT

## 2021-12-13 PROCEDURE — 6370000000 HC RX 637 (ALT 250 FOR IP): Performed by: NURSE PRACTITIONER

## 2021-12-13 PROCEDURE — 87635 SARS-COV-2 COVID-19 AMP PRB: CPT

## 2021-12-13 PROCEDURE — 71260 CT THORAX DX C+: CPT

## 2021-12-13 PROCEDURE — 71045 X-RAY EXAM CHEST 1 VIEW: CPT

## 2021-12-13 PROCEDURE — 97530 THERAPEUTIC ACTIVITIES: CPT

## 2021-12-13 PROCEDURE — 2580000003 HC RX 258: Performed by: ORTHOPAEDIC SURGERY

## 2021-12-13 PROCEDURE — 97535 SELF CARE MNGMENT TRAINING: CPT

## 2021-12-13 PROCEDURE — 6370000000 HC RX 637 (ALT 250 FOR IP): Performed by: ORTHOPAEDIC SURGERY

## 2021-12-13 RX ADMIN — ACETAMINOPHEN 650 MG: 325 TABLET ORAL at 18:59

## 2021-12-13 RX ADMIN — IOPAMIDOL 75 ML: 755 INJECTION, SOLUTION INTRAVENOUS at 19:22

## 2021-12-13 RX ADMIN — ACETAMINOPHEN 650 MG: 325 TABLET ORAL at 14:02

## 2021-12-13 RX ADMIN — INSULIN GLARGINE 10 UNITS: 100 INJECTION, SOLUTION SUBCUTANEOUS at 22:42

## 2021-12-13 RX ADMIN — HEPARIN SODIUM 5000 UNITS: 5000 INJECTION INTRAVENOUS; SUBCUTANEOUS at 14:02

## 2021-12-13 RX ADMIN — EPOETIN ALFA-EPBX 15000 UNITS: 10000 INJECTION, SOLUTION INTRAVENOUS; SUBCUTANEOUS at 16:53

## 2021-12-13 RX ADMIN — INSULIN LISPRO 2 UNITS: 100 INJECTION, SOLUTION INTRAVENOUS; SUBCUTANEOUS at 18:59

## 2021-12-13 RX ADMIN — HEPARIN SODIUM 5000 UNITS: 5000 INJECTION INTRAVENOUS; SUBCUTANEOUS at 05:28

## 2021-12-13 RX ADMIN — ACETAMINOPHEN 650 MG: 325 TABLET ORAL at 05:28

## 2021-12-13 RX ADMIN — Medication 10 ML: at 22:43

## 2021-12-13 RX ADMIN — Medication 1500 MG: at 18:59

## 2021-12-13 RX ADMIN — GABAPENTIN 300 MG: 300 CAPSULE ORAL at 16:48

## 2021-12-13 RX ADMIN — INSULIN LISPRO 1 UNITS: 100 INJECTION, SOLUTION INTRAVENOUS; SUBCUTANEOUS at 22:42

## 2021-12-13 RX ADMIN — SODIUM CHLORIDE 25 ML: 9 INJECTION, SOLUTION INTRAVENOUS at 21:51

## 2021-12-13 RX ADMIN — HEPARIN SODIUM 5000 UNITS: 5000 INJECTION INTRAVENOUS; SUBCUTANEOUS at 22:42

## 2021-12-13 RX ADMIN — INSULIN LISPRO 4 UNITS: 100 INJECTION, SOLUTION INTRAVENOUS; SUBCUTANEOUS at 14:07

## 2021-12-13 RX ADMIN — LEVOTHYROXINE SODIUM 50 MCG: 0.05 TABLET ORAL at 05:28

## 2021-12-13 RX ADMIN — ATORVASTATIN CALCIUM 10 MG: 10 TABLET, FILM COATED ORAL at 22:42

## 2021-12-13 RX ADMIN — CARVEDILOL 12.5 MG: 6.25 TABLET, FILM COATED ORAL at 16:48

## 2021-12-13 ASSESSMENT — PAIN DESCRIPTION - LOCATION
LOCATION: KNEE

## 2021-12-13 ASSESSMENT — PAIN DESCRIPTION - DESCRIPTORS: DESCRIPTORS: ACHING

## 2021-12-13 ASSESSMENT — PAIN DESCRIPTION - ORIENTATION
ORIENTATION: LEFT
ORIENTATION: LEFT

## 2021-12-13 ASSESSMENT — PAIN SCALES - GENERAL
PAINLEVEL_OUTOF10: 0
PAINLEVEL_OUTOF10: 0
PAINLEVEL_OUTOF10: 3
PAINLEVEL_OUTOF10: 2
PAINLEVEL_OUTOF10: 2
PAINLEVEL_OUTOF10: 0
PAINLEVEL_OUTOF10: 3
PAINLEVEL_OUTOF10: 0

## 2021-12-13 ASSESSMENT — PAIN DESCRIPTION - PAIN TYPE
TYPE: SURGICAL PAIN

## 2021-12-13 ASSESSMENT — PAIN SCALES - WONG BAKER
WONGBAKER_NUMERICALRESPONSE: 8
WONGBAKER_NUMERICALRESPONSE: 8
WONGBAKER_NUMERICALRESPONSE: 0

## 2021-12-13 NOTE — PROGRESS NOTES
[facility]  12/13/2021  [id number]    Chief Complaint: tka  Subjective: Insurance denied ARU    ROS: no n/v, cp, sob  Objective:  Patient Vitals for the past 24 hrs:   BP Temp Temp src Pulse Resp SpO2 Weight   12/13/21 0807 (!) 154/61 101 °F (38.3 °C) -- 105 16 -- 181 lb 14.1 oz (82.5 kg)   12/13/21 0651 (!) 182/68 101.1 °F (38.4 °C) Oral 109 16 96 % --   12/13/21 0344 (!) 160/57 99.2 °F (37.3 °C) Oral 102 18 93 % --   12/12/21 2335 117/61 98.3 °F (36.8 °C) Oral 86 18 90 % --   12/12/21 2035 (!) 129/59 100.5 °F (38.1 °C) Oral 94 18 93 % --     Gen: No distress, pleasant. HEENT: Normocephalic, atraumatic. CV: Regular rate and rhythm. Resp: No respiratory distress. Abd: Soft, nontender   Ext: No edema. Neuro: Alert, oriented, appropriately interactive. Wt Readings from Last 3 Encounters:   12/13/21 181 lb 14.1 oz (82.5 kg)   11/02/21 178 lb (80.7 kg)   10/26/21 178 lb (80.7 kg)       Laboratory data:   Lab Results   Component Value Date    WBC 9.1 12/13/2021    HGB 7.2 (L) 12/13/2021    HCT 21.9 (L) 12/13/2021    MCV 92.9 12/13/2021     12/13/2021       Lab Results   Component Value Date     12/13/2021    K 4.9 12/13/2021    K 4.5 12/12/2021    CL 94 12/13/2021    CO2 19 12/13/2021    BUN 71 12/13/2021    CREATININE 9.2 12/13/2021    GLUCOSE 167 12/13/2021    CALCIUM 8.6 12/13/2021        Therapy progress:  PT  Position Activity Restriction  Other position/activity restrictions: HD M/W/F; 1)  Dangle at edge of bed, progress to stand, and take a few steps with assistive device. 2)  Encourage ankle pumps and quad sets. 3)  Up in bedside chair as tolerated. 4)  Commode privileges with assistance. Objective     Sit to Stand:  Moderate Assistance  Stand to sit: Moderate Assistance  Bed to Chair: Maximum assistance (stand step; flexed posture and strong posterior lean; therapist facilitating weight shift and guiding hips to chair)     OT  PT Equipment Recommendations  Equipment Needed: No  Other: defer to next level of care     Assessment        SLP                Body mass index is 28.49 kg/m². Assessment:  1. Left knee DJD s/p TKA   2. ESRD on HD  3. DM       Recommendations: Insurance denied ARU; patient now febrile; will sign off for now, please re-consult if needed. Thank you for this consult. Please contact me with any questions or concerns. Nima Reyna MD 12/13/2021, 10:42 AM

## 2021-12-13 NOTE — PLAN OF CARE
Nutrition Problem #1: Inadequate oral intake  Intervention: Food and/or Nutrient Delivery: Modify Current Diet, Modify Oral Nutrition Supplement  Nutritional Goals: Consume 50% or greater of 3 meals per day and ONS during this admission.

## 2021-12-13 NOTE — FLOWSHEET NOTE
Treatment time: 3.25 hours  Net UF: 620 ml     Pre weight: 82.5 kg   Post weight: 81.9 kg  EDW: 82.5 kg     Access used: KIRK AVF  Access function: Good with  ml/min     Medications or blood products given: N/A     Regular outpatient schedule: MWF     Summary of response to treatment:      12/13/21 0807 12/13/21 1135   Vital Signs   BP (!) 154/61 (!) 156/81   Temp 101 °F (38.3 °C) 101 °F (38.3 °C)   Pulse 105 98   Resp 16 16   Weight 181 lb 14.1 oz (82.5 kg) 180 lb 8.9 oz (81.9 kg)   Weight Method Bed scale  --    Percent Weight Change -2.6 -0.73   Pain Assessment   Pain Assessment 0-10 0-10   Pain Level 0 0   Post-Hemodialysis Assessment   Post-Treatment Procedures  --  Blood returned; Access bleeding time < 10 minutes   Machine Disinfection Process  --  Acid/Vinegar Clean; Heat Disinfect; Exterior Machine Disinfection   Rinseback Volume (ml)  --  400 ml   Total Liters Processed (l/min)  --  63.3 l/min   Dialyzer Clearance  --  Moderately streaked   Duration of Treatment (minutes)  --  195 minutes   Heparin amount administered during treatment (units)  --  0 units   Hemodialysis Intake (ml)  --  400 ml   Hemodialysis Output (ml)  --  1020 ml   NET Removed (ml)  --  620 ml   Tolerated Treatment  --  Good   Copy of dialysis treatment record placed in chart, to be scanned into EMR.  Report called to Lily Smith RN

## 2021-12-13 NOTE — CONSULTS
Pharmacy Note  Vancomycin Consult    Itzel Vergara is a 66 y.o. female started on Vancomycin for Sepsis; consult received from NP Marge Goode to manage therapy. Also receiving the following antibiotics: Zosyn. Allergies:  Patient has no known allergies. Tmax:     Recent Labs     12/12/21  0748 12/13/21  0810   CREATININE 7.2* 9.2*       Recent Labs     12/12/21  0748 12/13/21  0810   WBC 9.9 9.1       Estimated Creatinine Clearance: 6 mL/min (A) (based on SCr of 9.2 mg/dL [facility])). Intake/Output Summary (Last 24 hours) at 12/13/2021 1619  Last data filed at 12/13/2021 1135  Gross per 24 hour   Intake 620 ml   Output 1020 ml   Net -400 ml       Wt Readings from Last 1 Encounters:   12/13/21 180 lb 8.9 oz (81.9 kg)         Body mass index is 28.28 kg/m². Culture Date      Source                       Results    Pulse dose: fluctuating renal function, MEY, ESRD   Goal Vancomycin trough: 15-20 mcg/mL   Goal Vancomycin AUC: 400-600     Assessment/Plan:  Will initiate Vancomycin 1500 mg x1, based on patient poor renal function, will pulse dose patient to maintain vancomyucin levels > 15 mcg/mL. Vanc random for tomorrow in the AM (12/14 at 0600). Timing of trough level will be determined based on culture results, renal function, and clinical response. Thank you for the consult.   Mary Norwood, PharmD  12/13/2021 4:21 PM

## 2021-12-13 NOTE — PROGRESS NOTES
Department of Orthopedic Surgery  Physician Assistant   Progress Note    Subjective:       Systemic or Specific Complaints: Pt laying in bed, just returned from dialysis. Knee pn with mvmt, still not able to participate much with PT yesterday, has not had therapy today. Fever noted this AM to 101.1F. Denies N/V, HA, dizziness, cough, SOB. Objective:     Patient Vitals for the past 24 hrs:   BP Temp Temp src Pulse Resp SpO2 Weight   12/13/21 0807 (!) 154/61 101 °F (38.3 °C) -- 105 16 -- 181 lb 14.1 oz (82.5 kg)   12/13/21 0651 (!) 182/68 101.1 °F (38.4 °C) Oral 109 16 96 % --   12/13/21 0344 (!) 160/57 99.2 °F (37.3 °C) Oral 102 18 93 % --   12/12/21 2335 117/61 98.3 °F (36.8 °C) Oral 86 18 90 % --   12/12/21 2035 (!) 129/59 100.5 °F (38.1 °C) Oral 94 18 93 % --       General: alert, appears stated age and cooperative   Wound: Wound clean and dry no evidence of infection. and small nickel sized dried blood at distal end of mepilex. Mepilex replaced   Motion: Painful range of Motion in affected extremity, knee is held in approx 15 degrees of flexion. Can actively flex knee to 50 degrees. Still lacking last 10-15 degrees of extension   DVT Exam: No evidence of DVT seen on physical exam.  Negative Evans's sign. Additional exam: Pt is laying in bed, lower leg propped with pillow to encourage extension, wedge pillow in place laterally. SCD sleeves in place  Moderate swelling to left leg, no erythema or ecchymosis  nontender to palpation around anterolateral thigh  Knee is very stiff, unable to obtain full extension on exam, but improving. FHL, EHL, ant tib, and gastroc motor intact  Sensation intact to LLE, pt has neuropathy at baseline  Distal pulses 2+    With new fever, mepilex was taken down to inspect incision. No signs of ecchymosis, erythema, or warmth.    Edema around the knee, no fluctuance noted upon palpation            Data Review  CBC:   Lab Results   Component Value Date    WBC 9.1 12/13/2021    RBC

## 2021-12-13 NOTE — PROGRESS NOTES
Physical Therapy  Facility/Department: [facility] C5 - MED SURG/ORTHO  Daily Treatment Note  NAME: Serafin Carlisle  : 1950  MRN: [id number]    Date of Service: 2021    Discharge Recommendations:  Subacute/Skilled Nursing Facility   PT Equipment Recommendations  Equipment Needed: No  Other: defer to next level of care    Assessment   Body structures, Functions, Activity limitations: Decreased functional mobility ; Decreased endurance; Decreased ROM; Decreased balance; Decreased strength; Decreased cognition  Assessment: Pt seen for bed mobility, exs and transfers. Pt educated on positioning in bed to improve ROM as pt noted with LLE in hip ER and knee flexion in bed on arrival.  Pt requires SBA  for bed mobility, modA X 2 for STS and transfer with RW bed to chair. Pt will benefit from continued skilled PT in acute care setting to address above deficits. Continue to recommend SNF at d/c. Treatment Diagnosis: functional mobility deficit; impaired ROM  Specific instructions for Next Treatment: Progress mobility as tolerated  Prognosis: Good  Decision Making: Medium Complexity  PT Education: Goals; PT Role; Disease Specific Education; Plan of Care; Home Exercise Program; Precautions; Transfer Training; Functional Mobility Training; Family Education; Weight-bearing Education; General Safety; Injury Prevention  Patient Education: Educatedin importance of OOB mobility and t/f, safety with functional mobility and use of AD. Educated in positioning of knee to improve ROM. Pt will benefit from further reinfrocement  Barriers to Learning: none  REQUIRES PT FOLLOW UP: Yes  Activity Tolerance  Activity Tolerance: Patient limited by fatigue; Patient limited by endurance; Patient limited by cognitive status; Treatment limited secondary to medical complications (free text)  Activity Tolerance: / 64, , O2 96     Patient Diagnosis(es): The encounter diagnosis was Status post total left knee replacement.      has a past medical history of Arthritis, Chronic kidney disease, Diabetes mellitus ([facility] Dialysis patient ([facility] ESRD (end stage renal disease) ([facility] Hemodialysis patient ([facility] Hyperkalemia, Hyperlipidemia, Hypertension, OA (osteoarthritis), Retinopathy, Sepsis ([facility] Thyroid disease, and Wears dentures. has a past surgical history that includes Dialysis fistula creation (Left, 08/10/2016); eye surgery (Left, 04/03/2018); other surgical history (Right, 01/11/2019); Toe amputation (Right, 1/11/2019); and Total knee arthroplasty (Left, 12/8/2021). Restrictions  Restrictions/Precautions  Restrictions/Precautions: Weight Bearing  Lower Extremity Weight Bearing Restrictions  Left Lower Extremity Weight Bearing: Weight Bearing As Tolerated  Position Activity Restriction  Other position/activity restrictions: HD M/W/F; 1)  Dangle at edge of bed, progress to stand, and take a few steps with assistive device. 2)  Encourage ankle pumps and quad sets. 3)  Up in bedside chair as tolerated. 4)  Commode privileges with assistance. Subjective   General  Chart Reviewed: Yes  Response To Previous Treatment: Patient with no complaints from previous session. Family / Caregiver Present: No  Referring Practitioner: Maikel Rooney MD  Subjective  Subjective: Pt supine in bed on approach, pleasant and agreeable to PT tx  General Comment  Comments: RN cleared pt for session  Pain Screening  Patient Currently in Pain: Yes  Pain Assessment  Pain Assessment: Faces  Franco-Baker Pain Rating: Hurts whole lot (during exercises)  Pain Type: Surgical pain  Pain Location: Knee  Vital Signs  Patient Currently in Pain: Yes          Objective   Bed mobility  Supine to Sit: Stand by assistance (extra time and HOB elevated to L)  Sit to Supine: Unable to assess (remained up)  Transfers  Sit to Stand: Dependent/Total; 2 Person Assistance; Moderate Assistance  Stand to sit: Dependent/Total; 2 Person Assistance;  Moderate Assistance  Bed to Chair: Dependent/Total; 2 Person Assistance; Moderate assistance  Ambulation  Ambulation?: Yes  Ambulation 1  Surface: level tile  Device: Rolling Walker  Assistance: Dependent/Total; 2 Person assistance; Moderate assistance  Gait Deviations: Slow Audrey; Decreased step length; Decreased step height; Shuffles  Distance: 2 steps to chair, 2 steps forward  Comments: Pt having diffiuclty processing commands to improve gait, pt states she can understand what is being asked of her yet she is unable to give verbal tell back given a command.   At one point pt given verbal instructions as well as visual demo of heel slides in chair and when asked \"what do I want you to do\" she replied \"brush my hair\"        Exercises  Straight Leg Raise: Supine LLE x 10 AAROM  Quad Sets: X 10 B  Heelslides: X 12 LLE seated AAROM  Gluteal Sets: X 10 B  Ankle Pumps: Supine BLE x 15  Comments: Max verbal and visual cues for sequence/technique        AM-PAC Score  AM-PAC Inpatient Mobility Raw Score : 12 (12/13/21 1512)  AM-PAC Inpatient T-Scale Score : 35.33 (12/13/21 1512)  Mobility Inpatient CMS 0-100% Score: 68.66 (12/13/21 1512)  Mobility Inpatient CMS G-Code Modifier : CL (12/13/21 1512)          Goals  Short term goals  Time Frame for Short term goals: 12/13/21 unless noted  Short term goal 1: Pt will perform bed mobility with SBA by 12/12/21 -- 12/13; met  Short term goal 2: Pt will perform transfers with SBA; 12/13 mod A of 2  Short term goal 3: Pt will ambulate 50 ft with RW and SBA; -- 12/13 2-3 steps RW and mod A of 2  Short term goal 4: Pt will negotiate 1 curb step with LRAD and CGA; -- 12/13 LUANA  Short term goal 5: Pt will perform 12+ reps of LE exercise for strengthening and balance; -- 12/12: GOAL MET but continue x 15 reps supine BLE exercises  Patient Goals   Patient goals : \"to be able to get up to the chair with only touching help by tomorrow (12/10/21)\" -- 12/12: modA STS, unable to pivot to chair    Plan    Plan  Times per week: BID x 7  Times per day: Twice a day  Specific instructions for Next Treatment: Progress mobility as tolerated  Current Treatment Recommendations: Strengthening, Home Exercise Program, ROM, Balance Training, Endurance Training, Functional Mobility Training, Transfer Training, Gait Training, Stair training, Safety Education & Training, Patient/Caregiver Education & Training, Positioning  Safety Devices  Type of devices:  All fall risk precautions in place, Call light within reach, Chair alarm in place, Gait belt, Left in chair, Nurse notified  Restraints  Initially in place: No     Therapy Time   Individual Concurrent Group Co-treatment   Time In 1309         Time Out 1402         Minutes [facility],[address]

## 2021-12-13 NOTE — PROGRESS NOTES
Dialysis patient [facility]), ESRD (end stage renal disease) ([facility] Hemodialysis patient ([facility] Hyperkalemia, Hyperlipidemia, Hypertension, OA (osteoarthritis), Retinopathy, Sepsis ([facility] Thyroid disease, and Wears dentures. has a past surgical history that includes Dialysis fistula creation (Left, 08/10/2016); eye surgery (Left, 04/03/2018); other surgical history (Right, 01/11/2019); Toe amputation (Right, 1/11/2019); and Total knee arthroplasty (Left, 12/8/2021). Restrictions  Restrictions/Precautions  Restrictions/Precautions: Weight Bearing  Lower Extremity Weight Bearing Restrictions  Left Lower Extremity Weight Bearing: Weight Bearing As Tolerated  Position Activity Restriction  Other position/activity restrictions: HD M/W/F; 1)  Dangle at edge of bed, progress to stand, and take a few steps with assistive device. 2)  Encourage ankle pumps and quad sets. 3)  Up in bedside chair as tolerated. 4)  Commode privileges with assistance. Subjective   General  Chart Reviewed: Yes  Patient assessed for rehabilitation services?: Yes  Response to previous treatment: Patient with no complaints from previous session  Family / Caregiver Present: No    Subjective  Subjective: Pt resting in bed, agreeable to OT treatment. Pain Assessment  Pain Assessment: Faces  Franco-Baker Pain Rating: Hurts whole lot  Pain Type: Surgical pain  Pain Location: Knee  Pain Orientation: Left  Non-Pharmaceutical Pain Intervention(s): Repositioned; Ambulation/Increased Activity  Pre Treatment Pain Screening  Intervention List: Patient able to continue with treatment  Vital Signs  Patient Currently in Pain: Yes     Orientation  Orientation  Overall Orientation Status: Within Functional Limits     Objective    ADL  Feeding: Setup  Grooming: Moderate assistance;  Increased time to complete; Verbal cueing (to thoroughly comb back of hair, pt requires frequent cues to attend to task, easily distracted)     Balance  Sitting Balance: Stand by assistance  Standing Balance: Dependent/Total (mod A of 2 with SW)  Standing Balance  Activity: bed to chair stand step transfer, static stand at     Bed mobility  Supine to Sit: Stand by assistance (to L with significantly increased time)     Transfers  Sit to stand: Dependent/Total; 2 Person assistance (mod A of 1-2)  Stand to sit: Dependent/Total; 2 Person assistance (mod A of 1-2)  Transfer Comments: Pt continues to demo significant difficulty following commands and attending to task, requires significantly increased time and repetition for all activities. Cognition  Overall Cognitive Status: Exceptions  Following Commands: Follows one step commands with increased time; Follows one step commands with repetition  Attention Span: Attends with cues to redirect; Difficulty attending to directions; Difficulty dividing attention  Memory: Decreased short term memory  Safety Judgement: Decreased awareness of need for safety  Problem Solving: Assistance required to identify errors made; Assistance required to correct errors made; Assistance required to generate solutions; Assistance required to implement solutions; Decreased awareness of errors  Insights: Decreased awareness of deficits  Initiation: Requires cues for all  Sequencing: Requires cues for some  Cognition Comment: After significant difficulty following commands despite acknwoledging understanding, pt asked to re-state command given, pt unable to repeat back verbal command, unsure of baseline cognitive status.      Plan   Plan  Times per week: 4-6x    AM-PAC Score  AM-[facility] Inpatient Daily Activity Raw Score: 11 (12/13/21 1546)  AM-PAC Inpatient ADL T-Scale Score : 29.04 (12/13/21 1546)  ADL Inpatient CMS 0-100% Score: 70.42 (12/13/21 1546)  ADL Inpatient CMS G-Code Modifier : CL (12/13/21 1546)    Goals  Short term goals  Time Frame for Short term goals: 1 week by 12/16  Short term goal 1: Pt will complete LB ADLs with SPV with AE as needed-- ongoing 12/13/21  Short term goal 2: Pt will complete toileting with SPV-- ongoing 12/13/21  Short term goal 3: Pt will complete functional transfers wtih SPV-- ongoing 12/13/21  Short term goal 4: Pt wilson verb/demo car transfer tech with min cues-- ongoing 12/13/21  Short term goal 5: Pt will complete B UE ex x 20 reps w/o fatigue-- ongoing 12/13/21  Patient Goals   Patient goals : Brena Fire to the bathroom with a walker\"-- ongoing, pt tolerated bed to chair transfer only 12/13/21       Therapy Time   Individual Concurrent Group Co-treatment   Time In 1310         Time Out 1403         Minutes [address]/DANIEL

## 2021-12-13 NOTE — CONSULTS
Pharmacy to dose Zosyn    Day: 1  Recent Labs     12/11/21  1039 12/12/21  0748 12/13/21  0810   CREATININE 5.6* 7.2* 9.2*     Estimated Creatinine Clearance: 6 mL/min (A) (based on SCr of 9.2 mg/dL [facility])). Recent Labs     12/11/21  1039 12/12/21  0748 12/13/21  0810   WBC 8.9 9.9 9.1       Extended infusion zosyn protocol  CrCl ?  20 mL/min - 3.375 g IV q8h over 4 hours  CrCl < 20 mL/min or ESRD - 3.375 g IV q12h over 4 hours    WILVER Dumont PADMINI [facility] 12/13/2021 4:28 PM

## 2021-12-13 NOTE — PROGRESS NOTES
Comprehensive Nutrition Assessment    Type and Reason for Visit:  Initial, RD Nutrition Re-Screen/LOS    Nutrition Recommendations/Plan:   1. Modify diet to low potassium, low phos and encourage PO intake  2. Continue nepro BID, flavor preferences added   3. Monitor nutrition adequacy, pertinent labs, bowel habits, wt changes, and clinical progress    Nutrition Assessment:  LOS assessment: 66 y.o with ESRD on dialysis. S/p total knee arthroplasty on 12/8. On low potassium diet, RD to add phosphorus restriction due to high phos lab. Good appetite and PO intake per pt, eating % of meals. Pt enjoys nepro drinks, RD to add flavor preference. No N/V reported. No wt loss reported, pt stable at 180 lb per EMR. RD provided pt with diet education on renal diet. Will continue to monitor. Malnutrition Assessment:  Malnutrition Status: At risk for malnutrition (Comment)    Context:  Chronic Illness     Findings of the 6 clinical characteristics of malnutrition:  Energy Intake:  Mild decrease in energy intake (Comment)  Fluid Accumulation:  1 - Mild      Estimated Daily Nutrient Needs:  Energy (kcal):  0879-1547 kcals/day; Weight Used for Energy Requirements:  Ideal (61 kg)     Protein (g):  73-79 g/day; Weight Used for Protein Requirements:  Ideal (1.2-1.3 g/kg)        Method Used for Fluid Requirements:  1 ml/kcal      Nutrition Related Findings:  LLE edema + 1. + BM today. Na 135, BUN 71, Cr 9.2, Phos 6.3, GFR 4, -187 x 24 hours. Wounds:  Surgical Incision       Current Nutrition Therapies:    ADULT ORAL NUTRITION SUPPLEMENT; Breakfast, Lunch; Renal Oral Supplement  ADULT DIET; Regular; Low Potassium (Less than 3000 mg/day);  Low Phosphorus (Less than 1000 mg)    Anthropometric Measures:  · Height: 5' 7\" (170.2 cm)  · Current Body Weight: 180 lb (81.6 kg)   · Usual Body Weight: 180 lb (81.6 kg)     · Ideal Body Weight: 135 lbs; % Ideal Body Weight 133.3 %   · BMI: 28.2  · BMI Categories: Overweight (BMI 25.0-29. 9)       Nutrition Diagnosis:   · Inadequate oral intake related to early satiety as evidenced by intake 26-50%, intake 51-75%    Nutrition Interventions:   Food and/or Nutrient Delivery:  Modify Current Diet, Modify Oral Nutrition Supplement  Nutrition Education/Counseling:  Education completed   Coordination of Nutrition Care:  Continue to monitor while inpatient    Goals:  Consume 50% or greater of 3 meals per day and ONS during this admission.        Nutrition Monitoring and Evaluation:   Behavioral-Environmental Outcomes:  None Identified   Food/Nutrient Intake Outcomes:  Food and Nutrient Intake, Supplement Intake  Physical Signs/Symptoms Outcomes:  Biochemical Data, Fluid Status or Edema, Weight     Discharge Planning:    Continue current diet, Continue Oral Nutrition Supplement     Electronically signed by Tresea Cooks, MS, RD, LD on 12/13/21 at 3:27 PM EST    Contact: Office: [phone]; [address]: [phone]

## 2021-12-13 NOTE — PROGRESS NOTES
Hospitalist Progress Note      PCP: Hollie Ferreirao, APRN - CNP    Date of Admission: 12/6/2021    Chief Complaint: Left knee pain    Hospital Course:   66 y.o. female with a PMH of OA, ESRD on HD, HTN, Hypothyroidism, and DM who we are asked to see/evaluate by Mala Angel MD for medical management. Patient admitted for elective left knee replacement surgery. Subjective:    Pt is on RA. Afebrile. Tmax 101.1 this morning. Currently afebrile. No dyspnea or cough. No N/V/D. No abdominal pain.     Medications:  Reviewed    Infusion Medications    sodium chloride      dextrose       Scheduled Medications    epoetin alice-epbx  15,000 Units SubCUTAneous Once per day on Mon Wed Fri    vancomycin  1,500 mg IntraVENous Once    vancomycin (VANCOCIN) intermittent dosing (placeholder)   Other RX Placeholder    piperacillin-tazobactam  3,375 mg IntraVENous Q12H    insulin glargine  10 Units SubCUTAneous Nightly    heparin (porcine)  5,000 Units SubCUTAneous 3 times per day    sodium chloride flush  5-40 mL IntraVENous 2 times per day    acetaminophen  650 mg Oral Q6H    atorvastatin  10 mg Oral Nightly    carvedilol  12.5 mg Oral BID WC    gabapentin  300 mg Oral QPM    levothyroxine  50 mcg Oral Daily    sertraline  25 mg Oral Daily    insulin lispro  0-6 Units SubCUTAneous TID WC    insulin lispro  0-3 Units SubCUTAneous Nightly    losartan  50 mg Oral Daily     PRN Meds: docusate sodium, cyclobenzaprine, sodium chloride flush, sodium chloride, ondansetron **OR** ondansetron, oxyCODONE **OR** oxyCODONE, glucose, dextrose, glucagon (rDNA), dextrose, hydrALAZINE, labetalol, sodium chloride flush, polyethylene glycol      Intake/Output Summary (Last 24 hours) at 12/13/2021 1648  Last data filed at 12/13/2021 1135  Gross per 24 hour   Intake 620 ml   Output 1020 ml   Net -400 ml       Physical Exam Performed:    BP (!) 162/64   Pulse 105   Temp 97.8 °F (36.6 °C) (Axillary)   Resp 16   Ht 5' 7\" (1.702 m)   Wt 180 lb 8.9 oz (81.9 kg)   SpO2 100%   BMI 28.28 kg/m²     General appearance: mild distress, appears stated age and cooperative. HEENT: Normal cephalic, atraumatic without obvious deformity. Pupils equal, round, and reactive to light. Extra ocular muscles intact. Conjunctivae/corneas clear. Neck: Supple, with full range of motion. No jugular venous distention. Trachea midline. Respiratory:  Normal respiratory effort. Clear to auscultation, bilaterally without Rales/Wheezes/Rhonchi. Cardiovascular: Regular rate and rhythm with normal S1/S2 without murmurs, rubs or gallops. Abdomen: Soft, non-tender, non-distended with normal bowel sounds. Musculoskeletal: Left knee decreased rom, DSG CDI, moderate swelling  Skin: Skin color, texture, turgor normal.  No rashes or lesions. Neurologic:  Neurovascularly intact without any focal sensory/motor deficits. Cranial nerves: II-XII intact, grossly non-focal.  Psychiatric: Alert and oriented, thought content appropriate, normal insight  Capillary Refill: Brisk,3 seconds, normal   Peripheral Pulses: +2 palpable, equal bilaterally    Labs:   Recent Labs     12/11/21  1039 12/12/21  0748 12/13/21  0810   WBC 8.9 9.9 9.1   HGB 8.3* 7.8* 7.2*   HCT 25.1* 24.0* 21.9*    167 198     Recent Labs     12/10/21  2344 12/10/21  2344 12/11/21  1039 12/12/21  0748 12/13/21  0810   *   < > 136 132* 130*   K 4.1  --  3.6 4.5 4.9   CL 95*   < > 99 94* 94*   CO2 23   < > 21 21 19*   BUN 26*   < > 33* 50* 71*   CREATININE 4.4*   < > 5.6* 7.2* 9.2*   CALCIUM 8.4   < > 8.7 8.6 8.6   PHOS 3.3  --   --   --  6.3*    < > = values in this interval not displayed.      Recent Labs     12/10/21  2344   AST 12*   ALT <5*   BILITOT 0.3   ALKPHOS 86     Urinalysis:      Lab Results   Component Value Date    NITRU Negative 09/14/2021    WBCUA >100 09/14/2021    BACTERIA 3+ 09/14/2021    RBCUA 11-20 09/14/2021    BLOODU MODERATE 09/14/2021    SPECGRAV 1.015 09/14/2021    GLUCOSEU Negative 09/14/2021       Radiology:  XR CHEST PORTABLE   Final Result   No acute process. XR KNEE LEFT (1-2 VIEWS)   Final Result      Left total knee arthroplasty which is in anatomic alignment with no evidence   of fracture or loosening. Soft tissue air is consistent with the recent   surgery. Head         CT CHEST ABDOMEN PELVIS W CONTRAST    (Results Pending)           Assessment/Plan:    Active Hospital Problems    Diagnosis     Primary osteoarthritis of left knee [M17.12]        Left knee OA s/p TKA on 12/8/21:  - Postoperative management per Orthopedic Surgery. - Continue prn analgesia, bowel regimen, IS, DVT prophylaxis and PT/OT. Fever of uncertain etiology:  - CXR shows clear lungs. Rapid COVID negative. Obtain blood cultures. Obtain CT C/A/P for further evaluation. No diarrhea or abdominal pain. Left knee wound appears okay per Ortho. Start empiric vancomycin and zosyn for now. Trend fever curve. ESRD on HD M/W/F:  - Management per Nephrology. Acute on chronic anemia:  - Likely due to postop surgical losses in setting of ESRD. No overt signs of bleeding.  - Monitor CBC closely. Transfuse for Hgb less than 7.  - Continue epoetin. Hypertension  - Uncontrolled. Continue carvedilol and losartan. Monitor BP closely.     Diabetes mellitus type 2 with peripheral neuropathy   - Controlled, A1C 5.8.  - Hold home regimen. Continue basal bolus insulin regimen -- monitor and adjust as needed. - Continue gabapentin.      Hyperlipidemia:  - Continue statin.      Hypothyroidism:  - Clinically euthyroid. Continue Synthroid. DVT Prophylaxis: SQ heparin   Diet: ADULT ORAL NUTRITION SUPPLEMENT; Breakfast, Lunch; Renal Oral Supplement  ADULT DIET; Regular; Low Potassium (Less than 3000 mg/day);  Low Phosphorus (Less than 1000 mg)  Code Status: Full Code    PT/OT Eval Status: active and ongoing     Dispo -  Uncertain     Loretta Hill Chol, APRN - CNP

## 2021-12-13 NOTE — PROGRESS NOTES
[facility]    [facility]              [phone]                         Interval History and plan:     Potassium is stable  Blood pressure with on dialysis but was high prior to starting dialysis  She holds her blood pressure medication before dialysis on dialysis days and take soon after dialysis  Hemoglobin low at 7.2    Plan:  Seen during dialysis  Vitals is stable  Fluid removal to goal  Start on Nepro for low albumin  Start Epogen for low hemoglobin                   Assessment :        ESRD: on hemodialysis  Dialysis center/schedule:   Monday Wednesday Friday at [facility]    Volume status  [facility]- will get from center   2D Echo: 45 to 50% EF and grade 1 diastolic dysfunction on 6/1743    Hypertension  BP: (154-182)/(57-68)  Pulse:  [102-109]   Goal around 789-959 systolic    Anemia of CKD  Hgb:   Hb on admission - NA  Optimize with iron, aranesp    Renal Osteodystrophy  PO4- goal of below 5.5  Monitor and adjust meds    Dialysis Access  AV fistula    Nutrition on dialysis  Lab Results   Component Value Date    LABALBU 2.9 (L) 12/13/2021     Monitor, and will give nepro when possible             [facility] would like to thank Jabari Munson MD   for opportunity to serve this patient      Please call with questions at-   24 Hrs Answering service [phone] or  7 am- 5 pm via Perfect serve or cell phone  Brenda Kumari MD          CC/reason for consult :     ESRD     HPI :     Clare Vega is a 66 y.o. female presented to   the hospital on 12/6/2021  for elective knee replacement surgery. She is known to have ESRD gets dialysis Monday Wednesday Friday under my care at [facility] . She is very regular with dialysis and has no problem with potassium. Before the elective surgery, potassium was checked and it was high but hemolyzed because of which it is being repeated.     We are consulted for ESRD and related issues    ROS:     Seen with-no family    positives in bold   Constitutional:  fever, chills, weakness, weight change, fatigue  Skin:  rash, pruritus, hair loss, bruising, dry skin, petechiae  Head, Face, Neck   headaches, swelling,  cervical adenopathy  Respiratory: shortness of breath, cough, or wheezing  Cardiovascular: chest pain, palpitations, dizzy, edema  Gastrointestinal: nausea, vomiting, diarrhea, constipation,belly pain    Yellow skin, blood in stool  Musculoskeletal:  back pain, muscle weakness, gait problems,       joint pain or swelling. Genitourinary:  dysuria, poor urine flow, flank pain, blood in urine  Neurologic:  vertigo, TIA'S, syncope, seizures, focal weakness  Psychosocial:  insomnia, anxiety, or depression. Additional positive findings:                        All other remaining systems are negative or unable to obtain        PMH/PSH/SH/Family History:     Past Medical History:   Diagnosis Date    Arthritis     Chronic kidney disease     [facility]     Dialysis patient [facility])     M W F    [facility] disease) [facility]     Hemod[facility] ([facility]     M-W-F    Hyperkalemia 07/13/2016    Hyperlipidemia     Hypertension     OA (osteoarthritis)     Retinopathy     [facility]     Thyroid disease     Wears dentures        Past Surgical History:   Procedure Laterality Date    DIALYSIS FISTULA CREATION Left 08/10/2016    Dr. Cherelle Grier - upper arm, basilic vein transposition    EYE SURGERY Left 04/03/2018    catarct removal with lens implant     OTHER SURGICAL HISTORY Right 01/11/2019    partial foot amputation    TOE AMPUTATION Right 1/11/2019    PARTIAL FOOT AMPUTATION WITH GRAFT APPLICATION performed by General Jesika DPM at [facility] Left 12/8/2021    LEFT TOTAL KNEE ARTHROPLASTY performed by Kaia Argueta MD at [facility] 1        reports that she quit smoking about 7 years ago.  She has never used smokeless tobacco. She reports that she does not drink alcohol and does not use drugs. family history is not on file.          Medication:     Current Facility-Administered Medications: docusate sodium (COLACE) capsule 100 mg, 100 mg, Oral, BID PRN  cyclobenzaprine (FLEXERIL) tablet 5 mg, 5 mg, Oral, TID PRN  insulin glargine (LANTUS) injection vial 10 Units, 10 Units, SubCUTAneous, Nightly  heparin (porcine) injection 5,000 Units, 5,000 Units, SubCUTAneous, 3 times per day  sodium chloride flush 0.9 % injection 5-40 mL, 5-40 mL, IntraVENous, 2 times per day  sodium chloride flush 0.9 % injection 5-40 mL, 5-40 mL, IntraVENous, PRN  0.9 % sodium chloride infusion, 25 mL, IntraVENous, PRN  acetaminophen (TYLENOL) tablet 650 mg, 650 mg, Oral, Q6H  ondansetron (ZOFRAN-ODT) disintegrating tablet 4 mg, 4 mg, Oral, Q8H PRN **OR** ondansetron (ZOFRAN) injection 4 mg, 4 mg, IntraVENous, Q6H PRN  oxyCODONE (ROXICODONE) immediate release tablet 5 mg, 5 mg, Oral, Q4H PRN **OR** oxyCODONE (ROXICODONE) immediate release tablet 10 mg, 10 mg, Oral, Q4H PRN  atorvastatin (LIPITOR) tablet 10 mg, 10 mg, Oral, Nightly  carvedilol (COREG) tablet 12.5 mg, 12.5 mg, Oral, BID WC  gabapentin (NEURONTIN) capsule 300 mg, 300 mg, Oral, QPM  levothyroxine (SYNTHROID) tablet 50 mcg, 50 mcg, Oral, Daily  sertraline (ZOLOFT) tablet 25 mg, 25 mg, Oral, Daily  glucose (GLUTOSE) 40 % oral gel 15 g, 15 g, Oral, PRN  dextrose 50 % IV solution, 12.5 g, IntraVENous, PRN  glucagon (rDNA) injection 1 mg, 1 mg, IntraMUSCular, PRN  dextrose 5 % solution, 100 mL/hr, IntraVENous, PRN  insulin lispro (HUMALOG) injection vial 4 Units, 4 Units, SubCUTAneous, TID WC  insulin lispro (HUMALOG) injection vial 0-6 Units, 0-6 Units, SubCUTAneous, TID WC  insulin lispro (HUMALOG) injection vial 0-3 Units, 0-3 Units, SubCUTAneous, Nightly  hydrALAZINE (APRESOLINE) injection 10 mg, 10 mg, IntraVENous, Q4H PRN  labetalol (NORMODYNE;TRANDATE) injection 20 mg, 20 mg, IntraVENous, Q4H PRN  losartan (COZAAR) tablet 50 mg, 50 mg, Oral, Daily  sodium chloride flush 0.9 % injection 5-40 mL, 5-40 mL, IntraVENous, PRN  polyethylene glycol (GLYCOLAX) packet 17 g, 17 g, Oral, Daily PRN  calcium gluconate 10 % injection 1,000 mg, 1,000 mg, IntraVENous, Once       Vitals :     Vitals:    12/13/21 0807   BP: (!) 154/61   Pulse: 105   Resp: 16   Temp: 101 °F (38.3 °C)   SpO2:        I & O :       Intake/Output Summary (Last 24 hours) at 12/13/2021 8770  Last data filed at 12/13/2021 0530  Gross per 24 hour   Intake 220 ml   Output 0 ml   Net 220 ml        Physical Examination :     General appearance: Anxious- no, distressed- no, in good spirits- no  HEENT: Lips- normal, teeth- ok , oral mucosa- moist  Neck : Mass- no, appears symmetrical, JVD- not visible  Respiratory: Respiratory effort-  normal, wheeze- no, crackles - none  Cardiovascular: Ausculation- No M/R/G, Edema none  Abdomen: visible mass- no, distention- no, scar- no, tenderness- no                            hepatosplenomegaly-  no  Musculoskeletal:  clubbing no,cyanosis- no , digital ischemia- no                           muscle strength- grossly normal , tone - grossly normal  Skin: rashes- no , ulcers- no, induration- no, tightening - no  Psychiatric:  Judgement and insight- normal           AAO X 3  Additional finding: -      LABS:     Recent Labs     12/11/21  1039 12/12/21  0748 12/13/21  0810   WBC 8.9 9.9 9.1   HGB 8.3* 7.8* 7.2*   HCT 25.1* 24.0* 21.9*    167 198     Recent Labs     12/10/21  2344 12/10/21  2344 12/11/21  1039 12/12/21  0748 12/13/21  0810   *   < > 136 132* 130*   K 4.1  --  3.6 4.5 4.9   CL 95*   < > 99 94* 94*   CO2 23   < > 21 21 19*   BUN 26*   < > 33* 50* 71*   CREATININE 4.4*   < > 5.6* 7.2* 9.2*   GLUCOSE 151*   < > 53* 146* 167*   MG 1.90  --   --   --   --    PHOS 3.3  --   --   --  6.3*    < > = values in this interval not displayed.

## 2021-12-14 ENCOUNTER — APPOINTMENT (OUTPATIENT)
Dept: ULTRASOUND IMAGING | Age: 71
DRG: 469 | End: 2021-12-14
Attending: ORTHOPAEDIC SURGERY
Payer: MEDICARE

## 2021-12-14 PROBLEM — D62 POSTOPERATIVE ANEMIA DUE TO ACUTE BLOOD LOSS: Status: ACTIVE | Noted: 2021-12-14

## 2021-12-14 PROBLEM — I27.20 PULMONARY HTN (HCC): Status: ACTIVE | Noted: 2021-12-14

## 2021-12-14 PROBLEM — Z96.652 STATUS POST TOTAL LEFT KNEE REPLACEMENT: Status: ACTIVE | Noted: 2021-12-14

## 2021-12-14 PROBLEM — R91.1 LUNG NODULE: Status: ACTIVE | Noted: 2021-12-14

## 2021-12-14 PROBLEM — R91.8 LUNG MASS: Status: ACTIVE | Noted: 2021-12-14

## 2021-12-14 PROBLEM — Z87.891 FORMER SMOKER: Status: ACTIVE | Noted: 2021-12-14

## 2021-12-14 PROBLEM — R79.89 ELEVATED PARATHYROID HORMONE: Status: ACTIVE | Noted: 2021-12-14

## 2021-12-14 PROBLEM — K83.8 INTRAHEPATIC BILE DUCT DILATION: Status: ACTIVE | Noted: 2021-12-14

## 2021-12-14 PROBLEM — E34.9 ELEVATED PARATHYROID HORMONE: Status: ACTIVE | Noted: 2021-12-14

## 2021-12-14 PROBLEM — E87.1 HYPONATREMIA: Status: ACTIVE | Noted: 2021-12-14

## 2021-12-14 PROBLEM — J84.10 GRANULOMATOUS LUNG DISEASE (HCC): Status: ACTIVE | Noted: 2021-12-14

## 2021-12-14 LAB
ALBUMIN SERPL-MCNC: 3 G/DL (ref 3.4–5)
ALP BLD-CCNC: 80 U/L (ref 40–129)
ALT SERPL-CCNC: <5 U/L (ref 10–40)
ANION GAP SERPL CALCULATED.3IONS-SCNC: 13 MMOL/L (ref 3–16)
AST SERPL-CCNC: 22 U/L (ref 15–37)
BILIRUB SERPL-MCNC: 0.5 MG/DL (ref 0–1)
BILIRUBIN DIRECT: <0.2 MG/DL (ref 0–0.3)
BILIRUBIN, INDIRECT: ABNORMAL MG/DL (ref 0–1)
BUN BLDV-MCNC: 36 MG/DL (ref 7–20)
CALCIUM SERPL-MCNC: 9 MG/DL (ref 8.3–10.6)
CHLORIDE BLD-SCNC: 96 MMOL/L (ref 99–110)
CO2: 21 MMOL/L (ref 21–32)
CREAT SERPL-MCNC: 5.4 MG/DL (ref 0.6–1.2)
GFR AFRICAN AMERICAN: 9
GFR NON-AFRICAN AMERICAN: 8
GLUCOSE BLD-MCNC: 123 MG/DL (ref 70–99)
GLUCOSE BLD-MCNC: 132 MG/DL (ref 70–99)
GLUCOSE BLD-MCNC: 154 MG/DL (ref 70–99)
GLUCOSE BLD-MCNC: 162 MG/DL (ref 70–99)
GLUCOSE BLD-MCNC: 309 MG/DL (ref 70–99)
HCT VFR BLD CALC: 23.1 % (ref 36–48)
HEMOGLOBIN: 7.4 G/DL (ref 12–16)
MCH RBC QN AUTO: 30.2 PG (ref 26–34)
MCHC RBC AUTO-ENTMCNC: 31.9 G/DL (ref 31–36)
MCV RBC AUTO: 94.6 FL (ref 80–100)
PDW BLD-RTO: 18.4 % (ref 12.4–15.4)
PERFORMED ON: ABNORMAL
PLATELET # BLD: 212 K/UL (ref 135–450)
PMV BLD AUTO: 8.9 FL (ref 5–10.5)
POTASSIUM REFLEX MAGNESIUM: 5.2 MMOL/L (ref 3.5–5.1)
RBC # BLD: 2.44 M/UL (ref 4–5.2)
SODIUM BLD-SCNC: 130 MMOL/L (ref 136–145)
TOTAL PROTEIN: 6.4 G/DL (ref 6.4–8.2)
VANCOMYCIN RANDOM: 26.8 UG/ML
WBC # BLD: 6.2 K/UL (ref 4–11)

## 2021-12-14 PROCEDURE — 2580000003 HC RX 258: Performed by: REGISTERED NURSE

## 2021-12-14 PROCEDURE — 97530 THERAPEUTIC ACTIVITIES: CPT

## 2021-12-14 PROCEDURE — 76705 ECHO EXAM OF ABDOMEN: CPT

## 2021-12-14 PROCEDURE — 97110 THERAPEUTIC EXERCISES: CPT

## 2021-12-14 PROCEDURE — 1200000000 HC SEMI PRIVATE

## 2021-12-14 PROCEDURE — 6370000000 HC RX 637 (ALT 250 FOR IP): Performed by: ORTHOPAEDIC SURGERY

## 2021-12-14 PROCEDURE — 2580000003 HC RX 258: Performed by: ORTHOPAEDIC SURGERY

## 2021-12-14 PROCEDURE — 6360000002 HC RX W HCPCS: Performed by: REGISTERED NURSE

## 2021-12-14 PROCEDURE — 80048 BASIC METABOLIC PNL TOTAL CA: CPT

## 2021-12-14 PROCEDURE — 97116 GAIT TRAINING THERAPY: CPT

## 2021-12-14 PROCEDURE — 80202 ASSAY OF VANCOMYCIN: CPT

## 2021-12-14 PROCEDURE — 36415 COLL VENOUS BLD VENIPUNCTURE: CPT

## 2021-12-14 PROCEDURE — 6360000002 HC RX W HCPCS: Performed by: ORTHOPAEDIC SURGERY

## 2021-12-14 PROCEDURE — 99223 1ST HOSP IP/OBS HIGH 75: CPT | Performed by: INTERNAL MEDICINE

## 2021-12-14 PROCEDURE — 97535 SELF CARE MNGMENT TRAINING: CPT

## 2021-12-14 PROCEDURE — 80076 HEPATIC FUNCTION PANEL: CPT

## 2021-12-14 PROCEDURE — 85027 COMPLETE CBC AUTOMATED: CPT

## 2021-12-14 RX ORDER — ACETAMINOPHEN 325 MG/1
650 TABLET ORAL EVERY 6 HOURS PRN
Status: DISCONTINUED | OUTPATIENT
Start: 2021-12-14 | End: 2021-12-17 | Stop reason: HOSPADM

## 2021-12-14 RX ADMIN — OXYCODONE 10 MG: 5 TABLET ORAL at 21:47

## 2021-12-14 RX ADMIN — Medication 10 ML: at 21:47

## 2021-12-14 RX ADMIN — PIPERACILLIN AND TAZOBACTAM 3375 MG: 3; .375 INJECTION, POWDER, LYOPHILIZED, FOR SOLUTION INTRAVENOUS at 00:45

## 2021-12-14 RX ADMIN — GABAPENTIN 300 MG: 300 CAPSULE ORAL at 18:13

## 2021-12-14 RX ADMIN — INSULIN LISPRO 1 UNITS: 100 INJECTION, SOLUTION INTRAVENOUS; SUBCUTANEOUS at 21:47

## 2021-12-14 RX ADMIN — SERTRALINE 25 MG: 50 TABLET, FILM COATED ORAL at 09:28

## 2021-12-14 RX ADMIN — ACETAMINOPHEN 650 MG: 325 TABLET ORAL at 05:40

## 2021-12-14 RX ADMIN — LOSARTAN POTASSIUM 50 MG: 25 TABLET, FILM COATED ORAL at 09:29

## 2021-12-14 RX ADMIN — OXYCODONE 5 MG: 5 TABLET ORAL at 12:04

## 2021-12-14 RX ADMIN — PIPERACILLIN AND TAZOBACTAM 3375 MG: 3; .375 INJECTION, POWDER, LYOPHILIZED, FOR SOLUTION INTRAVENOUS at 18:17

## 2021-12-14 RX ADMIN — ACETAMINOPHEN 650 MG: 325 TABLET ORAL at 12:03

## 2021-12-14 RX ADMIN — HEPARIN SODIUM 5000 UNITS: 5000 INJECTION INTRAVENOUS; SUBCUTANEOUS at 21:46

## 2021-12-14 RX ADMIN — CARVEDILOL 12.5 MG: 6.25 TABLET, FILM COATED ORAL at 09:29

## 2021-12-14 RX ADMIN — HEPARIN SODIUM 5000 UNITS: 5000 INJECTION INTRAVENOUS; SUBCUTANEOUS at 05:41

## 2021-12-14 RX ADMIN — CARVEDILOL 12.5 MG: 6.25 TABLET, FILM COATED ORAL at 18:14

## 2021-12-14 RX ADMIN — INSULIN LISPRO 4 UNITS: 100 INJECTION, SOLUTION INTRAVENOUS; SUBCUTANEOUS at 12:04

## 2021-12-14 RX ADMIN — PIPERACILLIN AND TAZOBACTAM 3375 MG: 3; .375 INJECTION, POWDER, LYOPHILIZED, FOR SOLUTION INTRAVENOUS at 05:38

## 2021-12-14 RX ADMIN — Medication 10 ML: at 09:29

## 2021-12-14 RX ADMIN — HEPARIN SODIUM 5000 UNITS: 5000 INJECTION INTRAVENOUS; SUBCUTANEOUS at 12:03

## 2021-12-14 RX ADMIN — ACETAMINOPHEN 650 MG: 325 TABLET ORAL at 18:13

## 2021-12-14 RX ADMIN — LEVOTHYROXINE SODIUM 50 MCG: 0.05 TABLET ORAL at 05:41

## 2021-12-14 RX ADMIN — OXYCODONE 5 MG: 5 TABLET ORAL at 05:54

## 2021-12-14 RX ADMIN — ATORVASTATIN CALCIUM 10 MG: 10 TABLET, FILM COATED ORAL at 21:44

## 2021-12-14 ASSESSMENT — PAIN SCALES - GENERAL
PAINLEVEL_OUTOF10: 7
PAINLEVEL_OUTOF10: 4
PAINLEVEL_OUTOF10: 5
PAINLEVEL_OUTOF10: 7
PAINLEVEL_OUTOF10: 6
PAINLEVEL_OUTOF10: 0
PAINLEVEL_OUTOF10: 4
PAINLEVEL_OUTOF10: 6

## 2021-12-14 ASSESSMENT — PAIN DESCRIPTION - ORIENTATION
ORIENTATION: LEFT

## 2021-12-14 ASSESSMENT — PAIN DESCRIPTION - PAIN TYPE
TYPE: SURGICAL PAIN

## 2021-12-14 ASSESSMENT — PAIN DESCRIPTION - DESCRIPTORS: DESCRIPTORS: ACHING

## 2021-12-14 ASSESSMENT — PAIN DESCRIPTION - LOCATION
LOCATION: KNEE

## 2021-12-14 NOTE — PROGRESS NOTES
Occupational Therapy  Facility/Department: [facility] C5 - MED SURG/ORTHO  Daily Treatment Note  NAME: Clare Vega  : 1950  MRN: [id number]    Date of Service: 2021    Discharge Recommendations:  Subacute/Skilled Nursing Facility     Assessment   Performance deficits / Impairments: Decreased functional mobility ; Decreased ADL status; Decreased strength; Decreased safe awareness; Decreased cognition; Decreased endurance; Decreased balance; Decreased high-level IADLs  Assessment: Pt demos good tolerance of OT treatment, demos significant progress toward OT treatment. Pt min-mod A with functional transfers and standing balance with SW. Pt mod A for LE ADLs. Pt continues to function below her baseline and would benefit from continued skilled OT in SNF setting at d/c. Prognosis: Good  OT Education: OT Role; Plan of Care; ADL Adaptive Strategies; Transfer Training  Disease Specific Education: Pt educated on weight bearing status, post-op precautions, appropriate DME, and safe mobility with AD. Pt verbalized understanding. Barriers to Learning: cognition  REQUIRES OT FOLLOW UP: Yes  Activity Tolerance  Activity Tolerance: Patient Tolerated treatment well  Activity Tolerance: Vitals: BP= 95/51,  HR= 88, SPO2= 98% RA  Safety Devices  Safety Devices in place: Yes  Type of devices: Left in chair; Chair alarm in place; Call light within reach; Nurse notified; Gait belt         Patient Diagnosis(es): The encounter diagnosis was Status post total left knee replacement. has a past medical history of Arthritis, Chronic kidney disease, Diabetes mellitus ([address] Dialysis patient ([address] ESRD (end stage renal disease) ([address] Hemodialysis patient ([address] Hyperkalemia, Hyperlipidemia, Hypertension, OA (osteoarthritis), Retinopathy, Sepsis ([address] Thyroid disease, and Wears dentures.    has a past surgical history that includes Dialysis fistula creation (Left, 08/10/2016); eye surgery (Left, 2018); other surgical history (Right, 01/11/2019); Toe amputation (Right, 1/11/2019); and Total knee arthroplasty (Left, 12/8/2021). Restrictions  Restrictions/Precautions  Restrictions/Precautions: Weight Bearing  Lower Extremity Weight Bearing Restrictions  Left Lower Extremity Weight Bearing: Weight Bearing As Tolerated  Position Activity Restriction  Other position/activity restrictions: HD M/W/F; 1)  Dangle at edge of bed, progress to stand, and take a few steps with assistive device. 2)  Encourage ankle pumps and quad sets. 3)  Up in bedside chair as tolerated. 4)  Commode privileges with assistance. Subjective   General  Chart Reviewed: Yes  Patient assessed for rehabilitation services?: Yes  Response to previous treatment: Patient with no complaints from previous session  Family / Caregiver Present: No    Subjective  Subjective: Pt resting in bed, agreeable to OT treatment. Pain Assessment  Pain Assessment: 0-10  Pain Level: 6  Pain Type: Surgical pain  Pain Location: Knee  Pain Orientation: Left  Non-Pharmaceutical Pain Intervention(s): Repositioned; Ambulation/Increased Activity  Pre Treatment Pain Screening  Intervention List: Patient able to continue with treatment  Vital Signs  Patient Currently in Pain: Yes     Orientation  Orientation  Overall Orientation Status: Within Functional Limits     Objective    ADL  Grooming: Minimal assistance; Increased time to complete; Verbal cueing (handwashing in stance at sink)  LE Dressing: Moderate assistance; Verbal cueing; Increased time to complete (to brennan pull up briefs)  Toileting: Moderate assistance (for balance to manage clothing and hygiene)     Balance  Sitting Balance: Stand by assistance  Standing Balance:  Moderate assistance (varies from CGA- mod A with SW)  Standing Balance  Activity: functional/bathroom mobility, toilet transfer training, handwashing in stance    Functional Mobility  Functional - Mobility Device: Standard Walker  Activity: To/from bathroom  Assist Level: Moderate assistance (at times CGA)    Toilet Transfers  Toilet - Technique: Ambulating  Equipment Used: Standard toilet (use of ravi grab bars)  Toilet Transfer: Minimal assistance  Toilet Transfers Comments: VCs for hand placement and sequencing    Bed mobility  Supine to Sit: Stand by assistance (to L with HOB elevated)     Transfers  Sit to stand: Moderate assistance  Stand to sit: Moderate assistance  Transfer Comments: VCs for hand placement      Cognition  Following Commands: Follows one step commands consistently  Safety Judgement: Decreased awareness of need for safety  Initiation: Requires cues for some  Sequencing: Requires cues for some  Cognition Comment: Pt demos significant progress in ability to follow commands and processing time this session.      Plan   Plan  Times per week: 4-6x    AM-PAC Score  AM-PAC Inpatient Daily Activity Raw Score: 14 (12/14/21 1001)  AM-PAC Inpatient ADL T-Scale Score : 33.39 (12/14/21 1001)  ADL Inpatient CMS 0-100% Score: 59.67 (12/14/21 1001)  ADL Inpatient CMS G-Code Modifier : CK (12/14/21 1001)    Goals  Short term goals  Time Frame for Short term goals: 1 week by 12/16  Short term goal 1: Pt will complete LB ADLs with SPV with AE as needed-- ongoing 12/14/21  Short term goal 2: Pt will complete toileting with SPV-- ongoing 12/14/21  Short term goal 3: Pt will complete functional transfers wtih SPV-- ongoing 12/14/21  Short term goal 4: Pt wilson verb/demo car transfer tech with min cues-- ongoing 12/14/21  Short term goal 5: Pt will complete B UE ex x 20 reps w/o fatigue-- ongoing 12/14/21  Patient Goals   Patient goals : Breanne Slain to the bathroom with a walker\"-- GOAL MET, pt demos ability to walk bathroom with SW and min A 12/14/21       Therapy Time   Individual Concurrent Group Co-treatment   Time In 0800         Time Out 0840         Minutes [address], HOWARD/L

## 2021-12-14 NOTE — PROGRESS NOTES
[facility]    [facility]              [phone]                         Interval History and plan:     Potassium is 5.2  On low K diet  Getting PT/OT  Got fever in the week end  Now better  BPvariable, low at one now  Now better but still on low side  Has left lower lobe mass/graanulomatous changes in chest/abdomen  Possible acute cholecystitis  Plan for pulmonary consult      Plan:  Anemia- continue epogen  BP lowish - hold losartan again, due to lowish BP caused by possible sepsis  Post op                   Assessment :        ESRD: on hemodialysis  Dialysis center/schedule:   Monday Wednesday Friday at [facility]    Volume status  Diego [facility] Lyndon- will get from center   2D Echo: 45 to 50% EF and grade 1 diastolic dysfunction on 2/6991    Hypertension  BP: (123-153)/(55-65)  Pulse:  [82-86]   Goal around 012-578 systolic    Anemia of CKD  Hgb:   Hb on admission - NA  Optimize with iron, aranesp    Renal Osteodystrophy  PO4- goal of below 5.5  Monitor and adjust meds    Dialysis Access  AV fistula    Nutrition on dialysis  Lab Results   Component Value Date    LABALBU 3.0 (L) 12/14/2021     Monitor, and will give nepro when possible             [facility] would like to thank Stephanie Jimenez MD   for opportunity to serve this patient      Please call with questions at-   24 Hrs Answering service [phone] or  7 am- 5 pm via Perfect serve or cell phone  Haydee Jennings MD          CC/reason for consult :     ESRD     HPI :     Juju Byrd is a 66 y.o. female presented to   the hospital on 12/6/2021  for elective knee replacement surgery. She is known to have ESRD gets dialysis Monday Wednesday Friday under my care at [facility]. She is very regular with dialysis and has no problem with potassium. Before the elective surgery, potassium was checked and it was high but hemolyzed because of which it is being repeated.     We are consulted for ESRD and related issues    ROS:     Seen with-no family    positives in bold   Constitutional:  fever, chills, weakness, weight change, fatigue  Skin:  rash, pruritus, hair loss, bruising, dry skin, petechiae  Head, Face, Neck   headaches, swelling,  cervical adenopathy  Respiratory: shortness of breath, cough, or wheezing  Cardiovascular: chest pain, palpitations, dizzy, edema  Gastrointestinal: nausea, vomiting, diarrhea, constipation,belly pain    Yellow skin, blood in stool  Musculoskeletal:  back pain, muscle weakness, gait problems,       joint pain or swelling. Genitourinary:  dysuria, poor urine flow, flank pain, blood in urine  Neurologic:  vertigo, TIA'S, syncope, seizures, focal weakness  Psychosocial:  insomnia, anxiety, or depression. Additional positive findings:                        All other remaining systems are negative or unable to obtain        PMH/PSH/SH/Family History:     Past Medical History:   Diagnosis Date    Arthritis     Chronic kidney disease     [facility]     [facility] patient [facility])     M W F    ESRD (end stage [facility] disease) [facility]     Hemodialysis patient ([facility]     M-W-F    Hyperkalemia 07/13/2016    Hyperlipidemia     Hypertension     OA (osteoarthritis)     Retinopathy     Sepsis ([facility]     Thyroid disease     Wears dentures        Past Surgical History:   Procedure Laterality Date    DIALYSIS FISTULA CREATION Left 08/10/2016    Dr. Any Crawford - upper arm, basilic vein transposition    EYE SURGERY Left 04/03/2018    catarct removal with lens implant     OTHER SURGICAL HISTORY Right 01/11/2019    partial foot amputation    TOE AMPUTATION Right 1/11/2019    PARTIAL FOOT AMPUTATION WITH GRAFT APPLICATION [facility]    LEFT TOTAL KNEE ARTHROPLASTY performed by Jill Astudillo MD at [facility]        reports that she quit smoking about 7 years ago.  She has never used smokeless tobacco. She reports that she does not drink alcohol and does not use drugs. family history is not on file.          Medication:     Current Facility-Administered Medications: epoetin aliec-epbx (RETACRIT) injection 15,000 Units, 15,000 Units, SubCUTAneous, Once per day on Mon Wed Fri  vancomycin (VANCOCIN) intermittent dosing (placeholder), , Other, RX Placeholder  piperacillin-tazobactam (ZOSYN) 3,375 mg in dextrose 5 % 50 mL IVPB extended infusion (mini-bag), 3,375 mg, IntraVENous, Q12H  docusate sodium (COLACE) capsule 100 mg, 100 mg, Oral, BID PRN  cyclobenzaprine (FLEXERIL) tablet 5 mg, 5 mg, Oral, TID PRN  insulin glargine (LANTUS) injection vial 10 Units, 10 Units, SubCUTAneous, Nightly  heparin (porcine) injection 5,000 Units, 5,000 Units, SubCUTAneous, 3 times per day  sodium chloride flush 0.9 % injection 5-40 mL, 5-40 mL, IntraVENous, 2 times per day  sodium chloride flush 0.9 % injection 5-40 mL, 5-40 mL, IntraVENous, PRN  0.9 % sodium chloride infusion, 25 mL, IntraVENous, PRN  acetaminophen (TYLENOL) tablet 650 mg, 650 mg, Oral, Q6H  ondansetron (ZOFRAN-ODT) disintegrating tablet 4 mg, 4 mg, Oral, Q8H PRN **OR** ondansetron (ZOFRAN) injection 4 mg, 4 mg, IntraVENous, Q6H PRN  oxyCODONE (ROXICODONE) immediate release tablet 5 mg, 5 mg, Oral, Q4H PRN **OR** oxyCODONE (ROXICODONE) immediate release tablet 10 mg, 10 mg, Oral, Q4H PRN  atorvastatin (LIPITOR) tablet 10 mg, 10 mg, Oral, Nightly  carvedilol (COREG) tablet 12.5 mg, 12.5 mg, Oral, BID WC  gabapentin (NEURONTIN) capsule 300 mg, 300 mg, Oral, QPM  levothyroxine (SYNTHROID) tablet 50 mcg, 50 mcg, Oral, Daily  sertraline (ZOLOFT) tablet 25 mg, 25 mg, Oral, Daily  glucose (GLUTOSE) 40 % oral gel 15 g, 15 g, Oral, PRN  dextrose 50 % IV solution, 12.5 g, IntraVENous, PRN  glucagon (rDNA) injection 1 mg, 1 mg, IntraMUSCular, PRN  dextrose 5 % solution, 100 mL/hr, IntraVENous, PRN  insulin lispro (HUMALOG) injection vial 0-6 Units, 0-6 Units, SubCUTAneous, TID WC  insulin lispro (HUMALOG) injection vial 0-3 Units, 0-3 Units, SubCUTAneous, Nightly  hydrALAZINE (APRESOLINE) injection 10 mg, 10 mg, IntraVENous, Q4H PRN  labetalol (NORMODYNE;TRANDATE) injection 20 mg, 20 mg, IntraVENous, Q4H PRN  losartan (COZAAR) tablet 50 mg, 50 mg, Oral, Daily  sodium chloride flush 0.9 % injection 5-40 mL, 5-40 mL, IntraVENous, PRN  polyethylene glycol (GLYCOLAX) packet 17 g, 17 g, Oral, Daily PRN       Vitals :     Vitals:    12/14/21 0709   BP: (!) 123/55   Pulse: 82   Resp: 18   Temp: 98.6 °F (37 °C)   SpO2: 96%       I & O :       Intake/Output Summary (Last 24 hours) at 12/14/2021 0950  Last data filed at 12/14/2021 8867  Gross per 24 hour   Intake 640 ml   Output 1020 ml   Net -380 ml        Physical Examination :     General appearance: Anxious- no, distressed- no, in good spirits- no  HEENT: Lips- normal, teeth- ok , oral mucosa- moist  Neck : Mass- no, appears symmetrical, JVD- not visible  Respiratory: Respiratory effort-  normal, wheeze- no, crackles - none  Cardiovascular:  Ausculation- No M/R/G, Edema none  Abdomen: visible mass- no, distention- no, scar- no, tenderness- no                            hepatosplenomegaly-  no  Musculoskeletal:  clubbing no,cyanosis- no , digital ischemia- no                           muscle strength- grossly normal , tone - grossly normal  Skin: rashes- no , ulcers- no, induration- no, tightening - no  Psychiatric:  Judgement and insight- normal           AAO X 3  Additional finding: -      LABS:     Recent Labs     12/12/21  0748 12/13/21  0810 12/14/21  0657   WBC 9.9 9.1 6.2   HGB 7.8* 7.2* 7.4*   HCT 24.0* 21.9* 23.1*    198 212     Recent Labs     12/12/21  0748 12/13/21  0810 12/14/21  0657   * 130* 130*   K 4.5 4.9 5.2*   CL 94* 94* 96*   CO2 21 19* 21   BUN 50* 71* 36*   CREATININE 7.2* 9.2* 5.4*   GLUCOSE 146* 167* 132*   PHOS  --  6.3*  --

## 2021-12-14 NOTE — PROGRESS NOTES
status; Treatment limited secondary to medical complications (free text)  Activity Tolerance: 95/51, HR 88, O2 95*     Patient Diagnosis(es): The encounter diagnosis was Status post total left knee replacement. has a past medical history of Arthritis, Chronic kidney disease, Diabetes mellitus ([facility] [address] Dialysis patient ([facility] [address] ESRD (end stage renal disease) ([facility] [address] Hemodialysis patient ([facility] [address] Hyperkalemia, Hyperlipidemia, Hypertension, OA (osteoarthritis), Retinopathy, Sepsis ([facility] [address] Thyroid disease, and Wears dentures. has a past surgical history that includes Dialysis fistula creation (Left, 08/10/2016); eye surgery (Left, 04/03/2018); other surgical history (Right, 01/11/2019); Toe amputation (Right, 1/11/2019); and Total knee arthroplasty (Left, 12/8/2021). Restrictions  Restrictions/Precautions  Restrictions/Precautions: Weight Bearing  Lower Extremity Weight Bearing Restrictions  Left Lower Extremity Weight Bearing: Weight Bearing As Tolerated  Position Activity Restriction  Other position/activity restrictions: HD M/W/F; 1)  Dangle at edge of bed, progress to stand, and take a few steps with assistive device. 2)  Encourage ankle pumps and quad sets. 3)  Up in bedside chair as tolerated. 4)  Commode privileges with assistance. Subjective   General  Chart Reviewed: Yes  Response To Previous Treatment: Patient with no complaints from previous session.   Family / Caregiver Present: No  Referring Practitioner: Augustine Herman MD  Subjective  Subjective: Pt supine in bed on approach, pleasant and agreeable to PT tx  General Comment  Comments: RN cleared pt for session  Pain Screening  Patient Currently in Pain: Yes  Pain Assessment  Pain Assessment: 0-10  Pain Type: Surgical pain  Pain Location: Knee  Pain Orientation: Left  Pain Descriptors: Aching  Non-Pharmaceutical Pain Intervention(s): Emotional support; Repositioned; Rest; Ambulation/Increased Activity  Vital Signs  Patient Currently in Pain: Yes Objective   Bed mobility  Supine to Sit: Stand by assistance (to L)  Sit to Supine: Unable to assess  Transfers  Sit to Stand: CGA  Stand to sit: CGA/SBA  Bed to Chair: CGA  Ambulation  Ambulation?: Yes  WB Status: FWBAT  Ambulation 1  Surface: level tile  Device: Standard Walker  Assistance: min/ CGA of 1  Gait Deviations: Slow Audrey; Decreased step length; Decreased step height; Shuffles  Distance: 40'  Comments: able to process commands for ex and ambulation better today        Exercises  Quad Sets: X 15 B  Heelslides: X 15 B seated  Knee Long Arc Quad: X 10 L  Knee Active Range of Motion: seated flexion to 85*  Ankle Pumps: Supine BLE x 15        AM-PAC Score  AM-PAC Inpatient Mobility Raw Score : 16 (12/14/21 1202)  AM-PAC Inpatient T-Scale Score : 40.78 (12/14/21 1202)  Mobility Inpatient CMS 0-100% Score: 54.16 (12/14/21 1202)  Mobility Inpatient CMS G-Code Modifier : CK (12/14/21 1202)          Goals  Short term goals  Time Frame for Short term goals: 12/13/21 unless noted  Short term goal 1: Pt will perform bed mobility with SBA by 12/12/21 -- 12/13; met  Short term goal 2: Pt will perform transfers with SBA; 12/14 CGA  Short term goal 3: Pt will ambulate 50 ft with RW and SBA; -- 12/14 min A with SW up to 40'  Short term goal 4: Pt will negotiate 1 curb step with LRAD and CGA; -- 12/14 NT  Short term goal 5: Pt will perform 12+ reps of LE exercise for strengthening and balance; -- 12/12: GOAL MET but continue x 15 reps supine BLE exercises  Patient Goals   Patient goals : \"to be able to get up to the chair with only touching help by tomorrow (12/10/21)\" -- 12/12: modA STS, unable to pivot to chair    Plan    Plan  Times per week: BID x 7  Times per day: Twice a day  Specific instructions for Next Treatment: Progress mobility as tolerated  Current Treatment Recommendations: Strengthening, Home Exercise Program, ROM, Balance Training, Endurance Training, Functional Mobility Training, Transfer Training, Gait Training, Stair training, Safety Education & Training, Patient/Caregiver Education & Training, Positioning  Safety Devices  Type of devices:  All fall risk precautions in place, Call light within reach, Chair alarm in place, Gait belt, Left in chair, Nurse notified  Restraints  Initially in place: No     Therapy Time   Individual Concurrent Group Co-treatment   Time In 1305         Time Out [address] [place], [place] #[address]

## 2021-12-14 NOTE — PLAN OF CARE
Problem: Falls - Risk of:  Goal: Will remain free from falls  Description: Will remain free from falls  Outcome: Ongoing     Problem: Skin Integrity:  Goal: Will show no infection signs and symptoms  Description: Will show no infection signs and symptoms  Outcome: Ongoing     Problem: Pain:  Goal: Pain level will decrease  Description: Pain level will decrease  Outcome: Ongoing     Problem: Pain - Acute:  Goal: Pain level will decrease  Description: Pain level will decrease  Outcome: Ongoing     Problem: Nutrition  Goal: Optimal nutrition therapy  Outcome: Ongoing

## 2021-12-14 NOTE — PROGRESS NOTES
Physical Therapy  Facility/Department: [facility] - MED SURG/ORTHO  Daily Treatment Note  NAME: Maninder Laureano  : 1950  MRN: [id number]    Date of Service: 2021    Discharge Recommendations:  Subacute/Skilled Nursing Facility   PT Equipment Recommendations  Equipment Needed: No  Other: defer to next level of care    Assessment   Body structures, Functions, Activity limitations: Decreased functional mobility ; Decreased endurance; Decreased ROM; Decreased balance; Decreased strength; Decreased cognition  Assessment: Pt seen in am for PT tx. Pt with improved performance with bed mobility and STS. Pt educated on positioning in bed to improve ROM as pt noted with LLE in hip ER and knee flexion in bed on arrival. Pillow under ankle and wedge placed laterally to LLE to decreased hip ER. Pt requires SBA bed mobility, modA for STS and Ninfa of 1-2 for ambulation with SW for 25' X 2.. Pt will benefit from continued skilled PT in acute care setting to address above deficits. Continue to recommend SNF at d/c. Treatment Diagnosis: functional mobility deficit; impaired ROM  Specific instructions for Next Treatment: Progress mobility as tolerated  Prognosis: Good  Decision Making: Medium Complexity  PT Education: Goals; PT Role; Disease Specific Education; Plan of Care; Home Exercise Program; Precautions; Transfer Training; Functional Mobility Training; Family Education; Weight-bearing Education; General Safety; Injury Prevention  Patient Education: Educated in importance of OOB mobility and t/f, safety with functional mobility and use of AD. Educated in positioning of knee to improve ROM. Pt will benefit from further reinfrocement  Barriers to Learning: none  REQUIRES PT FOLLOW UP: Yes  Activity Tolerance  Activity Tolerance: Patient limited by fatigue; Patient limited by endurance;  Patient limited by cognitive status; Treatment limited secondary to medical complications (free text)  Activity Tolerance: 95/51, HR assess  Transfers  Sit to Stand:  Moderate Assistance  Stand to sit: Minimal Assistance  Bed to Chair: Moderate assistance (SW)  Ambulation  Ambulation?: Yes  WB Status: FWBAT  Ambulation 1  Surface: level tile  Device: Standard Walker  Assistance: Dependent/Total; Moderate assistance; Minimal assistance (Ninfa of 1-2)  Gait Deviations: Slow Audrey; Decreased step length; Decreased step height; Shuffles  Distance: 25' X 2  Comments: able to process commands for ex and ambulation better today        Exercises  Quad Sets: X 15 B  Heelslides: X 15 B seated  Knee Long Arc Quad: X 10 L  Knee Active Range of Motion: seated flexion to 85*  Ankle Pumps: Supine BLE x 15        AM-PAC Score  AM-PAC Inpatient Mobility Raw Score : 16 (12/14/21 1202)  AM-PAC Inpatient T-Scale Score : 40.78 (12/14/21 1202)  Mobility Inpatient CMS 0-100% Score: 54.16 (12/14/21 1202)  Mobility Inpatient CMS G-Code Modifier : CK (12/14/21 1202)          Goals  Short term goals  Time Frame for Short term goals: 12/13/21 unless noted  Short term goal 1: Pt will perform bed mobility with SBA by 12/12/21 -- 12/13; met  Short term goal 2: Pt will perform transfers with SBA; 12/14 mod A  Short term goal 3: Pt will ambulate 50 ft with RW and SBA; -- 12/14 min A Of 1-2 for 25' X 2 with SW  Short term goal 4: Pt will negotiate 1 curb step with LRAD and CGA; -- 12/14 NT  Short term goal 5: Pt will perform 12+ reps of LE exercise for strengthening and balance; -- 12/12: GOAL MET but continue x 15 reps supine BLE exercises  Patient Goals   Patient goals : \"to be able to get up to the chair with only touching help by tomorrow (12/10/21)\" -- 12/12: modA STS, unable to pivot to chair    Plan    Plan  Times per week: BID x 7  Times per day: Twice a day  Specific instructions for Next Treatment: Progress mobility as tolerated  Current Treatment Recommendations: Strengthening, Home Exercise Program, ROM, Balance Training, Endurance Training, Functional Mobility

## 2021-12-14 NOTE — CONSULTS
optimized preop and patient underwent a left total knee replacement along with that patient had acute postop anemia    Patient's course of hospitalization was complicated with persistent intermittent fevers and as a part of that patient underwent evaluations including a CT of the chest and was found to have left lower lobe mass and a pulmonary consult was requested for the same  Patient when seen earlier today states that she came to the hospital for elective knee surgery and patient has had some discomfort in the left leg postop as expected but patient did not complain of anything else per se, patient was not complaining of any significant cough expectoration shortness of breath or wheezing which was more than usual, patient did not have any significant rhinorrhea nasal congestion sinus congestion postnasal drainage, patient did not have any otalgia no ear discharge, patient did not have any significant sore throat or difficulty in swallowing, no coughing or choking while eating, no odynophagia or dysphagia,, no pleuritic chest pain, patient does not have any significant chills or rigors, no palpitations, patient was not complaining of any epistaxis or hemoptysis, patient does not have any abdominal discomfort nausea vomiting or any altered bowel habits or any dysuria or hematuria, patient does not have any significant increasing leg edema, patient states that she does not have any history of any exposures prior to coming to the hospital, patient is a former smoker who quit about 10 years back, patient does not have any history of any occupational hazards, patient does not have any change in the ambient environment any sick contacts, patient does not have any history of chest wall trauma or any surgeries of the chest, patient on questioning further states that she did not have any exposure to any asbestos exposure, patient did not have any pets as parakeets, patient does not give history of any exposure to any patients around the house, patient does not have any history of being on a farm or exposure to farm animals, patient gives history of any appetite or weight loss, patient does not have any other pertinent review of system of concern     Patient Active Problem List    Diagnosis Date Noted    Lung mass 12/14/2021    Lung nodule 12/14/2021    Granulomatous lung disease ([address] 12/14/2021    Former smoker 12/14/2021    Intrahepatic bile duct dilation 12/14/2021    Hyponatremia 12/14/2021    DM (diabetes mellitus), secondary uncontrolled ([address] 12/14/2021    Elevated parathyroid hormone 12/14/2021    Status post total left knee replacement 12/14/2021    Pulmonary HTN ([address] 12/14/2021    Postoperative anemia due to acute blood loss 12/14/2021    Primary osteoarthritis of left knee 05/25/2021    Acute pain of left knee 05/25/2021    Toe osteomyelitis, right ([address] 01/10/2019    Cellulitis of right foot 01/10/2019    Cellulitis of right leg     Chronic kidney disease (CKD), stage V ([address] 08/30/2016    Hypoxia     Hypervolemia     ESRD on dialysis ([address]     CKD (chronic kidney disease), stage IV ([address] 31/88/5833    Diastolic dysfunction with acute on chronic heart failure ([address] 08/07/2016    Acute on chronic diastolic congestive heart failure (HCC)     Diabetic ulcer of left foot associated with type 2 diabetes mellitus ([address] 07/18/2016    Hyperkalemia 07/13/2016    Anemia due to chronic kidney disease 04/08/2016    Nephrotic syndrome 01/08/2016    Essential hypertension     Secondary hypertension 01/07/2016    Hyperlipidemia 01/07/2016    Hypoglycemia 01/07/2016    Anxiety about health 01/07/2016    Cellulitis 01/06/2016    MEY (acute kidney injury) ([address] 01/06/2016    Type 2 diabetes mellitus with complication, without long-term current use of insulin ([address] 01/06/2016       Past Medical History:   Diagnosis Date    Arthritis     Chronic kidney disease     Diabetes mellitus ([address]     Dialysis patient [facility])     M W F    [facility]     Hemodialysis patient [facility])     M-W-F    Hyperkalemia 2016    Hyperlipidemia     Hypertension     OA (osteoarthritis)     Retinopathy     Sepsis ([facility]     Thyroid disease     Wears dentures         Past Surgical History:   Procedure Laterality Date    DIALYSIS FISTULA CREATION Left 08/10/2016    Dr. Jones Poles - upper arm, basilic vein transposition    EYE SURGERY Left 2018    catarct removal with lens implant     OTHER SURGICAL HISTORY Right 2019    partial foot amputation    TOE AMPUTATION Right 2019    PARTIAL FOOT AMPUTATION WITH GRAFT APPLICATION performed by Jessica Gleason DPM at [address] Left 2021    LEFT TOTAL KNEE ARTHROPLASTY performed by Leon Alberto MD at / [facility] reviewed. No pertinent family history. Social History     Tobacco Use    Smoking status: Former Smoker     Quit date: 2014     Years since quittin.4    Smokeless tobacco: Never Used   Substance Use Topics    Alcohol use: No        No Known Allergies            Physical Exam:  Blood pressure (!) 93/49, pulse 81, temperature 98.4 °F (36.9 °C), temperature source Oral, resp. rate 18, height 5' 7\" (1.702 m), weight 180 lb 8.9 oz (81.9 kg), SpO2 100 %, not currently breastfeeding.'   Constitutional:  No acute distress. HENT:  Oropharynx is clear and moist. No thyromegaly. Eyes:  Conjunctivae are normal. Pupils equal, round, and reactive to light. No scleral icterus. Neck: . No tracheal deviation present. No obvious thyroid mass. Cardiovascular: Normal rate, regular rhythm, normal heart sounds. No right ventricular heave. No lower extremity edema. Pulmonary/Chest: No wheezes. No rales. Chest wall is not dull to percussion. No accessory muscle usage or stridor. Decreased breath sound intensity  Abdominal: Soft. Bowel sounds present. No distension or hernia. No tenderness. Musculoskeletal: No cyanosis. No clubbing. No obvious joint deformity. Lymphadenopathy: No cervical or supraclavicular adenopathy. Skin: Skin is warm and dry. No rash or nodules on the exposed extremities. Psychiatric: Normal mood and affect. Behavior is normal.  No anxiety. Neurologic: Alert, awake and oriented. PERRL. Speech fluent        Results:  CBC:   Recent Labs     12/12/21  0748 12/13/21  0810 12/14/21  0657   WBC 9.9 9.1 6.2   HGB 7.8* 7.2* 7.4*   HCT 24.0* 21.9* 23.1*   MCV 93.8 92.9 94.6    198 212     BMP:   Recent Labs     12/12/21  0748 12/13/21  0810 12/14/21  0657   * 130* 130*   K 4.5 4.9 5.2*   CL 94* 94* 96*   CO2 21 19* 21   PHOS  --  6.3*  --    BUN 50* 71* 36*   CREATININE 7.2* 9.2* 5.4*       Imaging:  I have reviewed radiology images personally. US GALLBLADDER RUQ   Final Result   Mild gallbladder wall thickening either due to the partially contracted state   of the gallbladder or wall thickening from gallbladder wall   inflammation-early cholecystitis. Gallstones are seen      Mild heterogeneous echotexture throughout the liver, either technical or due   to diffuse hepatocellular disease such as fatty infiltration      Echogenic right kidney, suggesting medical renal disease      RECOMMENDATIONS:   Unavailable         CT CHEST ABDOMEN PELVIS W CONTRAST   Final Result   1. Left lower lobe mass. Pulmonary follow-up is recommended. Additional guidelines are provided below based upon this dominant mass. 2. 1 additional pulmonary nodule in the right lower lobe. 3. Trace associated pleural fluid in the left chest.   4. Granulomatous changes are seen in the chest and abdomen. 5. Cholelithiasis with gallbladder mucosal thickening and mild intrahepatic   biliary dilatation. Acute cholecystitis should be considered clinically as   potential source for patient's symptoms. Follow-up ultrasound or HIDA scan   may be considered if indicated clinically. RECOMMENDATIONS:   Fleischner Society guidelines for follow-up and management of incidentally   detected pulmonary nodules:      Single Solid Nodule:      Single solid lung nodule 9+ mm: Consider non-contrast chest CT at 3 months,   PET/CT, or tissue sampling. If this nodule was detected on an incomplete   chest CT, first recommend a prompt, non-contrast chest CT.      - Low risk patients include individuals with minimal or absent history of   smoking and other known risk factors. - High risk patients include individuals with a history or smoking or known   risk factors. Reference: Radiology 2017   http://pubs. rsna.org/doi/full/10.1148/radiol. 6267771126         XR CHEST PORTABLE   Final Result   No acute process. XR KNEE LEFT (1-2 VIEWS)   Final Result      Left total knee arthroplasty which is in anatomic alignment with no evidence   of fracture or loosening. Soft tissue air is consistent with the recent   surgery. Head           XR KNEE LEFT (1-2 VIEWS)    Result Date: 12/8/2021  EXAM: XR Left Knee, 1 or 2 Views EXAM DATE/TIME: 12/8/2021 5:09 pm CLINICAL HISTORY: ORDERING SYSTEM PROVIDED  s/p L TKA  TECHNOLOGIST PROVIDED HISTORY:  Of operative side while in recovery room. Reason for exam:->s/p L TKA  Reason for Exam: s/p L TKA TECHNIQUE: Frontal and/or lateral views of the left knee. COMPARISON: 11/02/2021 FINDINGS: Bones/joints:  Left total knee arthroplasty which is in anatomic alignment with no evidence of fracture or loosening. Soft tissue air is consistent with the recent surgery. Soft tissues:  No acute findings. Left total knee arthroplasty which is in anatomic alignment with no evidence of fracture or loosening. Soft tissue air is consistent with the recent surgery.   Head     XR CHEST PORTABLE    Result Date: 12/13/2021  EXAMINATION: ONE XRAY VIEW OF THE CHEST 12/13/2021 10:05 am COMPARISON: 06/09/2017 HISTORY: ORDERING SYSTEM PROVIDED HISTORY: fever TECHNOLOGIST PROVIDED HISTORY: Reason for exam:->fever Reason for Exam: pt states she has been running a low grade fever for a couple days, denies chest pain or SOB FINDINGS: The lungs are without acute focal process. There is no effusion or pneumothorax. The cardiomediastinal silhouette is stable. The osseous structures are stable. No acute process. CT CHEST ABDOMEN PELVIS W CONTRAST    Result Date: 12/13/2021  EXAMINATION: CT OF THE CHEST, ABDOMEN, AND PELVIS WITH CONTRAST 12/13/2021 7:15 pm TECHNIQUE: CT of the chest, abdomen and pelvis was performed with the administration of intravenous contrast. Multiplanar reformatted images are provided for review. Dose modulation, iterative reconstruction, and/or weight based adjustment of the mA/kV was utilized to reduce the radiation dose to as low as reasonably achievable. COMPARISON: None HISTORY: ORDERING SYSTEM PROVIDED HISTORY: fever of uncertain etiology TECHNOLOGIST PROVIDED HISTORY: Reason for exam:->fever of uncertain etiology Additional Contrast?->None Reason for Exam: low grade fever, left TKR 5 days ago FINDINGS: Chest: Mediastinum: The heart is mildly enlarged. Small pericardial effusion. Aorta and pulmonary arteries are normal.  Calcified mediastinal and left hilar lymph nodes. No adenopathy. Thyroid and esophagus unremarkable. There is a small sliding-type hiatal hernia. Lungs/pleura: The airways are patent. Trace pleural fluid on the left. 4.5 x 2.7 cm mass that is heterogeneous in attenuation with areas of punctate calcification in the left lower lobe. This is inferior to a calcified granuloma that has been stable since 2017, but this mass is new since that time based upon review of prior chest radiographs. There is also a nodule in the right lower lobe measuring 1.4 x 1.3 cm. Soft Tissues/Bones: No skeletal abnormalities are appreciated within the chest. Abdomen/Pelvis: Organs: Extensive calcified granulomata in the liver and spleen.   1.0 cm low attenuating lesion at the dome that is indeterminate. There is another subtle low attenuating lesion in segment 6 that cannot be characterized. The gallbladder demonstrates cholelithiasis. There is diffuse mucosal thickening/edema. No pericholecystic fluid. Mild intrahepatic biliary dilatation. The common bile duct measures 8 mm. The pancreas is normal. The adrenal glands are normal.  Cortical thinning in the kidneys suggesting chronic medical renal disease. GI/Bowel: The stomach, duodenum and small bowel are normal. A normal appendix is visualized. Mild diverticular changes in the sigmoid colon. No inflammation. Pelvis: The bladder is unremarkable. The uterus and adnexa are normal. Peritoneum/Retroperitoneum: The aorta tapers normally. No lymph node enlargement. Bones/Soft Tissues: No significant skeletal abnormalities. 1. Left lower lobe mass. Pulmonary follow-up is recommended. Additional guidelines are provided below based upon this dominant mass. 2. 1 additional pulmonary nodule in the right lower lobe. 3. Trace associated pleural fluid in the left chest. 4. Granulomatous changes are seen in the chest and abdomen. 5. Cholelithiasis with gallbladder mucosal thickening and mild intrahepatic biliary dilatation. Acute cholecystitis should be considered clinically as potential source for patient's symptoms. Follow-up ultrasound or HIDA scan may be considered if indicated clinically. RECOMMENDATIONS: Fleischner Society guidelines for follow-up and management of incidentally detected pulmonary nodules: Single Solid Nodule: Single solid lung nodule 9+ mm: Consider non-contrast chest CT at 3 months, PET/CT, or tissue sampling. If this nodule was detected on an incomplete chest CT, first recommend a prompt, non-contrast chest CT. - Low risk patients include individuals with minimal or absent history of smoking and other known risk factors.  - High risk patients include individuals with a history or smoking or known  Gallstones are seen       Mild heterogeneous echotexture throughout the liver, either technical or due   to diffuse hepatocellular disease such as fatty infiltration       Echogenic right kidney, suggesting medical renal disease         Assessment:  Active Problems:    ESRD on dialysis [facility])    Primary osteoarthritis of left knee    Lung mass    Lung nodule    Granulomatous lung disease (HCC)    Former smoker    Intrahepatic bile duct dilation    Hyponatremia    DM (diabetes mellitus), secondary uncontrolled (HCC)    Elevated parathyroid hormone    Status post total left knee replacement    Pulmonary HTN (HCC)    Postoperative anemia due to acute blood loss  Resolved Problems:    * No resolved hospital problems.  *          Plan:   · Oxygen supplementation, if required, to keep saturation being 90-94% only  · Patient was on room air oxygen when seen  · Pulmonary toilet  · Patient was shown the CT of the chest /abdomen and patient does have a left lower lobe mass along with a small central calcification along with that patient also has noncalcified right lung nodule  · Patient also has a significant radiological finding suggestive of granulomatous lung disease and also patient has granulomatous changes in liver and spleen  · Patient was told about the differential diagnosis of the lung nodule and lung mass which includes inflammation or benign tumor with differential diagnosis including malignancy-the pretest probability of malignancy being equivocal or low  · Patient's lung findings are not causing the patient to have fever  · Patient does have cholelithiasis but with possible cholecystitis and also intrahepatic dilatation which may be causing patient to have fever  · Patient has a history of E. coli UTI in the past  · Patient has been put on Zosyn and vancomycin by the internal medicine team  · Patient is on Zosyn to be continued for now but the dose was changed to 2.25 g IV piggyback every 8 h  · Will hold off on any vancomycin for now and reassess  · Titration of antimicrobials as per clinical status and cultures  · Patient's right upper quadrant sonogram was reviewed  · Patient does not have any clinical signs of acute cholecystitis  · Internal medicine team to decide if patient needs any HIDA scan/?surgical opinion   · Patient was told about the options in terms of the lung mass-which includes CT-guided lung biopsy versus a PET scan as an outpatient and if hypermetabolic lesion then considering biopsy at that time and pros and cons of each approach was discussed in detail  · Patient wants to recuperate from the surgery and then do something-consider PET scan after discharge/repeat CT to do the needful  · Patient has history of elevated PTH in the past-will send repeat PTH  · Patient does have history of diastolic dysfunction and pulmonary hypertension on echo in 2016  · Fluid management/dialysis as per nephrology  · Monitor H&H closely  · Consider packed RBC transfusion if the hemoglobin drops below 7 g%  · Patient is on Retacrit  · Consider IV Venofer if deemed appropriate  · Insulins as per IM  · Synthroid as per IM  · PUD and DVT prophylaxis as per IM   · PT/OT     Case discussed with patient and nursing          Electronically signed by:  Vesna Horne MD    12/14/2021    8:25 PM.

## 2021-12-14 NOTE — PROGRESS NOTES
Hospitalist Progress Note      PCP: Al De La Vega, APRN - CNP    Date of Admission: 12/6/2021    Chief Complaint: Left knee pain    Hospital Course:   66 y.o. female with a PMH of OA, ESRD on HD, HTN, Hypothyroidism, and DM who we are asked to see/evaluate by Noemi Jimenez MD for medical management. Patient admitted for elective left knee replacement surgery. Subjective:    Pt is on RA. Afebrile. VSS. No dyspnea, cough or chest pain. No N/V/D. No abdominal pain.     Medications:  Reviewed    Infusion Medications    sodium chloride 25 mL (12/13/21 2151)    dextrose       Scheduled Medications    epoetin alice-epbx  15,000 Units SubCUTAneous Once per day on Mon Wed Fri    vancomycin (VANCOCIN) intermittent dosing (placeholder)   Other RX Placeholder    piperacillin-tazobactam  3,375 mg IntraVENous Q12H    insulin glargine  10 Units SubCUTAneous Nightly    heparin (porcine)  5,000 Units SubCUTAneous 3 times per day    sodium chloride flush  5-40 mL IntraVENous 2 times per day    acetaminophen  650 mg Oral Q6H    atorvastatin  10 mg Oral Nightly    carvedilol  12.5 mg Oral BID WC    gabapentin  300 mg Oral QPM    levothyroxine  50 mcg Oral Daily    sertraline  25 mg Oral Daily    insulin lispro  0-6 Units SubCUTAneous TID WC    insulin lispro  0-3 Units SubCUTAneous Nightly     PRN Meds: docusate sodium, cyclobenzaprine, sodium chloride flush, sodium chloride, ondansetron **OR** ondansetron, oxyCODONE **OR** oxyCODONE, glucose, dextrose, glucagon (rDNA), dextrose, hydrALAZINE, labetalol, sodium chloride flush, polyethylene glycol      Intake/Output Summary (Last 24 hours) at 12/14/2021 1113  Last data filed at 12/14/2021 0616  Gross per 24 hour   Intake 640 ml   Output 1020 ml   Net -380 ml       Physical Exam Performed:    BP (!) 123/55   Pulse 82   Temp 98.6 °F (37 °C) (Oral)   Resp 18   Ht 5' 7\" (1.702 m)   Wt 180 lb 8.9 oz (81.9 kg)   SpO2 96%   BMI 28.28 kg/m²     General appearance: mild distress, appears stated age and cooperative. HEENT: Normal cephalic, atraumatic without obvious deformity. Pupils equal, round, and reactive to light. Extra ocular muscles intact. Conjunctivae/corneas clear. Neck: Supple, with full range of motion. No jugular venous distention. Trachea midline. Respiratory:  Normal respiratory effort. Clear to auscultation, bilaterally without Rales/Wheezes/Rhonchi. Cardiovascular: Regular rate and rhythm with normal S1/S2 without murmurs, rubs or gallops. Abdomen: Soft, non-tender, non-distended with normal bowel sounds. Musculoskeletal: Left knee decreased rom, DSG CDI, moderate swelling  Skin: Skin color, texture, turgor normal.  No rashes or lesions. Neurologic:  Neurovascularly intact without any focal sensory/motor deficits. Cranial nerves: II-XII intact, grossly non-focal.  Psychiatric: Alert and oriented, thought content appropriate, normal insight  Capillary Refill: Brisk,3 seconds, normal   Peripheral Pulses: +2 palpable, equal bilaterally    Labs:   Recent Labs     12/12/21  0748 12/13/21  0810 12/14/21  0657   WBC 9.9 9.1 6.2   HGB 7.8* 7.2* 7.4*   HCT 24.0* 21.9* 23.1*    198 212     Recent Labs     12/12/21  0748 12/13/21  0810 12/14/21  0657   * 130* 130*   K 4.5 4.9 5.2*   CL 94* 94* 96*   CO2 21 19* 21   BUN 50* 71* 36*   CREATININE 7.2* 9.2* 5.4*   CALCIUM 8.6 8.6 9.0   PHOS  --  6.3*  --      Recent Labs     12/14/21  0657   AST 22   ALT <5*   BILIDIR <0.2   BILITOT 0.5   ALKPHOS 80     Urinalysis:      Lab Results   Component Value Date    NITRU Negative 09/14/2021    WBCUA >100 09/14/2021    BACTERIA 3+ 09/14/2021    RBCUA 11-20 09/14/2021    BLOODU MODERATE 09/14/2021    SPECGRAV 1.015 09/14/2021    GLUCOSEU Negative 09/14/2021       Radiology:  CT CHEST ABDOMEN PELVIS W CONTRAST   Final Result   1. Left lower lobe mass. Pulmonary follow-up is recommended. Additional guidelines are provided below based upon this dominant mass.    2. 1 additional pulmonary nodule in the right lower lobe. 3. Trace associated pleural fluid in the left chest.   4. Granulomatous changes are seen in the chest and abdomen. 5. Cholelithiasis with gallbladder mucosal thickening and mild intrahepatic   biliary dilatation. Acute cholecystitis should be considered clinically as   potential source for patient's symptoms. Follow-up ultrasound or HIDA scan   may be considered if indicated clinically. RECOMMENDATIONS:   Fleischner Society guidelines for follow-up and management of incidentally   detected pulmonary nodules:      Single Solid Nodule:      Single solid lung nodule 9+ mm: Consider non-contrast chest CT at 3 months,   PET/CT, or tissue sampling. If this nodule was detected on an incomplete   chest CT, first recommend a prompt, non-contrast chest CT.      - Low risk patients include individuals with minimal or absent history of   smoking and other known risk factors. - High risk patients include individuals with a history or smoking or known   risk factors. Reference: Radiology 2017   http://pubs. rsna.org/doi/full/10.1148/radiol. 6740773527         XR CHEST PORTABLE   Final Result   No acute process. XR KNEE LEFT (1-2 VIEWS)   Final Result      Left total knee arthroplasty which is in anatomic alignment with no evidence   of fracture or loosening. Soft tissue air is consistent with the recent   surgery. Head             Assessment/Plan:    Active Hospital Problems    Diagnosis     Primary osteoarthritis of left knee [M17.12]        Left knee OA s/p TKA on 12/8/21:  - Postoperative management per Orthopedic Surgery. - Continue prn analgesia, bowel regimen, IS, DVT prophylaxis and PT/OT. Fever of uncertain etiology:  - CXR shows clear lungs. Rapid COVID negative. Blood cultures are pending. No diarrhea or abdominal pain. Left knee wound appears okay per Ortho. No other skin issues noted.    - Pt started on empiric vancomycin and

## 2021-12-14 NOTE — CARE COORDINATION
CM met with pt at bedside to discuss DCP and therapy recs for SNF. Pt lives alone and states that she has little help at home as son works in [employer] and DIL started a new job. IPTA active with Susan VANEGAS in [facility]. Referral placed to [facility], closest SNF to [place], LVM with Denzel Gann in admissions. Pt tearful today and not wanting to talk much. Will see pt again tomorrow.  CM following-Armida Hilliard RN

## 2021-12-15 LAB
A/G RATIO: 0.8 (ref 1.1–2.2)
ALBUMIN SERPL-MCNC: 3 G/DL (ref 3.4–5)
ALP BLD-CCNC: 76 U/L (ref 40–129)
ALT SERPL-CCNC: <5 U/L (ref 10–40)
ANION GAP SERPL CALCULATED.3IONS-SCNC: 16 MMOL/L (ref 3–16)
AST SERPL-CCNC: 22 U/L (ref 15–37)
BILIRUB SERPL-MCNC: 0.4 MG/DL (ref 0–1)
BUN BLDV-MCNC: 50 MG/DL (ref 7–20)
CALCIUM SERPL-MCNC: 9 MG/DL (ref 8.3–10.6)
CHLORIDE BLD-SCNC: 96 MMOL/L (ref 99–110)
CO2: 20 MMOL/L (ref 21–32)
CREAT SERPL-MCNC: 7.4 MG/DL (ref 0.6–1.2)
GFR AFRICAN AMERICAN: 7
GFR NON-AFRICAN AMERICAN: 5
GLUCOSE BLD-MCNC: 105 MG/DL (ref 70–99)
GLUCOSE BLD-MCNC: 127 MG/DL (ref 70–99)
GLUCOSE BLD-MCNC: 147 MG/DL (ref 70–99)
GLUCOSE BLD-MCNC: 175 MG/DL (ref 70–99)
HCT VFR BLD CALC: 22.4 % (ref 36–48)
HEMOGLOBIN: 7.2 G/DL (ref 12–16)
MCH RBC QN AUTO: 29.9 PG (ref 26–34)
MCHC RBC AUTO-ENTMCNC: 32.2 G/DL (ref 31–36)
MCV RBC AUTO: 92.8 FL (ref 80–100)
PDW BLD-RTO: 18.6 % (ref 12.4–15.4)
PERFORMED ON: ABNORMAL
PLATELET # BLD: 262 K/UL (ref 135–450)
PMV BLD AUTO: 9.3 FL (ref 5–10.5)
POTASSIUM REFLEX MAGNESIUM: 4.9 MMOL/L (ref 3.5–5.1)
RBC # BLD: 2.41 M/UL (ref 4–5.2)
SODIUM BLD-SCNC: 132 MMOL/L (ref 136–145)
TOTAL PROTEIN: 7 G/DL (ref 6.4–8.2)
WBC # BLD: 6.2 K/UL (ref 4–11)

## 2021-12-15 PROCEDURE — 80053 COMPREHEN METABOLIC PANEL: CPT

## 2021-12-15 PROCEDURE — 99223 1ST HOSP IP/OBS HIGH 75: CPT | Performed by: SURGERY

## 2021-12-15 PROCEDURE — 85027 COMPLETE CBC AUTOMATED: CPT

## 2021-12-15 PROCEDURE — 6370000000 HC RX 637 (ALT 250 FOR IP): Performed by: ORTHOPAEDIC SURGERY

## 2021-12-15 PROCEDURE — 97530 THERAPEUTIC ACTIVITIES: CPT

## 2021-12-15 PROCEDURE — 90935 HEMODIALYSIS ONE EVALUATION: CPT

## 2021-12-15 PROCEDURE — 99232 SBSQ HOSP IP/OBS MODERATE 35: CPT | Performed by: INTERNAL MEDICINE

## 2021-12-15 PROCEDURE — 97535 SELF CARE MNGMENT TRAINING: CPT

## 2021-12-15 PROCEDURE — 6370000000 HC RX 637 (ALT 250 FOR IP): Performed by: NURSE PRACTITIONER

## 2021-12-15 PROCEDURE — 94761 N-INVAS EAR/PLS OXIMETRY MLT: CPT

## 2021-12-15 PROCEDURE — 6360000002 HC RX W HCPCS: Performed by: ORTHOPAEDIC SURGERY

## 2021-12-15 PROCEDURE — 2580000003 HC RX 258: Performed by: ORTHOPAEDIC SURGERY

## 2021-12-15 PROCEDURE — 2580000003 HC RX 258: Performed by: INTERNAL MEDICINE

## 2021-12-15 PROCEDURE — 83970 ASSAY OF PARATHORMONE: CPT

## 2021-12-15 PROCEDURE — 6360000002 HC RX W HCPCS: Performed by: INTERNAL MEDICINE

## 2021-12-15 PROCEDURE — 2700000000 HC OXYGEN THERAPY PER DAY

## 2021-12-15 PROCEDURE — 97116 GAIT TRAINING THERAPY: CPT

## 2021-12-15 PROCEDURE — 36415 COLL VENOUS BLD VENIPUNCTURE: CPT

## 2021-12-15 PROCEDURE — 97110 THERAPEUTIC EXERCISES: CPT

## 2021-12-15 PROCEDURE — 1200000000 HC SEMI PRIVATE

## 2021-12-15 RX ORDER — OXYCODONE HYDROCHLORIDE 5 MG/1
5 TABLET ORAL EVERY 4 HOURS PRN
Qty: 12 TABLET | Refills: 0 | Status: SHIPPED | OUTPATIENT
Start: 2021-12-15 | End: 2021-12-17

## 2021-12-15 RX ORDER — CYCLOBENZAPRINE HCL 5 MG
5 TABLET ORAL 3 TIMES DAILY PRN
Qty: 21 TABLET | Refills: 0
Start: 2021-12-15 | End: 2021-12-22

## 2021-12-15 RX ORDER — DOCUSATE SODIUM 100 MG/1
100 CAPSULE, LIQUID FILLED ORAL 2 TIMES DAILY PRN
Qty: 20 CAPSULE | Refills: 0
Start: 2021-12-15 | End: 2021-12-25

## 2021-12-15 RX ORDER — HEPARIN SODIUM 5000 [USP'U]/ML
5000 INJECTION, SOLUTION INTRAVENOUS; SUBCUTANEOUS EVERY 8 HOURS SCHEDULED
Qty: 42 ML | Refills: 0
Start: 2021-12-15 | End: 2021-12-29

## 2021-12-15 RX ADMIN — HEPARIN SODIUM 5000 UNITS: 5000 INJECTION INTRAVENOUS; SUBCUTANEOUS at 20:14

## 2021-12-15 RX ADMIN — INSULIN LISPRO 1 UNITS: 100 INJECTION, SOLUTION INTRAVENOUS; SUBCUTANEOUS at 17:31

## 2021-12-15 RX ADMIN — PIPERACILLIN AND TAZOBACTAM 2250 MG: 2; .25 INJECTION, POWDER, LYOPHILIZED, FOR SOLUTION INTRAVENOUS at 04:04

## 2021-12-15 RX ADMIN — ATORVASTATIN CALCIUM 10 MG: 10 TABLET, FILM COATED ORAL at 20:13

## 2021-12-15 RX ADMIN — GABAPENTIN 300 MG: 300 CAPSULE ORAL at 17:31

## 2021-12-15 RX ADMIN — INSULIN LISPRO 1 UNITS: 100 INJECTION, SOLUTION INTRAVENOUS; SUBCUTANEOUS at 20:12

## 2021-12-15 RX ADMIN — SERTRALINE 25 MG: 50 TABLET, FILM COATED ORAL at 12:14

## 2021-12-15 RX ADMIN — EPOETIN ALFA-EPBX 2000 UNITS: 2000 INJECTION, SOLUTION INTRAVENOUS; SUBCUTANEOUS at 09:46

## 2021-12-15 RX ADMIN — PIPERACILLIN AND TAZOBACTAM 2250 MG: 2; .25 INJECTION, POWDER, LYOPHILIZED, FOR SOLUTION INTRAVENOUS at 17:34

## 2021-12-15 RX ADMIN — PIPERACILLIN AND TAZOBACTAM 2250 MG: 2; .25 INJECTION, POWDER, LYOPHILIZED, FOR SOLUTION INTRAVENOUS at 12:14

## 2021-12-15 RX ADMIN — HEPARIN SODIUM 5000 UNITS: 5000 INJECTION INTRAVENOUS; SUBCUTANEOUS at 14:50

## 2021-12-15 RX ADMIN — SODIUM CHLORIDE 25 ML: 9 INJECTION, SOLUTION INTRAVENOUS at 12:11

## 2021-12-15 RX ADMIN — EPOETIN ALFA-EPBX 10000 UNITS: 10000 INJECTION, SOLUTION INTRAVENOUS; SUBCUTANEOUS at 09:46

## 2021-12-15 RX ADMIN — EPOETIN ALFA-EPBX 3000 UNITS: 3000 INJECTION, SOLUTION INTRAVENOUS; SUBCUTANEOUS at 09:46

## 2021-12-15 RX ADMIN — INSULIN GLARGINE 10 UNITS: 100 INJECTION, SOLUTION SUBCUTANEOUS at 20:13

## 2021-12-15 RX ADMIN — CARVEDILOL 12.5 MG: 6.25 TABLET, FILM COATED ORAL at 17:31

## 2021-12-15 ASSESSMENT — PAIN DESCRIPTION - PAIN TYPE
TYPE: SURGICAL PAIN
TYPE: SURGICAL PAIN

## 2021-12-15 ASSESSMENT — PAIN SCALES - GENERAL
PAINLEVEL_OUTOF10: 0

## 2021-12-15 ASSESSMENT — PAIN DESCRIPTION - LOCATION
LOCATION: KNEE
LOCATION: KNEE

## 2021-12-15 ASSESSMENT — PAIN SCALES - WONG BAKER
WONGBAKER_NUMERICALRESPONSE: 4
WONGBAKER_NUMERICALRESPONSE: 4

## 2021-12-15 ASSESSMENT — PAIN DESCRIPTION - ORIENTATION
ORIENTATION: LEFT
ORIENTATION: LEFT

## 2021-12-15 NOTE — PROGRESS NOTES
Department of Orthopedic Surgery  Physician Assistant   Progress Note    Subjective:       Systemic or Specific Complaints: Pt comfortable in bed, just returned from dialysis. Feels well today, knee is stiff, had not worked with PT/OT yet at time of interview. Pt is pleased with her knee progress over the last few days    Objective:     Patient Vitals for the past 24 hrs:   BP Temp Temp src Pulse Resp SpO2 Weight   12/15/21 1139 (!) 144/56 97.9 °F (36.6 °C) Oral 91 18 100 % --   12/15/21 1054 (!) 133/57 98.2 °F (36.8 °C) -- 84 16 -- 179 lb 10.8 oz (81.5 kg)   12/15/21 0745 (!) 123/48 98 °F (36.7 °C) -- 80 16 -- 181 lb 10.5 oz (82.4 kg)   12/15/21 0339 (!) 117/55 98.2 °F (36.8 °C) Oral 81 16 92 % --   12/15/21 0007 (!) 100/45 98 °F (36.7 °C) Oral 76 16 95 % --   12/14/21 1417 -- 98.4 °F (36.9 °C) Oral 81 18 100 % --   12/14/21 1416 (!) 93/49 -- -- -- -- -- --       General: alert, appears stated age and cooperative   Wound: Wound clean and dry no evidence of infection. Motion: Painful range of Motion in affected extremity, knee is held in approx 10 degrees of flexion. Can actively flex knee to 40 degrees. With queueing pt can actively extend knee to almost neutral   DVT Exam: No evidence of DVT seen on physical exam.  Negative Evans's sign. Additional exam: Pt is laying in bed, wedge pillow in place laterally. Moderate swelling to left leg, no erythema or ecchymosis  nontender to palpation around anterolateral thigh  Knee is stiff, is able to fully extend knee with posterior tactile queueing.  ACtive knee flexion to 40 degrees  FHL, EHL, ant tib, and gastroc motor intact  Sensation intact to LLE, pt has neuropathy at baseline  Distal pulses 2+, feet WWP  Edema around the knee, no fluctuance noted upon palpation        Data Review  CBC:   Lab Results   Component Value Date    WBC 6.2 12/15/2021    RBC 2.41 12/15/2021    HGB 7.2 12/15/2021    HCT 22.4 12/15/2021     12/15/2021       Renal:   Lab Results Component Value Date     12/15/2021    K 4.9 12/15/2021    CL 96 12/15/2021    CO2 20 12/15/2021    BUN 50 12/15/2021    CREATININE 7.4 12/15/2021    GLUCOSE 127 12/15/2021    CALCIUM 9.0 12/15/2021            Assessment:     s/p left total knee arthroplasty. POD 7  Acute blood loss anemia - expected after surgery. Will monitor Hgb, has been stable in the 7s    Plan:      1:  WBAT LLE, pt will need to continue to work with PT/OT, improving at this time. Continue to have pillow propping up leg at ankle, do not prop up leg with pillow under the knee. Wedge pillow placed by PT to prevent ER of hip. 2:  Hgb stable at 7.2 today. Appreciate IM following pt for needs- pt is CKD on HD.   3:  Continue Deep venous thrombosis prophylaxis- heparin, SCDs, ambulation  4:  Continue Pain Control- oxycodone, tylenol. Added flexeril for muscle spasm  5:  PT/OT recommending SNF, CM following. Pt accepted at [facility]  6:  OK to DC from ortho standpoint. DC pending medical stability, general surgery consult placed today for possible cholecystitis, awaiting reccs. Hospitalist following.          Neris Odom

## 2021-12-15 NOTE — PROGRESS NOTES
[facility]    [facility]              [phone]                         Interval History and plan:     Dialysis done this morning  Electrolytes are stable  Pain under control  Gallbladder thickening in CT, no evidence of cholecystitis per surgery  Seen by pulmonologist for left lower lobe mass  Has granulomatous changes in the lung liver and spleen  Plan for possible PET scan for possible malignancy in the future  Plan:  Anemia- continue epogen  Continue to hold losartan because of low his blood pressure  Resume as soon as safely possible since he also has congestive heart failure and will benefit from that drug                   Assessment :        ESRD: on hemodialysis  Dialysis center/schedule:   Monday Wednesday Friday at [facility]    Volume status  [facility]- will get from center   2D Echo: 45 to 50% EF and grade 1 diastolic dysfunction on 2/5049    Hypertension  BP: (123-144)/(48-65)  Pulse:  [80-95]   Goal around 120-396 systolic    Anemia of CKD  Hgb:   Hb on admission - NA  Optimize with iron, aranesp    Renal Osteodystrophy  PO4- goal of below 5.5  Monitor and adjust meds    Dialysis Access  AV fistula    Nutrition on dialysis  Lab Results   Component Value Date    LABALBU 3.0 (L) 12/15/2021     Monitor, and will give nepro when possible             [facility] would like to thank Jessica Dubois MD   for opportunity to serve this patient      Please call with questions at-   24 Hrs Answering service [phone] or  7 am- 5 pm via Perfect serve or cell phone  Jam Malave MD          CC/reason for consult :     ESRD     HPI :     Shelbi Olea is a 66 y.o. female presented to   the hospital on 12/6/2021  for elective knee replacement surgery. She is known to have ESRD gets dialysis Monday Wednesday Friday under my care at [facility]. She is very regular with dialysis and has no problem with potassium.   Before the elective surgery, potassium was checked and it was high but hemolyzed because of which it is being repeated. We are consulted for ESRD and related issues    ROS:     Seen with-no family    positives in bold   Constitutional:  fever, chills, weakness, weight change, fatigue  Skin:  rash, pruritus, hair loss, bruising, dry skin, petechiae  Head, Face, Neck   headaches, swelling,  cervical adenopathy  Respiratory: shortness of breath, cough, or wheezing  Cardiovascular: chest pain, palpitations, dizzy, edema  Gastrointestinal: nausea, vomiting, diarrhea, constipation,belly pain    Yellow skin, blood in stool  Musculoskeletal:  back pain, muscle weakness, gait problems,       joint pain or swelling. Genitourinary:  dysuria, poor urine flow, flank pain, blood in urine  Neurologic:  vertigo, TIA'S, syncope, seizures, focal weakness  Psychosocial:  insomnia, anxiety, or depression.   Additional positive findings:                        All other remaining systems are negative or unable to obtain        PMH/PSH/SH/Family History:     Past Medical History:   Diagnosis Date    Arthritis     Chronic kidney disease     Diabetes mellitus ([facility]     Dialysis patient [facility])     M W F    ESRD (end stage renal disease) [facility]     Hemodialysis patient ([facility]     M-W-F    Hyperkalemia 07/13/2016    Hyperlipidemia     Hypertension     OA (osteoarthritis)     Retinopathy     Sepsis ([facility]     Thyroid disease     Wears dentures        Past Surgical History:   Procedure Laterality Date    DIALYSIS FISTULA CREATION Left 08/10/2016    Dr. Julienne Collins - upper arm, basilic vein transposition    EYE SURGERY Left 04/03/2018    catarct removal with lens implant     OTHER SURGICAL HISTORY Right 01/11/2019    partial foot amputation    TOE AMPUTATION Right 1/11/2019    PARTIAL FOOT AMPUTATION WITH GRAFT APPLICATION performed by Jax Bajwa DPM at [address] [place] [address] Left 12/8/2021    LEFT TOTAL KNEE ARTHROPLASTY performed by Mala Angel MD at [place] [address] reports that she quit smoking about 7 years ago. She has never used smokeless tobacco. She reports that she does not drink alcohol and does not use drugs. family history is not on file.          Medication:     Current Facility-Administered Medications: epoetin alice-epbx (RETACRIT) injection 3,000 Units, 3,000 Units, SubCUTAneous, Once per day on Mon Wed Fri **AND** epoetin alice-epbx (RETACRIT) injection 2,000 Units, 2,000 Units, SubCUTAneous, Once per day on Mon Wed Fri **AND** epoetin alice-epbx (RETACRIT) injection 10,000 Units, 10,000 Units, SubCUTAneous, Once per day on Mon Wed Fri  acetaminophen (TYLENOL) tablet 650 mg, 650 mg, Oral, Q6H PRN  piperacillin-tazobactam (ZOSYN) 2,250 mg in dextrose 5 % 50 mL IVPB (mini-bag), 2,250 mg, IntraVENous, Q8H  docusate sodium (COLACE) capsule 100 mg, 100 mg, Oral, BID PRN  cyclobenzaprine (FLEXERIL) tablet 5 mg, 5 mg, Oral, TID PRN  insulin glargine (LANTUS) injection vial 10 Units, 10 Units, SubCUTAneous, Nightly  heparin (porcine) injection 5,000 Units, 5,000 Units, SubCUTAneous, 3 times per day  sodium chloride flush 0.9 % injection 5-40 mL, 5-40 mL, IntraVENous, 2 times per day  sodium chloride flush 0.9 % injection 5-40 mL, 5-40 mL, IntraVENous, PRN  0.9 % sodium chloride infusion, 25 mL, IntraVENous, PRN  ondansetron (ZOFRAN-ODT) disintegrating tablet 4 mg, 4 mg, Oral, Q8H PRN **OR** ondansetron (ZOFRAN) injection 4 mg, 4 mg, IntraVENous, Q6H PRN  oxyCODONE (ROXICODONE) immediate release tablet 5 mg, 5 mg, Oral, Q4H PRN **OR** oxyCODONE (ROXICODONE) immediate release tablet 10 mg, 10 mg, Oral, Q4H PRN  atorvastatin (LIPITOR) tablet 10 mg, 10 mg, Oral, Nightly  carvedilol (COREG) tablet 12.5 mg, 12.5 mg, Oral, BID WC  gabapentin (NEURONTIN) capsule 300 mg, 300 mg, Oral, QPM  levothyroxine (SYNTHROID) tablet 50 mcg, 50 mcg, Oral, Daily  sertraline (ZOLOFT) tablet 25 mg, 25 mg, Oral, Daily  glucose (GLUTOSE) 40 % oral gel 15 g, 15 g, Oral, PRN  dextrose 50 % IV solution, 12.5 g, IntraVENous, PRN  glucagon (rDNA) injection 1 mg, 1 mg, IntraMUSCular, PRN  dextrose 5 % solution, 100 mL/hr, IntraVENous, PRN  insulin lispro (HUMALOG) injection vial 0-6 Units, 0-6 Units, SubCUTAneous, TID WC  insulin lispro (HUMALOG) injection vial 0-3 Units, 0-3 Units, SubCUTAneous, Nightly  hydrALAZINE (APRESOLINE) injection 10 mg, 10 mg, IntraVENous, Q4H PRN  labetalol (NORMODYNE;TRANDATE) injection 20 mg, 20 mg, IntraVENous, Q4H PRN  sodium chloride flush 0.9 % injection 5-40 mL, 5-40 mL, IntraVENous, PRN  polyethylene glycol (GLYCOLAX) packet 17 g, 17 g, Oral, Daily PRN       Vitals :     Vitals:    12/15/21 1427   BP: 137/65   Pulse: 95   Resp: 18   Temp: 98.5 °F (36.9 °C)   SpO2: 95%       I & O :       Intake/Output Summary (Last 24 hours) at 12/15/2021 1447  Last data filed at 12/15/2021 1054  Gross per 24 hour   Intake 500 ml   Output 1500 ml   Net -1000 ml        Physical Examination :     General appearance: Anxious- no, distressed- no, in good spirits- no  HEENT: Lips- normal, teeth- ok , oral mucosa- moist  Neck : Mass- no, appears symmetrical, JVD- not visible  Respiratory: Respiratory effort-  normal, wheeze- no, crackles - none  Cardiovascular:  Ausculation- No M/R/G, Edema none  Abdomen: visible mass- no, distention- no, scar- no, tenderness- no                            hepatosplenomegaly-  no  Musculoskeletal:  clubbing no,cyanosis- no , digital ischemia- no                           muscle strength- grossly normal , tone - grossly normal  Skin: rashes- no , ulcers- no, induration- no, tightening - no  Psychiatric:  Judgement and insight- normal           AAO X 3  Additional finding: -      LABS:     Recent Labs     12/13/21  0810 12/14/21  0657 12/15/21  0554   WBC 9.1 6.2 6.2   HGB 7.2* 7.4* 7.2*   HCT 21.9* 23.1* 22.4*    212 262     Recent Labs     12/13/21  0810 12/14/21  0657 12/15/21  0554   * 130* 132*   K 4.9 5.2* 4.9   CL 94* 96* 96*   CO2 19* 21 20* BUN 71* 36* 50*   CREATININE 9.2* 5.4* 7.4*   GLUCOSE 167* 132* 127*   PHOS 6.3*  --   --

## 2021-12-15 NOTE — CARE COORDINATION
CM met with pt at bedside in dialysis today to discuss DCP and SNF placement. Pt OK with SNF placement as she has not much help at home and with transportation. OK with Referral to [facility] and referral to [facility]. [facility] [place] [id number] [facility] [employer] Pt wanting writer to reach out to family as well. LVM with Luis A Alcaraz daughter in law. CM following-Armida Hilliard RN       ADDENDUM 1056: Spoke to [place] [id number] with [facility] and they can accept pt no cert needed and they working on setting up [facility] [place]. CM following-Armida Hilliard RN       ADDENDUM 4868: spoke to Zakiya Ziegler with [facility] [phone] Fax- [phone] HD in [place] and confirmed that pt's chair time is MWF at [phone]. Updated [place] [phone] With [facility] transportation has been arranged [facility] HD [facility] for this Friday to [place]. Pt has f/u appointment with Dr. Shweta caba for 12/21/21 at 1000. Updated Josselyn as well.  CM following-Armida Hilliard RN

## 2021-12-15 NOTE — PROGRESS NOTES
Hospitalist Progress Note      PCP: Kari Wilkerson, APRN - CNP    Date of Admission: 12/6/2021    Chief Complaint: Left knee pain    Hospital Course:   66 y.o. female with a PMH of OA, ESRD on HD, HTN, Hypothyroidism, and DM who we are asked to see/evaluate by Margretta Fabry, MD for medical management. Patient admitted for elective left knee replacement surgery. Subjective:    Pt is on RA. Afebrile. VSS. No dyspnea, cough or chest pain. No N/V/D. No abdominal pain.     Medications:  Reviewed    Infusion Medications    sodium chloride 25 mL (12/15/21 1211)    dextrose       Scheduled Medications    epoetin alice-epbx  3,000 Units SubCUTAneous Once per day on Mon Wed Fri    And    epoetin alice-epbx  2,000 Units SubCUTAneous Once per day on Mon Wed Fri    And    epoetin alice-epbx  10,000 Units SubCUTAneous Once per day on Mon Wed Fri    piperacillin-tazobactam  2,250 mg IntraVENous Q8H    insulin glargine  10 Units SubCUTAneous Nightly    heparin (porcine)  5,000 Units SubCUTAneous 3 times per day    sodium chloride flush  5-40 mL IntraVENous 2 times per day    atorvastatin  10 mg Oral Nightly    carvedilol  12.5 mg Oral BID WC    gabapentin  300 mg Oral QPM    levothyroxine  50 mcg Oral Daily    sertraline  25 mg Oral Daily    insulin lispro  0-6 Units SubCUTAneous TID WC    insulin lispro  0-3 Units SubCUTAneous Nightly     PRN Meds: acetaminophen, docusate sodium, cyclobenzaprine, sodium chloride flush, sodium chloride, ondansetron **OR** ondansetron, oxyCODONE **OR** oxyCODONE, glucose, dextrose, glucagon (rDNA), dextrose, hydrALAZINE, labetalol, sodium chloride flush, polyethylene glycol      Intake/Output Summary (Last 24 hours) at 12/15/2021 1444  Last data filed at 12/15/2021 1054  Gross per 24 hour   Intake 500 ml   Output 1500 ml   Net -1000 ml       Physical Exam Performed:    BP (!) 144/56   Pulse 91   Temp 97.9 °F (36.6 °C) (Oral)   Resp 18   Ht 5' 7\" (1.702 m)   Wt 179 lb 10.8 oz (81.5 kg)   SpO2 100%   BMI 28.14 kg/m²     General appearance: mild distress, appears stated age and cooperative. HEENT: Normal cephalic, atraumatic without obvious deformity. Pupils equal, round, and reactive to light. Extra ocular muscles intact. Conjunctivae/corneas clear. Neck: Supple, with full range of motion. No jugular venous distention. Trachea midline. Respiratory:  Normal respiratory effort. Clear to auscultation, bilaterally without Rales/Wheezes/Rhonchi. Cardiovascular: Regular rate and rhythm with normal S1/S2 without murmurs, rubs or gallops. Abdomen: Soft, non-tender, non-distended with normal bowel sounds. Musculoskeletal: Left knee decreased rom, DSG CDI, moderate swelling  Skin: Skin color, texture, turgor normal.  No rashes or lesions. Neurologic:  Neurovascularly intact without any focal sensory/motor deficits.  Cranial nerves: II-XII intact, grossly non-focal.  Psychiatric: Alert and oriented, thought content appropriate, normal insight  Capillary Refill: Brisk,3 seconds, normal   Peripheral Pulses: +2 palpable, equal bilaterally    Labs:   Recent Labs     12/13/21  0810 12/14/21  0657 12/15/21  0554   WBC 9.1 6.2 6.2   HGB 7.2* 7.4* 7.2*   HCT 21.9* 23.1* 22.4*    212 262     Recent Labs     12/13/21  0810 12/14/21  0657 12/15/21  0554   * 130* 132*   K 4.9 5.2* 4.9   CL 94* 96* 96*   CO2 19* 21 20*   BUN 71* 36* 50*   CREATININE 9.2* 5.4* 7.4*   CALCIUM 8.6 9.0 9.0   PHOS 6.3*  --   --      Recent Labs     12/14/21  0657 12/15/21  0554   AST 22 22   ALT <5* <5*   BILIDIR <0.2  --    BILITOT 0.5 0.4   ALKPHOS 80 76     Urinalysis:      Lab Results   Component Value Date    NITRU Negative 09/14/2021    WBCUA >100 09/14/2021    BACTERIA 3+ 09/14/2021    RBCUA 11-20 09/14/2021    BLOODU MODERATE 09/14/2021    SPECGRAV 1.015 09/14/2021    GLUCOSEU Negative 09/14/2021       Radiology:  US GALLBLADDER RUQ   Final Result   Mild gallbladder wall thickening either due to the partially contracted state   of the gallbladder or wall thickening from gallbladder wall   inflammation-early cholecystitis. Gallstones are seen      Mild heterogeneous echotexture throughout the liver, either technical or due   to diffuse hepatocellular disease such as fatty infiltration      Echogenic right kidney, suggesting medical renal disease      RECOMMENDATIONS:   Unavailable         CT CHEST ABDOMEN PELVIS W CONTRAST   Final Result   1. Left lower lobe mass. Pulmonary follow-up is recommended. Additional guidelines are provided below based upon this dominant mass. 2. 1 additional pulmonary nodule in the right lower lobe. 3. Trace associated pleural fluid in the left chest.   4. Granulomatous changes are seen in the chest and abdomen. 5. Cholelithiasis with gallbladder mucosal thickening and mild intrahepatic   biliary dilatation. Acute cholecystitis should be considered clinically as   potential source for patient's symptoms. Follow-up ultrasound or HIDA scan   may be considered if indicated clinically. RECOMMENDATIONS:   Fleischner Society guidelines for follow-up and management of incidentally   detected pulmonary nodules:      Single Solid Nodule:      Single solid lung nodule 9+ mm: Consider non-contrast chest CT at 3 months,   PET/CT, or tissue sampling. If this nodule was detected on an incomplete   chest CT, first recommend a prompt, non-contrast chest CT.      - Low risk patients include individuals with minimal or absent history of   smoking and other known risk factors. - High risk patients include individuals with a history or smoking or known   risk factors. Reference: Radiology 2017   http://pubs. rsna.org/doi/full/10.1148/radiol. 5988907793         XR CHEST PORTABLE   Final Result   No acute process. XR KNEE LEFT (1-2 VIEWS)   Final Result      Left total knee arthroplasty which is in anatomic alignment with no evidence   of fracture or loosening.   Soft tissue air is consistent with the recent   surgery. Head             Assessment/Plan:    Active Hospital Problems    Diagnosis     Lung mass [R91.8]     Lung nodule [R91.1]     Granulomatous lung disease ([facility] [J84.10]     Former smoker [C60.206]     Intrahepatic bile duct dilation [K83.8]     Hyponatremia [E87.1]     DM (diabetes mellitus), secondary uncontrolled ([address] [E13.65]     Elevated parathyroid hormone [E34.9]     Status post total left knee replacement [Z96.652]     Pulmonary HTN (HCC) [I27.20]     Postoperative anemia due to acute blood loss [D62]     Primary osteoarthritis of left knee [M17.12]     ESRD on dialysis ([facility] [N18.6, Z99.2]        Left knee OA s/p TKA on 12/8/21:  - Postoperative management per Orthopedic Surgery. - Continue prn analgesia, bowel regimen, IS, DVT prophylaxis and PT/OT. Fever of uncertain etiology (resolved):  - Etiology unclear, possibly due to perioperative state. Pt has been afebrile for 48 hrs now. No associated leukocytosis. Procalcitonin not helpful in setting of ESRD. - CXR shows clear lungs. CT C/A/P obtained and showed LLL mass as well as cholelithiasis with GB mucosal thickening and mild intrahepatic biliary dilatation.   - Rapid COVID negative. Blood cultures are NGTD. No diarrhea or abdominal pain. Left knee wound appears okay per Ortho. No other skin issues noted. - Pt started on empiric vancomycin and zosyn on 12/13. Vanc dc'd per Pulm. No plan for abx at OR. Possible cholecystitis (ruled out):  - Doubt clinically, pt w/o abdominal pain, no N/V.   - LFTs/bilirubin are normal.   - RUQ US with findings suggestive of early cholecystitis. - General surgery consulted, no evidence of cholecystitis from their perspective, GB is unlikely the source of her fevers.      Left lower lobe mass / granulomatous lung disease with findings in the liver/spleen:  - Noted on CT.   - Pulmonary consulted, plan for CT-guided lung biopsy vs PET scan -- to be completed as an outpt. - Per Pulm, lung findings on CT are likely not the source for her fevers. ESRD on HD M/W/F:  - Management per Nephrology. Acute on chronic anemia:  - Likely due to postop surgical losses in setting of ESRD. No overt signs of bleeding.  - Monitor CBC closely. Transfuse for Hgb less than 7. H&H is low at 7.2 but remains stable. - Continue epoetin. Hypertension  - Variable control. Continue carvedilol and losartan. Monitor BP closely.     Diabetes mellitus type 2 with peripheral neuropathy   - Controlled, A1C 5.8.  - Hold home regimen. Continue basal bolus insulin regimen -- monitor and adjust as needed. - Continue gabapentin.      Hyperlipidemia:  - Continue statin.      Hypothyroidism:  - Clinically euthyroid. Continue Synthroid. DVT Prophylaxis: SQ heparin   Diet: ADULT ORAL NUTRITION SUPPLEMENT; Breakfast, Lunch; Renal Oral Supplement  ADULT DIET; Regular; Low Potassium (Less than 3000 mg/day); Low Phosphorus (Less than 1000 mg)  Code Status: Full Code    PT/OT Eval Status: active and ongoing     Dispo -  Okay to discharge from IM standpoint.      Dimitri Alas APRN - CNP

## 2021-12-15 NOTE — CONSULTS
PRN  piperacillin-tazobactam (ZOSYN) 2,250 mg in dextrose 5 % 50 mL IVPB (mini-bag), 2,250 mg, IntraVENous, Q8H  docusate sodium (COLACE) capsule 100 mg, 100 mg, Oral, BID PRN  cyclobenzaprine (FLEXERIL) tablet 5 mg, 5 mg, Oral, TID PRN  insulin glargine (LANTUS) injection vial 10 Units, 10 Units, SubCUTAneous, Nightly  heparin (porcine) injection 5,000 Units, 5,000 Units, SubCUTAneous, 3 times per day  sodium chloride flush 0.9 % injection 5-40 mL, 5-40 mL, IntraVENous, 2 times per day  sodium chloride flush 0.9 % injection 5-40 mL, 5-40 mL, IntraVENous, PRN  0.9 % sodium chloride infusion, 25 mL, IntraVENous, PRN  ondansetron (ZOFRAN-ODT) disintegrating tablet 4 mg, 4 mg, Oral, Q8H PRN **OR** ondansetron (ZOFRAN) injection 4 mg, 4 mg, IntraVENous, Q6H PRN  oxyCODONE (ROXICODONE) immediate release tablet 5 mg, 5 mg, Oral, Q4H PRN **OR** oxyCODONE (ROXICODONE) immediate release tablet 10 mg, 10 mg, Oral, Q4H PRN  atorvastatin (LIPITOR) tablet 10 mg, 10 mg, Oral, Nightly  carvedilol (COREG) tablet 12.5 mg, 12.5 mg, Oral, BID WC  gabapentin (NEURONTIN) capsule 300 mg, 300 mg, Oral, QPM  levothyroxine (SYNTHROID) tablet 50 mcg, 50 mcg, Oral, Daily  sertraline (ZOLOFT) tablet 25 mg, 25 mg, Oral, Daily  glucose (GLUTOSE) 40 % oral gel 15 g, 15 g, Oral, PRN  dextrose 50 % IV solution, 12.5 g, IntraVENous, PRN  glucagon (rDNA) injection 1 mg, 1 mg, IntraMUSCular, PRN  dextrose 5 % solution, 100 mL/hr, IntraVENous, PRN  insulin lispro (HUMALOG) injection vial 0-6 Units, 0-6 Units, SubCUTAneous, TID WC  insulin lispro (HUMALOG) injection vial 0-3 Units, 0-3 Units, SubCUTAneous, Nightly  hydrALAZINE (APRESOLINE) injection 10 mg, 10 mg, IntraVENous, Q4H PRN  labetalol (NORMODYNE;TRANDATE) injection 20 mg, 20 mg, IntraVENous, Q4H PRN  sodium chloride flush 0.9 % injection 5-40 mL, 5-40 mL, IntraVENous, PRN  polyethylene glycol (GLYCOLAX) packet 17 g, 17 g, Oral, Daily PRN  Prior to Admission medications    Medication Sig Start Date End Date Taking? Authorizing Provider   cyclobenzaprine (FLEXERIL) 5 MG tablet Take 1 tablet by mouth 3 times daily as needed for Muscle spasms 12/15/21 12/22/21 Yes BRIGETTE Thomas   docusate sodium (COLACE) 100 MG capsule Take 1 capsule by mouth 2 times daily as needed for Constipation 12/15/21 12/25/21 Yes BRIGETTE Thomas   oxyCODONE (ROXICODONE) 5 MG immediate release tablet Take 1 tablet by mouth every 4 hours as needed for Pain for up to 3 days. 12/15/21 12/18/21 Yes BRIGETTE Thomas   heparin, porcine, 5000 UNIT/ML injection Inject 1 mL into the skin every 8 hours for 14 days 12/15/21 12/29/21 Yes BRIGETTE Thomas   carvedilol (COREG) 12.5 MG tablet Take 12.5 mg by mouth 2 times daily (with meals)   Yes Historical Provider, MD   Ferric Citrate (AURYXIA) 1  MG(Fe) TABS Take 3 tablets by mouth 3 times daily (before meals)   Yes Historical Provider, MD   Lactobacillus-Inulin (CULTURELLE ADULT ULT BALANCE PO) Take 1 tablet by mouth daily as needed   Yes Historical Provider, MD   levothyroxine (SYNTHROID) 50 MCG tablet Take 50 mcg by mouth Daily   Yes Historical Provider, MD   insulin glargine (LANTUS) 100 UNIT/ML injection vial Inject 8 Units into the skin every morning 20 units if bs 150 or above, 15 units if below bs is below. Patient taking differently: Inject 15 Units into the skin every morning 18 units if bs 150 or above, 15 units if below bs is below. 8/16/16  Yes Cristina Mills MD   gabapentin (NEURONTIN) 300 MG capsule Take 300 mg by mouth every evening Take 1-2 tablets every evening before bed.    Yes Historical Provider, MD   sertraline (ZOLOFT) 25 MG tablet Take 25 mg by mouth daily   Yes Historical Provider, MD   losartan (COZAAR) 100 MG tablet Take 100 mg by mouth nightly     Historical Provider, MD   aspirin 81 MG EC tablet Take 1 tablet by mouth daily Take with food 1/11/16   Bay Bee MD   atorvastatin (LIPITOR) 10 MG tablet Take 10 mg by mouth nightly Historical Provider, MD        Allergies:  Patient has no known allergies. Social History:   TOBACCO:  quit  ETOH:  no    Family History:    History reviewed. No pertinent family history. REVIEW OF SYSTEMS:  CONSTITUTIONAL:  positive for  fevers  HEENT:  negative  RESPIRATORY:  negative  CARDIOVASCULAR:  negative  GASTROINTESTINAL:  negative  GENITOURINARY:  negative  HEMATOLOGIC/LYMPHATIC:  negative  NEUROLOGICAL:  Negative  * All other ROS reviewed and negative. PHYSICAL EXAM:  VITALS:  BP (!) 144/56   Pulse 91   Temp 97.9 °F (36.6 °C) (Oral)   Resp 18   Ht 5' 7\" (1.702 m)   Wt 179 lb 10.8 oz (81.5 kg)   SpO2 100%   BMI 28.14 kg/m²   24HR INTAKE/OUTPUT:    No intake/output data recorded. I/O this shift: In: 500   Out: 1500       CONSTITUTIONAL:  alert, no apparent distress and normal weight  EYES:  PERRL, sclera clear  ENT:  Normocephalic,atraumatic, without obvious abnormality  NECK:  supple, symmetrical, trachea midline  LUNGS: Resp effort easy and unlabored  CARDIOVASCULAR:  NO JVD, regular rate   ABDOMEN:  , normal bowel sounds, soft, non-distended, non-tender  MUSCULOSKELETAL: No clubbing or cyanosis,  NEUROLOGIC:  Mental Status Exam:  Level of Alertness:   awake  PSYCHIATRIC:   person, place, time  SKIN:  no rashes    DATA:    CBC:   Recent Labs     12/13/21  0810 12/14/21  0657 12/15/21  0554   WBC 9.1 6.2 6.2   HGB 7.2* 7.4* 7.2*   HCT 21.9* 23.1* 22.4*    212 262     BMP:    Recent Labs     12/13/21  0810 12/14/21  0657 12/15/21  0554   * 130* 132*   K 4.9 5.2* 4.9   CL 94* 96* 96*   CO2 19* 21 20*   BUN 71* 36* 50*   CREATININE 9.2* 5.4* 7.4*   GLUCOSE 167* 132* 127*     Hepatic:   Recent Labs     12/14/21  0657 12/15/21  0554   AST 22 22   ALT <5* <5*   BILITOT 0.5 0.4   ALKPHOS 80 76     Mag:    No results for input(s): MG in the last 72 hours.    Phos:     Recent Labs     12/13/21  0810   PHOS 6.3*      INR: No results for input(s): INR in the last 72 hours. Radiology Review: Images personally reviewed by me. US - mild gallbladder wall thickening  CT - mild gallbladder wall thickening      IMPRESSION/RECOMMENDATIONS:    65 yo with fever  Imaging reviewed and only mild wall thickening seen. No other signs of inflammation. US from 2016 also showed mild gallbladder wall thickening. Also without leukocytosis during hospitalization and without complaints of abdominal pain or nausea. Based on these factors I don't think she has acute cholecystitis and that her prior fevers were from another source. Possibly related to new lung mass, perioperative state or granulomatous disease seen in liver and spleen.     Electronically signed by Debby Bob, [address] [place] [address] Surgery  [id number]

## 2021-12-15 NOTE — PROGRESS NOTES
Occupational Therapy  Facility/Department: [facility] C5 - MED SURG/ORTHO  Daily Treatment Note  NAME: Sharmila Thompson  : 1950  MRN: [id number]    Date of Service: 12/15/2021    Discharge Recommendations:  Subacute/Skilled Nursing Facility     Assessment   Performance deficits / Impairments: Decreased functional mobility ; Decreased ADL status; Decreased strength; Decreased safe awareness; Decreased cognition; Decreased endurance; Decreased balance; Decreased high-level IADLs  Assessment: Pt with fair tolerance of treatment, increased fatigue this date following dialysis. Pt demos need for increased assist with sit to stand transfers from EOB, mod A of 2. Pt demos need for mod A to don pants, with increased time, VCs for technique and safety. Pt demos decreased insight into deficits and cognitive deficits as noted. Pt functioning below her baseline and would benefit from continud skilled OT in SNF setting at d/c as pt is unsafe to return home alone. Prognosis: Good  OT Education: OT Role; Plan of Care; ADL Adaptive Strategies; Transfer Training  Disease Specific Education: Pt educated on weight bearing status, post-op precautions, appropriate DME, and safe mobility with AD. Pt verbalized understanding. Barriers to Learning: cognition  REQUIRES OT FOLLOW UP: Yes  Activity Tolerance  Activity Tolerance: Patient Tolerated treatment well  Activity Tolerance: Vitals: BP= 117/54, HR= 92, SPO2= 95% RA  Safety Devices  Safety Devices in place: Yes  Type of devices: Left in chair; Chair alarm in place; Call light within reach; Nurse notified; Gait belt         Patient Diagnosis(es): The encounter diagnosis was Status post total left knee replacement.       has a past medical history of Arthritis, Chronic kidney disease, Diabetes mellitus ([facility] [address] Dialysis patient ([facility] [address] ESRD (end stage renal disease) ([facility] [address] Hemodialysis patient ([facility] [address] Hyperkalemia, Hyperlipidemia, Hypertension, OA (osteoarthritis), Retinopathy, Sepsis [facility]), Thyroid disease, and Wears dentures. has a past surgical history that includes Dialysis fistula creation (Left, 08/10/2016); eye surgery (Left, 04/03/2018); other surgical history (Right, 01/11/2019); Toe amputation (Right, 1/11/2019); and Total knee arthroplasty (Left, 12/8/2021). Restrictions  Restrictions/Precautions  Restrictions/Precautions: Weight Bearing  Lower Extremity Weight Bearing Restrictions  Left Lower Extremity Weight Bearing: Weight Bearing As Tolerated  Position Activity Restriction  Other position/activity restrictions: HD M/W/F; 1)  Dangle at edge of bed, progress to stand, and take a few steps with assistive device. 2)  Encourage ankle pumps and quad sets. 3)  Up in bedside chair as tolerated. 4)  Commode privileges with assistance. Subjective   General  Chart Reviewed: Yes  Patient assessed for rehabilitation services?: Yes  Response to previous treatment: Patient with no complaints from previous session  Family / Caregiver Present: No    Subjective  Subjective: Pt resting in bed, agreeable to OT treatment. Pain Assessment  Pain Assessment: Faces  Franco-Baker Pain Rating: Hurts little more  Pain Type: Surgical pain  Pain Location: Knee  Pain Orientation: Left  Non-Pharmaceutical Pain Intervention(s): Repositioned; Ambulation/Increased Activity  Pre Treatment Pain Screening  Intervention List: Patient able to continue with treatment  Vital Signs  Patient Currently in Pain: Yes     Orientation  Orientation  Overall Orientation Status: Within Functional Limits     Objective    ADL  LE Dressing: Moderate assistance; Verbal cueing;  Increased time to complete (pants)     Balance  Sitting Balance: Stand by assistance  Standing Balance: Dependent/Total (initial mod A of 2 with SW, progressing to CGA-min A of 1 with SW)  Standing Balance  Activity: functional mobility, LE dressing    Functional Mobility  Functional - Mobility Device: Standard Walker  Activity: To/from bathroom  Assist Level: Minimal assistance    Bed mobility  Supine to Sit: Stand by assistance (to L with HOB elevated, extended time, VCs)     Transfers  Sit to stand: Dependent/Total; 2 Person assistance (mod A of 2)  Stand to sit: Moderate assistance  Transfer Comments: VCs for hand placement     Cognition  Overall Cognitive Status: Exceptions  Following Commands: Follows one step commands with repetition  Attention Span: Attends with cues to redirect; Difficulty attending to directions;  Difficulty dividing attention  Safety Judgement: Decreased awareness of need for safety; Decreased awareness of need for assistance  Problem Solving: Assistance required to identify errors made; Assistance required to generate solutions; Assistance required to implement solutions  Insights: Decreased awareness of deficits  Initiation: Requires cues for some  Sequencing: Requires cues for some     Plan   Plan  Times per week: 4-6x    AM-PAC Score  AM-[facility] Inpatient Daily Activity Raw Score: 14 (12/15/21 1428)  AM-PAC Inpatient ADL T-Scale Score : 33.39 (12/15/21 1428)  ADL Inpatient CMS 0-100% Score: 59.67 (12/15/21 1428)  ADL Inpatient CMS G-Code Modifier : CK (12/15/21 1428)    Goals  Short term goals  Time Frame for Short term goals: 1 week by 12/16  Short term goal 1: Pt will complete LB ADLs with SPV with AE as needed-- ongoing 12/15/21  Short term goal 2: Pt will complete toileting with SPV-- ongoing 12/15/21  Short term goal 3: Pt will complete functional transfers wtih SPV-- ongoing 12/15/21  Short term goal 4: Pt wilson verb/demo car transfer tech with min cues-- ongoing 12/15/21  Short term goal 5: Pt will complete B UE ex x 20 reps w/o fatigue-- ongoing 12/15/21  Patient Goals   Patient goals : Jeraline Sender to the bathroom with a walker\"-- GOAL MET, pt demos ability to walk bathroom with SW and min A 12/14/21       Therapy Time   Individual Concurrent Group Co-treatment   Time In 2155         Time Out 1345         Minutes 40 Gatha Aver, HOWARD/L

## 2021-12-15 NOTE — PROGRESS NOTES
Physical Therapy  Facility/Department: [facility] C5 - MED SURG/ORTHO  Daily Treatment Note  NAME: Danna Kim  : 1950  MRN: [id number]    Date of Service: 12/15/2021    Discharge Recommendations:  Subacute/Skilled Nursing Facility   PT Equipment Recommendations  Equipment Needed: No  Other: defer to next level of care    Assessment   Body structures, Functions, Activity limitations: Decreased functional mobility ; Decreased endurance; Decreased ROM; Decreased balance; Decreased strength; Decreased cognition  Assessment: Pt seen following dialysis and needing more A than yesterday. Pt required SBA to getsupineto sit buthen mod A of 2 STS with SW. She ambulated min A with SW X 40' but needed extended time and mod cues to complete the task with poor posture, frequent standing rests and poor safety. Pt performed a couple seated ex but was very tired so session ended with her up in chair for knee flexion. Pt is recommended for con't skilled PT  and SNF at D/C. Treatment Diagnosis: functional mobility deficit; impaired ROM  Specific instructions for Next Treatment: Progress mobility as tolerated  Prognosis: Good  Decision Making: Medium Complexity  PT Education: Goals; PT Role; Disease Specific Education; Plan of Care; Home Exercise Program; Precautions; Transfer Training; Functional Mobility Training; Family Education; Weight-bearing Education; General Safety; Injury Prevention  Patient Education: Educated in importance of OOB mobility and t/f, safety with functional mobility and use of AD. Educated in positioning of knee to improve ROM. Pt will benefit from further reinfrocement  Barriers to Learning: none  REQUIRES PT FOLLOW UP: Yes  Activity Tolerance  Activity Tolerance: . Patient Diagnosis(es): The encounter diagnosis was Status post total left knee replacement.      has a past medical history of Arthritis, Chronic kidney disease, Diabetes mellitus ([facility] [address] Dialysis patient ([facility] [address] ESRD (end stage renal disease) ([facility] [address] Hemodialysis patient ([facility] [address] Hyperkalemia, Hyperlipidemia, Hypertension, OA (osteoarthritis), Retinopathy, Sepsis ([facility] [address] Thyroid disease, and Wears dentures. has a past surgical history that includes Dialysis fistula creation (Left, 08/10/2016); eye surgery (Left, 04/03/2018); other surgical history (Right, 01/11/2019); Toe amputation (Right, 1/11/2019); and Total knee arthroplasty (Left, 12/8/2021). Restrictions  Restrictions/Precautions  Restrictions/Precautions: Weight Bearing  Lower Extremity Weight Bearing Restrictions  Left Lower Extremity Weight Bearing: Weight Bearing As Tolerated  Position Activity Restriction  Other position/activity restrictions: HD M/W/F; 1)  Dangle at edge of bed, progress to stand, and take a few steps with assistive device. 2)  Encourage ankle pumps and quad sets. 3)  Up in bedside chair as tolerated. 4)  Commode privileges with assistance. Subjective   General  Chart Reviewed: Yes  Response To Previous Treatment: Patient with no complaints from previous session. Family / Caregiver Present: No  Referring Practitioner: Gini Dance, MD  Subjective  Subjective: Pt supine in bed on approach, pleasant and agreeable to PT tx, tired from dialysis  General Comment  Comments: RN cleared pt for session  Pain Screening  Patient Currently in Pain: Yes  Pain Assessment  Pain Assessment: Faces  Franco-Baker Pain Rating: Hurts little more  Pain Type: Surgical pain  Pain Location: Knee  Pain Orientation: Left  Vital Signs  Patient Currently in Pain: Yes          Objective   Bed mobility  Supine to Sit: Stand by assistance (to L with HOB elevated, extended time and use of rail)  Sit to Supine: Unable to assess (remained up)  Transfers  Sit to Stand: Dependent/Total; 2 Person Assistance;  Moderate Assistance  Stand to sit: Minimal Assistance  Ambulation  Ambulation?: Yes  WB Status: FWBAT  Ambulation 1  Surface: level tile  Device: Standard Walker  Assistance: Minimal within reach, Chair alarm in place, Gait belt, Left in chair, Nurse notified  Restraints  Initially in place: No     Therapy Time   Individual Concurrent Group Co-treatment   Time In 1305         Time Out 1345         Minutes [address] [place], [place] [address]

## 2021-12-15 NOTE — DISCHARGE SUMMARY
Department of Orthopedic Surgery  Physician Assistant   Discharge Summary    The Courtney Smith is a 66 y.o. female underwent total knee replacement procedure without complication. Courtney Smith was admitted to the floor following Her recovery in the PACU.      Discharge Diagnosis  left Knee Replacement    Current Inpatient Medications    Current Facility-Administered Medications: epoetin alice-epbx (RETACRIT) injection 3,000 Units, 3,000 Units, SubCUTAneous, Once per day on Mon Wed Fri **AND** epoetin alice-epbx (RETACRIT) injection 2,000 Units, 2,000 Units, SubCUTAneous, Once per day on Mon Wed Fri **AND** epoetin alice-epbx (RETACRIT) injection 10,000 Units, 10,000 Units, SubCUTAneous, Once per day on Mon Wed Fri  acetaminophen (TYLENOL) tablet 650 mg, 650 mg, Oral, Q6H PRN  piperacillin-tazobactam (ZOSYN) 2,250 mg in dextrose 5 % 50 mL IVPB (mini-bag), 2,250 mg, IntraVENous, Q8H  docusate sodium (COLACE) capsule 100 mg, 100 mg, Oral, BID PRN  cyclobenzaprine (FLEXERIL) tablet 5 mg, 5 mg, Oral, TID PRN  insulin glargine (LANTUS) injection vial 10 Units, 10 Units, SubCUTAneous, Nightly  heparin (porcine) injection 5,000 Units, 5,000 Units, SubCUTAneous, 3 times per day  sodium chloride flush 0.9 % injection 5-40 mL, 5-40 mL, IntraVENous, 2 times per day  sodium chloride flush 0.9 % injection 5-40 mL, 5-40 mL, IntraVENous, PRN  0.9 % sodium chloride infusion, 25 mL, IntraVENous, PRN  ondansetron (ZOFRAN-ODT) disintegrating tablet 4 mg, 4 mg, Oral, Q8H PRN **OR** ondansetron (ZOFRAN) injection 4 mg, 4 mg, IntraVENous, Q6H PRN  oxyCODONE (ROXICODONE) immediate release tablet 5 mg, 5 mg, Oral, Q4H PRN **OR** oxyCODONE (ROXICODONE) immediate release tablet 10 mg, 10 mg, Oral, Q4H PRN  atorvastatin (LIPITOR) tablet 10 mg, 10 mg, Oral, Nightly  carvedilol (COREG) tablet 12.5 mg, 12.5 mg, Oral, BID WC  gabapentin (NEURONTIN) capsule 300 mg, 300 mg, Oral, QPM  levothyroxine (SYNTHROID) tablet 50 mcg, 50 mcg, Oral, Daily  sertraline (ZOLOFT) tablet 25 mg, 25 mg, Oral, Daily  glucose (GLUTOSE) 40 % oral gel 15 g, 15 g, Oral, PRN  dextrose 50 % IV solution, 12.5 g, IntraVENous, PRN  glucagon (rDNA) injection 1 mg, 1 mg, IntraMUSCular, PRN  dextrose 5 % solution, 100 mL/hr, IntraVENous, PRN  insulin lispro (HUMALOG) injection vial 0-6 Units, 0-6 Units, SubCUTAneous, TID WC  insulin lispro (HUMALOG) injection vial 0-3 Units, 0-3 Units, SubCUTAneous, Nightly  hydrALAZINE (APRESOLINE) injection 10 mg, 10 mg, IntraVENous, Q4H PRN  labetalol (NORMODYNE;TRANDATE) injection 20 mg, 20 mg, IntraVENous, Q4H PRN  sodium chloride flush 0.9 % injection 5-40 mL, 5-40 mL, IntraVENous, PRN  polyethylene glycol (GLYCOLAX) packet 17 g, 17 g, Oral, Daily PRN    Post-operatively the patients diet was advanced as tolerated and their incision was checked on POD #1. The incision is dressing in place, clean, dry and intact with no signs of infection. The patient remained neurovascularly intact in the lower extremity and had intact pulses distally. Patients calf remained soft and showed no evidence of DVT. The patient was able to move their leg and ankle/foot without any problems post-operatively. Physical therapy and occupational therapy were consulted and began working with the patient post-operatively. The patient progressed with PT/OT as would be expected and continued to improve through their stay. The patients pain was initially controlled with IV medications but we were able to transition to oral pain medications soon after arrival to the floor and their pain remained under good control through their hospital stay. From a medical standpoint the patient remained stable and continued to have the medicine team follow throughout their stay. The patients dressing was changed/incison was checked on day of d/c.   The patient will be discharged at this time to [facility]  with their current diet restrictions and will continue to follow the total knee precautions outlined to them by us and PT/OT. Condition on Discharge: Stable    Plan  Return visit in 2 days. .  Patient was instructed on the use of pain medications, the signs and symptoms of infection, and was given our number to call should they have any questions or concerns following discharge. For opioid prescriptions given at discharge the following statement is provided for compliance with [employer] rules. Patient being given increased dosage/quantity of opoid pain medication in excess of [employer] guidelines which noted a 30 MED daily of opioids due to the fact that he/she has undergone major orthopaedic surgery as outlined in rule 4731-11-13. Dosages and further duration of the pain medication will be re-evaluated at her post op visit in 2 weeks. Patient was educated on dosing expectations and limits of prescribing as a result of the new [facility] Board rules enacted August 31, 2017. Please also note that this is not the initial opoid prescription issued to this patient but a continuation of medication utilized during the hospital admission as noted in the medical record. [place] report has also been utilized to screen for any abuse history or suspicious activity as outlined in [place]. All efforts have been taken to prevent abuse potential and misuse of opioid medications including education, screening, and close clinical follow up.

## 2021-12-15 NOTE — FLOWSHEET NOTE
12/15/21 0745 12/15/21 1054   Treatment   Time On 0750  --    Time Off  --  1051   Vital Signs   BP (!) 123/48 (!) 133/57   Temp 98 °F (36.7 °C) 98.2 °F (36.8 °C)   Pulse 80 84   Resp 16 16   Weight 181 lb 10.5 oz (82.4 kg) 179 lb 10.8 oz (81.5 kg)   Weight Method Bed scale Bed scale     Treatment time: 3.25 hours  Net UF: 1000 ml     Pre weight: 82.4 kg   Post weight: 81.5 kg  EDW: tbd kg     Access used: LAVF  Access function: fair with  ml/min     Medications or blood products given: Retacrit     Regular outpatient schedule: [address]     Summary of response to treatment: Tolerated tx well, elevated  last 14 mins of tx, blood returned to prevent loss of system, MD aware. Copy of dialysis treatment record placed in chart, to be scanned into EMR.

## 2021-12-15 NOTE — DISCHARGE INSTR - COC
Continuity of Care Form    Patient Name: Sana Gilliam   :  1950  MRN:  [id number]    Admit date:  2021  Discharge date:  21     Code Status Order: Full Code   Advance Directives:   Advance Care Flowsheet Documentation       Date/Time Healthcare Directive Type of Healthcare Directive Copy in [address] [place] [address] [address] Agent's Name Healthcare Agent's Phone Number    21 8590 No, patient does not have an advance directive for healthcare treatment -- -- -- -- --    21 1134 No, patient does not have an advance directive for healthcare treatment -- -- -- -- --            Admitting Physician:  Ricarda Guerrero MD  PCP: ELANA Yarbrough CNP    Discharging Nurse: [facility] Unit/Room#: 9295/2383-68  Discharging Unit Phone Number: ***    Emergency Contact:   Extended Emergency Contact Information  Primary Emergency Contact: [address] [place] [address] Phone: [phone]  Mobile Phone: [phone]  Relation: Other    Past Surgical History:  Past Surgical History:   Procedure Laterality Date    DIALYSIS FISTULA CREATION Left 08/10/2016    Dr. Marcus Wilcox - upper arm, basilic vein transposition    EYE SURGERY Left 2018    catarct removal with lens implant     OTHER SURGICAL HISTORY Right 2019    partial foot amputation    TOE AMPUTATION Right 2019    PARTIAL FOOT AMPUTATION WITH GRAFT APPLICATION performed by Benton Garza DPM at [address] [place] [address] Left 2021    LEFT TOTAL KNEE ARTHROPLASTY performed by Ricarda Guerrero MD at [facility]       Immunization History:   Immunization History   Administered Date(s) Administered    Influenza Virus Vaccine 2018       Active Problems:  Patient Active Problem List   Diagnosis Code    Cellulitis L03.90    MEY (acute kidney injury) ([facility] N17.9    Type 2 diabetes mellitus with complication, without long-term current use of insulin ([facility] E11.8    Secondary hypertension I15.9    Hyperlipidemia E78.5    Hypoglycemia E16.2    Anxiety about health F41.8    Nephrotic syndrome N04.9    Essential hypertension I10    Hyperkalemia E87.5    Diabetic ulcer of left foot associated with type 2 diabetes mellitus ([facility] E11.621, L97.529    CKD (chronic kidney disease), stage IV ([facility]) G70.0    Diastolic dysfunction with acute on chronic heart failure (HCC) I50.33    Acute on chronic diastolic congestive heart failure (HCC) I50.33    ESRD on dialysis ([facility]) N18.6, Z99.2    Hypoxia R09.02    Hypervolemia E87.70    Anemia due to chronic kidney disease N18.9, D63.1    Chronic kidney disease (CKD), stage V ([facility]) N18.5    Toe osteomyelitis, right ([facility]) M86.9    Cellulitis of right foot L03.115    Cellulitis of right leg L03.115    Primary osteoarthritis of left knee M17.12    Acute pain of left knee M25.562    Lung mass R91.8    Lung nodule R91.1    Granulomatous lung disease ([facility] J84.10    Former smoker Z87.891    Intrahepatic bile duct dilation K83.8    Hyponatremia E87.1    DM (diabetes mellitus), secondary uncontrolled ([facility]) E13.65    Elevated parathyroid hormone E34.9    Status post total left knee replacement Z96.652    Pulmonary HTN ([facility]) I27.20    Postoperative anemia due to acute blood loss D62       Isolation/Infection:   Isolation            No Isolation          Patient Infection Status       Infection Onset Added Last Indicated Last Indicated By Review Planned Expiration Resolved Resolved By    None active    Resolved    COVID-19 (Rule Out) 12/13/21 12/13/21 12/13/21 COVID-19, Rapid (Ordered)   12/13/21 Rule-Out Test Resulted    COVID-19 (Rule Out) 11/30/21 11/30/21 11/30/21 COVID-19 (Ordered)   12/01/21 Rule-Out Test Resulted            Nurse Assessment:  Last Vital Signs: BP (!) 144/56   Pulse 91   Temp 97.9 °F (36.6 °C) (Oral)   Resp 18   Ht 5' 7\" (1.702 m)   Wt 179 lb 10.8 oz (81.5 kg)   SpO2 100%   BMI 28.14 kg/m²     Last documented pain score (0-10 scale): Pain Level: 0  Last Weight:   Wt Readings from Last 1 Encounters:   12/15/21 179 lb 10.8 oz (81.5 kg)     Mental Status:  {IP PT MENTAL STATUS:08059}    IV Access:  [address] ZANA IV ACCESS:423450985}    Nursing Mobility/ADLs:  Walking   {CHP DME ESIZ:162039843}  Transfer  {CHP DME DGQM:106317285}  Bathing  {CHP DME FVKM:464709158}  Dressing  {CHP DME YDQR:636945550}  Toileting  {CHP DME IYGQ:799848010}  Feeding  {CHP DME FWLA:361588398}  Med Admin  {P DME WMOV:142311295}  Med Delivery   { ZANA MED Delivery:817130675}    Wound Care Documentation and Therapy:  Pressure Ulcer 07/13/16 Coccyx (Active)   Number of days: 1980       Pressure Ulcer 07/13/16 Heel Left (Active)   Number of days: 1980       Pressure Ulcer 07/13/16 Heel Right (Active)   Number of days: 1980       Wound 01/07/16 Abrasion(s) Leg Anterior;Left;Lower (Active)   Number of days: 2169       Wound 01/07/16 Abrasion(s) Leg Left; Lower; Lateral (Active)   Number of days: 2169       Wound 01/07/16 Abrasion(s) Leg Left;Posterior (Active)   Number of days: 2169       Wound 01/07/16 Abrasion(s) Leg Left;Posterior; Inner (Active)   Number of days: 2169       Wound 01/07/16 Abrasion(s) Ankle Left; Inner several very small abrasions w/ yellow beds in an area of red skin that is 10 cm x 5 cm. (Active)   Number of days: 2169       Wound 01/07/16 Abrasion(s) Leg Right; Anterior (Active)   Number of days: 2169       Wound 01/07/16 Abrasion(s) Leg Right; Lower; Anterior (Active)   Number of days: 2169       Wound 01/07/16 Abrasion(s) Leg Lower; Posterior (Active)   Number of days: 2169       Wound 01/07/16 Leg Right; Lower; Posterior (Active)   Number of days: 2169       Wound 01/07/16 Abrasion(s) Leg Right; Lower; Posterior (Active)   Number of days: 2169       Diabetic Ulcer 08/06/16 Heel Left;Upper diabetic ulcer (Active)   Number of days: 1956       Incision 08/10/16 Arm Left;Medial (Active)   Number of days: 1953       Wound 01/10/19 Toe (Comment  which one) Right (Active)   Number of days: 1069 Elimination:  Continence: Bowel: {YES / AI:57536}  Bladder: {YES / GA:87165}  Urinary Catheter: {Urinary Catheter:113236330}   Colostomy/Ileostomy/Ileal Conduit: {YES / CQ:50359}       Date of Last BM: ***    Intake/Output Summary (Last 24 hours) at 12/15/2021 1256  Last data filed at 12/15/2021 1054  Gross per 24 hour   Intake 500 ml   Output 1500 ml   Net -1000 ml     No intake/output data recorded.     Safety Concerns:     [address] Safety Concerns:340500614}    Impairments/Disabilities:      [address] Impairments/Disabilities:356852544}    Nutrition Therapy:  Current Nutrition Therapy:   [address] [employer] Diet List:434004645}    Routes of Feeding: {CHP DME Other Feedings:860689120}  Liquids: {Slp liquid thickness:66623}  Daily Fluid Restriction: {CHP DME Yes amt example:427878468}  Last Modified Barium Swallow with Video (Video Swallowing Test): {Done Not Done CVNP:122202021}    Treatments at the Time of Hospital Discharge:   Respiratory Treatments: ***  Oxygen Therapy:  {Therapy; copd oxygen:83377}  Ventilator:    { CC Vent UONP:894668675}    Rehab Therapies: {THERAPEUTIC INTERVENTION:1398338443}  Weight Bearing Status/Restrictions: [employer]  Weight Bearin}  Other Medical Equipment (for information only, NOT a DME order):  {EQUIPMENT:486566909}  Other Treatments: ***    Patient's personal belongings (please select all that are sent with patient):  {[facility] DME Belongings:504764433}    RN SIGNATURE:  {Esignature:706642875}    CASE MANAGEMENT/SOCIAL WORK SECTION    Inpatient Status Date: 21  Readmission Risk Assessment Score:  Readmission Risk              Risk of Unplanned Readmission:  22         Discharging to Facility/ Agency   Name: [facility]  Address:  Phone: [phone]    / signature: Electronically signed by Chris Peters RN on 21 at 12:04 PM EST    PHYSICIAN SECTION    Prognosis: Good    Condition at Discharge: Stable    Rehab Potential (if transferring to Rehab): Good    Recommended Follow-up, Labs or Other Treatments After Discharge:    PT/OT WBAT left knee s/p TKA, with assistive device  Follow up with Dr Paige Manrique as scheduled on 12/21/21 at 10:00am 22 Gris Sepulveda       Physician Certification: I certify the above information and transfer of Sol Vergara  is necessary for the continuing treatment of the diagnosis listed and that she requires [facility] for less 30 days.      Update Admission H&P: No change in H&P    PHYSICIAN SIGNATURE:  Electronically signed by BRIGETTE Odom on 12/17/21 at 11:39 AM EST

## 2021-12-16 ENCOUNTER — TELEPHONE (OUTPATIENT)
Dept: PULMONOLOGY | Age: 71
End: 2021-12-16

## 2021-12-16 LAB
GLUCOSE BLD-MCNC: 117 MG/DL (ref 70–99)
GLUCOSE BLD-MCNC: 180 MG/DL (ref 70–99)
GLUCOSE BLD-MCNC: 191 MG/DL (ref 70–99)
GLUCOSE BLD-MCNC: 228 MG/DL (ref 70–99)
HCT VFR BLD CALC: 23.7 % (ref 36–48)
HEMOGLOBIN: 7.7 G/DL (ref 12–16)
MCH RBC QN AUTO: 29.7 PG (ref 26–34)
MCHC RBC AUTO-ENTMCNC: 32.3 G/DL (ref 31–36)
MCV RBC AUTO: 92 FL (ref 80–100)
PDW BLD-RTO: 18.5 % (ref 12.4–15.4)
PERFORMED ON: ABNORMAL
PLATELET # BLD: 332 K/UL (ref 135–450)
PMV BLD AUTO: 8.9 FL (ref 5–10.5)
RBC # BLD: 2.58 M/UL (ref 4–5.2)
SARS-COV-2, NAAT: NOT DETECTED
WBC # BLD: 7 K/UL (ref 4–11)

## 2021-12-16 PROCEDURE — 6360000002 HC RX W HCPCS: Performed by: ORTHOPAEDIC SURGERY

## 2021-12-16 PROCEDURE — 99222 1ST HOSP IP/OBS MODERATE 55: CPT | Performed by: INTERNAL MEDICINE

## 2021-12-16 PROCEDURE — 6370000000 HC RX 637 (ALT 250 FOR IP): Performed by: NURSE PRACTITIONER

## 2021-12-16 PROCEDURE — 1200000000 HC SEMI PRIVATE

## 2021-12-16 PROCEDURE — 85027 COMPLETE CBC AUTOMATED: CPT

## 2021-12-16 PROCEDURE — 2580000003 HC RX 258: Performed by: INTERNAL MEDICINE

## 2021-12-16 PROCEDURE — 6360000002 HC RX W HCPCS: Performed by: INTERNAL MEDICINE

## 2021-12-16 PROCEDURE — 97535 SELF CARE MNGMENT TRAINING: CPT

## 2021-12-16 PROCEDURE — 97530 THERAPEUTIC ACTIVITIES: CPT

## 2021-12-16 PROCEDURE — 97110 THERAPEUTIC EXERCISES: CPT

## 2021-12-16 PROCEDURE — 2580000003 HC RX 258: Performed by: ORTHOPAEDIC SURGERY

## 2021-12-16 PROCEDURE — 6370000000 HC RX 637 (ALT 250 FOR IP): Performed by: ORTHOPAEDIC SURGERY

## 2021-12-16 PROCEDURE — 97116 GAIT TRAINING THERAPY: CPT

## 2021-12-16 PROCEDURE — 87635 SARS-COV-2 COVID-19 AMP PRB: CPT

## 2021-12-16 PROCEDURE — 36415 COLL VENOUS BLD VENIPUNCTURE: CPT

## 2021-12-16 RX ADMIN — SERTRALINE 25 MG: 50 TABLET, FILM COATED ORAL at 09:47

## 2021-12-16 RX ADMIN — Medication 10 ML: at 09:46

## 2021-12-16 RX ADMIN — OXYCODONE 10 MG: 5 TABLET ORAL at 06:17

## 2021-12-16 RX ADMIN — HEPARIN SODIUM 5000 UNITS: 5000 INJECTION INTRAVENOUS; SUBCUTANEOUS at 06:11

## 2021-12-16 RX ADMIN — INSULIN LISPRO 2 UNITS: 100 INJECTION, SOLUTION INTRAVENOUS; SUBCUTANEOUS at 17:13

## 2021-12-16 RX ADMIN — CARVEDILOL 12.5 MG: 6.25 TABLET, FILM COATED ORAL at 17:13

## 2021-12-16 RX ADMIN — LEVOTHYROXINE SODIUM 50 MCG: 0.05 TABLET ORAL at 06:11

## 2021-12-16 RX ADMIN — Medication 10 ML: at 20:51

## 2021-12-16 RX ADMIN — CARVEDILOL 12.5 MG: 6.25 TABLET, FILM COATED ORAL at 09:56

## 2021-12-16 RX ADMIN — GABAPENTIN 300 MG: 300 CAPSULE ORAL at 17:13

## 2021-12-16 RX ADMIN — HEPARIN SODIUM 5000 UNITS: 5000 INJECTION INTRAVENOUS; SUBCUTANEOUS at 14:45

## 2021-12-16 RX ADMIN — ATORVASTATIN CALCIUM 10 MG: 10 TABLET, FILM COATED ORAL at 20:42

## 2021-12-16 RX ADMIN — HEPARIN SODIUM 5000 UNITS: 5000 INJECTION INTRAVENOUS; SUBCUTANEOUS at 20:51

## 2021-12-16 RX ADMIN — INSULIN GLARGINE 10 UNITS: 100 INJECTION, SOLUTION SUBCUTANEOUS at 20:42

## 2021-12-16 RX ADMIN — INSULIN LISPRO 1 UNITS: 100 INJECTION, SOLUTION INTRAVENOUS; SUBCUTANEOUS at 20:41

## 2021-12-16 RX ADMIN — PIPERACILLIN AND TAZOBACTAM 2250 MG: 2; .25 INJECTION, POWDER, LYOPHILIZED, FOR SOLUTION INTRAVENOUS at 09:52

## 2021-12-16 RX ADMIN — PIPERACILLIN AND TAZOBACTAM 2250 MG: 2; .25 INJECTION, POWDER, LYOPHILIZED, FOR SOLUTION INTRAVENOUS at 02:15

## 2021-12-16 RX ADMIN — INSULIN LISPRO 1 UNITS: 100 INJECTION, SOLUTION INTRAVENOUS; SUBCUTANEOUS at 12:51

## 2021-12-16 RX ADMIN — SODIUM CHLORIDE 25 ML: 9 INJECTION, SOLUTION INTRAVENOUS at 09:49

## 2021-12-16 ASSESSMENT — PAIN SCALES - GENERAL
PAINLEVEL_OUTOF10: 0
PAINLEVEL_OUTOF10: 3
PAINLEVEL_OUTOF10: 7

## 2021-12-16 ASSESSMENT — PAIN DESCRIPTION - LOCATION
LOCATION: KNEE
LOCATION: KNEE

## 2021-12-16 ASSESSMENT — PAIN DESCRIPTION - PAIN TYPE
TYPE: SURGICAL PAIN
TYPE: SURGICAL PAIN

## 2021-12-16 ASSESSMENT — PAIN DESCRIPTION - ORIENTATION
ORIENTATION: LEFT
ORIENTATION: LEFT

## 2021-12-16 NOTE — PLAN OF CARE
Problem: Pain:  Goal: Control of acute pain  Description: Control of acute pain  Outcome: Ongoing  Note: Pt rates pain using 0-10 scale, continues to deny pain. Instructed pt to call with increasing pain or if pain medication needed. Pt verbalized understanding. Call light within reach, will continue to monitor.

## 2021-12-16 NOTE — TELEPHONE ENCOUNTER
----- Message from Meg Forrester MD sent at 12/15/2021  8:08 PM EST -----    This pulmonary follow-up in 2 to 3 weeks after the discharge to discuss options including a repeat CT of the chest in few weeks time versus PET scan versus biopsy

## 2021-12-16 NOTE — CONSULTS
Infectious Diseases   Consult Note      Reason for Consult:    Fever   Requesting Physician:  ELANA Morales       Date of Admission: 12/6/2021  Subjective:   CHIEF COMPLAINT:  None given       HPI:    Darin Bloom is a 64yoF with history of ESRD on HD MWF via AVF, OA, HTN, HLD, DM, hypothyroidism    She was admitted 12/6/21 for planned primary TKA L knee. Surgery was delyaed due to hyperkalemia  On 12/8/21 L TKA was completed. She remained inpatient for several days working on placement. She developed fever to 101 early on 12/13/21  A source of fever was not readily apparent. Empiric vanc and Zosyn were started 12/13/21, 12/14/21 respectively    WBC and LFTs have consistently been in normal range    Workup included  -rapid COVID negative   -BC x2 12/13/21 NGTD   -CXR 12/13/12 neg   -CT CAP 12/13/21 - LLL mass, RLL nodule, changes of granulomatous disease, cholelithiasis  -RUQ US  -Gen Sgy evaluation for ?  Of acute cholecystitis which was ruled out     Last elevated temp was 100.5 at 8pm 12/15/21                   Current abx:  Zosyn s 12/14/21  vanc per pharmacy dosing, s 12/13/21       Past Surgical History:       Diagnosis Date    Arthritis     Chronic kidney disease     Diabetes mellitus ([facility]     Dialysis patient [facility])     M W F    ESRD (end stage renal disease) ([facility]     Hemodialysis patient ([facility]     M-W-F    Hyperkalemia 07/13/2016    Hyperlipidemia     Hypertension     OA (osteoarthritis)     Retinopathy     Sepsis ([facility]     Thyroid disease     Wears dentures          Procedure Laterality Date    DIALYSIS FISTULA CREATION Left 08/10/2016    Dr. Kathi Avina - upper arm, basilic vein transposition    EYE SURGERY Left 04/03/2018    catarct removal with lens implant     OTHER SURGICAL HISTORY Right 01/11/2019    partial foot amputation    TOE AMPUTATION Right 1/11/2019    PARTIAL FOOT AMPUTATION WITH GRAFT APPLICATION performed by Vikas Spencer DPM at [address] [place] [address] ARTHROPLASTY Left 12/8/2021    LEFT TOTAL KNEE ARTHROPLASTY performed by Soraya Martinez MD at [place] [address] History:    TOBACCO:   reports that she quit smoking about 7 years ago. She has never used smokeless tobacco.  ETOH:   reports no history of alcohol use. There is no history of illicit drug use or other significant epidemiologic exposures. Family History:   History reviewed. No pertinent family history. There is no family history of autoimmune diseases or significant infectious diseases.       Current Medications:    Current Facility-Administered Medications: epoetin alice-epbx (RETACRIT) injection 3,000 Units, 3,000 Units, SubCUTAneous, Once per day on Mon Wed Fri **AND** epoetin alice-epbx (RETACRIT) injection 2,000 Units, 2,000 Units, SubCUTAneous, Once per day on Mon Wed Fri **AND** epoetin alice-epbx (RETACRIT) injection 10,000 Units, 10,000 Units, SubCUTAneous, Once per day on Mon Wed Fri  acetaminophen (TYLENOL) tablet 650 mg, 650 mg, Oral, Q6H PRN  piperacillin-tazobactam (ZOSYN) 2,250 mg in dextrose 5 % 50 mL IVPB (mini-bag), 2,250 mg, IntraVENous, Q8H  docusate sodium (COLACE) capsule 100 mg, 100 mg, Oral, BID PRN  cyclobenzaprine (FLEXERIL) tablet 5 mg, 5 mg, Oral, TID PRN  insulin glargine (LANTUS) injection vial 10 Units, 10 Units, SubCUTAneous, Nightly  heparin (porcine) injection 5,000 Units, 5,000 Units, SubCUTAneous, 3 times per day  sodium chloride flush 0.9 % injection 5-40 mL, 5-40 mL, IntraVENous, 2 times per day  sodium chloride flush 0.9 % injection 5-40 mL, 5-40 mL, IntraVENous, PRN  0.9 % sodium chloride infusion, 25 mL, IntraVENous, PRN  ondansetron (ZOFRAN-ODT) disintegrating tablet 4 mg, 4 mg, Oral, Q8H PRN **OR** ondansetron (ZOFRAN) injection 4 mg, 4 mg, IntraVENous, Q6H PRN  oxyCODONE (ROXICODONE) immediate release tablet 5 mg, 5 mg, Oral, Q4H PRN **OR** oxyCODONE (ROXICODONE) immediate release tablet 10 mg, 10 mg, Oral, Q4H PRN  atorvastatin (LIPITOR) tablet 10 mg, 10 mg, Oral, Nightly  carvedilol (COREG) tablet 12.5 mg, 12.5 mg, Oral, BID WC  gabapentin (NEURONTIN) capsule 300 mg, 300 mg, Oral, QPM  levothyroxine (SYNTHROID) tablet 50 mcg, 50 mcg, Oral, Daily  sertraline (ZOLOFT) tablet 25 mg, 25 mg, Oral, Daily  glucose (GLUTOSE) 40 % oral gel 15 g, 15 g, Oral, PRN  dextrose 50 % IV solution, 12.5 g, IntraVENous, PRN  glucagon (rDNA) injection 1 mg, 1 mg, IntraMUSCular, PRN  dextrose 5 % solution, 100 mL/hr, IntraVENous, PRN  insulin lispro (HUMALOG) injection vial 0-6 Units, 0-6 Units, SubCUTAneous, TID WC  insulin lispro (HUMALOG) injection vial 0-3 Units, 0-3 Units, SubCUTAneous, Nightly  hydrALAZINE (APRESOLINE) injection 10 mg, 10 mg, IntraVENous, Q4H PRN  labetalol (NORMODYNE;TRANDATE) injection 20 mg, 20 mg, IntraVENous, Q4H PRN  sodium chloride flush 0.9 % injection 5-40 mL, 5-40 mL, IntraVENous, PRN  polyethylene glycol (GLYCOLAX) packet 17 g, 17 g, Oral, Daily PRN      No Known Allergies       REVIEW OF SYSTEMS:    CONSTITUTIONAL:   Per HPI   EYES:  negative for blurred vision, eye discharge, acute visual disturbance and icterus  HEENT:  negative for acute hearing loss, tinnitus, ear drainage, sinus pressure, nasal congestion, epistaxis and snoring  RESPIRATORY:  No cough, shortness of breath, hemoptysis  CARDIOVASCULAR:  negative for chest pain, palpitations, exertional chest pressure/discomfort, syncope  GASTROINTESTINAL:  negative for nausea, vomiting, diarrhea, constipation, blood in stool and abdominal pain  GENITOURINARY:   Anuric   HEMATOLOGIC/LYMPHATIC:  negative for easy bruising, bleeding and lymphadenopathy  ALLERGIC/IMMUNOLOGIC:  negative for recurrent infections, angioedema, anaphylaxis and drug reactions  ENDOCRINE:  negative for weight changes and diabetic symptoms including polyuria, polydipsia and polyphagia  MUSCULOSKELETAL:   Per HPI   Progressing well with therapy   No incisional erythema or drainage noted  NEUROLOGICAL:  negative for headaches, slurred speech, unilateral weakness  PSYCHIATRIC/BEHAVIORAL: negative for hallucinations, behavioral problems, confusion and agitation. Objective:   PHYSICAL EXAM:      VITALS:  BP (!) 167/55   Pulse 90   Temp 99.2 °F (37.3 °C) (Oral)   Resp 16   Ht 5' 7\" (1.702 m)   Wt 179 lb 10.8 oz (81.5 kg)   SpO2 93%   BMI 28.14 kg/m²      24HR INTAKE/OUTPUT:      Intake/Output Summary (Last 24 hours) at 12/16/2021 1631  Last data filed at 12/16/2021 1444  Gross per 24 hour   Intake 410 ml   Output --   Net 410 ml     CONSTITUTIONAL:  Awake, alert, cooperative, no apparent distress, and appears stated age  Non-toxic appearing   HEENT: NCAT, PERRL, EOMI. Sclera white, conjunctiva full. OP with moist mucosal membranes, no thrush, tongue protrudes midline  Edentulous   NECK:  Supple, symmetrical, trachea midline, no adenopathy  LUNGS:  no increased work of breathing, CTA Perez without W/R/R  CARDIOVASCULAR:   RRR witohut murmur  ABDOMEN:  normal bowel sounds, soft, flat, NT   PSYCHIATRIC: Oriented to person place and time. No obvious depression or anxiety. MUSCULOSKELETAL:   Dressing L knee CDI. No local erythema. No drainage evident   SKIN:  normal skin color, texture, turgor and no redness, warmth, or swelling. No palpable nodules or stigmata of embolic phenomenon  NEUROLOGIC: nonfocal exam  ACCESS:  Thrill over LUE AVF  PIV RUE  No phlebitis       DATA:    Old records have been reviewed    CBC:  Recent Labs     12/14/21  0657 12/15/21  0554 12/16/21  1302   WBC 6.2 6.2 7.0   RBC 2.44* 2.41* 2.58*   HGB 7.4* 7.2* 7.7*   HCT 23.1* 22.4* 23.7*    262 332   MCV 94.6 92.8 92.0   MCH 30.2 29.9 29.7   MCHC 31.9 32.2 32.3   RDW 18.4* 18.6* 18.5*      BMP:  Recent Labs     12/14/21  0657 12/15/21  0554   * 132*   K 5.2* 4.9   CL 96* 96*   CO2 21 20*   BUN 36* 50*   CREATININE 5.4* 7.4*   CALCIUM 9.0 9.0   GLUCOSE 132* 127*        Cultures:   12/13 BC x2 NGTD   COVID NAAT neg       Radiology Review:   All pertinent images / reports were reviewed as a part of this visit. CT CAP 12/13/21  Impression   1. Left lower lobe mass.  Pulmonary follow-up is recommended. Additional guidelines are provided below based upon this dominant mass. 2. 1 additional pulmonary nodule in the right lower lobe. 3. Trace associated pleural fluid in the left chest.   4. Granulomatous changes are seen in the chest and abdomen. 5. Cholelithiasis with gallbladder mucosal thickening and mild intrahepatic   biliary dilatation.  Acute cholecystitis should be considered clinically as   potential source for patient's symptoms.  Follow-up ultrasound or HIDA scan   may be considered if indicated clinically.      RUQ US 12/14/21   Impression   Mild gallbladder wall thickening either due to the partially contracted state   of the gallbladder or wall thickening from gallbladder wall   inflammation-early cholecystitis.  Gallstones are seen       Mild heterogeneous echotexture throughout the liver, either technical or due   to diffuse hepatocellular disease such as fatty infiltration       Echogenic right kidney, suggesting medical renal disease       RECOMMENDATIONS:   Unavailable       Assessment:     Patient Active Problem List   Diagnosis    Cellulitis    MEY (acute kidney injury) ([address]    Type 2 diabetes mellitus with complication, without long-term current use of insulin ([address]    Secondary hypertension    Hyperlipidemia    Hypoglycemia    Anxiety about health    Nephrotic syndrome    Essential hypertension    Hyperkalemia    Diabetic ulcer of left foot associated with type 2 diabetes mellitus ([address]    CKD (chronic kidney disease), stage IV (HCC)    Diastolic dysfunction with acute on chronic heart failure (HCC)    Acute on chronic diastolic congestive heart failure (HCC)    ESRD on dialysis ([address]    Hypoxia    Hypervolemia    Anemia due to chronic kidney disease    Chronic kidney disease (CKD), stage V (HCC)    Toe osteomyelitis, right ([facility]    Cellulitis of right foot    Cellulitis of right leg    Primary osteoarthritis of left knee    Acute pain of left knee    Lung mass    Lung nodule    Granulomatous lung disease ([facility]    Former smoker    Intrahepatic bile duct dilation    Hyponatremia    DM (diabetes mellitus), secondary uncontrolled ([facility]    Elevated parathyroid hormone    Status post total left knee replacement    Pulmonary HTN ([facility]    Postoperative anemia due to acute blood loss       S/p primary L TKA 12/8/21  No signs of complication     Nosocomial fever, POD #5  No localizing complaints  No signs of surgical site infection   WBC is wnl with lymphopenia  CT ap was suggestive of possible cholecystitis but there is no clinical correlate and LFTs are normal  No evidence of IV-associated phlebitis, LRTI, C diff. She is anuric. She is fully vaccinated for COVID (x3 doses). A rapid test was negative. Lymphopenia is noted though she has not other specific signs of COVID-19. Certainly atelectasis could be culprit for fever    For completeness, will repeat the rapid COVID test   No need of isolation as long as that comes back negative    DC Zosyn, no clear rationale to continue on low suspicion of bacterial infection    Encourage IS     Monitor expectantly     Discussed with patient, questions addressed  D/w RN, Hospitalist       Will see her again tomorrow        Amor Hathaway M.D. Thank you for the opportunity to participate in the care of your patient.     Please do not hesitate to contact me:   [phone] office

## 2021-12-16 NOTE — PROGRESS NOTES
[facility]    [facility]              [phone]                         Interval History and plan:     Dialysis done this morning  Electrolytes are stable  Pain under control  Gallbladder thickening in CT, no evidence of cholecystitis per surgery  Seen by pulmonologist for left lower lobe mass  Has granulomatous changes in the lung liver and spleen  Plan for possible PET scan for possible malignancy in the future  Plan:  Anemia- continue epogen  Continue to hold losartan because of low her  blood pressure  Resume as soon as safely possible since he also has congestive heart failure and will benefit from that drug  Will do dialysis first shift am  So that she can leave soon after that   She should follow up with pulmonologist after DC                    Assessment :        ESRD: on hemodialysis  Dialysis center/schedule:   Monday Wednesday Friday at [facility]    Volume status  [facility]- will get from center   2D Echo: 45 to 50% EF and grade 1 diastolic dysfunction on 3/5716    Hypertension  BP: (137-138)/(60-62)  Pulse:  [84-88]   Goal around 830-735 systolic    Anemia of CKD  Hgb:   Hb on admission - NA  Optimize with iron, aranesp    Renal Osteodystrophy  PO4- goal of below 5.5  Monitor and adjust meds    Dialysis Access  AV fistula    Nutrition on dialysis  Lab Results   Component Value Date    LABALBU 3.0 (L) 12/15/2021     Monitor, and will give nepro when possible             [facility] would like to thank Soraya Martinez MD   for opportunity to serve this patient      Please call with questions at-   24 Hrs Answering service [phone] or  7 am- 5 pm via Perfect serve or cell phone  Clarissa Senior MD          CC/reason for consult :     ESRD     HPI :     Serafin Carlisle is a 66 y.o. female presented to   the hospital on 12/6/2021  for elective knee replacement surgery.   She is known to have ESRD gets dialysis Monday Wednesday Friday under my care at [facility] [employer] She is very regular with dialysis and has no problem with potassium. Before the elective surgery, potassium was checked and it was high but hemolyzed because of which it is being repeated. We are consulted for ESRD and related issues    ROS:     Seen with-no family    positives in bold   Constitutional:  fever, chills, weakness, weight change, fatigue  Skin:  rash, pruritus, hair loss, bruising, dry skin, petechiae  Head, Face, Neck   headaches, swelling,  cervical adenopathy  Respiratory: shortness of breath, cough, or wheezing  Cardiovascular: chest pain, palpitations, dizzy, edema  Gastrointestinal: nausea, vomiting, diarrhea, constipation,belly pain    Yellow skin, blood in stool  Musculoskeletal:  back pain, muscle weakness, gait problems,       joint pain or swelling. Genitourinary:  dysuria, poor urine flow, flank pain, blood in urine  Neurologic:  vertigo, TIA'S, syncope, seizures, focal weakness  Psychosocial:  insomnia, anxiety, or depression.   Additional positive findings:                        All other remaining systems are negative or unable to obtain        PMH/PSH/SH/Family History:     Past Medical History:   Diagnosis Date    Arthritis     Chronic kidney disease     Diabetes mellitus ([facility] [address]     Dialysis patient ([facility] [address]     M W F    ESRD (end stage renal disease) ([facility] [address] [facility]     Hemodialysis patient ([facility] [address]     M-W-F    Hyperkalemia 07/13/2016    Hyperlipidemia     Hypertension     OA (osteoarthritis)     Retinopathy     Sepsis ([facility] [address]     Thyroid disease     Wears dentures        Past Surgical History:   Procedure Laterality Date    DIALYSIS FISTULA CREATION Left 08/10/2016    Dr. Rachel Randhawa - upper arm, basilic vein transposition    EYE SURGERY Left 04/03/2018    catarct removal with lens implant     OTHER SURGICAL HISTORY Right 01/11/2019    partial foot amputation    TOE AMPUTATION Right 1/11/2019    PARTIAL FOOT AMPUTATION WITH GRAFT APPLICATION performed by Vilma Kenney LIZ Marcial at [facility] Left 12/8/2021    LEFT TOTAL KNEE ARTHROPLASTY performed by Kaia Argueta MD at [facility] 1        reports that she quit smoking about 7 years ago. She has never used smokeless tobacco. She reports that she does not drink alcohol and does not use drugs. family history is not on file.          Medication:     Current Facility-Administered Medications: epoetin alice-epbx (RETACRIT) injection 3,000 Units, 3,000 Units, SubCUTAneous, Once per day on Mon Wed Fri **AND** epoetin alice-epbx (RETACRIT) injection 2,000 Units, 2,000 Units, SubCUTAneous, Once per day on Mon Wed Fri **AND** epoetin alice-epbx (RETACRIT) injection 10,000 Units, 10,000 Units, SubCUTAneous, Once per day on Mon Wed Fri  acetaminophen (TYLENOL) tablet 650 mg, 650 mg, Oral, Q6H PRN  piperacillin-tazobactam (ZOSYN) 2,250 mg in dextrose 5 % 50 mL IVPB (mini-bag), 2,250 mg, IntraVENous, Q8H  docusate sodium (COLACE) capsule 100 mg, 100 mg, Oral, BID PRN  cyclobenzaprine (FLEXERIL) tablet 5 mg, 5 mg, Oral, TID PRN  insulin glargine (LANTUS) injection vial 10 Units, 10 Units, SubCUTAneous, Nightly  heparin (porcine) injection 5,000 Units, 5,000 Units, SubCUTAneous, 3 times per day  sodium chloride flush 0.9 % injection 5-40 mL, 5-40 mL, IntraVENous, 2 times per day  sodium chloride flush 0.9 % injection 5-40 mL, 5-40 mL, IntraVENous, PRN  0.9 % sodium chloride infusion, 25 mL, IntraVENous, PRN  ondansetron (ZOFRAN-ODT) disintegrating tablet 4 mg, 4 mg, Oral, Q8H PRN **OR** ondansetron (ZOFRAN) injection 4 mg, 4 mg, IntraVENous, Q6H PRN  oxyCODONE (ROXICODONE) immediate release tablet 5 mg, 5 mg, Oral, Q4H PRN **OR** oxyCODONE (ROXICODONE) immediate release tablet 10 mg, 10 mg, Oral, Q4H PRN  atorvastatin (LIPITOR) tablet 10 mg, 10 mg, Oral, Nightly  carvedilol (COREG) tablet 12.5 mg, 12.5 mg, Oral, BID WC  gabapentin (NEURONTIN) capsule 300 mg, 300 mg, Oral, QPM  levothyroxine (SYNTHROID) tablet 50 mcg, 50 mcg, Oral, Daily  sertraline (ZOLOFT) tablet 25 mg, 25 mg, Oral, Daily  glucose (GLUTOSE) 40 % oral gel 15 g, 15 g, Oral, PRN  dextrose 50 % IV solution, 12.5 g, IntraVENous, PRN  glucagon (rDNA) injection 1 mg, 1 mg, IntraMUSCular, PRN  dextrose 5 % solution, 100 mL/hr, IntraVENous, PRN  insulin lispro (HUMALOG) injection vial 0-6 Units, 0-6 Units, SubCUTAneous, TID WC  insulin lispro (HUMALOG) injection vial 0-3 Units, 0-3 Units, SubCUTAneous, Nightly  hydrALAZINE (APRESOLINE) injection 10 mg, 10 mg, IntraVENous, Q4H PRN  labetalol (NORMODYNE;TRANDATE) injection 20 mg, 20 mg, IntraVENous, Q4H PRN  sodium chloride flush 0.9 % injection 5-40 mL, 5-40 mL, IntraVENous, PRN  polyethylene glycol (GLYCOLAX) packet 17 g, 17 g, Oral, Daily PRN       Vitals :     Vitals:    12/16/21 0819   BP: 138/60   Pulse: 84   Resp: 14   Temp: 98.2 °F (36.8 °C)   SpO2: 94%       I & O :       Intake/Output Summary (Last 24 hours) at 12/16/2021 0949  Last data filed at 12/16/2021 5980  Gross per 24 hour   Intake 620 ml   Output 1500 ml   Net -880 ml        Physical Examination :     General appearance: Anxious- no, distressed- no, in good spirits- no  HEENT: Lips- normal, teeth- ok , oral mucosa- moist  Neck : Mass- no, appears symmetrical, JVD- not visible  Respiratory: Respiratory effort-  normal, wheeze- no, crackles - none  Cardiovascular:  Ausculation- No M/R/G, Edema none  Abdomen: visible mass- no, distention- no, scar- no, tenderness- no                            hepatosplenomegaly-  no  Musculoskeletal:  clubbing no,cyanosis- no , digital ischemia- no                           muscle strength- grossly normal , tone - grossly normal  Skin: rashes- no , ulcers- no, induration- no, tightening - no  Psychiatric:  Judgement and insight- normal           AAO X 3  Additional finding: -      LABS:     Recent Labs     12/14/21  0657 12/15/21  0554   WBC 6.2 6.2   HGB 7.4* 7.2*   HCT 23.1* 22.4*    262     Recent Labs     12/14/21  0695 12/15/21  0554   * 132*   K 5.2* 4.9   CL 96* 96*   CO2 21 20*   BUN 36* 50*   CREATININE 5.4* 7.4*   GLUCOSE 132* 127*

## 2021-12-16 NOTE — PROGRESS NOTES
Department of Orthopedic Surgery  Physician Assistant   Progress Note    Subjective:       Systemic or Specific Complaints: Pt comfortable in bed, states she does better on her non-dialysis days. Happy with her knee progress the last few days    Objective:     Patient Vitals for the past 24 hrs:   BP Temp Temp src Pulse Resp SpO2   12/16/21 0819 138/60 98.2 °F (36.8 °C) Oral 84 14 94 %   12/16/21 0352 137/62 98.9 °F (37.2 °C) Oral 88 16 97 %   12/15/21 2313 (!) 147/57 99.3 °F (37.4 °C) Oral 94 14 95 %   12/15/21 2003 (!) 151/71 100.5 °F (38.1 °C) Oral 96 14 95 %   12/15/21 1427 137/65 98.5 °F (36.9 °C) Oral 95 18 95 %       General: alert, appears stated age and cooperative   Wound: Wound clean and dry no evidence of infection. Motion: Painful range of Motion in affected extremity, knee is held in approx 10 degrees of flexion. Can actively flex knee to 50 degrees. With queueing pt can actively extend knee to almost neutral   DVT Exam: No evidence of DVT seen on physical exam.  Negative Evans's sign. Additional exam: Pt is laying in bed, wedge pillow in place laterally. Moderate swelling to left leg, no erythema or ecchymosis. AUBRIE hose orders placed for bilateral legs  nontender to palpation around anterolateral thigh  Knee is stiff, is able to fully extend knee with posterior tactile queueing.  Active knee flexion to 50 degrees  FHL, EHL, ant tib, and gastroc motor intact  Sensation intact to LLE, pt has neuropathy at baseline  Distal pulses 2+, feet WWP  Edema around the knee, no fluctuance noted upon palpation        Data Review  CBC:   Lab Results   Component Value Date    WBC 6.2 12/15/2021    RBC 2.41 12/15/2021    HGB 7.2 12/15/2021    HCT 22.4 12/15/2021     12/15/2021       Renal:   Lab Results   Component Value Date     12/15/2021    K 4.9 12/15/2021    CL 96 12/15/2021    CO2 20 12/15/2021    BUN 50 12/15/2021    CREATININE 7.4 12/15/2021    GLUCOSE 127 12/15/2021    CALCIUM 9.0 12/15/2021            Assessment:     s/p left total knee arthroplasty. POD 8  Acute blood loss anemia - expected after surgery. Will monitor Hgb, has been stable in the 7s    Plan:      1:  WBAT LLE, pt will need to continue to work with PT/OT, improving at this time. Continue to have pillow propping up leg at ankle, do not prop up leg with pillow under the knee. Wedge pillow placed by PT to prevent ER of hip. 2:  Hgb stable at 7.4 today. Appreciate IM following pt for needs- pt is CKD on HD.   3:  Continue Deep venous thrombosis prophylaxis- heparin, SCDs, ambulation  4:  Continue Pain Control- oxycodone, tylenol. Added flexeril for muscle spasm  5:  PT/OT recommending SNF, CM following. [facility] p[facility] familys request.  6:  OK to DC from ortho standpoint.  SNF cannot provide transport to dialysis until Monday, pt will stay overnight and DC after dialysis in the AM.      Garrel Litten, PA

## 2021-12-16 NOTE — PROGRESS NOTES
Occupational Therapy  Facility/Department: [facility] C5 - MED SURG/ORTHO  Daily Treatment Note  NAME: Cheryl Stearns  : 1950  MRN: [id number]    Date of Service: 2021    Discharge Recommendations:  Subacute/Skilled Nursing Facility       Assessment   Performance deficits / Impairments: Decreased functional mobility ; Decreased ADL status; Decreased strength; Decreased safe awareness; Decreased cognition; Decreased endurance; Decreased balance; Decreased high-level IADLs  Assessment: Pt demos improved standing tolerance, stood in stance at sink ~ 8 mins for grooming tasks, however pt leaning on counter as UE and balance support. Pt requires increased time and VCs for all tasks. Pt had LOB when completing functional mobility into bathroom with slight elevation in floor, pt required max A to correct. Pt continues to function below her baseline, is unsafe to return home alone. Recommend SNF at d/c. Prognosis: Good  OT Education: OT Role; Plan of Care; ADL Adaptive Strategies; Transfer Training  Disease Specific Education: Pt educated on weight bearing status, post-op precautions, appropriate DME, and safe mobility with AD. Pt verbalized understanding. Barriers to Learning: cognition  REQUIRES OT FOLLOW UP: Yes  Activity Tolerance  Activity Tolerance: Patient Tolerated treatment well  Activity Tolerance: Vitals: BP= 119/52, HR= 84, SPO2= 99%  Safety Devices  Safety Devices in place: Yes  Type of devices: Left in chair; Chair alarm in place; Call light within reach; Nurse notified; Gait belt         Patient Diagnosis(es): The encounter diagnosis was Status post total left knee replacement. has a past medical history of Arthritis, Chronic kidney disease, Diabetes mellitus ([facility] [address] Dialysis patient ([facility] [address] ESRD (end stage renal disease) ([facility] [address] Hemodialysis patient ([facility] [address] Hyperkalemia, Hyperlipidemia, Hypertension, OA (osteoarthritis), Retinopathy, Sepsis ([facility] [address] Thyroid disease, and Wears dentures.    has a when walking up grade in floor into bathroom)    Bed mobility  Supine to Sit: Stand by assistance (to R with HOB elevated)     Transfers  Sit to stand: Contact guard assistance  Stand to sit: Contact guard assistance     Cognition  Overall Cognitive Status: Exceptions  Following Commands:  Follows one step commands with repetition  Attention Span: Attends with cues to redirect  Memory: Decreased short term memory  Safety Judgement: Decreased awareness of need for safety; Decreased awareness of need for assistance  Insights: Decreased awareness of deficits  Initiation: Requires cues for some  Sequencing: Requires cues for some     Plan   Plan  Times per week: 4-6x    AM-PAC Score  AM-PAC Inpatient Daily Activity Raw Score: 14 (12/16/21 1318)  AM-PAC Inpatient ADL T-Scale Score : 33.39 (12/16/21 1318)  ADL Inpatient CMS 0-100% Score: 59.67 (12/16/21 1318)  ADL Inpatient CMS G-Code Modifier : CK (12/16/21 1318)    Goals  Short term goals  Time Frame for Short term goals: 1 week by 12/16; extended d/t LOS 12/23  Short term goal 1: Pt will complete LB ADLs with SPV with AE as needed-- ongoing 12/16/21  Short term goal 2: Pt will complete toileting with SPV-- ongoing 12/16/21  Short term goal 3: Pt will complete functional transfers wtih SPV-- ongoing 12/16/21  Short term goal 4: Pt wilson verb/demo car transfer tech with min cues-- ongoing 12/16/21  Short term goal 5: Pt will complete B UE ex x 20 reps w/o fatigue by 12/20-- ongoing 12/16/21  Patient Goals   Patient goals : Buren Musty to the bathroom with a walker\"-- GOAL MET, pt demos ability to walk bathroom with SW and min A 12/14/21       Therapy Time   Individual Concurrent Group Co-treatment   Time In 1140         Time Out 1220         Minutes [address]/DANIEL

## 2021-12-16 NOTE — PROGRESS NOTES
Retinopathy, Sepsis ([facility] [address] Thyroid disease, and Wears dentures. has a past surgical history that includes Dialysis fistula creation (Left, 08/10/2016); eye surgery (Left, 04/03/2018); other surgical history (Right, 01/11/2019); Toe amputation (Right, 1/11/2019); and Total knee arthroplasty (Left, 12/8/2021). Restrictions  Restrictions/Precautions  Restrictions/Precautions: Weight Bearing  Lower Extremity Weight Bearing Restrictions  Left Lower Extremity Weight Bearing: Weight Bearing As Tolerated  Position Activity Restriction  Other position/activity restrictions: HD M/W/F; 1)  Dangle at edge of bed, progress to stand, and take a few steps with assistive device. 2)  Encourage ankle pumps and quad sets. 3)  Up in bedside chair as tolerated. 4)  Commode privileges with assistance. Subjective   General  Chart Reviewed: Yes  Response To Previous Treatment: Patient with no complaints from previous session.   Family / Caregiver Present: No  Referring Practitioner: Mala Angel MD  Subjective  Subjective: Pt supine in bed on approach, pleasant and agreeable to PT tx  General Comment  Comments: RN cleared pt for session  Pain Screening  Patient Currently in Pain: Yes  Pain Assessment  Pain Level: 3  Non-Pharmaceutical Pain Intervention(s): Ambulation/Increased Activity; Repositioned  Vital Signs  Patient Currently in Pain: Yes       Orientation  Orientation  Overall Orientation Status: Within Functional Limits  Cognition      Objective   Bed mobility  Supine to Sit: Stand by assistance (to L with HOB elevated, extended time, VCs)  Transfers  Sit to Stand: Minimal Assistance  Stand to sit: Minimal Assistance  Ambulation  Ambulation?: Yes  WB Status: FWBAT  Ambulation 1  Surface: level tile  Device: Rolling Walker  Assistance: Contact guard assistance  Quality of Gait: steady, cues sequencing  Gait Deviations: Slow Audrey; Decreased step length; Decreased step height  Distance: 50' x 2  Comments: slow processing time for commands/ instruction  Stairs/Curb  Stairs?: No     Balance  Posture: Fair  Sitting - Static: Good  Sitting - Dynamic: Good; -  Standing - Static: Fair; +  Standing - Dynamic: Fair; + (rw)  Exercises  Straight Leg Raise: Supine LLE x 10 indep  Quad Sets: X 15 B  Heelslides: X 15 B seated  Gluteal Sets: X 10 B  Hip Abduction: Supine BLE x 15  Knee Long Arc Quad: X 10 L  Knee Active Range of Motion: seated flexion to 78*  Ankle Pumps: Supine BLE x 15             AM-PAC Score  AM-PAC Inpatient Mobility Raw Score : 16 (12/16/21 0923)  AM-PAC Inpatient T-Scale Score : 40.78 (12/16/21 0923)  Mobility Inpatient CMS 0-100% Score: 54.16 (12/16/21 0923)  Mobility Inpatient CMS G-Code Modifier : CK (12/16/21 4937)          Goals  Short term goals  Time Frame for Short term goals: 12/13/21 unless noted -- GOALs updated to 12/20 d/t prolonged hospital stay  Short term goal 1: Pt will perform bed mobility with SBA by 12/12/21 -- 12/16; met  Short term goal 2: Pt will perform transfers with SBA; 12/16 min A  Short term goal 3: Pt will ambulate 50 ft with RW and SBA; 12/16 50 ft with RW and CGA  Short term goal 4: Pt will negotiate 1 curb step with LRAD and CGA; -- 12/16 NT  Short term goal 5: Pt will perform 12+ reps of LE exercise for strengthening and balance; -- 12/16: GOAL MET but continue x 15 reps supine BLE exercises  Patient Goals   Patient goals : \"to be able to get up to the chair with only touching help by tomorrow (12/10/21)\" -- 12/16 not met, requires SBA sup>sit and min A EOB to chair    Plan    Plan  Times per week: BID x 7  Times per day: Twice a day  Specific instructions for Next Treatment: Progress mobility as tolerated  Current Treatment Recommendations: Strengthening, Home Exercise Program, ROM, Balance Training, Endurance Training, Functional Mobility Training, Transfer Training, Gait Training, Stair training, Safety Education & Training, Patient/Caregiver Education & Training, Positioning  Safety Devices  Type of devices: All fall risk precautions in place, Call light within reach, Chair alarm in place, Gait belt, Left in chair, Nurse notified  Restraints  Initially in place: No     Therapy Time   Individual Concurrent Group Co-treatment   Time In 0830         Time Out 0910         Minutes 40         Timed Code Treatment Minutes: [address] [facility] E   Goals updated by Justin Disla PT, DPT  If pt is unable to be seen after this session, please let this note serve as discharge summary. Please see case management note for discharge disposition. Thank you.

## 2021-12-16 NOTE — PROGRESS NOTES
Hospitalist Progress Note      PCP: Karyn German APRN - CNP    Date of Admission: 12/6/2021    Chief Complaint: Left knee pain    Hospital Course:   66 y.o. female with a PMH of OA, ESRD on HD, HTN, Hypothyroidism, and DM who we are asked to see/evaluate by Kaia Argueta MD for medical management. Patient admitted for elective left knee replacement surgery. Subjective:    Pt is on RA. Tmax 100.5 overnight. VSS. No dyspnea, cough or chest pain. No N/V/D. No abdominal pain.     Medications:  Reviewed    Infusion Medications    sodium chloride 25 mL (12/16/21 0949)    dextrose       Scheduled Medications    epoetin alice-epbx  3,000 Units SubCUTAneous Once per day on Mon Wed Fri    And    epoetin alice-epbx  2,000 Units SubCUTAneous Once per day on Mon Wed Fri    And    epoetin alice-epbx  10,000 Units SubCUTAneous Once per day on Mon Wed Fri    piperacillin-tazobactam  2,250 mg IntraVENous Q8H    insulin glargine  10 Units SubCUTAneous Nightly    heparin (porcine)  5,000 Units SubCUTAneous 3 times per day    sodium chloride flush  5-40 mL IntraVENous 2 times per day    atorvastatin  10 mg Oral Nightly    carvedilol  12.5 mg Oral BID WC    gabapentin  300 mg Oral QPM    levothyroxine  50 mcg Oral Daily    sertraline  25 mg Oral Daily    insulin lispro  0-6 Units SubCUTAneous TID WC    insulin lispro  0-3 Units SubCUTAneous Nightly     PRN Meds: acetaminophen, docusate sodium, cyclobenzaprine, sodium chloride flush, sodium chloride, ondansetron **OR** ondansetron, oxyCODONE **OR** oxyCODONE, glucose, dextrose, glucagon (rDNA), dextrose, hydrALAZINE, labetalol, sodium chloride flush, polyethylene glycol      Intake/Output Summary (Last 24 hours) at 12/16/2021 1617  Last data filed at 12/16/2021 1444  Gross per 24 hour   Intake 410 ml   Output --   Net 410 ml       Physical Exam Performed:    BP (!) 167/55   Pulse 90   Temp 99.2 °F (37.3 °C) (Oral)   Resp 16   Ht 5' 7\" (1.702 m)   Wt 179 lb 10.8 oz (81.5 kg)   SpO2 93%   BMI 28.14 kg/m²     General appearance: mild distress, appears stated age and cooperative. HEENT: Normal cephalic, atraumatic without obvious deformity. Pupils equal, round, and reactive to light. Extra ocular muscles intact. Conjunctivae/corneas clear. Neck: Supple, with full range of motion. No jugular venous distention. Trachea midline. Respiratory:  Normal respiratory effort. Clear to auscultation, bilaterally without Rales/Wheezes/Rhonchi. Cardiovascular: Regular rate and rhythm with normal S1/S2 without murmurs, rubs or gallops. Abdomen: Soft, non-tender, non-distended with normal bowel sounds. Musculoskeletal: Left knee decreased rom, DSG CDI, moderate swelling  Skin: Skin color, texture, turgor normal.  No rashes or lesions. Neurologic:  Neurovascularly intact without any focal sensory/motor deficits.  Cranial nerves: II-XII intact, grossly non-focal.  Psychiatric: Alert and oriented, thought content appropriate, normal insight  Capillary Refill: Brisk,3 seconds, normal   Peripheral Pulses: +2 palpable, equal bilaterally    Labs:   Recent Labs     12/14/21  0657 12/15/21  0554 12/16/21  1302   WBC 6.2 6.2 7.0   HGB 7.4* 7.2* 7.7*   HCT 23.1* 22.4* 23.7*    262 332     Recent Labs     12/14/21  0657 12/15/21  0554   * 132*   K 5.2* 4.9   CL 96* 96*   CO2 21 20*   BUN 36* 50*   CREATININE 5.4* 7.4*   CALCIUM 9.0 9.0     Recent Labs     12/14/21  0657 12/15/21  0554   AST 22 22   ALT <5* <5*   BILIDIR <0.2  --    BILITOT 0.5 0.4   ALKPHOS 80 76     Urinalysis:      Lab Results   Component Value Date    NITRU Negative 09/14/2021    WBCUA >100 09/14/2021    BACTERIA 3+ 09/14/2021    RBCUA 11-20 09/14/2021    BLOODU MODERATE 09/14/2021    SPECGRAV 1.015 09/14/2021    GLUCOSEU Negative 09/14/2021       Radiology:  US GALLBLADDER RUQ   Final Result   Mild gallbladder wall thickening either due to the partially contracted state   of the gallbladder or wall thickening from gallbladder wall   inflammation-early cholecystitis. Gallstones are seen      Mild heterogeneous echotexture throughout the liver, either technical or due   to diffuse hepatocellular disease such as fatty infiltration      Echogenic right kidney, suggesting medical renal disease      RECOMMENDATIONS:   Unavailable         CT CHEST ABDOMEN PELVIS W CONTRAST   Final Result   1. Left lower lobe mass. Pulmonary follow-up is recommended. Additional guidelines are provided below based upon this dominant mass. 2. 1 additional pulmonary nodule in the right lower lobe. 3. Trace associated pleural fluid in the left chest.   4. Granulomatous changes are seen in the chest and abdomen. 5. Cholelithiasis with gallbladder mucosal thickening and mild intrahepatic   biliary dilatation. Acute cholecystitis should be considered clinically as   potential source for patient's symptoms. Follow-up ultrasound or HIDA scan   may be considered if indicated clinically. RECOMMENDATIONS:   Fleischner Society guidelines for follow-up and management of incidentally   detected pulmonary nodules:      Single Solid Nodule:      Single solid lung nodule 9+ mm: Consider non-contrast chest CT at 3 months,   PET/CT, or tissue sampling. If this nodule was detected on an incomplete   chest CT, first recommend a prompt, non-contrast chest CT.      - Low risk patients include individuals with minimal or absent history of   smoking and other known risk factors. - High risk patients include individuals with a history or smoking or known   risk factors. Reference: Radiology 2017   http://pubs. rsna.org/doi/full/10.1148/radiol. 3119055490         XR CHEST PORTABLE   Final Result   No acute process. XR KNEE LEFT (1-2 VIEWS)   Final Result      Left total knee arthroplasty which is in anatomic alignment with no evidence   of fracture or loosening. Soft tissue air is consistent with the recent   surgery.   Head Assessment/Plan:    Active Hospital Problems    Diagnosis     Lung mass [R91.8]     Lung nodule [R91.1]     Granulomatous lung disease ([facility] [J84.10]     Former smoker [S33.441]     Intrahepatic bile duct dilation [K83.8]     Hyponatremia [E87.1]     DM (diabetes mellitus), secondary uncontrolled ([facility] [E13.65]     Elevated parathyroid hormone [E34.9]     Status post total left knee replacement [Z96.652]     Pulmonary HTN (HCC) [I27.20]     Postoperative anemia due to acute blood loss [D62]     Primary osteoarthritis of left knee [M17.12]     [facility] [N18.6, Z99.2]        Left knee OA s/p TKA on 12/8/21:  - Postoperative management per Orthopedic Surgery. - Continue prn analgesia, bowel regimen, IS, DVT prophylaxis and PT/OT. Fever of uncertain etiology:  - Etiology unclear, possibly due to perioperative state. No associated leukocytosis. Procalcitonin not helpful in setting of ESRD. - CXR shows clear lungs. CT C/A/P obtained and showed LLL mass as well as cholelithiasis with GB mucosal thickening and mild intrahepatic biliary dilatation.   - Rapid COVID negative. Blood cultures are NGTD. No diarrhea or abdominal pain. Left knee wound appears okay per Ortho. No other skin issues noted. - UA not sent as pt is anuric.   - Pt started on empiric vancomycin and zosyn on 12/13. Vanc dc'd per Pulm. She remains on IV zosyn. - ID consulted, given recurrent fever overnight -- appreciate. Possible cholecystitis (ruled out):  - Doubt clinically, pt w/o abdominal pain, no N/V.   - LFTs/bilirubin are normal.   - RUQ US with findings suggestive of early cholecystitis. - General surgery consulted, no evidence of cholecystitis from their perspective, GB is unlikely the source of her fevers.      Left lower lobe mass / granulomatous lung disease with findings in the liver/spleen:  - Noted on CT.   - Pulmonary consulted, plan for CT-guided lung biopsy vs PET scan -- to be completed as an outpt. - Per Pulm, lung findings on CT are likely not the source for her fevers. ESRD on HD M/W/F:  - Management per Nephrology. Acute on chronic anemia:  - Likely due to postop surgical losses in setting of ESRD. No overt signs of bleeding.  - Monitor CBC closely. Transfuse for Hgb less than 7. H&H is low at 7.2 but remains stable. - Continue epoetin. Hypertension  - Variable control. Continue carvedilol and losartan. Monitor BP closely.     Diabetes mellitus type 2 with peripheral neuropathy   - Controlled, A1C 5.8.  - Hold home regimen. Continue basal bolus insulin regimen -- monitor and adjust as needed. - Continue gabapentin.      Hyperlipidemia:  - Continue statin.      Hypothyroidism:  - Clinically euthyroid. Continue Synthroid. DVT Prophylaxis: SQ heparin   Diet: ADULT ORAL NUTRITION SUPPLEMENT; Breakfast, Lunch; Renal Oral Supplement  ADULT DIET; Regular; Low Potassium (Less than 3000 mg/day); Low Phosphorus (Less than 1000 mg)  Code Status: Full Code    PT/OT Eval Status: active and ongoing     Dispo -  Pending ID input.      [facility], APRN - CNP

## 2021-12-16 NOTE — PROGRESS NOTES
Physical Therapy  Facility/Department: [facility], [place] C5 - MED SURG/ORTHO  Daily Treatment Note  NAME: Huong Ross  : 1950  MRN: [id number]    Date of Service: 2021    Discharge Recommendations:  Subacute/Skilled Nursing Facility   PT Equipment Recommendations  Equipment Needed: No  Other: defer to next level of care    Assessment   Body structures, Functions, Activity limitations: Decreased functional mobility ; Decreased endurance; Decreased ROM; Decreased balance; Decreased strength; Decreased cognition  Assessment: Pt required SBA to get supine to sit, then min A of 1 STS with SW. She ambulated CGA with SW X 50'. Pt requires increased time and VCs for all tasks. Pt needed extended time and mod cues to complete the task with frequent standing rests and poor safety. Pt is recommended for con't skilled PT  and SNF at D/C. Treatment Diagnosis: functional mobility deficit; impaired ROM  Prognosis: Good  Decision Making: Medium Complexity  PT Education: Goals; PT Role; Disease Specific Education; Plan of Care; Home Exercise Program; Precautions; Transfer Training; Functional Mobility Training; Family Education; Weight-bearing Education; General Safety; Injury Prevention  Patient Education: Educated in importance of OOB mobility and t/f, safety with functional mobility and use of AD. Educated in positioning of knee to improve ROM. Pt will benefit from further reinfrocement  Barriers to Learning: none  REQUIRES PT FOLLOW UP: Yes  Activity Tolerance  Activity Tolerance: Patient Tolerated treatment well  Activity Tolerance: 167/55 HR 90  O2 96% RA     Patient Diagnosis(es): The encounter diagnosis was Status post total left knee replacement.      has a past medical history of Arthritis, Chronic kidney disease, Diabetes mellitus ([facility] Dialysis patient ([facility] [address] ESRD (end stage renal disease) ([facility] [address] Hemodialysis patient ([facility] Hyperkalemia, Hyperlipidemia, Hypertension, OA (osteoarthritis), Retinopathy, Sepsis [facility]), Thyroid disease, and Wears dentures. has a past surgical history that includes Dialysis fistula creation (Left, 08/10/2016); eye surgery (Left, 04/03/2018); other surgical history (Right, 01/11/2019); Toe amputation (Right, 1/11/2019); and Total knee arthroplasty (Left, 12/8/2021). Restrictions  Restrictions/Precautions  Restrictions/Precautions: Weight Bearing  Lower Extremity Weight Bearing Restrictions  Left Lower Extremity Weight Bearing: Weight Bearing As Tolerated  Position Activity Restriction  Other position/activity restrictions: HD M/W/F; 1)  Dangle at edge of bed, progress to stand, and take a few steps with assistive device. 2)  Encourage ankle pumps and quad sets. 3)  Up in bedside chair as tolerated. 4)  Commode privileges with assistance. Subjective   General  Chart Reviewed: Yes  Response To Previous Treatment: Patient with no complaints from previous session.   Family / Caregiver Present: No  Referring Practitioner: Jostin Ramires MD  Subjective  Subjective: Pt in chair on approach, pleasant and agreeable to PT tx  General Comment  Comments: RN cleared pt for session  Pain Screening  Patient Currently in Pain: No  Pain Assessment  Pain Level: 3  Non-Pharmaceutical Pain Intervention(s): Ambulation/Increased Activity; Repositioned  Vital Signs  Patient Currently in Pain: No       Orientation  Orientation  Overall Orientation Status: Within Functional Limits  Cognition      Objective   Bed mobility  Supine to Sit: Stand by assistance (to L with HOB elevated, extended time, VCs)  Sit to Supine: Contact guard assistance  Transfers  Sit to Stand: Minimal Assistance  Stand to sit: Minimal Assistance  Ambulation  Ambulation?: Yes  WB Status: FWBAT  Ambulation 1  Surface: level tile  Device: Rolling Walker  Assistance: Contact guard assistance  Quality of Gait: steady, cues sequencing  Gait Deviations: Slow Audrey; Decreased step length; Decreased step height  Distance: 50' x 2  Comments: slow processing time for commands/ instruction  Stairs/Curb  Stairs?: No     Balance  Posture: Fair  Sitting - Static: Good  Sitting - Dynamic: Good  Standing - Static: Fair; +  Standing - Dynamic: Fair; + (rw)  Exercises  Straight Leg Raise: Supine LLE x 10 indep  Quad Sets: X 15 B  Heelslides: X 15 B seated  Gluteal Sets: X 10 B  Hip Abduction: Supine BLE x 15  Knee Long Arc Quad: X 10 L  Knee Short Arc Quad: Supine LLE x 15  Knee Active Range of Motion: seated flexion to 84*  Ankle Pumps: Supine BLE x 15          AM-PAC Score  AM-PAC Inpatient Mobility Raw Score : 16 (12/16/21 0923)  AM-PAC Inpatient T-Scale Score : 40.78 (12/16/21 0923)  Mobility Inpatient CMS 0-100% Score: 54.16 (12/16/21 0923)  Mobility Inpatient CMS G-Code Modifier : CK (12/16/21 0112)          Goals  Short term goals  Time Frame for Short term goals: 12/13/21 unless noted -- -- GOALs updated to 12/20 d/t prolonged hospital stay  Short term goal 1: Pt will perform bed mobility with SBA by 12/12/21 -- 12/16; met  Short term goal 2: Pt will perform transfers with SBA; 12/16 min A  Short term goal 3: Pt will ambulate 50 ft with RW and SBA; 12/16 50 ft with RW and CGA  Short term goal 4: Pt will negotiate 1 curb step with LRAD and CGA; -- 12/16 NT  Short term goal 5: Pt will perform 12+ reps of LE exercise for strengthening and balance; -- 12/16: GOAL MET but continue x 15 reps supine BLE exercises  Patient Goals   Patient goals : \"to be able to get up to the chair with only touching help by tomorrow (12/10/21)\" -- 12/16 not met, requires SBA sup>sit and min A EOB to chair    Plan    Plan  Times per week: BID x 7  Times per day: Twice a day  Specific instructions for Next Treatment: Progress mobility as tolerated  Current Treatment Recommendations: Strengthening, Home Exercise Program, ROM, Balance Training, Endurance Training, Functional Mobility Training, Transfer Training, Gait Training, Stair training, Safety Education & Training, Patient/Caregiver Education & Training, Positioning  Safety Devices  Type of devices:  All fall risk precautions in place, Call light within reach, Gait belt, Nurse notified, Bed alarm in place, Left in bed  Restraints  Initially in place: No     Therapy Time   Individual Concurrent Group Co-treatment   Time In 1245         Time Out 1324         Minutes 39         Timed Code Treatment Minutes: [address] [facility]

## 2021-12-16 NOTE — PROGRESS NOTES
INPATIENT PULMONARY CRITICAL CARE PROGRESS NOTE      Reason for visit    left lower lobe mass on CT obtained for persistent fevers    SUBJECTIVE: Patient when seen this morning was comfortably sleeping in the bed without any increased shortness of breath or increased work of breathing, patient was afebrile this morning, patient was hemodynamically maintained, patient had sinus rhythm on the monitor, patient's glycemic control was fluctuating but acceptable this morning, patient went to have a cycle of dialysis today, patient's p.o. intake is limited, no other pertinent review of system of concern             Physical Exam:  Blood pressure 137/65, pulse 95, temperature 98.5 °F (36.9 °C), temperature source Oral, resp. rate 18, height 5' 7\" (1.702 m), weight 179 lb 10.8 oz (81.5 kg), SpO2 95 %, not currently breastfeeding.'     Constitutional:  No acute distress. HENT:  Oropharynx is clear and moist. No thyromegaly. Eyes:  Conjunctivae are normal. Pupils equal, round, and reactive to light. No scleral icterus. Neck: . No tracheal deviation present. No obvious thyroid mass. Cardiovascular: Normal rate, regular rhythm, normal heart sounds. No right ventricular heave. No lower extremity edema. Pulmonary/Chest: No wheezes. No rales. Chest wall is not dull to percussion. No accessory muscle usage or stridor. Decreased breath sound intensity  Abdominal: Soft. Bowel sounds present. No distension or hernia. No tenderness. Musculoskeletal: No cyanosis. No clubbing. No obvious joint deformity. Lymphadenopathy: No cervical or supraclavicular adenopathy. Skin: Skin is warm and dry. No rash or nodules on the exposed extremities.   Neurologic:  Sleeping when seen       Results:  CBC:   Recent Labs     12/13/21  0810 12/14/21  0657 12/15/21  0554   WBC 9.1 6.2 6.2   HGB 7.2* 7.4* 7.2*   HCT 21.9* 23.1* 22.4*   MCV 92.9 94.6 92.8    212 262     BMP:   Recent Labs     12/13/21  0810 12/14/21  0657 12/15/21  0554   * 130* 132*   K 4.9 5.2* 4.9   CL 94* 96* 96*   CO2 19* 21 20*   PHOS 6.3*  --   --    BUN 71* 36* 50*   CREATININE 9.2* 5.4* 7.4*     LIVER PROFILE:   Recent Labs     12/14/21  0657 12/15/21  0554   AST 22 22   ALT <5* <5*   BILIDIR <0.2  --    BILITOT 0.5 0.4   ALKPHOS 80 76       Imaging:  I have reviewed radiology images personally. US GALLBLADDER RUQ   Final Result   Mild gallbladder wall thickening either due to the partially contracted state   of the gallbladder or wall thickening from gallbladder wall   inflammation-early cholecystitis. Gallstones are seen      Mild heterogeneous echotexture throughout the liver, either technical or due   to diffuse hepatocellular disease such as fatty infiltration      Echogenic right kidney, suggesting medical renal disease      RECOMMENDATIONS:   Unavailable         CT CHEST ABDOMEN PELVIS W CONTRAST   Final Result   1. Left lower lobe mass. Pulmonary follow-up is recommended. Additional guidelines are provided below based upon this dominant mass. 2. 1 additional pulmonary nodule in the right lower lobe. 3. Trace associated pleural fluid in the left chest.   4. Granulomatous changes are seen in the chest and abdomen. 5. Cholelithiasis with gallbladder mucosal thickening and mild intrahepatic   biliary dilatation. Acute cholecystitis should be considered clinically as   potential source for patient's symptoms. Follow-up ultrasound or HIDA scan   may be considered if indicated clinically. RECOMMENDATIONS:   Fleischner Society guidelines for follow-up and management of incidentally   detected pulmonary nodules:      Single Solid Nodule:      Single solid lung nodule 9+ mm: Consider non-contrast chest CT at 3 months,   PET/CT, or tissue sampling.   If this nodule was detected on an incomplete   chest CT, first recommend a prompt, non-contrast chest CT.      - Low risk patients include individuals with minimal or absent history of smoking and other known risk factors. - High risk patients include individuals with a history or smoking or known   risk factors. Reference: Radiology 2017   http://pubs. rsna.org/doi/full/10.1148/radiol. 9421700808         XR CHEST PORTABLE   Final Result   No acute process. XR KNEE LEFT (1-2 VIEWS)   Final Result      Left total knee arthroplasty which is in anatomic alignment with no evidence   of fracture or loosening. Soft tissue air is consistent with the recent   surgery. Head           XR KNEE LEFT (1-2 VIEWS)    Result Date: 12/8/2021  EXAM: XR Left Knee, 1 or 2 Views EXAM DATE/TIME: 12/8/2021 5:09 pm CLINICAL HISTORY: ORDERING SYSTEM PROVIDED  s/p L TKA  TECHNOLOGIST PROVIDED HISTORY:  Of operative side while in recovery room. Reason for exam:->s/p L TKA  Reason for Exam: s/p L TKA TECHNIQUE: Frontal and/or lateral views of the left knee. COMPARISON: 11/02/2021 FINDINGS: Bones/joints:  Left total knee arthroplasty which is in anatomic alignment with no evidence of fracture or loosening. Soft tissue air is consistent with the recent surgery. Soft tissues:  No acute findings. Left total knee arthroplasty which is in anatomic alignment with no evidence of fracture or loosening. Soft tissue air is consistent with the recent surgery. Head     US GALLBLADDER RUQ    Result Date: 12/14/2021  EXAMINATION: RIGHT UPPER QUADRANT ULTRASOUND 12/14/2021 5:14 pm COMPARISON: Recent CT HISTORY: ORDERING SYSTEM PROVIDED HISTORY: abnormal CT suggesting possible cholecystitis TECHNOLOGIST PROVIDED HISTORY: Reason for exam:->abnormal CT suggesting possible cholecystitis FINDINGS: LIVER:  No intrahepatic ductal dilatation seen. No perihepatic fluid. There is mild heterogeneous echotexture throughout the liver. BILIARY SYSTEM:  Gallstones are seen. No pericholecystic fluid Gallbladder is contracted accentuating its wall thickness at 4-5 mm. .  No sonographic Horan sign Common bile duct is within normal limits measuring 4-5 mm. RIGHT KIDNEY: No hydronephrosis noted. Cortical thinning is seen on the right. Right kidney appears echogenic PANCREAS:  Visualized portions of the pancreas are unremarkable. OTHER: No evidence of right upper quadrant ascites. Mild gallbladder wall thickening either due to the partially contracted state of the gallbladder or wall thickening from gallbladder wall inflammation-early cholecystitis. Gallstones are seen Mild heterogeneous echotexture throughout the liver, either technical or due to diffuse hepatocellular disease such as fatty infiltration Echogenic right kidney, suggesting medical renal disease RECOMMENDATIONS: Unavailable     XR CHEST PORTABLE    Result Date: 12/13/2021  EXAMINATION: ONE XRAY VIEW OF THE CHEST 12/13/2021 10:05 am COMPARISON: 06/09/2017 HISTORY: ORDERING SYSTEM PROVIDED HISTORY: fever TECHNOLOGIST PROVIDED HISTORY: Reason for exam:->fever Reason for Exam: pt states she has been running a low grade fever for a couple days, denies chest pain or SOB FINDINGS: The lungs are without acute focal process. There is no effusion or pneumothorax. The cardiomediastinal silhouette is stable. The osseous structures are stable. No acute process. CT CHEST ABDOMEN PELVIS W CONTRAST    Result Date: 12/13/2021  EXAMINATION: CT OF THE CHEST, ABDOMEN, AND PELVIS WITH CONTRAST 12/13/2021 7:15 pm TECHNIQUE: CT of the chest, abdomen and pelvis was performed with the administration of intravenous contrast. Multiplanar reformatted images are provided for review. Dose modulation, iterative reconstruction, and/or weight based adjustment of the mA/kV was utilized to reduce the radiation dose to as low as reasonably achievable.  COMPARISON: None HISTORY: ORDERING SYSTEM PROVIDED HISTORY: fever of uncertain etiology TECHNOLOGIST PROVIDED HISTORY: Reason for exam:->fever of uncertain etiology Additional Contrast?->None Reason for Exam: low grade fever, left TKR 5 days ago FINDINGS: Chest: Mediastinum: The heart is mildly enlarged. Small pericardial effusion. Aorta and pulmonary arteries are normal.  Calcified mediastinal and left hilar lymph nodes. No adenopathy. Thyroid and esophagus unremarkable. There is a small sliding-type hiatal hernia. Lungs/pleura: The airways are patent. Trace pleural fluid on the left. 4.5 x 2.7 cm mass that is heterogeneous in attenuation with areas of punctate calcification in the left lower lobe. This is inferior to a calcified granuloma that has been stable since 2017, but this mass is new since that time based upon review of prior chest radiographs. There is also a nodule in the right lower lobe measuring 1.4 x 1.3 cm. Soft Tissues/Bones: No skeletal abnormalities are appreciated within the chest. Abdomen/Pelvis: Organs: Extensive calcified granulomata in the liver and spleen. 1.0 cm low attenuating lesion at the dome that is indeterminate. There is another subtle low attenuating lesion in segment 6 that cannot be characterized. The gallbladder demonstrates cholelithiasis. There is diffuse mucosal thickening/edema. No pericholecystic fluid. Mild intrahepatic biliary dilatation. The common bile duct measures 8 mm. The pancreas is normal. The adrenal glands are normal.  Cortical thinning in the kidneys suggesting chronic medical renal disease. GI/Bowel: The stomach, duodenum and small bowel are normal. A normal appendix is visualized. Mild diverticular changes in the sigmoid colon. No inflammation. Pelvis: The bladder is unremarkable. The uterus and adnexa are normal. Peritoneum/Retroperitoneum: The aorta tapers normally. No lymph node enlargement. Bones/Soft Tissues: No significant skeletal abnormalities. 1. Left lower lobe mass. Pulmonary follow-up is recommended. Additional guidelines are provided below based upon this dominant mass. 2. 1 additional pulmonary nodule in the right lower lobe.  3. Trace associated pleural fluid in the left chest. 4. Granulomatous changes are seen in the chest and abdomen. 5. Cholelithiasis with gallbladder mucosal thickening and mild intrahepatic biliary dilatation. Acute cholecystitis should be considered clinically as potential source for patient's symptoms. Follow-up ultrasound or HIDA scan may be considered if indicated clinically. RECOMMENDATIONS: Fleischner Society guidelines for follow-up and management of incidentally detected pulmonary nodules: Single Solid Nodule: Single solid lung nodule 9+ mm: Consider non-contrast chest CT at 3 months, PET/CT, or tissue sampling. If this nodule was detected on an incomplete chest CT, first recommend a prompt, non-contrast chest CT. - Low risk patients include individuals with minimal or absent history of smoking and other known risk factors. - High risk patients include individuals with a history or smoking or known risk factors. Reference: Radiology 2017 http://pubs. rsna.org/doi/full/10.1148/radiol. 1793136495             Assessment:  Active Problems:    ESRD on dialysis [facility])    Primary osteoarthritis of left knee    Lung mass    Lung nodule    Granulomatous lung disease (HCC)    Former smoker    Intrahepatic bile duct dilation    Hyponatremia    DM (diabetes mellitus), secondary uncontrolled (HCC)    Elevated parathyroid hormone    Status post total left knee replacement    Pulmonary HTN (HCC)    Postoperative anemia due to acute blood loss  Resolved Problems:    * No resolved hospital problems.  *          Plan:   · Oxygen supplementation, if required, to keep saturation being 90-94% only  · Patient was on room air oxygen when seen  · Pulmonary toilet  · Patient was shown the CT of the chest /abdomen and patient does have a left lower lobe mass along with a small central calcification along with that patient also has noncalcified right lung nodule  · Patient also has a significant radiological finding suggestive of granulomatous lung disease and also patient has granulomatous changes in liver and spleen  · Patient was told about the differential diagnosis of the lung nodule and lung mass which includes inflammation or benign tumor with differential diagnosis including malignancy-the pretest probability of malignancy being equivocal or low  · Patient's lung findings are not causing the patient to have fever  · Patient does have cholelithiasis but with possible cholecystitis and also intrahepatic dilatation Patient has a history of E. coli UTI in the past  · Patient has been put on Zosyn and vancomycin by the internal medicine team  · Patient is on Zosyn to be continued for now but the dose was changed to 2.25 g IV piggyback every 8 h  · Hold vancomycin for now and reassess  · Titration of antimicrobials as per clinical status and cultures  · Patient's right upper quadrant sonogram was reviewed  · Patient does not have any clinical signs of acute cholecystitis  · Surgery consult appreciated and reviewed  · Patient was told about the options in terms of the lung mass-which includes CT-guided lung biopsy versus a PET scan as an outpatient and if hypermetabolic lesion then considering biopsy at that time and pros and cons of each approach was discussed in detail  · Patient wants to recuperate from the surgery and then do something-consider PET scan after discharge/repeat CT to do the needful  · Patient has history of elevated PTH in the past-will send repeat PTH  · Patient does have history of diastolic dysfunction and pulmonary hypertension on echo in 2016  · Fluid management/dialysis as per nephrology  · Monitor H&H closely  · Consider packed RBC transfusion if the hemoglobin drops below 7 g%  · Patient is on Retacrit  · Consider IV Venofer if deemed appropriate  · Insulins as per IM  · Synthroid as per IM  · PUD and DVT prophylaxis as per IM   · PT/OT       No other recommendations from pulmonary/critical care standpoint of view- will sign off-please call on whenever necessary basis if the patient is not discharged    Patient can follow-up in the office in few weeks after discharge to discuss options and about imaging again and do the needful            Electronically signed by:  Greggory Rinne, MD    12/15/2021    8:03 PM.

## 2021-12-17 VITALS
BODY MASS INDEX: 27.85 KG/M2 | HEIGHT: 67 IN | HEART RATE: 82 BPM | RESPIRATION RATE: 16 BRPM | OXYGEN SATURATION: 96 % | TEMPERATURE: 98 F | DIASTOLIC BLOOD PRESSURE: 71 MMHG | WEIGHT: 177.47 LBS | SYSTOLIC BLOOD PRESSURE: 173 MMHG

## 2021-12-17 LAB
ALBUMIN SERPL-MCNC: 3.1 G/DL (ref 3.4–5)
ANION GAP SERPL CALCULATED.3IONS-SCNC: 14 MMOL/L (ref 3–16)
ATYPICAL LYMPHOCYTE RELATIVE PERCENT: 0 % (ref 0–6)
BANDED NEUTROPHILS RELATIVE PERCENT: 1 % (ref 0–7)
BASOPHILS ABSOLUTE: 0 K/UL (ref 0–0.2)
BASOPHILS RELATIVE PERCENT: 0 %
BLOOD CULTURE, ROUTINE: NORMAL
BUN BLDV-MCNC: 38 MG/DL (ref 7–20)
CALCIUM SERPL-MCNC: 9.2 MG/DL (ref 8.3–10.6)
CHLORIDE BLD-SCNC: 100 MMOL/L (ref 99–110)
CO2: 21 MMOL/L (ref 21–32)
CREAT SERPL-MCNC: 7.4 MG/DL (ref 0.6–1.2)
CULTURE, BLOOD 2: NORMAL
EOSINOPHILS ABSOLUTE: 0.5 K/UL (ref 0–0.6)
EOSINOPHILS RELATIVE PERCENT: 7 %
GFR AFRICAN AMERICAN: 7
GFR NON-AFRICAN AMERICAN: 5
GLUCOSE BLD-MCNC: 105 MG/DL (ref 70–99)
GLUCOSE BLD-MCNC: 111 MG/DL (ref 70–99)
HCT VFR BLD CALC: 22.1 % (ref 36–48)
HEMOGLOBIN: 7.2 G/DL (ref 12–16)
LYMPHOCYTES ABSOLUTE: 1.8 K/UL (ref 1–5.1)
LYMPHOCYTES RELATIVE PERCENT: 28 %
MACROCYTES: ABNORMAL
MCH RBC QN AUTO: 30.1 PG (ref 26–34)
MCHC RBC AUTO-ENTMCNC: 32.5 G/DL (ref 31–36)
MCV RBC AUTO: 92.5 FL (ref 80–100)
MICROCYTES: ABNORMAL
MONOCYTES ABSOLUTE: 0.2 K/UL (ref 0–1.3)
MONOCYTES RELATIVE PERCENT: 3 %
NEUTROPHILS ABSOLUTE: 4.1 K/UL (ref 1.7–7.7)
NEUTROPHILS RELATIVE PERCENT: 61 %
NUCLEATED RED BLOOD CELLS: 2 /100 WBC
PARATHYROID HORMONE INTACT: 175.5 PG/ML (ref 14–72)
PDW BLD-RTO: 18.8 % (ref 12.4–15.4)
PERFORMED ON: ABNORMAL
PHOSPHORUS: 5 MG/DL (ref 2.5–4.9)
PLATELET # BLD: 360 K/UL (ref 135–450)
PLATELET SLIDE REVIEW: ADEQUATE
PMV BLD AUTO: 8.7 FL (ref 5–10.5)
POLYCHROMASIA: ABNORMAL
POTASSIUM SERPL-SCNC: 4.8 MMOL/L (ref 3.5–5.1)
RBC # BLD: 2.39 M/UL (ref 4–5.2)
SLIDE REVIEW: ABNORMAL
SODIUM BLD-SCNC: 135 MMOL/L (ref 136–145)
WBC # BLD: 6.6 K/UL (ref 4–11)

## 2021-12-17 PROCEDURE — 80069 RENAL FUNCTION PANEL: CPT

## 2021-12-17 PROCEDURE — 6360000002 HC RX W HCPCS: Performed by: ORTHOPAEDIC SURGERY

## 2021-12-17 PROCEDURE — 36415 COLL VENOUS BLD VENIPUNCTURE: CPT

## 2021-12-17 PROCEDURE — 6370000000 HC RX 637 (ALT 250 FOR IP): Performed by: ORTHOPAEDIC SURGERY

## 2021-12-17 PROCEDURE — 85025 COMPLETE CBC W/AUTO DIFF WBC: CPT

## 2021-12-17 PROCEDURE — 90935 HEMODIALYSIS ONE EVALUATION: CPT

## 2021-12-17 PROCEDURE — 6360000002 HC RX W HCPCS: Performed by: INTERNAL MEDICINE

## 2021-12-17 RX ORDER — OXYCODONE HYDROCHLORIDE 5 MG/1
5 TABLET ORAL EVERY 4 HOURS PRN
Qty: 12 TABLET | Refills: 0 | Status: SHIPPED | OUTPATIENT
Start: 2021-12-17 | End: 2021-12-20

## 2021-12-17 RX ADMIN — SERTRALINE 25 MG: 50 TABLET, FILM COATED ORAL at 13:12

## 2021-12-17 RX ADMIN — HEPARIN SODIUM 5000 UNITS: 5000 INJECTION INTRAVENOUS; SUBCUTANEOUS at 06:22

## 2021-12-17 RX ADMIN — EPOETIN ALFA-EPBX 10000 UNITS: 10000 INJECTION, SOLUTION INTRAVENOUS; SUBCUTANEOUS at 09:15

## 2021-12-17 RX ADMIN — EPOETIN ALFA-EPBX 2000 UNITS: 2000 INJECTION, SOLUTION INTRAVENOUS; SUBCUTANEOUS at 09:16

## 2021-12-17 RX ADMIN — HEPARIN SODIUM 5000 UNITS: 5000 INJECTION INTRAVENOUS; SUBCUTANEOUS at 13:12

## 2021-12-17 RX ADMIN — LEVOTHYROXINE SODIUM 50 MCG: 0.05 TABLET ORAL at 06:23

## 2021-12-17 RX ADMIN — EPOETIN ALFA-EPBX 3000 UNITS: 3000 INJECTION, SOLUTION INTRAVENOUS; SUBCUTANEOUS at 09:15

## 2021-12-17 ASSESSMENT — PAIN SCALES - GENERAL
PAINLEVEL_OUTOF10: 0
PAINLEVEL_OUTOF10: 0

## 2021-12-17 NOTE — PLAN OF CARE
Problem: Nutrition  Goal: Optimal nutrition therapy  Outcome: Ongoing  Note: Nutrition Problem #1: Inadequate oral intake  Intervention: Food and/or Nutrient Delivery: Continue Current Diet, Continue Oral Nutrition Supplement  Nutritional Goals: Consume 50% or greater of 3 meals per day and ONS during this admission.

## 2021-12-17 NOTE — PROGRESS NOTES
Comprehensive Nutrition Assessment    Type and Reason for Visit:  Reassess    Nutrition Recommendations/Plan:   1. Continue low potassium, low phosphorus diet   2. Continue Nepro BID   3. Encourage PO intakes   4. Monitor diet education needs   5. Monitor nutrition adequacy, pertinent labs, bowel habits, wt changes, and clinical progress    Nutrition Assessment:  Follow up: Pt remains nutritionally at risk AEB variable PO intakes. Continues on low potassium, low phosphorus diet. Will continue current ONS to promote adequate nutrition. Weights remain stable this admission. Pt likely to discharge this afternoon. Will monitor for further nutrition needs. Malnutrition Assessment:  Malnutrition Status: At risk for malnutrition (Comment)    Context:  Chronic Illness       Estimated Daily Nutrient Needs:  Energy (kcal):  2005-5216 kcals/day; Weight Used for Energy Requirements:  Ideal (61 kg)     Protein (g):  73-79 g/day; Weight Used for Protein Requirements:  Ideal (1.2-1.3 g/kg)        Fluid (ml/day):   ; Method Used for Fluid Requirements:  1 ml/kcal      Nutrition Related Findings:  -228 past 24 hrs. +1 generalized edema. Na 135  K 4.8  Phos 5.0      Wounds:  Surgical Incision       Current Nutrition Therapies:    ADULT ORAL NUTRITION SUPPLEMENT; Breakfast, Lunch; Renal Oral Supplement  ADULT DIET; Regular; Low Potassium (Less than 3000 mg/day); Low Phosphorus (Less than 1000 mg)    Anthropometric Measures:  · Height: 5' 7\" (170.2 cm)  · Current Body Weight: 177 lb (80.3 kg)   · Usual Body Weight: 180 lb (81.6 kg)     · Ideal Body Weight: 135 lbs; % Ideal Body Weight 133.3 %   · BMI: 27.7  · BMI Categories: Overweight (BMI 25.0-29. 9)       Nutrition Diagnosis:   · Inadequate oral intake related to early satiety as evidenced by intake 0-25%, intake 26-50%, intake 51-75%      Nutrition Interventions:   Food and/or Nutrient Delivery:  Continue Current Diet, Continue Oral Nutrition Supplement  Nutrition Education/Counseling:  Education completed   Coordination of Nutrition Care:  Continue to monitor while inpatient    Goals:  Consume 50% or greater of 3 meals per day and ONS during this admission.        Nutrition Monitoring and Evaluation:   Behavioral-Environmental Outcomes:  None Identified   Food/Nutrient Intake Outcomes:  Food and Nutrient Intake, Supplement Intake  Physical Signs/Symptoms Outcomes:  Biochemical Data, Fluid Status or Edema, Weight     Discharge Planning:    Continue current diet, Continue Oral Nutrition Supplement     Electronically signed by Ethel Vidal MS, RD, LD on 12/17/21 at 3:06 PM EST    Contact: [phone]

## 2021-12-17 NOTE — PROGRESS NOTES
Hospitalist Progress Note      PCP: ELANA Briseno CNP    Date of Admission: 12/6/2021    Chief Complaint: Left knee pain    Hospital Course:   66 y.o. female with a PMH of OA, ESRD on HD, HTN, Hypothyroidism, and DM who we are asked to see/evaluate by Soraya Martinez MD for medical management. Patient admitted for elective left knee replacement surgery. Subjective:    Pt is on RA. Afebrile. VSS. No dyspnea, cough or chest pain. No N/V/D. No abdominal pain.     Medications:  Reviewed    Infusion Medications    sodium chloride 25 mL (12/16/21 0949)    dextrose       Scheduled Medications    epoetin alice-epbx  3,000 Units SubCUTAneous Once per day on Mon Wed Fri    And    epoetin alice-epbx  2,000 Units SubCUTAneous Once per day on Mon Wed Fri    And    epoetin alice-epbx  10,000 Units SubCUTAneous Once per day on Mon Wed Fri    insulin glargine  10 Units SubCUTAneous Nightly    heparin (porcine)  5,000 Units SubCUTAneous 3 times per day    sodium chloride flush  5-40 mL IntraVENous 2 times per day    atorvastatin  10 mg Oral Nightly    carvedilol  12.5 mg Oral BID WC    gabapentin  300 mg Oral QPM    levothyroxine  50 mcg Oral Daily    sertraline  25 mg Oral Daily    insulin lispro  0-6 Units SubCUTAneous TID WC    insulin lispro  0-3 Units SubCUTAneous Nightly     PRN Meds: acetaminophen, docusate sodium, cyclobenzaprine, sodium chloride flush, sodium chloride, ondansetron **OR** ondansetron, oxyCODONE **OR** oxyCODONE, glucose, dextrose, glucagon (rDNA), dextrose, hydrALAZINE, labetalol, sodium chloride flush, polyethylene glycol      Intake/Output Summary (Last 24 hours) at 12/17/2021 1220  Last data filed at 12/16/2021 1444  Gross per 24 hour   Intake 290 ml   Output --   Net 290 ml       Physical Exam Performed:    /61   Pulse 78   Temp 98.2 °F (36.8 °C)   Resp 16   Ht 5' 7\" (1.702 m)   Wt 179 lb 10.8 oz (81.5 kg)   SpO2 96%   BMI 28.14 kg/m²     General appearance: mild distress, appears stated age and cooperative. HEENT: Normal cephalic, atraumatic without obvious deformity. Pupils equal, round, and reactive to light. Extra ocular muscles intact. Conjunctivae/corneas clear. Neck: Supple, with full range of motion. No jugular venous distention. Trachea midline. Respiratory:  Normal respiratory effort. Clear to auscultation, bilaterally without Rales/Wheezes/Rhonchi. Cardiovascular: Regular rate and rhythm with normal S1/S2 without murmurs, rubs or gallops. Abdomen: Soft, non-tender, non-distended with normal bowel sounds. Musculoskeletal: Left knee decreased rom, DSG CDI, moderate swelling  Skin: Skin color, texture, turgor normal.  No rashes or lesions. Neurologic:  Neurovascularly intact without any focal sensory/motor deficits. Cranial nerves: II-XII intact, grossly non-focal.  Psychiatric: Alert and oriented, thought content appropriate, normal insight  Capillary Refill: Brisk,3 seconds, normal   Peripheral Pulses: +2 palpable, equal bilaterally    Labs:   Recent Labs     12/15/21  0554 12/16/21  1302 12/17/21  0708   WBC 6.2 7.0 6.6   HGB 7.2* 7.7* 7.2*   HCT 22.4* 23.7* 22.1*    332 360     Recent Labs     12/15/21  0554 12/17/21  0708   * 135*   K 4.9 4.8   CL 96* 100   CO2 20* 21   BUN 50* 38*   CREATININE 7.4* 7.4*   CALCIUM 9.0 9.2   PHOS  --  5.0*     Recent Labs     12/15/21  0554   AST 22   ALT <5*   BILITOT 0.4   ALKPHOS 76     Urinalysis:      Lab Results   Component Value Date    NITRU Negative 09/14/2021    WBCUA >100 09/14/2021    BACTERIA 3+ 09/14/2021    RBCUA 11-20 09/14/2021    BLOODU MODERATE 09/14/2021    SPECGRAV 1.015 09/14/2021    GLUCOSEU Negative 09/14/2021       Radiology:  US GALLBLADDER RUQ   Final Result   Mild gallbladder wall thickening either due to the partially contracted state   of the gallbladder or wall thickening from gallbladder wall   inflammation-early cholecystitis.   Gallstones are seen      Mild heterogeneous echotexture throughout the liver, either technical or due   to diffuse hepatocellular disease such as fatty infiltration      Echogenic right kidney, suggesting medical renal disease      RECOMMENDATIONS:   Unavailable         CT CHEST ABDOMEN PELVIS W CONTRAST   Final Result   1. Left lower lobe mass. Pulmonary follow-up is recommended. Additional guidelines are provided below based upon this dominant mass. 2. 1 additional pulmonary nodule in the right lower lobe. 3. Trace associated pleural fluid in the left chest.   4. Granulomatous changes are seen in the chest and abdomen. 5. Cholelithiasis with gallbladder mucosal thickening and mild intrahepatic   biliary dilatation. Acute cholecystitis should be considered clinically as   potential source for patient's symptoms. Follow-up ultrasound or HIDA scan   may be considered if indicated clinically. RECOMMENDATIONS:   Fleischner Society guidelines for follow-up and management of incidentally   detected pulmonary nodules:      Single Solid Nodule:      Single solid lung nodule 9+ mm: Consider non-contrast chest CT at 3 months,   PET/CT, or tissue sampling. If this nodule was detected on an incomplete   chest CT, first recommend a prompt, non-contrast chest CT.      - Low risk patients include individuals with minimal or absent history of   smoking and other known risk factors. - High risk patients include individuals with a history or smoking or known   risk factors. Reference: Radiology 2017   http://pubs. rsna.org/doi/full/10.1148/radiol. 8990005649         XR CHEST PORTABLE   Final Result   No acute process. XR KNEE LEFT (1-2 VIEWS)   Final Result      Left total knee arthroplasty which is in anatomic alignment with no evidence   of fracture or loosening. Soft tissue air is consistent with the recent   surgery.   Head             Assessment/Plan:    Active Hospital Problems    Diagnosis     Lung mass [R91.8]     Lung nodule [R91.1]  Granulomatous lung disease ([facility] [J84.10]     Former smoker [R77.959]     Intrahepatic bile duct dilation [K83.8]     Hyponatremia [E87.1]     DM (diabetes mellitus), secondary uncontrolled ([facility] [E13.65]     Elevated parathyroid hormone [E34.9]     Status post total left knee replacement [Z96.652]     Pulmonary HTN (HCC) [I27.20]     Postoperative anemia due to acute blood loss [D62]     Primary osteoarthritis of left knee [M17.12]     ESRD on dialysis ([facility] [N18.6, Z99.2]        Left knee OA s/p TKA on 12/8/21:  - Postoperative management per Orthopedic Surgery. - Continue prn analgesia, bowel regimen, IS, DVT prophylaxis and PT/OT. Fever of uncertain etiology:  - Etiology unclear, possibly due to perioperative state / atelectasis. No associated leukocytosis. Procalcitonin not helpful in setting of ESRD. - CXR shows clear lungs. CT C/A/P obtained and showed LLL mass as well as cholelithiasis with GB mucosal thickening and mild intrahepatic biliary dilatation.   - Rapid COVID negative. Blood cultures are NGTD. No diarrhea or abdominal pain. Left knee wound appears okay per Ortho. No other skin issues noted. - UA not sent as pt is anuric.   - Pt started on empiric vancomycin and zosyn on 12/13.    - ID consulted, abx stopped, suspect this may be due to atelectasis. Possible cholecystitis (ruled out):  - Doubt clinically, pt w/o abdominal pain, no N/V.   - LFTs/bilirubin are normal.   - RUQ US with findings suggestive of early cholecystitis. - General surgery consulted, no evidence of cholecystitis from their perspective, GB is unlikely the source of her fevers. Left lower lobe mass / granulomatous lung disease with findings in the liver/spleen:  - Noted on CT.   - Pulmonary consulted, plan for CT-guided lung biopsy vs PET scan -- to be completed as an outpt. - Per Pulm, lung findings on CT are likely not the source for her fevers.      ESRD on HD M/W/F:  - Management per Nephrology. Acute on chronic anemia:  - Likely due to postop surgical losses in setting of ESRD. No overt signs of bleeding.  - Monitor CBC closely. Transfuse for Hgb less than 7. H&H is low at 7.2 but remains stable. - Continue epoetin. Hypertension  - Variable control. Continue carvedilol and losartan. Monitor BP closely.     Diabetes mellitus type 2 with peripheral neuropathy   - Controlled, A1C 5.8.  - Hold home regimen. Continue basal bolus insulin regimen -- monitor and adjust as needed. - Continue gabapentin.      Hyperlipidemia:  - Continue statin.      Hypothyroidism:  - Clinically euthyroid. Continue Synthroid. DVT Prophylaxis: SQ heparin   Diet: ADULT ORAL NUTRITION SUPPLEMENT; Breakfast, Lunch; Renal Oral Supplement  ADULT DIET; Regular; Low Potassium (Less than 3000 mg/day); Low Phosphorus (Less than 1000 mg)  Code Status: Full Code    PT/OT Eval Status: active and ongoing     Dispo -  Okay to dc from IM standpoint.      [facility], APRN - CNP

## 2021-12-17 NOTE — FLOWSHEET NOTE
12/17/21 0736 12/17/21 1102   Treatment   Time On 0742  --    Time Off  --  1102   Vital Signs   /61 (!) 173/71   Temp 98.2 °F (36.8 °C) 98 °F (36.7 °C)   Pulse 78 82   Resp 16 16   Weight 179 lb 10.8 oz (81.5 kg) 177 lb 7.5 oz (80.5 kg)   Weight Method Bed scale Bed scale     Treatment time: 3.25 hours  Net UF: 1051 ml     Pre weight: 81.5 kg   Post weight: 80.5 kg  EDW: tbd kg     Access used: LAVF  Access function: good with  ml/min     Medications or blood products given: Retacrit     Regular outpatient schedule: [facility]     Summary of response to treatment: Tx tolerated well, no distress noted, MD aware. Copy of dialysis treatment record placed in chart, to be scanned into EMR.

## 2021-12-17 NOTE — CARE COORDINATION
CASE MANAGEMENT DISCHARGE SUMMARY      Discharge to: skilled to [facility]    Precertification completed: [facility] Exemption Notification (HENS) completed: yes    IMM given: (date) 12/16/21    New Durable Medical Equipment ordered/agency:     Transportation:    Medical Transport explained to [employer]. Pt/family voice no agency preference. Agency used: [facility]   time: 1630   Ambulance form completed: Yes    Confirmed discharge plan with:     Patient: yes     Family:  Yes, daughter in law, Earnest Weeks [phone]                                                                                                                                        Facility/Agency, name:  [facility] faxed to facility and Sarah Sanches in admissions notified     Phone number for report to facility: [phone]     RN, name: Oscar Ureña    Note: Discharging nurse to complete [facility], reconcile AVS, and place final copy with patient's discharge packet. RN to ensure that written prescriptions for Level II medications are sent with patient to the facility as per protocol.     Shabnam Dias RN

## 2021-12-17 NOTE — PROGRESS NOTES
[facility]    [facility]              [phone]                         Interval History and plan: Off of abx  Lytes stable  Vitals are stable  Seen by pulmonologist for left lower lobe mass  Has granulomatous changes in the lung liver and spleen  Plan for possible PET scan for possible malignancy in the future  Plan:  Anemia- continue epogen  Continue to hold losartan because of low her  blood pressure  Resume as soon as safely possible since he also has congestive heart failure and will benefit from that drug  So that she can leave soon after that   She should follow up with pulmonologist after DC   Seen during dialysis, vitals and labs are stable     Assessment :        ESRD: on hemodialysis  Dialysis center/schedule:   Monday Wednesday Friday at [facility]    Volume status  [facility]- will get from center   2D Echo: 45 to 50% EF and grade 1 diastolic dysfunction on 7/2664    Hypertension  BP: (136-138)/(58-61)  Pulse:  [78-82]   Goal around 886-926 systolic    Anemia of CKD  Hgb:   Hb on admission - NA  Optimize with iron, aranesp    Renal Osteodystrophy  PO4- goal of below 5.5  Monitor and adjust meds    Dialysis Access  AV fistula    Nutrition on dialysis  Lab Results   Component Value Date    LABALBU 3.1 (L) 12/17/2021     Monitor, and will give nepro when possible             [facility] would like to thank Carley Garza MD   for opportunity to serve this patient      Please call with questions at-   24 Hrs Answering service [phone] or  7 am- 5 pm via Perfect serve or cell phone  Agnieszka Wright MD          CC/reason for consult :     ESRD     HPI :     Parth Fonseca is a 66 y.o. female presented to   the hospital on 12/6/2021  for elective knee replacement surgery. She is known to have ESRD gets dialysis Monday Wednesday Friday under my care at [facility]. She is very regular with dialysis and has no problem with potassium.   Before the elective surgery, potassium was checked and it was high but hemolyzed because of which it is being repeated. We are consulted for ESRD and related issues    ROS:     Seen with-no family    positives in bold   Constitutional:  fever, chills, weakness, weight change, fatigue  Skin:  rash, pruritus, hair loss, bruising, dry skin, petechiae  Head, Face, Neck   headaches, swelling,  cervical adenopathy  Respiratory: shortness of breath, cough, or wheezing  Cardiovascular: chest pain, palpitations, dizzy, edema  Gastrointestinal: nausea, vomiting, diarrhea, constipation,belly pain    Yellow skin, blood in stool  Musculoskeletal:  back pain, muscle weakness, gait problems,       joint pain or swelling. Genitourinary:  dysuria, poor urine flow, flank pain, blood in urine  Neurologic:  vertigo, TIA'S, syncope, seizures, focal weakness  Psychosocial:  insomnia, anxiety, or depression.   Additional positive findings:                        All other remaining systems are negative or unable to obtain        PMH/PSH/SH/Family History:     Past Medical History:   Diagnosis Date    Arthritis     Chronic kidney disease     Diabetes mellitus ([facility]     Dialysis patient [facility])     M W F    [facility]D [facility]) [facility]     Hemodialysis patient ([facility]     M-W-F    Hyperkalemia 07/13/2016    Hyperlipidemia     Hypertension     OA (osteoarthritis)     Retinopathy     Sepsis ([facility]     Thyroid disease     Wears dentures        Past Surgical History:   Procedure Laterality Date    DIALYSIS FISTULA CREATION Left 08/10/2016    Dr. Pernell Tejada - upper arm, basilic vein transposition    EYE SURGERY Left 04/03/2018    catarct removal with lens implant     OTHER SURGICAL HISTORY Right 01/11/2019    partial foot amputation    TOE AMPUTATION Right 1/11/2019    PARTIAL FOOT AMPUTATION WITH GRAFT APPLICATION performed by Elvin Bansal DPM at [address]. Left 12/8/2021    LEFT TOTAL KNEE ARTHROPLASTY performed by Jessica Dubois MD at [facility]        reports that she quit smoking about 7 years ago. She has never used smokeless tobacco. She reports that she does not drink alcohol and does not use drugs. family history is not on file.          Medication:     Current Facility-Administered Medications: epoetin alice-epbx (RETACRIT) injection 3,000 Units, 3,000 Units, SubCUTAneous, Once per day on Mon Wed Fri **AND** epoetin alice-epbx (RETACRIT) injection 2,000 Units, 2,000 Units, SubCUTAneous, Once per day on Mon Wed Fri **AND** epoetin alice-epbx (RETACRIT) injection 10,000 Units, 10,000 Units, SubCUTAneous, Once per day on Mon Wed Fri  acetaminophen (TYLENOL) tablet 650 mg, 650 mg, Oral, Q6H PRN  docusate sodium (COLACE) capsule 100 mg, 100 mg, Oral, BID PRN  cyclobenzaprine (FLEXERIL) tablet 5 mg, 5 mg, Oral, TID PRN  insulin glargine (LANTUS) injection vial 10 Units, 10 Units, SubCUTAneous, Nightly  heparin (porcine) injection 5,000 Units, 5,000 Units, SubCUTAneous, 3 times per day  sodium chloride flush 0.9 % injection 5-40 mL, 5-40 mL, IntraVENous, 2 times per day  sodium chloride flush 0.9 % injection 5-40 mL, 5-40 mL, IntraVENous, PRN  0.9 % sodium chloride infusion, 25 mL, IntraVENous, PRN  ondansetron (ZOFRAN-ODT) disintegrating tablet 4 mg, 4 mg, Oral, Q8H PRN **OR** ondansetron (ZOFRAN) injection 4 mg, 4 mg, IntraVENous, Q6H PRN  oxyCODONE (ROXICODONE) immediate release tablet 5 mg, 5 mg, Oral, Q4H PRN **OR** oxyCODONE (ROXICODONE) immediate release tablet 10 mg, 10 mg, Oral, Q4H PRN  atorvastatin (LIPITOR) tablet 10 mg, 10 mg, Oral, Nightly  carvedilol (COREG) tablet 12.5 mg, 12.5 mg, Oral, BID WC  gabapentin (NEURONTIN) capsule 300 mg, 300 mg, Oral, QPM  levothyroxine (SYNTHROID) tablet 50 mcg, 50 mcg, Oral, Daily  sertraline (ZOLOFT) tablet 25 mg, 25 mg, Oral, Daily  glucose (GLUTOSE) 40 % oral gel 15 g, 15 g, Oral, PRN  dextrose 50 % IV solution, 12.5 g, IntraVENous, PRN  glucagon (rDNA) injection 1 mg, 1 mg, IntraMUSCular, PRN  dextrose 5 % solution, 100 mL/hr, IntraVENous, PRN  insulin lispro (HUMALOG) injection vial 0-6 Units, 0-6 Units, SubCUTAneous, TID WC  insulin lispro (HUMALOG) injection vial 0-3 Units, 0-3 Units, SubCUTAneous, Nightly  hydrALAZINE (APRESOLINE) injection 10 mg, 10 mg, IntraVENous, Q4H PRN  labetalol (NORMODYNE;TRANDATE) injection 20 mg, 20 mg, IntraVENous, Q4H PRN  sodium chloride flush 0.9 % injection 5-40 mL, 5-40 mL, IntraVENous, PRN  polyethylene glycol (GLYCOLAX) packet 17 g, 17 g, Oral, Daily PRN       Vitals :     Vitals:    12/17/21 0736   BP: 136/61   Pulse: 78   Resp: 16   Temp: 98.2 °F (36.8 °C)   SpO2:        I & O :       Intake/Output Summary (Last 24 hours) at 12/17/2021 1023  Last data filed at 12/16/2021 1444  Gross per 24 hour   Intake 290 ml   Output --   Net 290 ml        Physical Examination :     General appearance: Anxious- no, distressed- no, in good spirits- no  HEENT: Lips- normal, teeth- ok , oral mucosa- moist  Neck : Mass- no, appears symmetrical, JVD- not visible  Respiratory: Respiratory effort-  normal, wheeze- no, crackles - none  Cardiovascular:  Ausculation- No M/R/G, Edema none  Abdomen: visible mass- no, distention- no, scar- no, tenderness- no                            hepatosplenomegaly-  no  Musculoskeletal:  clubbing no,cyanosis- no , digital ischemia- no                           muscle strength- grossly normal , tone - grossly normal  Skin: rashes- no , ulcers- no, induration- no, tightening - no  Psychiatric:  Judgement and insight- normal           AAO X 3  Additional finding: -      LABS:     Recent Labs     12/15/21  0554 12/16/21  1302 12/17/21  0708   WBC 6.2 7.0 6.6   HGB 7.2* 7.7* 7.2*   HCT 22.4* 23.7* 22.1*    332 360     Recent Labs     12/15/21  0554 12/17/21  0708   * 135*   K 4.9 4.8   CL 96* 100   CO2 20* 21   BUN 50* 38*   CREATININE 7.4* 7.4*   GLUCOSE 127* 105*   PHOS  --  5.0*

## 2021-12-17 NOTE — DISCHARGE SUMMARY
PRN  dextrose 50 % IV solution, 12.5 g, IntraVENous, PRN  glucagon (rDNA) injection 1 mg, 1 mg, IntraMUSCular, PRN  dextrose 5 % solution, 100 mL/hr, IntraVENous, PRN  insulin lispro (HUMALOG) injection vial 0-6 Units, 0-6 Units, SubCUTAneous, TID WC  insulin lispro (HUMALOG) injection vial 0-3 Units, 0-3 Units, SubCUTAneous, Nightly  hydrALAZINE (APRESOLINE) injection 10 mg, 10 mg, IntraVENous, Q4H PRN  labetalol (NORMODYNE;TRANDATE) injection 20 mg, 20 mg, IntraVENous, Q4H PRN  sodium chloride flush 0.9 % injection 5-40 mL, 5-40 mL, IntraVENous, PRN  polyethylene glycol (GLYCOLAX) packet 17 g, 17 g, Oral, Daily PRN    Post-operatively the patients diet was advanced as tolerated and their incision was checked on POD #1. The incision is dressing in place, clean, dry and intact with no signs of infection. The patient remained neurovascularly intact in the lower extremity and had intact pulses distally. Patients calf remained soft and showed no evidence of DVT. The patient was able to move their leg and ankle/foot without any problems post-operatively. Physical therapy and occupational therapy were consulted and began working with the patient post-operatively. The patient progressed with PT/OT as would be expected and continued to improve through their stay. The patients pain was initially controlled with IV medications but we were able to transition to oral pain medications soon after arrival to the floor and their pain remained under good control through their hospital stay. From a medical standpoint the patient had some low grade fevers, was worked up by medicine team, but remained stable and continued to have the medicine team follow throughout their stay. The patients dressing was changed/incison was checked on day of d/c.   The patient will be discharged at this time to [address] [place] [address]  with their current diet restrictions and will continue to follow the total knee precautions outlined to them by us and PT/OT. Condition on Discharge: Stable    Plan  Return visit in 4 days. as scheduled. Patient was instructed on the use of pain medications, the signs and symptoms of infection, and was given our number to call should they have any questions or concerns following discharge. For opioid prescriptions given at discharge the following statement is provided for compliance with [employer] rules. Patient being given increased dosage/quantity of opoid pain medication in excess of [employer] guidelines which noted a 30 MED daily of opioids due to the fact that he/she has undergone major orthopaedic surgery as outlined in rule 4731-11-13. Dosages and further duration of the pain medication will be re-evaluated at her post op visit in 2 weeks. Patient was educated on dosing expectations and limits of prescribing as a result of the new [facility] Board rules enacted August 31, 2017. Please also note that this is not the initial opoid prescription issued to this patient but a continuation of medication utilized during the hospital admission as noted in the medical record. [place]ARRS report has also been utilized to screen for any abuse history or suspicious activity as outlined in [place]. All efforts have been taken to prevent abuse potential and misuse of opioid medications including education, screening, and close clinical follow up.

## 2021-12-20 ENCOUNTER — TELEPHONE (OUTPATIENT)
Dept: ORTHOPEDIC SURGERY | Age: 71
End: 2021-12-20

## 2021-12-20 NOTE — TELEPHONE ENCOUNTER
Attempted to contact pt, left voicemail with purpose and call back number.     Cely Dickinson  Orthopedic Nurse Navigator  Phone number: [phone]    Follow up appointments:    Future Appointments   Date Time Provider Sandy Ha   12/21/2021 10:00 AM Jessica Dubois MD [facility]

## 2021-12-21 ENCOUNTER — TELEPHONE (OUTPATIENT)
Dept: ORTHOPEDIC SURGERY | Age: 71
End: 2021-12-21

## 2022-01-04 NOTE — TELEPHONE ENCOUNTER
Lm for pt to call us so we know she understands that this could be serious. This is second message will leave it up to pt. To call back.

## 2022-01-07 ENCOUNTER — OFFICE VISIT (OUTPATIENT)
Dept: ORTHOPEDIC SURGERY | Age: 72
End: 2022-01-07

## 2022-01-07 VITALS — HEIGHT: 67 IN | WEIGHT: 177 LBS | BODY MASS INDEX: 27.78 KG/M2

## 2022-01-07 DIAGNOSIS — Z96.652 H/O TOTAL KNEE REPLACEMENT, LEFT: Primary | ICD-10-CM

## 2022-01-07 PROCEDURE — 99024 POSTOP FOLLOW-UP VISIT: CPT | Performed by: PHYSICIAN ASSISTANT

## 2022-01-07 NOTE — PROGRESS NOTES
Physician Assistant   Progress Note    Subjective:     Shiela Ruano returns today for her first postop evaluation Status Post left Total Knee Arthroplasty which was performed on 12/9/2021 by Dr. Jazmyne Hughes. Patient has been progressing well and has been recently discharged from the SNF. She has been performing a home exercise program and she is presently ambulating full weightbearing with a walker. She states that her pain is very minimal at this point. She denies any fevers chills or constitutional changes. Pain Assessment  Location of Pain: Knee  Location Modifiers: Left  Severity of Pain: 2  Quality of Pain: Dull,Aching  Duration of Pain: Persistent  Frequency of Pain: Constant  Aggravating Factors: Walking,Standing  Limiting Behavior: Some  Relieving Factors: Rest  Result of Injury: No  Work-Related Injury: No  Are there other pain locations you wish to document?: No]      Objective:     @IPVITALS(24)@    General: alert, appears stated age, cooperative and no distress   Wound: Wound clean and dry no evidence of infection. Motion:  Range of motion at the present time is with minimal pain and she is able to extend to -3 degrees and flex to approximately 110 degrees. There is no gross ligamentous instability noted. DVT Exam: No evidence of DVT seen on physical exam.       Knee swollen but thigh soft to palpation. Moving foot and ankle. Good distal pulses. Data Review  CBC:   Lab Results   Component Value Date    WBC 6.6 12/17/2021    RBC 2.39 12/17/2021    HGB 7.2 12/17/2021    HCT 22.1 12/17/2021     12/17/2021     X-rays: 3 views of the left knee to include AP, lateral, and sunrise view were obtained today in the office and independently reviewed with the patient shows well-placed total knee replacement with no radiolucencies noted. There is no patellar tilt or subluxation noted. Assessment:     Status Post left Total Knee Arthroplasty. Doing well postoperatively.      Plan:      1: Patient was referred to outpatient physical therapy to begin aggressive range of motion progressive strengthening exercise program with modalities of choice. Follow-up: In 1 month at which time repeat clinical examination and range of motion check will be obtained.     Patient examined and note dictated by Zen Suazo PA-C.

## 2022-01-13 ENCOUNTER — APPOINTMENT (OUTPATIENT)
Dept: PHYSICAL THERAPY | Age: 72
End: 2022-01-13
Payer: MEDICARE

## 2022-01-18 ENCOUNTER — HOSPITAL ENCOUNTER (OUTPATIENT)
Dept: PHYSICAL THERAPY | Age: 72
Setting detail: THERAPIES SERIES
Discharge: HOME | End: 2022-01-18
Payer: MEDICARE

## 2022-01-18 PROCEDURE — 97161 PT EVAL LOW COMPLEX 20 MIN: CPT

## 2022-01-18 PROCEDURE — 97110 THERAPEUTIC EXERCISES: CPT

## 2022-01-18 PROCEDURE — 97112 NEUROMUSCULAR REEDUCATION: CPT

## 2022-01-18 PROCEDURE — 97016 VASOPNEUMATIC DEVICE THERAPY: CPT

## 2022-01-18 NOTE — FLOWSHEET NOTE
[address] [place] [address] and Sports Rehabilitation[address], [address] [place] [address] [address]  Phone: [phone]   Fax:     [phone]      Physical Therapy Treatment Note/ Progress Report:           Date:  2022    Patient Name:  Liz Lange    :  1950  MRN: [id number]  Restrictions/Precautions:    Medical/Treatment Diagnosis Information:  · Diagnosis: V99.686 (ICD-10-CM) - H/O total knee replacement, left  · Treatment Diagnosis: Left knee pain, decreased ROM and functional strength  Insurance/Certification information:  PT Insurance Information: [facility] Medicare $35 CP, No auth, [address]  Physician Information:  Referring Practitioner: Darrick Manzanares PA-C  Has the plan of care been signed (Y/N):        []  Yes  []  No     Date of Patient follow up with Physician:     Assessment Summary: Bernardino Glass is a 66 y.o. female reporting to OP PT s/p left TKA on 21. Pt is noted to have reduced ROM into flexion and extension. Still using FWW.        Is this a Progress Report:     []  Yes  [x]  No        If Yes:  Date Range for reporting period:  Beginning   22  Ending 22    Progress report will be due (10 Rx or 30 days whichever is less): 8/10/13       Recertification will be due (POC Duration  / 90 days whichever is less): 3/18/22          Visit # Insurance Allowable Auth Required   In [facility] [address] []  Yes     []  No    Tele Health   []  Yes     []  No    Total 1             Functional Scale: LEFS 63%    Date assessed:        Latex Allergy:  [x]NO      []YES  Preferred Language for Healthcare:   [x]English       []other:      Pain level:  1/10     SUBJECTIVE:  see eval    OBJECTIVE: see eval      RESTRICTIONS/PRECAUTIONS: DM, ESRD    Exercises/Interventions: HEP code: [id number]  Therapeutic Ex (19518) HEP 22     Warm-up       Rec bike  5'            TABLE       Heel prop x 5', 2#     Quad set x x30     SLR       Heel slides x x30     HS stretch x 1' Gastroc stretch x 1'                   SEATED       LAQ                                          STANDING       Wedge gastroc stretch       Heel raises       Toe raises       Anterior step up       Tandem stance       Tandem stance EC                                                                        Manual (31512): None    Therapeutic Exercise and NMR EXR  [x] (52086) Provided verbal/tactile cueing for activities related to strengthening, flexibility, endurance, ROM for improvements in LE, proximal hip, and core control with self care, mobility, lifting, ambulation. [x] (65073) Provided verbal/tactile cueing for activities related to improving balance, coordination, kinesthetic sense, posture, motor skill, proprioception to assist with LE, proximal hip, and core control in self-care, mobility, lifting, ambulation and eccentric single leg control.      NMR and Therapeutic Activities:    [x] (51960 or 79270) Provided verbal/tactile cueing for activities related to improving balance, coordination, kinesthetic sense, posture, motor skill, proprioception and motor activation to allow for proper function of core, proximal hip and LE with self-care and ADLs and functional mobility.   [] (90261) Gait Re-education- Provided training and instruction to the patient for proper LE, core and proximal hip recruitment and positioning and eccentric body weight control with ambulation re-education including up and down stairs     Home Exercise Program:    [x] (82940) Reviewed/Progressed HEP activities related to strengthening, flexibility, endurance, ROM of core, proximal hip and LE for functional self-care, mobility, lifting and ambulation/stair navigation   [] (33093) Reviewed/Progressed HEP activities related to improving balance, coordination, kinesthetic sense, posture, motor skill, proprioception of core, proximal hip and LE for self-care, mobility, lifting, and ambulation/stair navigation      Manual Treatments:  PROM / STM / Oscillations-Mobs:  G-I, II, III, IV (PA's, Inf., Post.)  [x] (81197) Provided manual therapy to mobilize LE, proximal hip and/or LS spine soft tissue/joints for the purpose of modulating pain, promoting relaxation, increasing ROM, reducing/eliminating soft tissue swelling/inflammation/restriction, improving soft tissue extensibility and allowing for proper ROM for normal function with self-care, mobility, lifting and ambulation. Modalities:     [] GAME READY (VASO)- for significant edema, swelling, pain control. Charges:  Timed Code Treatment Minutes: 25   Total Treatment Minutes:  45   BWC:  TE TIME:  NMR TIME:  MANUAL TIME:   TA  UNTIMED MINUTES:  Medicare Total:   15  10      20        [x] EVAL (LOW) 98438 (typically 20 minutes face-to-face)  [] EVAL (MOD) 56160 (typically 30 minutes face-to-face)  [] EVAL (HIGH) 21543 (typically 45 minutes face-to-face)  [] RE-EVAL     [x] XW(69353) 1     [] IONTO  [x] NMR (90939) 1     [x] VASO  [] Manual (41039) x     [] Dry Needle:  [] TA x      [] Mech Traction (14190)  [] ES(attended) (23804)      [] ES (un) (40112):        GOALS:     Patient stated goal: Improve mobility     Therapist goals for Patient:   Short Term Goals: To be achieved in: 2 weeks  1. Independent in HEP and progression per patient tolerance, in order to prevent re-injury. []? Progressing: []? Met: []? Not Met: []? Adjusted      2. Patient will have a decrease in pain to facilitate improvement in movement, function, and ADLs as indicated by Functional Deficits. []? Progressing: []? Met: []? Not Met: []? Adjusted      Long Term Goals: To be achieved in: 8 weeks  1. Disability index score of 43% or less for the LEFS to assist with reaching prior level of function. []? Progressing: []? Met: []? Not Met: []? Adjusted      2. Patient will demonstrate increased AROM to -2 to 120 to allow for proper joint functioning as indicated by patients Functional Deficits. []? Progressing: []?  Met: []? Not Met: []? Adjusted      3. Patient will demonstrate an increase in Strength to 5/5  allow for proper functional mobility as indicated by patients Functional Deficits. []? Progressing: []? Met: []? Not Met: []? Adjusted      4. Patient will return to functional walking activities with NRPS of 0/10. []? Progressing: []? Met: []? Not Met: []? Adjusted      5. Pt will be able to perform 2 stairs reciprocally without deviation.  (patient specific functional goal)    []? Progressing: []? Met: []? Not Met: []? Adjusted                 ASSESSMENT:  See eval    Patient received education on their current pathology and how their condition effects them with their functional activities. Patient understood discussion and questions were answered. Patient understands their activity limitations and understands rational for treatment progression. Pt educated on plan of care and HEP, if worsening symptoms to d/c that exercise. PLAN: See edgard  [x] Continue per plan of care [] Alter current plan (see comments above)  [] Plan of care initiated [] Hold pending MD visit [] Discharge      Electronically signed by:  Mariaelena Porter, PT    Note: If patient does not return for scheduled/ recommended follow up visits, this note will serve as a discharge from care along with most recent update on progress.

## 2022-01-18 NOTE — PLAN OF CARE
[facility] and [facility], [facility], [address] [facility] [address]  Phone: [phone]   Fax:     [phone]       Physical Therapy Certification    Dear Referring Practitioner: Jv Manrique PA-C,    We had the pleasure of evaluating the following patient for physical therapy services at [address] [place] [address]. A summary of our findings can be found in the initial assessment below. This includes our plan of care. If you have any questions or concerns regarding these findings, please do not hesitate to contact me at the office phone number checked above. Thank you for the referral.       Physician Signature:_______________________________Date:__________________  By signing above (or electronic signature), therapists plan is approved by physician      Patient: Batsheva Floyd   : 1950   MRN: [id number]  Referring Physician: Referring Practitioner: Jv Manrique PA-C      Evaluation Date: 2022      Medical Diagnosis Information:  Diagnosis: G30.394 (ICD-10-CM) - H/O total knee replacement, left   Treatment Diagnosis: Left knee pain, decreased ROM and functional strength                                         Insurance information: PT Insurance Information: [facility] Medicare $35 CP, No auth, [facility]     Precautions/ Contra-indications: None    Latex Allergy:  [x]NO      []YES    Preferred Language for Healthcare:   [x]English       []other:    SUBJECTIVE: Patient states has knee replaced on 21. Has been feeling good overall. Did go to SNF for 1 week. 2 steps at house. Using FWW    Relevant Medical History: None    Pain Scale: Current: 0/10; Max 3/10; Best 0/10    Easing factors: Movement    Provocative factors: Changing positions.       Type: []Constant   [x]Intermittent  []Radiating []Localized []other:     Numbness/Tingling: Does have neuropathy    Occupation/School: Retired    Living Status/Prior Level of Function: Independent with ADLs and IADLs. C-SSRS Triggered by Intake questionnaire (Past 2 wk assessment):   [x] No, Questionnaire did not trigger screening.   [] Yes, Patient intake triggered further evaluation      [] C-SSRS Screening completed  [] PCP notified via Plan of Care  [] Emergency services notified     OBJECTIVE:     ROM RIGHT LEFT   Hip Flexion     Hip Abduction     Hip Extension     Hip Internal Rotation     Hip External Rotation          Knee Extension -2 -10   Knee Flexion 120 110        Ankle Dorsiflexion     Ankle Plantarflexion     Ankle Inversion     Ankle Eversion          Popliteal angle     Rectus femoris          Strength  RIGHT LEFT   Hip Flexors     Hip Abductors     Hip Extension     Hip External Rotation     Hip Internal Rotation          Knee Extension 4+    Knee Flexion 4         Ankle Dorsiflexion     Ankle Plantarflexion     Ankle Inversion     Ankle Eversion            Reflexes/Sensation:    [x]Dermatomes/Myotomes intact    []Reflexes equal and normal bilaterally   []Other:    Joint mobility:    []Normal    [x]Hypo   []Hyper    Palpation: No tenderness at knee or calf. Functional Mobility/Transfers: Mod Independent    Posture: WNL    Bandages/Dressings/Incisions: Incision well approximated. Gait: (include devices/WB status) Using FWW, mild antalgic gait. Orthopedic Special Tests: None                       [x] Patient history, allergies, meds reviewed. Medical chart reviewed. See intake form. Review Of Systems (ROS):  [x]Performed Review of systems (Integumentary, CardioPulmonary, Neurological) by intake and observation. Intake form has been scanned into medical record. Patient has been instructed to contact their primary care physician regarding ROS issues if not already being addressed at this time.       Co-morbidities/Complexities (which will affect course of rehabilitation):   []None Arthritic conditions   []Rheumatoid arthritis (M05.9)  []Osteoarthritis (M19.91)   Cardiovascular conditions   [x]Hypertension (I10)  []Hyperlipidemia (E78.5)  []Angina pectoris (I20)  []Atherosclerosis (I70)   Musculoskeletal conditions   []Disc pathology   []Congenital spine pathologies   []Prior surgical intervention  []Osteoporosis (M81.8)  []Osteopenia (M85.8)   Endocrine conditions   []Hypothyroid (E03.9)  []Hyperthyroid Gastrointestinal conditions   []Constipation (X09.17)   Metabolic conditions   []Morbid obesity (E66.01)  [x]Diabetes type 1(E10.65) or 2 (E11.65)   [x]Neuropathy (G60.9)     Pulmonary conditions   []Asthma (J45)  []Coughing   []COPD (J44.9)   Psychological Disorders  []Anxiety (F41.9)  []Depression (F32.9)   []Other:   [x]Other: ESRD          Barriers to/and or personal factors that will affect rehab potential:              []Age  []Sex              []Motivation/Lack of Motivation                        [x]Co-Morbidities              []Cognitive Function, education/learning barriers              []Environmental, home barriers              []profession/work barriers  []past PT/medical experience  []other:  Justification: None    Falls Risk Assessment (30 days):   [] Falls Risk assessed and no intervention required. [x] Falls Risk assessed and Patient requires intervention due to being higher risk   TUG score (>12s at risk):   [x] Falls education provided, including balance education          Functional Questionnaire: LEFS 63%        ASSESSMENT: Kelvin Lambert is a 66 y.o. female reporting to OP PT s/p left TKA on 12/9/21. Pt is noted to have reduced ROM into flexion and extension. Still using FWW.          Functional Impairments:     [x]Noted lumbar/proximal hip/LE joint hypomobility   []Decreased LE functional ROM   []Decreased core/proximal hip strength and neuromuscular control   []Decreased LE functional strength   []Reduced balance/proprioceptive control   []other:      Functional Activity Limitations (from functional questionnaire and intake)   []Reduced ability to tolerate prolonged functional positions   []Reduced ability or difficulty with changes of positions or transfers between positions   []Reduced ability to maintain good posture and demonstrate good body mechanics with sitting, bending, and lifting   []Reduced ability to sleep   [] Reduced ability or tolerance with driving and/or computer work   []Reduced ability to perform lifting, carrying tasks   [x]Reduced ability to squat   []Reduced ability to forward bend   [x]Reduced ability to ambulate prolonged functional periods/distances/surfaces   [x]Reduced ability to ascend/descend stairs   []Reduced ability to run, hop, cut or jump   []other:    Participation Restrictions   []Reduced participation in self care activities   [x]Reduced participation in home management activities   []Reduced participation in work activities   [x]Reduced participation in social activities. []Reduced participation in sport/recreation activities. Classification :    [x]Signs/symptoms consistent with post-surgical status including decreased ROM, strength and function.    []Signs/symptoms consistent with joint sprain/strain   []Signs/symptoms consistent with patella-femoral syndrome   []Signs/symptoms consistent with knee OA/hip OA   []Signs/symptoms consistent with internal derangement of knee/Hip   []Signs/symptoms consistent with functional hip weakness/NMR control      []Signs/symptoms consistent with tendinitis/tendinosis    []signs/symptoms consistent with pathology which may benefit from Dry needling      []other:      Prognosis/Rehab Potential:      []Excellent   [x]Good    []Fair   []Poor    Tolerance of evaluation/treatment:    []Excellent   [x]Good    []Fair   []Poor    Physical Therapy Evaluation Complexity Justification  [x] A history of present problem with:  [] no personal factors and/or comorbidities that impact the plan of care;  []1-2 personal factors and/or comorbidities that impact the plan of care  [x]3 personal factors and/or comorbidities that impact the plan of care  [x] An examination of body systems using standardized tests and measures addressing any of the following: body structures and functions (impairments), activity limitations, and/or participation restrictions;:  [x] a total of 1-2 or more elements   [] a total of 3 or more elements   [] a total of 4 or more elements   [x] A clinical presentation with:  [x] stable and/or uncomplicated characteristics   [] evolving clinical presentation with changing characteristics  [] unstable and unpredictable characteristics;   [x] Clinical decision making of [] low, [] moderate, [] high complexity using standardized patient assessment instrument and/or measurable assessment of functional outcome. [x] EVAL (LOW) 03411 (typically 20 minutes face-to-face)  [] EVAL (MOD) 67295 (typically 30 minutes face-to-face)  [] EVAL (HIGH) 41692 (typically 45 minutes face-to-face)  [] RE-EVAL     PLAN:   Frequency/Duration:  2 days per week for 8 Weeks:  Interventions:  [x]  Therapeutic exercise including: strength training, ROM, for Lower extremity and core   [x]  NMR activation and proprioception for LE, Glutes and Core   [x]  Manual therapy as indicated for LE, Hip and spine to include: Dry Needling/IASTM, STM, PROM, Gr I-IV mobilizations, manipulation. [x] Modalities as needed that may include: thermal agents, E-stim, Biofeedback, US, iontophoresis as indicated  [x] Patient education on joint protection, postural re-education, activity modification, progression of HEP. HEP instruction: Fifi Elliott (see scanned forms)    GOALS:  Patient stated goal: Improve mobility    Therapist goals for Patient:   Short Term Goals: To be achieved in: 2 weeks  1. Independent in HEP and progression per patient tolerance, in order to prevent re-injury. [] Progressing: [] Met: [] Not Met: [] Adjusted     2.  Patient will have a decrease in pain to facilitate improvement in movement, function, and ADLs as indicated by Functional Deficits. [] Progressing: [] Met: [] Not Met: [] Adjusted     Long Term Goals: To be achieved in: 8 weeks  1. Disability index score of 43% or less for the LEFS to assist with reaching prior level of function. [] Progressing: [] Met: [] Not Met: [] Adjusted     2. Patient will demonstrate increased AROM to -2 to 120 to allow for proper joint functioning as indicated by patients Functional Deficits. [] Progressing: [] Met: [] Not Met: [] Adjusted     3. Patient will demonstrate an increase in Strength to 5/5  allow for proper functional mobility as indicated by patients Functional Deficits. [] Progressing: [] Met: [] Not Met: [] Adjusted     4. Patient will return to functional walking activities with NRPS of 0/10. [] Progressing: [] Met: [] Not Met: [] Adjusted     5.  Pt will be able to perform 2 stairs reciprocally without deviation.  (patient specific functional goal)    [] Progressing: [] Met: [] Not Met: [] Adjusted      Electronically signed by:  Bianka Nino, PT

## 2022-01-18 NOTE — PROGRESS NOTES
[address] [place] [address] and Sports Rehabilitation, [address], [address] [place] [address] [address]  Phone: [phone]   Fax: [phone]    Date: 2022          Patient Name; :  Get Smith; 1950   Dx: Diagnosis: Z19.575 (ICD-10-CM) - H/O total knee replacement, left      Physician: Referring Practitioner: Cate Zimmerman PA-C        Total PT Visits:      Measures Previous Current   Pain (0-10)     Disability %           Assessment:        Prognosis for POC: [] Good [] Fair  [] Poor      Patient requires continued skilled intervention: [] Yes  [] No        Plan & Recommendations:  [] Continue rehabilitation due to objective improvement and continued functional deficits with frequency and duration:   [] Progress toward  []GAP, []Work Conditioning, []Independent HEP   [] Discharge due to   [] All goals achieved, [] Maximized \"medical necessity\" [] No subjective or objective improvements      Electronically signed by:  Ariane Perez, PT  Therapy Plan of Care Re-Certification  This patient has been re-evaluated for physical therapy services and for therapy to continue, Medicare, Medicaid and other insurances require periodic physician review of the treatment plan. Please review the above re-evaluation and verify that you agree with plan of care as established above by signing the attached document and return it to our office or note changes to established plan below  [] Follow treatment plan as above [] Discontinue physical therapy  [] Change plan to:                                 __________________________________________________    Physician Signature:____________________________________ Date:____________  By signing above, therapists plan is approved by physician    If you have any questions or concerns, please don't hesitate to call.   Thank you for your referral.

## 2022-01-20 ENCOUNTER — APPOINTMENT (OUTPATIENT)
Dept: PHYSICAL THERAPY | Age: 72
End: 2022-01-20
Payer: MEDICARE

## 2022-01-25 ENCOUNTER — HOSPITAL ENCOUNTER (OUTPATIENT)
Dept: PHYSICAL THERAPY | Age: 72
Setting detail: THERAPIES SERIES
Discharge: HOME | End: 2022-01-25
Payer: MEDICARE

## 2022-01-25 PROCEDURE — 97110 THERAPEUTIC EXERCISES: CPT

## 2022-01-25 PROCEDURE — 97112 NEUROMUSCULAR REEDUCATION: CPT

## 2022-01-25 PROCEDURE — 97140 MANUAL THERAPY 1/> REGIONS: CPT

## 2022-01-25 NOTE — FLOWSHEET NOTE
[address] [place] [address] and Sports Rehabilitation[address], [address] [place] [address] [address]  Phone: [phone]   Fax:     [phone]      Physical Therapy Treatment Note/ Progress Report:           Date:  2022    Patient Name:  Ivana Reyna    :  1950  MRN: [id number]  Restrictions/Precautions:    Medical/Treatment Diagnosis Information:  · Diagnosis: N26.539 (ICD-10-CM) - H/O total knee replacement, left  · Treatment Diagnosis: Left knee pain, decreased ROM and functional strength  Insurance/Certification information:  PT Insurance Information: [facility] Medicare $35 CP, No auth, [address]  Physician Information:  Referring Practitioner: Mariola Matute PA-C  Has the plan of care been signed (Y/N):        []  Yes  []  No     Date of Patient follow up with Physician:     Assessment Summary: Janet Myers is a 66 y.o. female reporting to OP PT s/p left TKA on 21. Pt is noted to have reduced ROM into flexion and extension. Still using FWW. Is this a Progress Report:     []  Yes  [x]  No        If Yes:  Date Range for reporting period:  Beginning   22  Ending 22    Progress report will be due (10 Rx or 30 days whichever is less):        Recertification will be due (POC Duration  / 90 days whichever is less): 3/18/22          Visit # Insurance Allowable Auth Required   In [facility] [address] []  Yes     []  No    Tele Health   []  Yes     []  No    Total 2             Functional Scale: LEFS 63%    Date assessed:        Latex Allergy:  [x]NO      []YES  Preferred Language for Healthcare:   [x]English       []other:      Pain level:  1/10     SUBJECTIVE:  Pt states was a little sore after last visit but feels pretty good today.      OBJECTIVE:     Left dorsiflexion 3-/      RESTRICTIONS/PRECAUTIONS: DM, ESRD    Exercises/Interventions: HEP code: [id number]  Therapeutic Ex (78593) HEP 22    Warm-up       Rec bike  5' 6'           TABLE Heel prop x 5', 2# 6', 3#    Quad set x x30     SLR   10x2    Heel slides x x30 x30    HS stretch x 1'     Gastroc stretch x 1'                   SEATED       LAQ                                          STANDING       Wedge gastroc stretch   1'    Heel raises   x15    Toe raises   x15    Anterior step up   X30, 4\"    Anterior step down   X10, 6\"    Tandem stance   30\"x2 ea                                                                            Manual (74068): PROm into flexion and extension w/ grade III mobs into extension, Hs stretching 8'    Therapeutic Exercise and NMR EXR  [x] (71252) Provided verbal/tactile cueing for activities related to strengthening, flexibility, endurance, ROM for improvements in LE, proximal hip, and core control with self care, mobility, lifting, ambulation. [x] (85073) Provided verbal/tactile cueing for activities related to improving balance, coordination, kinesthetic sense, posture, motor skill, proprioception to assist with LE, proximal hip, and core control in self-care, mobility, lifting, ambulation and eccentric single leg control.      NMR and Therapeutic Activities:    [x] (94517 or 44745) Provided verbal/tactile cueing for activities related to improving balance, coordination, kinesthetic sense, posture, motor skill, proprioception and motor activation to allow for proper function of core, proximal hip and LE with self-care and ADLs and functional mobility.   [] (32746) Gait Re-education- Provided training and instruction to the patient for proper LE, core and proximal hip recruitment and positioning and eccentric body weight control with ambulation re-education including up and down stairs     Home Exercise Program:    [x] (52440) Reviewed/Progressed HEP activities related to strengthening, flexibility, endurance, ROM of core, proximal hip and LE for functional self-care, mobility, lifting and ambulation/stair navigation   [] (41435) Reviewed/Progressed HEP activities related to improving balance, coordination, kinesthetic sense, posture, motor skill, proprioception of core, proximal hip and LE for self-care, mobility, lifting, and ambulation/stair navigation      Manual Treatments:  PROM / STM / Oscillations-Mobs:  G-I, II, III, IV (PA's, Inf., Post.)  [x] (06074) Provided manual therapy to mobilize LE, proximal hip and/or LS spine soft tissue/joints for the purpose of modulating pain, promoting relaxation, increasing ROM, reducing/eliminating soft tissue swelling/inflammation/restriction, improving soft tissue extensibility and allowing for proper ROM for normal function with self-care, mobility, lifting and ambulation. Modalities:     [] GAME READY (VASO)- for significant edema, swelling, pain control. Charges:  Timed Code Treatment Minutes: 45   Total Treatment Minutes:  45   BWC:  TE TIME:  NMR TIME:  MANUAL TIME:   TA  UNTIMED MINUTES:  Medicare Total:   30  15              [] EVAL (LOW) 12255 (typically 20 minutes face-to-face)  [] EVAL (MOD) 81461 (typically 30 minutes face-to-face)  [] EVAL (HIGH) 61174 (typically 45 minutes face-to-face)  [] RE-EVAL     [x] FB(66741) 1     [] IONTO  [x] NMR (84505) 1     [x] VASO  [] Manual (56914) x     [] Dry Needle:  [] TA x      [] Mech Traction (61670)  [] ES(attended) (86638)      [] ES (un) (77695):        GOALS:     Patient stated goal: Improve mobility     Therapist goals for Patient:   Short Term Goals: To be achieved in: 2 weeks  1. Independent in HEP and progression per patient tolerance, in order to prevent re-injury. []? Progressing: []? Met: []? Not Met: []? Adjusted      2. Patient will have a decrease in pain to facilitate improvement in movement, function, and ADLs as indicated by Functional Deficits. []? Progressing: []? Met: []? Not Met: []? Adjusted      Long Term Goals: To be achieved in: 8 weeks  1. Disability index score of 43% or less for the LEFS to assist with reaching prior level of function. []? Progressing: []? Met: []? Not Met: []? Adjusted      2. Patient will demonstrate increased AROM to -2 to 120 to allow for proper joint functioning as indicated by patients Functional Deficits. []? Progressing: []? Met: []? Not Met: []? Adjusted      3. Patient will demonstrate an increase in Strength to 5/5  allow for proper functional mobility as indicated by patients Functional Deficits. []? Progressing: []? Met: []? Not Met: []? Adjusted      4. Patient will return to functional walking activities with NRPS of 0/10. []? Progressing: []? Met: []? Not Met: []? Adjusted      5. Pt will be able to perform 2 stairs reciprocally without deviation.  (patient specific functional goal)    []? Progressing: []? Met: []? Not Met: []? Adjusted                 ASSESSMENT:  Pt junaid session well. Introduced some stair activity as well as balance. Balance noted to be quite poor and dorsiflexion weak. Patient received education on their current pathology and how their condition effects them with their functional activities. Patient understood discussion and questions were answered. Patient understands their activity limitations and understands rational for treatment progression. Pt educated on plan of care and HEP, if worsening symptoms to d/c that exercise. PLAN: See eval  [x] Continue per plan of care [] Alter current plan (see comments above)  [] Plan of care initiated [] Hold pending MD visit [] Discharge      Electronically signed by:  Emir Singh PT    Note: If patient does not return for scheduled/ recommended follow up visits, this note will serve as a discharge from care along with most recent update on progress.

## 2022-01-27 ENCOUNTER — HOSPITAL ENCOUNTER (OUTPATIENT)
Dept: PHYSICAL THERAPY | Age: 72
Setting detail: THERAPIES SERIES
Discharge: HOME | End: 2022-01-27
Payer: MEDICARE

## 2022-01-27 PROCEDURE — 97530 THERAPEUTIC ACTIVITIES: CPT

## 2022-01-27 PROCEDURE — 97110 THERAPEUTIC EXERCISES: CPT

## 2022-01-27 PROCEDURE — 97112 NEUROMUSCULAR REEDUCATION: CPT

## 2022-01-27 NOTE — FLOWSHEET NOTE
[address] [place] [address] and Sports Rehabilitation[address], [address] [place] [address] [address]  Phone: [phone]   Fax:     [phone]      Physical Therapy Treatment Note/ Progress Report:           Date:  2022    Patient Name:  Sumner Curling    :  1950  MRN: [id number]  Restrictions/Precautions:    Medical/Treatment Diagnosis Information:  · Diagnosis: K49.548 (ICD-10-CM) - H/O total knee replacement, left  · Treatment Diagnosis: Left knee pain, decreased ROM and functional strength  Insurance/Certification information:  PT Insurance Information: [facility] Medicare $35 CP, No auth, [address]  Physician Information:  Referring Practitioner: Remi Cavazos PA-C  Has the plan of care been signed (Y/N):        []  Yes  []  No     Date of Patient follow up with Physician:     Assessment Summary: Lito Motley is a 66 y.o. female reporting to OP PT s/p left TKA on 21. Pt is noted to have reduced ROM into flexion and extension. Still using FWW. Is this a Progress Report:     []  Yes  [x]  No        If Yes:  Date Range for reporting period:  Beginning   22  Ending 22    Progress report will be due (10 Rx or 30 days whichever is less):        Recertification will be due (POC Duration  / 90 days whichever is less): 3/18/22          Visit # Insurance Allowable Auth Required   In [facility] [address] []  Yes     []  No    Tele Health   []  Yes     []  No    Total 3             Functional Scale: LEFS 63%    Date assessed:        Latex Allergy:  [x]NO      []YES  Preferred Language for Healthcare:   [x]English       []other:      Pain level:  1/10     SUBJECTIVE:  Pt states knee feels good.      OBJECTIVE:   Ambulating with SPC  Left dorsiflexion 3-/5      RESTRICTIONS/PRECAUTIONS: DM, ESRD    Exercises/Interventions: HEP code: [id number]  Therapeutic Ex (23124) HEP 22   Warm-up       Rec bike  5' 6' 6'          TABLE       Heel prop x 5', 2# 6', 3# 6', 4#   Quad set x x30  --   SLR   10x2 10x3   Heel slides x x30 x30 x30   HS stretch x 1'     Gastroc stretch x 1'                   SEATED       LAQ    X30, 4#   DF x   x20                               STANDING       Wedge gastroc stretch   1' 1'   HS stretch    1'   Heel raises   x15 10x2   Toe raises   x15 x5   Anterior step up   X30, 4\" X30, 4\"   Anterior step down   X10, 6\" X20, 4\"   Tandem stance x  30\"x2 ea 30\"ea   Semi tandem x   30\"x2 ea   Leg Press DL    X25, 60#   HS curl machine    X30, 30#   Gait train    3'                                               Manual (06442): Therapeutic Exercise and NMR EXR  [x] (61382) Provided verbal/tactile cueing for activities related to strengthening, flexibility, endurance, ROM for improvements in LE, proximal hip, and core control with self care, mobility, lifting, ambulation. [x] (85127) Provided verbal/tactile cueing for activities related to improving balance, coordination, kinesthetic sense, posture, motor skill, proprioception to assist with LE, proximal hip, and core control in self-care, mobility, lifting, ambulation and eccentric single leg control.      NMR and Therapeutic Activities:    [x] (41266 or 13847) Provided verbal/tactile cueing for activities related to improving balance, coordination, kinesthetic sense, posture, motor skill, proprioception and motor activation to allow for proper function of core, proximal hip and LE with self-care and ADLs and functional mobility.   [] (94483) Gait Re-education- Provided training and instruction to the patient for proper LE, core and proximal hip recruitment and positioning and eccentric body weight control with ambulation re-education including up and down stairs     Home Exercise Program:    [x] (18258) Reviewed/Progressed HEP activities related to strengthening, flexibility, endurance, ROM of core, proximal hip and LE for functional self-care, mobility, lifting and ambulation/stair navigation   [] (19583) Reviewed/Progressed HEP activities related to improving balance, coordination, kinesthetic sense, posture, motor skill, proprioception of core, proximal hip and LE for self-care, mobility, lifting, and ambulation/stair navigation      Manual Treatments:  PROM / STM / Oscillations-Mobs:  G-I, II, III, IV (PA's, Inf., Post.)  [x] (66543) Provided manual therapy to mobilize LE, proximal hip and/or LS spine soft tissue/joints for the purpose of modulating pain, promoting relaxation, increasing ROM, reducing/eliminating soft tissue swelling/inflammation/restriction, improving soft tissue extensibility and allowing for proper ROM for normal function with self-care, mobility, lifting and ambulation. Modalities:     [] GAME READY (VASO)- for significant edema, swelling, pain control. Charges:  Timed Code Treatment Minutes: 55   Total Treatment Minutes:  55   BWC:  TE TIME:  NMR TIME:  MANUAL TIME:   TA  UNTIMED MINUTES:  Medicare Total:   30  15    10          [] EVAL (LOW) 82296 (typically 20 minutes face-to-face)  [] EVAL (MOD) 97484 (typically 30 minutes face-to-face)  [] EVAL (HIGH) 99605 (typically 45 minutes face-to-face)  [] RE-EVAL     [x] DJ(78393) 2     [] IONTO  [x] NMR (03288) 1     [x] VASO  [] Manual (69534) x     [] Dry Needle:  [x] TA 1      [] Mech Traction (71751)  [] ES(attended) (76183)      [] ES (un) (72426):        GOALS:     Patient stated goal: Improve mobility     Therapist goals for Patient:   Short Term Goals: To be achieved in: 2 weeks  1. Independent in HEP and progression per patient tolerance, in order to prevent re-injury. []? Progressing: []? Met: []? Not Met: []? Adjusted      2. Patient will have a decrease in pain to facilitate improvement in movement, function, and ADLs as indicated by Functional Deficits. []? Progressing: []? Met: []? Not Met: []? Adjusted      Long Term Goals: To be achieved in: 8 weeks  1.  Disability index score of 43% or less for the LEFS to assist with reaching prior level of function. []? Progressing: []? Met: []? Not Met: []? Adjusted      2. Patient will demonstrate increased AROM to -2 to 120 to allow for proper joint functioning as indicated by patients Functional Deficits. []? Progressing: []? Met: []? Not Met: []? Adjusted      3. Patient will demonstrate an increase in Strength to 5/5  allow for proper functional mobility as indicated by patients Functional Deficits. []? Progressing: []? Met: []? Not Met: []? Adjusted      4. Patient will return to functional walking activities with NRPS of 0/10. []? Progressing: []? Met: []? Not Met: []? Adjusted      5. Pt will be able to perform 2 stairs reciprocally without deviation.  (patient specific functional goal)    []? Progressing: []? Met: []? Not Met: []? Adjusted                 ASSESSMENT:  Pt junaid session well. Addition of dorsiflexion in seated position because standing quality of motion is poor. Patient received education on their current pathology and how their condition effects them with their functional activities. Patient understood discussion and questions were answered. Patient understands their activity limitations and understands rational for treatment progression. Pt educated on plan of care and HEP, if worsening symptoms to d/c that exercise. PLAN: See eval  [x] Continue per plan of care [] Alter current plan (see comments above)  [] Plan of care initiated [] Hold pending MD visit [] Discharge      Electronically signed by:  Mary Jung PT    Note: If patient does not return for scheduled/ recommended follow up visits, this note will serve as a discharge from care along with most recent update on progress.

## 2022-02-03 ENCOUNTER — APPOINTMENT (OUTPATIENT)
Dept: PHYSICAL THERAPY | Age: 72
End: 2022-02-03
Payer: MEDICARE

## 2022-02-10 ENCOUNTER — HOSPITAL ENCOUNTER (OUTPATIENT)
Dept: PHYSICAL THERAPY | Age: 72
Setting detail: THERAPIES SERIES
Discharge: HOME | End: 2022-02-10
Payer: MEDICARE

## 2022-02-10 PROCEDURE — 97112 NEUROMUSCULAR REEDUCATION: CPT

## 2022-02-10 PROCEDURE — 97110 THERAPEUTIC EXERCISES: CPT

## 2022-02-10 PROCEDURE — 97530 THERAPEUTIC ACTIVITIES: CPT

## 2022-02-10 NOTE — FLOWSHEET NOTE
[address] [place] [address] and Sports Rehabilitation[address], [address] [place] [address] [address]  Phone: [phone]   Fax:     [phone]      Physical Therapy Treatment Note/ Progress Report:           Date:  2/10/2022    Patient Name:  Aruna Ascencio    :  1950  MRN: [id number]  Restrictions/Precautions:    Medical/Treatment Diagnosis Information:  · Diagnosis: X00.666 (ICD-10-CM) - H/O total knee replacement, left  · Treatment Diagnosis: Left knee pain, decreased ROM and functional strength  Insurance/Certification information:  PT Insurance Information: [facility] Medicare $35 CP, No auth, [address]  Physician Information:  Referring Practitioner: Jovi Ramires PA-C  Has the plan of care been signed (Y/N):        []  Yes  []  No     Date of Patient follow up with Physician:     Assessment Summary: Sonam Ryder is a 66 y.o. female reporting to OP PT s/p left TKA on 21. Pt is noted to have reduced ROM into flexion and extension. Still using FWW. Is this a Progress Report:     []  Yes  [x]  No        If Yes:  Date Range for reporting period:  Beginning   22  Ending 22    Progress report will be due (10 Rx or 30 days whichever is less):        Recertification will be due (POC Duration  / 90 days whichever is less): 3/18/22          Visit # Insurance Allowable Auth Required   In [facility] [address] []  Yes     []  No    Tele Health   []  Yes     []  No    Total 4             Functional Scale: LEFS 63%    Date assessed:        Latex Allergy:  [x]NO      []YES  Preferred Language for Healthcare:   [x]English       []other:      Pain level:  0/10     SUBJECTIVE:  Pt states balance is still off but knee feels good.      OBJECTIVE:   Ambulating with SPC  Left dorsiflexion 3-/5      RESTRICTIONS/PRECAUTIONS: DM, ESRD    Exercises/Interventions: HEP code: [id number]  Therapeutic Ex (33939) HEP 1/18/22 1/25/22 1/27/22 2/10/22   Warm-up        Rec bike  5' 6' 6' 7' TABLE        Heel prop x 5', 2# 6', 3# 6', 4# 6', 5#   Quad set x x30  --    SLR   10x2 10x3    Heel slides x x30 x30 x30    HS stretch x 1'      Gastroc stretch x 1'                      SEATED        LAQ    X30, 4#    DF x   x20                                    STANDING        Wedge gastroc stretch   1' 1' 1'   HS stretch    1' 1'   Heel raises   x15 10x2 10x3   Toe raises   x15 x5 x10   Anterior step up   X30, 4\" X30, 4\" X20, 6\"   Anterior step down   X10, 6\" X20, 4\" --   Tandem stance x  30\"x2 ea 30\"ea --   Semi tandem x   30\"x2 ea 30\"x2 ea   Semi tandem EC     30\" ea   Leg Press DL    X25, 60# --   HS curl machine    X30, 30# --   Gait train    3' --                                                     Manual (01624): Therapeutic Exercise and NMR EXR  [x] (24497) Provided verbal/tactile cueing for activities related to strengthening, flexibility, endurance, ROM for improvements in LE, proximal hip, and core control with self care, mobility, lifting, ambulation. [x] (00781) Provided verbal/tactile cueing for activities related to improving balance, coordination, kinesthetic sense, posture, motor skill, proprioception to assist with LE, proximal hip, and core control in self-care, mobility, lifting, ambulation and eccentric single leg control.      NMR and Therapeutic Activities:    [x] (08172 or 85754) Provided verbal/tactile cueing for activities related to improving balance, coordination, kinesthetic sense, posture, motor skill, proprioception and motor activation to allow for proper function of core, proximal hip and LE with self-care and ADLs and functional mobility.   [] (16595) Gait Re-education- Provided training and instruction to the patient for proper LE, core and proximal hip recruitment and positioning and eccentric body weight control with ambulation re-education including up and down stairs     Home Exercise Program:    [x] (84820) Reviewed/Progressed HEP activities related to strengthening, flexibility, endurance, ROM of core, proximal hip and LE for functional self-care, mobility, lifting and ambulation/stair navigation   [] (45110) Reviewed/Progressed HEP activities related to improving balance, coordination, kinesthetic sense, posture, motor skill, proprioception of core, proximal hip and LE for self-care, mobility, lifting, and ambulation/stair navigation      Manual Treatments:  PROM / STM / Oscillations-Mobs:  G-I, II, III, IV (PA's, Inf., Post.)  [x] (29896) Provided manual therapy to mobilize LE, proximal hip and/or LS spine soft tissue/joints for the purpose of modulating pain, promoting relaxation, increasing ROM, reducing/eliminating soft tissue swelling/inflammation/restriction, improving soft tissue extensibility and allowing for proper ROM for normal function with self-care, mobility, lifting and ambulation. Modalities:     [] GAME READY (VASO)- for significant edema, swelling, pain control. Charges:  Timed Code Treatment Minutes: 45   Total Treatment Minutes:  45   BWC:  TE TIME:  NMR TIME:  MANUAL TIME:   TA  UNTIMED MINUTES:  Medicare Total:   25  10    10          [] EVAL (LOW) 61312 (typically 20 minutes face-to-face)  [] EVAL (MOD) 26945 (typically 30 minutes face-to-face)  [] EVAL (HIGH) 90764 (typically 45 minutes face-to-face)  [] RE-EVAL     [x] FP(13497) 1     [] IONTO  [x] NMR (84194) 1     [x] VASO  [] Manual (05460) x     [] Dry Needle:  [x] TA 1      [] Mech Traction (26759)  [] ES(attended) (37889)      [] ES (un) (26988):        GOALS:     Patient stated goal: Improve mobility     Therapist goals for Patient:   Short Term Goals: To be achieved in: 2 weeks  1. Independent in HEP and progression per patient tolerance, in order to prevent re-injury. []? Progressing: []? Met: []? Not Met: []? Adjusted      2. Patient will have a decrease in pain to facilitate improvement in movement, function, and ADLs as indicated by Functional Deficits.   []? Progressing: []? Met: []? Not Met: []? Adjusted      Long Term Goals: To be achieved in: 8 weeks  1. Disability index score of 43% or less for the LEFS to assist with reaching prior level of function. []? Progressing: []? Met: []? Not Met: []? Adjusted      2. Patient will demonstrate increased AROM to -2 to 120 to allow for proper joint functioning as indicated by patients Functional Deficits. []? Progressing: []? Met: []? Not Met: []? Adjusted      3. Patient will demonstrate an increase in Strength to 5/5  allow for proper functional mobility as indicated by patients Functional Deficits. []? Progressing: []? Met: []? Not Met: []? Adjusted      4. Patient will return to functional walking activities with NRPS of 0/10. []? Progressing: []? Met: []? Not Met: []? Adjusted      5. Pt will be able to perform 2 stairs reciprocally without deviation.  (patient specific functional goal)    []? Progressing: []? Met: []? Not Met: []? Adjusted                 ASSESSMENT:  Pt junaid session well. Dorsiflexion remains weak. Extension still slightly limited. Ambulating without cane at times but unsteadiness noted. Patient received education on their current pathology and how their condition effects them with their functional activities. Patient understood discussion and questions were answered. Patient understands their activity limitations and understands rational for treatment progression. Pt educated on plan of care and HEP, if worsening symptoms to d/c that exercise. PLAN: See eval  [x] Continue per plan of care [] Alter current plan (see comments above)  [] Plan of care initiated [] Hold pending MD visit [] Discharge      Electronically signed by:  Donis Miguel PT    Note: If patient does not return for scheduled/ recommended follow up visits, this note will serve as a discharge from care along with most recent update on progress.

## 2022-02-17 ENCOUNTER — HOSPITAL ENCOUNTER (OUTPATIENT)
Dept: PHYSICAL THERAPY | Age: 72
Setting detail: THERAPIES SERIES
Discharge: HOME | End: 2022-02-17
Payer: MEDICARE

## 2022-02-17 NOTE — FLOWSHEET NOTE
[address] and Sports Rehabilitation, [address] [place] [address], [address] [place] [address] [address]  Phone: [phone]   Fax:     [phone]    Physical Therapy  Cancellation/No-show Note  Patient Name:  Vera Bach  :  1950   Date:  2022    Cancelled visits to date: 0  No-shows to date: 1    For today's appointment patient:  []  Cancelled  []  Rescheduled appointment  [x]  No-show     Reason given by patient:  []  Patient ill  []  Conflicting appointment  []  No transportation    []  Conflict with work  []  No reason given  []  Other:     Comments:      Phone call information:   []  Phone call made today to patient at am/pm at the number provided:      []  Patient answered, conversation as follows:    [x]  Patient did not answer, message left as follows: next apt  []  Phone call not needed - pt contacted us to cancel and provided reason for cancellation.      Electronically signed by:  Bernice Farris PT, PT

## 2022-02-18 ENCOUNTER — TELEPHONE (OUTPATIENT)
Dept: VASCULAR SURGERY | Age: 72
End: 2022-02-18

## 2022-02-18 ENCOUNTER — HOSPITAL ENCOUNTER (EMERGENCY)
Age: 72
Discharge: HOME | End: 2022-02-18
Payer: MEDICARE

## 2022-02-18 VITALS
HEART RATE: 80 BPM | RESPIRATION RATE: 16 BRPM | SYSTOLIC BLOOD PRESSURE: 194 MMHG | DIASTOLIC BLOOD PRESSURE: 102 MMHG | OXYGEN SATURATION: 98 % | TEMPERATURE: 97.2 F

## 2022-02-18 DIAGNOSIS — Z48.02 VISIT FOR SUTURE REMOVAL: Primary | ICD-10-CM

## 2022-02-18 PROCEDURE — 99283 EMERGENCY DEPT VISIT LOW MDM: CPT

## 2022-02-18 RX ORDER — CEPHALEXIN 500 MG/1
500 CAPSULE ORAL 2 TIMES DAILY
Qty: 20 CAPSULE | Refills: 0 | Status: SHIPPED | OUTPATIENT
Start: 2022-02-18 | End: 2022-02-28

## 2022-02-18 NOTE — ED NOTES
Pt left arm pressure dressing applied to fistula. Will monitor for continued bleeding.       Epifanio Vaughn LPN  45/41/52 [id number]

## 2022-02-18 NOTE — ED NOTES
Pt pressure dressing removed. Small amount of pale blood in color noted to gauze/no active bleeding noted from site. Pt arm reapplied with 4x4 gauze/kerlex/4in strapping ace wrap applied for comfort. Pt instructed to keep fistula area wrapped until f/u with Dialysis.       Himanshu Flanagan LPN  47/19/53 [id number]

## 2022-02-18 NOTE — ED PROVIDER NOTES
[facility] Emergency Department    CHIEF COMPLAINT  Other (pt was sent in 1 week ago to [address] because her fistula would not stop bleeding. they placed stiches in this. pt went to [facility] to have them remove the stiches and check it, they told her to come here.  )      SHARED SERVICE VISIT  Evaluated by SINDHU. My supervising physician was available for consultation. HISTORY OF PRESENT ILLNESS  Georges Scott is a 66 y.o. female who presents to the ED requesting suture removal.  Patient drove herself in for evaluation. She is patient with history of end-stage renal disease on dialysis. States that last Friday after dialysis fistula would not stop bleeding. She was taken to local emergency department where several sutures were placed and bleeding resolves. States that she followed up with clinic associated with her dialysis for evaluation of the fistula although she states that she was informed to follow-up with her vascular surgeon for stitch removal.  She attempted to contact vascular surgeon who is currently on vacation and opted to present here for evaluation. She denies any pain or swelling at site. States that she has been able to dialyze despite sutures. Denies any fevers or chills. No active bleeding or drainage from wound. No other complaints, modifying factors or associated symptoms. Nursing notes reviewed.    Past Medical History:   Diagnosis Date    Arthritis     Chronic kidney disease     Diabetes mellitus ([facility]     Dialysis patient [facility])     M W F    ESRD (end stage renal disease) ([facility]     Hemodialysis patient ([facility]     M-W-F    Hyperkalemia 07/13/2016    Hyperlipidemia     Hypertension     OA (osteoarthritis)     Retinopathy     Sepsis ([facility]     Thyroid disease     Wears dentures      Past Surgical History:   Procedure Laterality Date    DIALYSIS FISTULA CREATION Left 08/10/2016    Dr. Tanner Gayle - upper arm, basilic vein transposition    EYE SURGERY Left 2018    catarct removal with lens implant     OTHER SURGICAL HISTORY Right 2019    partial foot amputation    TOE AMPUTATION Right 2019    PARTIAL FOOT AMPUTATION WITH GRAFT APPLICATION performed by Meg Obregon DPM at [address] [place] [address] Left 2021    LEFT TOTAL KNEE ARTHROPLASTY performed by Vicky Maldonado MD at [place] [address]     No family history on file. Social History     Socioeconomic History    Marital status:      Spouse name: Not on file    Number of children: Not on file    Years of education: Not on file    Highest education level: Not on file   Occupational History    Not on file   Tobacco Use    Smoking status: Former Smoker     Quit date: 2014     Years since quittin.6    Smokeless tobacco: Never Used   Vaping Use    Vaping Use: Never used   Substance and Sexual Activity    Alcohol use: No    Drug use: No    Sexual activity: Never   Other Topics Concern    Not on file   Social History Narrative    Not on file     Social Determinants of Health     Financial Resource Strain:     Difficulty of Paying Living Expenses: Not on file   Food Insecurity:     Worried About [address] [employer] in the Last Year: Not on file    Felicia of Food in the Last Year: Not on file   Transportation Needs:     Lack of Transportation (Medical): Not on file    Lack of Transportation (Non-Medical):  Not on file   Physical Activity:     Days of Exercise per Week: Not on file    Minutes of Exercise per Session: Not on file   Stress:     Feeling of Stress : Not on file   Social Connections:     Frequency of Communication with Friends and Family: Not on file    Frequency of Social Gatherings with Friends and Family: Not on file    Attends Holiness Services: Not on file    Active Member of Clubs or Organizations: Not on file    Attends Club or Organization Meetings: Not on file    Marital Status: Not on file   Intimate Partner Violence:     Fear of Current or Ex-Partner: Not on file    Emotionally Abused: Not on file    Physically Abused: Not on file    Sexually Abused: Not on file   Housing Stability:     Unable to Pay for Housing in the Last Year: Not on file    Number of [place] in the Last Year: Not on file    Unstable Housing in the Last Year: Not on file     No current facility-administered medications for this encounter. Current Outpatient Medications   Medication Sig Dispense Refill    carvedilol (COREG) 12.5 MG tablet Take 12.5 mg by mouth 2 times daily (with meals)      Ferric Citrate (AURYXIA) 1  MG(Fe) TABS Take 3 tablets by mouth 3 times daily (before meals)      Lactobacillus-Inulin (CULTURELLE ADULT ULT BALANCE PO) Take 1 tablet by mouth daily as needed      levothyroxine (SYNTHROID) 50 MCG tablet Take 50 mcg by mouth Daily      losartan (COZAAR) 100 MG tablet Take 100 mg by mouth nightly       insulin glargine (LANTUS) 100 UNIT/ML injection vial Inject 8 Units into the skin every morning 20 units if bs 150 or above, 15 units if below bs is below. (Patient taking differently: Inject 15 Units into the skin every morning 18 units if bs 150 or above, 15 units if below bs is below.) 1 vial 0    aspirin 81 MG EC tablet Take 1 tablet by mouth daily Take with food 30 tablet 0    gabapentin (NEURONTIN) 300 MG capsule Take 300 mg by mouth every evening Take 1-2 tablets every evening before bed.  atorvastatin (LIPITOR) 10 MG tablet Take 10 mg by mouth nightly       sertraline (ZOLOFT) 25 MG tablet Take 25 mg by mouth daily       No Known Allergies    REVIEW OF SYSTEMS  10 systems reviewed, pertinent positives per HPI otherwise noted to be negative    PHYSICAL EXAM  BP (!) 209/91   Pulse 82   Temp 97.2 °F (36.2 °C) (Oral)   Resp 18   SpO2 100%   GENERAL APPEARANCE: Awake and alert. Cooperative. No acute distress. HEAD: Normocephalic. Atraumatic. EYES: PERRL. EOM's grossly intact.    ENT: Mucous membranes are moist.   NECK: Supple. HEART: RRR. No murmurs. LUNGS: Respirations unlabored. CTAB. Good air exchange. Speaking comfortably in full sentences. ABDOMEN: Soft. Non-distended. Non-tender. No guarding or rebound. EXTREMITIES: No peripheral edema. Several sutures are noted to the left upper extremity fistula. Mild skin erythema and warmth without active drainage or bleeding. No streaking. Compartment soft without crepitus. Moves all extremities equally. All extremities neurovascularly intact. SKIN: Warm and dry. No acute rashes. NEUROLOGICAL: Alert and oriented. CN's 2-12 intact. No gross facial drooping. Strength 5/5, sensation intact. PSYCHIATRIC: Normal mood and affect. ED COURSE  Pain control was not required while here in the emergency department. Triage vitals showing elevated blood pressure. Patient reports that she is a very nervous person. States that her fistula is her lifeline and she just wants to make sure she is doing the right thing. Does appear somewhat frustrated with current situation. Given concern for potential infectious process I did feel that at least some of the sutures needed removed. Did end up removing all visualized sutures with mild bleeding occurring. Controlled with pressure and resolved prior to discharge. Repeat blood pressure has improved. Patient was monitored with no complications from suture removal.  I will cover with oral antibiotics with recommendations for close continued outpatient PCP and vascular surgery follow-up. We have discussed return precautions and recommendations for follow-up otherwise and she is in agreement and comfortable at discharge. A discussion was had with Ms. Khadra Acuña regarding fistula complication suturing, ED findings, wound aftercare and recommendations for follow-up . Risk management discussed and shared decision making had with patient and/or surrogate. All questions were answered.  Patient will follow up with PCP and vascular surgeon within 2 to 3 days for further evaluation/treatment. All questions answered. Patient will return to ED for new/worsening symptoms. Patient was sent home with a prescription for Keflex. MDM  I estimate there is LOW risk for CELLULITIS, COMPARTMENT SYNDROME, NECROTIZING FASCIITIS, TENDON OR NEUROVASCULAR INJURY, or FOREIGN BODY, thus I consider the discharge disposition reasonable. Also, there is no evidence or peritonitis, sepsis, or toxicity. Slava Mcdonough and I have discussed the diagnosis and risks, and we agree with discharging home to follow-up with their primary doctor. We also discussed returning to the Emergency Department immediately if new or worsening symptoms occur. We have discussed the symptoms which are most concerning (e.g., changing or worsening pain, fever, numbness, weakness, cool or painful digits) that necessitate immediate return. Final Impression  1. Visit for suture removal      Discharge Vital Signs:  Blood pressure (!) 194/102, pulse 80, temperature 97.2 °F (36.2 °C), temperature source Oral, resp. rate 16, SpO2 98 %, not currently breastfeeding. DISPOSITION  Patient was discharged to home in good condition.           Milena Valladares [place]  02/18/22 0219

## 2022-02-21 ENCOUNTER — TELEPHONE (OUTPATIENT)
Dept: VASCULAR SURGERY | Age: 72
End: 2022-02-21

## 2022-02-21 NOTE — TELEPHONE ENCOUNTER
Called patient regarding she had questions regarding dialysis access. Patient to make an apt to see Dr Александр Stuart.  Nahed

## 2022-02-22 ENCOUNTER — HOSPITAL ENCOUNTER (OUTPATIENT)
Dept: PHYSICAL THERAPY | Age: 72
Setting detail: THERAPIES SERIES
Discharge: HOME | End: 2022-02-22
Payer: MEDICARE

## 2022-02-22 PROCEDURE — 97112 NEUROMUSCULAR REEDUCATION: CPT

## 2022-02-22 PROCEDURE — 97110 THERAPEUTIC EXERCISES: CPT

## 2022-02-22 PROCEDURE — 97530 THERAPEUTIC ACTIVITIES: CPT

## 2022-02-22 NOTE — FLOWSHEET NOTE
[facility] and [facility], [address] [place] [address] [address]  Phone: [phone]   Fax:     [phone]      Physical Therapy Treatment Note/ Progress Report:           Date:  2022    Patient Name:  Haley Waters    :  1950  MRN: [id number]  Restrictions/Precautions:    Medical/Treatment Diagnosis Information:  · Diagnosis: S52.599 (ICD-10-CM) - H/O total knee replacement, left  · Treatment Diagnosis: Left knee pain, decreased ROM and functional strength  Insurance/Certification information:  PT Insurance Information: [facility] Medicare $35 CP, No auth, [address]  Physician Information:  Referring Practitioner: Lita Henry PA-C  Has the plan of care been signed (Y/N):        []  Yes  []  No     Date of Patient follow up with Physician:     Assessment Summary: Pauly Coronado is a 66 y.o. female reporting to OP PT s/p left TKA on 21. Pt is noted to have reduced ROM into flexion and extension. Still using FWW. Is this a Progress Report:     []  Yes  [x]  No        If Yes:  Date Range for reporting period:  Beginning   22  Ending 22    Progress report will be due (10 Rx or 30 days whichever is less): 94       Recertification will be due (POC Duration  / 90 days whichever is less): 3/18/22          Visit # Insurance Allowable Auth Required   In [facility] [address] []  Yes     []  No    Tele Health   []  Yes     []  No    Total 5             Functional Scale: LEFS 63%, 38%    Date assessed:        Latex Allergy:  [x]NO      []YES  Preferred Language for Healthcare:   [x]English       []other:      Pain level:  0/10     SUBJECTIVE:  Pt states feel good, balance is still off a bit.      OBJECTIVE:   KNEE  AROM  Flexion 110  Extension 0    MMT  Flexion 5/5  Extension 5/5      Left dorsiflexion 3-/5      RESTRICTIONS/PRECAUTIONS: DM, ESRD    Exercises/Interventions: HEP code: [id number]  Therapeutic Ex (21067) HEP 1/27/22 2/10/22 2/22/22   Warm-up       Rec bike  6' 7' 7', lv 4          TABLE       Heel prop x 6', 4# 6', 5#    Quad set x --     SLR  10x3     Heel slides x x30     HS stretch x      Gastroc stretch x                    SEATED       LAQ  X30, 4#     DF x x20                                 STANDING       Wedge gastroc stretch  1' 1' 1'   HS stretch  1' 1' 1'   Heel raises  10x2 10x3    Toe raises  x5 x10    Anterior step up  X30, 4\" X20, 6\" X15, 6\"   Anterior step down  X20, 4\" --    Tandem stance x 30\"ea --    Semi tandem x 30\"x2 ea 30\"x2 ea    Semi tandem EC   30\" ea 30\"x2 ea   Leg Press DL  X25, 60# --    HS curl machine  X30, 30# --    Gait train  3' --                                                Manual (63697): Therapeutic Exercise and NMR EXR  [x] (67303) Provided verbal/tactile cueing for activities related to strengthening, flexibility, endurance, ROM for improvements in LE, proximal hip, and core control with self care, mobility, lifting, ambulation. [x] (24323) Provided verbal/tactile cueing for activities related to improving balance, coordination, kinesthetic sense, posture, motor skill, proprioception to assist with LE, proximal hip, and core control in self-care, mobility, lifting, ambulation and eccentric single leg control.      NMR and Therapeutic Activities:    [x] (75908 or 44104) Provided verbal/tactile cueing for activities related to improving balance, coordination, kinesthetic sense, posture, motor skill, proprioception and motor activation to allow for proper function of core, proximal hip and LE with self-care and ADLs and functional mobility.   [] (79664) Gait Re-education- Provided training and instruction to the patient for proper LE, core and proximal hip recruitment and positioning and eccentric body weight control with ambulation re-education including up and down stairs     Home Exercise Program:    [x] (29770) Reviewed/Progressed HEP activities related to strengthening, flexibility, endurance, ROM of core, proximal hip and LE for functional self-care, mobility, lifting and ambulation/stair navigation   [] (31043) Reviewed/Progressed HEP activities related to improving balance, coordination, kinesthetic sense, posture, motor skill, proprioception of core, proximal hip and LE for self-care, mobility, lifting, and ambulation/stair navigation      Manual Treatments:  PROM / STM / Oscillations-Mobs:  G-I, II, III, IV (PA's, Inf., Post.)  [x] (70615) Provided manual therapy to mobilize LE, proximal hip and/or LS spine soft tissue/joints for the purpose of modulating pain, promoting relaxation, increasing ROM, reducing/eliminating soft tissue swelling/inflammation/restriction, improving soft tissue extensibility and allowing for proper ROM for normal function with self-care, mobility, lifting and ambulation. Modalities:     [] GAME READY (VASO)- for significant edema, swelling, pain control. Charges:  Timed Code Treatment Minutes: 40   Total Treatment Minutes:  40   BWC:  TE TIME:  NMR TIME:  MANUAL TIME:   TA  UNTIMED MINUTES:  Medicare Total:   20  10    10          [] EVAL (LOW) 20214 (typically 20 minutes face-to-face)  [] EVAL (MOD) 22308 (typically 30 minutes face-to-face)  [] EVAL (HIGH) 39477 (typically 45 minutes face-to-face)  [] RE-EVAL     [x] QO(24309) 1     [] IONTO  [x] NMR (83305) 1     [x] VASO  [] Manual (07326) x     [] Dry Needle:  [x] TA 1      [] Mech Traction (09578)  [] ES(attended) (30514)      [] ES (un) (31294):        GOALS:     Patient stated goal: Improve mobility     Therapist goals for Patient:   Short Term Goals: To be achieved in: 2 weeks  1. Independent in HEP and progression per patient tolerance, in order to prevent re-injury. []? Progressing: [x]? Met: []? Not Met: []? Adjusted      2. Patient will have a decrease in pain to facilitate improvement in movement, function, and ADLs as indicated by Functional Deficits. []? Progressing: [x]? Met: []?  Not Met: []? Adjusted      Long Term Goals: To be achieved in: 8 weeks  1. Disability index score of 43% or less for the LEFS to assist with reaching prior level of function. []? Progressing: [x]? Met: []? Not Met: []? Adjusted      2. Patient will demonstrate increased AROM to -2 to 120 to allow for proper joint functioning as indicated by patients Functional Deficits. []? Progressing: []? Met: [x]? Not Met: []? Adjusted      3. Patient will demonstrate an increase in Strength to 5/5  allow for proper functional mobility as indicated by patients Functional Deficits. []? Progressing: [x]? Met: []? Not Met: []? Adjusted      4. Patient will return to functional walking activities with NRPS of 0/10. []? Progressing: [x]? Met: []? Not Met: []? Adjusted      5. Pt will be able to perform 2 stairs reciprocally without deviation.  (patient specific functional goal)    []? Progressing: [x]? Met: []? Not Met: []? Adjusted                 ASSESSMENT:  Thomas Ramos seen in Pt for 5 visits following left TKA. She is doing quite well, ROM and strengh are very good although flexion goal no quite met. Neuropathy appears to be affecting balance quite a bit, left dorsiflexion is quite weak. Patient received education on their current pathology and how their condition effects them with their functional activities. Patient understood discussion and questions were answered. Patient understands their activity limitations and understands rational for treatment progression. Pt educated on plan of care and HEP, if worsening symptoms to d/c that exercise. PLAN: See eval  [x] Continue per plan of care [] Alter current plan (see comments above)  [] Plan of care initiated [] Hold pending MD visit [] Discharge      Electronically signed by:  Fidencio Bailon, PT    Note: If patient does not return for scheduled/ recommended follow up visits, this note will serve as a discharge from care along with most recent update on progress.

## 2022-02-22 NOTE — PROGRESS NOTES
[address] [place] [address] and Sports Rehabilitation[address], [address] [place] [address] [address]  Phone: [phone]   Fax: [phone]    Date: 2022          Patient Name; :  Trenton Segundo; 1950   Dx: Diagnosis: T25.812 (ICD-10-CM) - H/O total knee replacement, left      Physician: Referring Practitioner: Kaz Mayberry PA-C        Total PT Visits:      Measures Previous Current   Pain (0-10)     Disability %       KNEE  AROM  Flexion 110  Extension 0    MMT  Flexion 5/5  Extension 5/5      Left dorsiflexion 3-/5    Assessment:  Jocelin Barnes seen in Pt for 5 visits following left TKA. She is doing quite well, ROM and strengh are very good although flexion goal no quite met. Neuropathy appears to be affecting balance quite a bit, left dorsiflexion is quite weak. Prognosis for POC: [x] Good [] Fair  [] Poor      Patient requires continued skilled intervention: [x] Yes  [] No   Patient stated goal: Improve mobility     Therapist goals for Patient:   Short Term Goals: To be achieved in: 2 weeks  1. Independent in HEP and progression per patient tolerance, in order to prevent re-injury. []?? Progressing: [x]? ? Met: []?? Not Met: []?? Adjusted      2. Patient will have a decrease in pain to facilitate improvement in movement, function, and ADLs as indicated by Functional Deficits. []?? Progressing: [x]? ? Met: []?? Not Met: []?? Adjusted      Long Term Goals: To be achieved in: 8 weeks  1. Disability index score of 43% or less for the LEFS to assist with reaching prior level of function.   []?? Progressing: [x]? ? Met: []?? Not Met: []?? Adjusted      2. Patient will demonstrate increased AROM to -2 to 120 to allow for proper joint functioning as indicated by patients Functional Deficits.   []? ? Progressing: []?? Met: [x]? ? Not Met: []?? Adjusted      3.  Patient will demonstrate an increase in Strength to 5/5  allow for proper functional mobility as indicated by patients Functional Deficits. []?? Progressing: [x]? ? Met: []?? Not Met: []?? Adjusted      4. Patient will return to functional walking activities with NRPS of 0/10.   []? ? Progressing: [x]? ? Met: []?? Not Met: []?? Adjusted      5. Pt will be able to perform 2 stairs reciprocally without deviation.  (patient specific functional goal)    []? ? Progressing: [x]? ? Met: []?? Not Met: []?? Adjusted           Plan & Recommendations:  [] Continue rehabilitation due to objective improvement and continued functional deficits with frequency and duration:   [] Progress toward  []GAP, []Work Conditioning, []Independent HEP   [x] Discharge due to   [] All goals achieved, [] Maximized \"medical necessity\" [] No subjective or objective improvements      Electronically signed by:  Abundio Yousif, PT  Therapy Plan of Care Re-Certification  This patient has been re-evaluated for physical therapy services and for therapy to continue, Medicare, Medicaid and other insurances require periodic physician review of the treatment plan. Please review the above re-evaluation and verify that you agree with plan of care as established above by signing the attached document and return it to our office or note changes to established plan below  [] Follow treatment plan as above [] Discontinue physical therapy  [] Change plan to:                                 __________________________________________________    Physician Signature:____________________________________ Date:____________  By signing above, therapists plan is approved by physician    If you have any questions or concerns, please don't hesitate to call.   Thank you for your referral.

## 2022-03-04 ENCOUNTER — OFFICE VISIT (OUTPATIENT)
Dept: VASCULAR SURGERY | Age: 72
End: 2022-03-04
Payer: MEDICARE

## 2022-03-04 VITALS
DIASTOLIC BLOOD PRESSURE: 70 MMHG | BODY MASS INDEX: 27.78 KG/M2 | SYSTOLIC BLOOD PRESSURE: 170 MMHG | HEIGHT: 67 IN | WEIGHT: 177 LBS

## 2022-03-04 DIAGNOSIS — N18.6 ENCOUNTER REGARDING VASCULAR ACCESS FOR DIALYSIS FOR ESRD (HCC): Primary | ICD-10-CM

## 2022-03-04 DIAGNOSIS — Z99.2 ENCOUNTER REGARDING VASCULAR ACCESS FOR DIALYSIS FOR ESRD (HCC): Primary | ICD-10-CM

## 2022-03-04 PROCEDURE — G8417 CALC BMI ABV UP PARAM F/U: HCPCS | Performed by: SURGERY

## 2022-03-04 PROCEDURE — 3017F COLORECTAL CA SCREEN DOC REV: CPT | Performed by: SURGERY

## 2022-03-04 PROCEDURE — G8399 PT W/DXA RESULTS DOCUMENT: HCPCS | Performed by: SURGERY

## 2022-03-04 PROCEDURE — 1123F ACP DISCUSS/DSCN MKR DOCD: CPT | Performed by: SURGERY

## 2022-03-04 PROCEDURE — G8484 FLU IMMUNIZE NO ADMIN: HCPCS | Performed by: SURGERY

## 2022-03-04 PROCEDURE — 1090F PRES/ABSN URINE INCON ASSESS: CPT | Performed by: SURGERY

## 2022-03-04 PROCEDURE — 99214 OFFICE O/P EST MOD 30 MIN: CPT | Performed by: SURGERY

## 2022-03-04 PROCEDURE — 4040F PNEUMOC VAC/ADMIN/RCVD: CPT | Performed by: SURGERY

## 2022-03-04 PROCEDURE — G8427 DOCREV CUR MEDS BY ELIG CLIN: HCPCS | Performed by: SURGERY

## 2022-03-04 PROCEDURE — 1036F TOBACCO NON-USER: CPT | Performed by: SURGERY

## 2022-03-04 NOTE — PROGRESS NOTES
Outpatient Follow Up Note    Yanelis Hurtado is 66 y.o. female who presents today for  follow up regarding:    Chief Complaint   Patient presents with    Other     patient says using fistula. hasnt been any problems. no bleeding issues. pamlr        S/P LUE Brachial basilic AVF [id number]. Has required stenting of distal vein. SHe had an episode of severe bleeding post dialysis and was treated in Ed for this with placement of multiple sutures to control. She also notes dilaysis staff has heard a whistle in distal fistula.      Past Medical History:   Diagnosis Date    Arthritis     Chronic kidney disease     [facility] mellitus ([facility]     [facility]ialysis patient [facility])     M W F    ESRD (end stage renal disease) ([facility]     [facility]     M-W-F    Hyperkalemia 2016    Hyperlipidemia     Hypertension     OA (osteoarthritis)     Retinopathy     Sepsis ([facility]     Thyroid disease     Wears dentures        Past Surgical History:   Procedure Laterality Date    DIALYSIS FISTULA CREATION Left 08/10/2016    Dr. Maurer Persons - upper arm, basilic vein transposition    EYE SURGERY Left 2018    catarct removal with lens implant     OTHER SURGICAL HISTORY Right 2019    partial foot amputation    TOE AMPUTATION Right 2019    PARTIAL FOOT AMPUTATION WITH GRAFT APPLICATION performed by Peter Gamez DPM at [address] [place] [address] Left 2021    LEFT TOTAL KNEE ARTHROPLASTY performed by Higinio Culver MD at [place] [address] History:    Social History     Tobacco Use   Smoking Status Former Smoker    Quit date: 2014    Years since quittin.6   Smokeless Tobacco Never Used     Current Medications:  Current Outpatient Medications   Medication Sig Dispense Refill    carvedilol (COREG) 12.5 MG tablet Take 12.5 mg by mouth 2 times daily (with meals)      Ferric Citrate (AURYXIA) 1  MG(Fe) TABS Take 3 tablets by mouth 3 times daily (before meals)      Lactobacillus-Inulin (CULTURELLE ADULT ULT BALANCE PO) Take 1 tablet by mouth daily as needed      levothyroxine (SYNTHROID) 50 MCG tablet Take 50 mcg by mouth Daily      losartan (COZAAR) 100 MG tablet Take 100 mg by mouth nightly       insulin glargine (LANTUS) 100 UNIT/ML injection vial Inject 8 Units into the skin every morning 20 units if bs 150 or above, 15 units if below bs is below. (Patient taking differently: Inject 15 Units into the skin every morning 18 units if bs 150 or above, 15 units if below bs is below.) 1 vial 0    aspirin 81 MG EC tablet Take 1 tablet by mouth daily Take with food 30 tablet 0    gabapentin (NEURONTIN) 300 MG capsule Take 300 mg by mouth every evening Take 1-2 tablets every evening before bed.  atorvastatin (LIPITOR) 10 MG tablet Take 10 mg by mouth nightly       sertraline (ZOLOFT) 25 MG tablet Take 25 mg by mouth daily       No current facility-administered medications for this visit. Allergies:  Patient has no known allergies. REVIEW OF SYSTEMS:   A 14 point review of systems was completed. Pertinent positives identified in the HPI, all other review of systems negative. Objective:   PHYSICAL EXAM:        VITALS:  BP (!) 170/70 (Site: Right Upper Arm)   Ht 5' 7\" (1.702 m)   Wt 177 lb (80.3 kg)   BMI 27.72 kg/m²   CONSTITUTIONAL: Cooperative, no apparent distress, and appears well nourished / developed  NEUROLOGIC:  Awake and oriented to person, place and time. PSYCH: Calm affect. SKIN: Warm and dry. HEENT: Sclera non-icteric, normocephalic, neck supple, normal carotid pulses with no bruits and thyroid normal size. LUNGS:  No increased work of breathing and clear to auscultation, no crackles or wheezing  CARDIOVASCULAR:  Regular rate and rhythm with no murmurs, gallops, rubs, or abnormal heart sounds, normal PMI.   Pulses:    brachial radial   LEFT 2 2     ABDOMEN:  Normal bowel sounds, non-distended and non-tender to palpation  EXT: No edema, no calf tenderness. Fistula patent with high pitched distal bruit. DATA:      Bedside duplex:  >50% stenosis in stented portion of fistula. Assessment:      Diagnosis Orders   1. 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 Plan:     Fistulagram with angioplasty. Procedure, risks, benefits, and alternatives discussed and understood.          Frank Clark MD, FACS

## 2022-03-08 ENCOUNTER — TELEPHONE (OUTPATIENT)
Dept: VASCULAR SURGERY | Age: 72
End: 2022-03-08

## 2022-06-01 ENCOUNTER — HOSPITAL ENCOUNTER (OUTPATIENT)
Age: 72
Discharge: HOME | End: 2022-06-01
Payer: MEDICARE

## 2022-06-01 LAB — POTASSIUM SERPL-SCNC: 4.2 MMOL/L (ref 3.5–5.1)

## 2022-06-01 PROCEDURE — 84132 ASSAY OF SERUM POTASSIUM: CPT

## 2022-06-14 ENCOUNTER — HOSPITAL ENCOUNTER (INPATIENT)
Age: 72
LOS: 8 days | Discharge: HOME | DRG: 871 | End: 2022-06-22
Attending: EMERGENCY MEDICINE | Admitting: INTERNAL MEDICINE
Payer: MEDICARE

## 2022-06-14 ENCOUNTER — APPOINTMENT (OUTPATIENT)
Dept: GENERAL RADIOLOGY | Age: 72
DRG: 871 | End: 2022-06-14
Payer: MEDICARE

## 2022-06-14 ENCOUNTER — APPOINTMENT (OUTPATIENT)
Dept: CT IMAGING | Age: 72
DRG: 871 | End: 2022-06-14
Payer: MEDICARE

## 2022-06-14 DIAGNOSIS — A41.9 SEPSIS WITH ACUTE HYPOXIC RESPIRATORY FAILURE WITHOUT SEPTIC SHOCK, DUE TO UNSPECIFIED ORGANISM (HCC): ICD-10-CM

## 2022-06-14 DIAGNOSIS — Z99.2 ESRD ON DIALYSIS (HCC): ICD-10-CM

## 2022-06-14 DIAGNOSIS — R41.89 EPISODE OF UNRESPONSIVENESS: Primary | ICD-10-CM

## 2022-06-14 DIAGNOSIS — J96.01 ACUTE RESPIRATORY FAILURE WITH HYPOXIA (HCC): ICD-10-CM

## 2022-06-14 DIAGNOSIS — R65.20 SEPSIS WITH ACUTE HYPOXIC RESPIRATORY FAILURE WITHOUT SEPTIC SHOCK, DUE TO UNSPECIFIED ORGANISM (HCC): ICD-10-CM

## 2022-06-14 DIAGNOSIS — J18.9 PNEUMONIA OF LEFT LOWER LOBE DUE TO INFECTIOUS ORGANISM: ICD-10-CM

## 2022-06-14 DIAGNOSIS — J96.01 SEPSIS WITH ACUTE HYPOXIC RESPIRATORY FAILURE WITHOUT SEPTIC SHOCK, DUE TO UNSPECIFIED ORGANISM (HCC): ICD-10-CM

## 2022-06-14 DIAGNOSIS — N18.6 ESRD ON DIALYSIS (HCC): ICD-10-CM

## 2022-06-14 DIAGNOSIS — T67.01XA: ICD-10-CM

## 2022-06-14 DIAGNOSIS — L03.116 LEFT LEG CELLULITIS: ICD-10-CM

## 2022-06-14 LAB
A/G RATIO: 1.2 (ref 1.1–2.2)
ALBUMIN SERPL-MCNC: 4.4 G/DL (ref 3.4–5)
ALP BLD-CCNC: 105 U/L (ref 40–129)
ALT SERPL-CCNC: 8 U/L (ref 10–40)
ANION GAP SERPL CALCULATED.3IONS-SCNC: 17 MMOL/L (ref 3–16)
AST SERPL-CCNC: 20 U/L (ref 15–37)
BASE EXCESS VENOUS: -1.6 MMOL/L (ref -3–3)
BASOPHILS ABSOLUTE: 0 K/UL (ref 0–0.2)
BASOPHILS RELATIVE PERCENT: 0.4 %
BILIRUB SERPL-MCNC: 0.4 MG/DL (ref 0–1)
BUN BLDV-MCNC: 44 MG/DL (ref 7–20)
CALCIUM SERPL-MCNC: 9.5 MG/DL (ref 8.3–10.6)
CARBOXYHEMOGLOBIN: 2.1 % (ref 0–1.5)
CHLORIDE BLD-SCNC: 101 MMOL/L (ref 99–110)
CO2: 24 MMOL/L (ref 21–32)
CREAT SERPL-MCNC: 5.8 MG/DL (ref 0.6–1.2)
EKG ATRIAL RATE: 109 BPM
EKG DIAGNOSIS: NORMAL
EKG P AXIS: 66 DEGREES
EKG P-R INTERVAL: 144 MS
EKG Q-T INTERVAL: 334 MS
EKG QRS DURATION: 78 MS
EKG QTC CALCULATION (BAZETT): 449 MS
EKG R AXIS: 43 DEGREES
EKG T AXIS: 50 DEGREES
EKG VENTRICULAR RATE: 109 BPM
EOSINOPHILS ABSOLUTE: 0 K/UL (ref 0–0.6)
EOSINOPHILS RELATIVE PERCENT: 0.2 %
ETHANOL: NORMAL MG/DL (ref 0–0.08)
GFR AFRICAN AMERICAN: 9
GFR NON-AFRICAN AMERICAN: 7
GLUCOSE BLD-MCNC: 170 MG/DL (ref 70–99)
GLUCOSE BLD-MCNC: 193 MG/DL (ref 70–99)
GLUCOSE BLD-MCNC: 210 MG/DL (ref 70–99)
HCO3 VENOUS: 23.1 MMOL/L (ref 23–29)
HCT VFR BLD CALC: 36.1 % (ref 36–48)
HEMOGLOBIN: 11.2 G/DL (ref 12–16)
INFLUENZA A: NOT DETECTED
INFLUENZA B: NOT DETECTED
LACTIC ACID, SEPSIS: 0.9 MMOL/L (ref 0.4–1.9)
LACTIC ACID, SEPSIS: 1.3 MMOL/L (ref 0.4–1.9)
LYMPHOCYTES ABSOLUTE: 1.1 K/UL (ref 1–5.1)
LYMPHOCYTES RELATIVE PERCENT: 9.5 %
MCH RBC QN AUTO: 24.7 PG (ref 26–34)
MCHC RBC AUTO-ENTMCNC: 31 G/DL (ref 31–36)
MCV RBC AUTO: 79.8 FL (ref 80–100)
METHEMOGLOBIN VENOUS: 0.3 %
MONOCYTES ABSOLUTE: 0.8 K/UL (ref 0–1.3)
MONOCYTES RELATIVE PERCENT: 6.7 %
NEUTROPHILS ABSOLUTE: 9.4 K/UL (ref 1.7–7.7)
NEUTROPHILS RELATIVE PERCENT: 83.2 %
O2 CONTENT, VEN: 17 VOL %
O2 SAT, VEN: 97 %
O2 THERAPY: ABNORMAL
PCO2, VEN: 39.3 MMHG (ref 40–50)
PDW BLD-RTO: 21.9 % (ref 12.4–15.4)
PERFORMED ON: ABNORMAL
PERFORMED ON: ABNORMAL
PH VENOUS: 7.39 (ref 7.35–7.45)
PLATELET # BLD: 218 K/UL (ref 135–450)
PMV BLD AUTO: 9 FL (ref 5–10.5)
PO2, VEN: 91 MMHG (ref 25–40)
POTASSIUM REFLEX MAGNESIUM: 5.2 MMOL/L (ref 3.5–5.1)
PROCALCITONIN: 11.26 NG/ML (ref 0–0.15)
RBC # BLD: 4.53 M/UL (ref 4–5.2)
SARS-COV-2 RNA, RT PCR: NOT DETECTED
SODIUM BLD-SCNC: 142 MMOL/L (ref 136–145)
TCO2 CALC VENOUS: 24 MMOL/L
TOTAL CK: 110 U/L (ref 26–192)
TOTAL PROTEIN: 8 G/DL (ref 6.4–8.2)
TROPONIN: 0.1 NG/ML
TROPONIN: 0.11 NG/ML
TSH REFLEX: 1.59 UIU/ML (ref 0.27–4.2)
WBC # BLD: 11.3 K/UL (ref 4–11)

## 2022-06-14 PROCEDURE — 99285 EMERGENCY DEPT VISIT HI MDM: CPT

## 2022-06-14 PROCEDURE — 2580000003 HC RX 258

## 2022-06-14 PROCEDURE — 2060000000 HC ICU INTERMEDIATE R&B

## 2022-06-14 PROCEDURE — 84443 ASSAY THYROID STIM HORMONE: CPT

## 2022-06-14 PROCEDURE — 6360000002 HC RX W HCPCS: Performed by: EMERGENCY MEDICINE

## 2022-06-14 PROCEDURE — 71045 X-RAY EXAM CHEST 1 VIEW: CPT

## 2022-06-14 PROCEDURE — 80053 COMPREHEN METABOLIC PANEL: CPT

## 2022-06-14 PROCEDURE — 2700000000 HC OXYGEN THERAPY PER DAY

## 2022-06-14 PROCEDURE — 83605 ASSAY OF LACTIC ACID: CPT

## 2022-06-14 PROCEDURE — 70450 CT HEAD/BRAIN W/O DYE: CPT

## 2022-06-14 PROCEDURE — 84484 ASSAY OF TROPONIN QUANT: CPT

## 2022-06-14 PROCEDURE — 87636 SARSCOV2 & INF A&B AMP PRB: CPT

## 2022-06-14 PROCEDURE — 73590 X-RAY EXAM OF LOWER LEG: CPT

## 2022-06-14 PROCEDURE — 93010 ELECTROCARDIOGRAM REPORT: CPT | Performed by: INTERNAL MEDICINE

## 2022-06-14 PROCEDURE — 82077 ASSAY SPEC XCP UR&BREATH IA: CPT

## 2022-06-14 PROCEDURE — 6360000002 HC RX W HCPCS

## 2022-06-14 PROCEDURE — 82550 ASSAY OF CK (CPK): CPT

## 2022-06-14 PROCEDURE — 85025 COMPLETE CBC W/AUTO DIFF WBC: CPT

## 2022-06-14 PROCEDURE — 93005 ELECTROCARDIOGRAM TRACING: CPT | Performed by: EMERGENCY MEDICINE

## 2022-06-14 PROCEDURE — 94761 N-INVAS EAR/PLS OXIMETRY MLT: CPT

## 2022-06-14 PROCEDURE — 2580000003 HC RX 258: Performed by: EMERGENCY MEDICINE

## 2022-06-14 PROCEDURE — 6370000000 HC RX 637 (ALT 250 FOR IP): Performed by: EMERGENCY MEDICINE

## 2022-06-14 PROCEDURE — 36415 COLL VENOUS BLD VENIPUNCTURE: CPT

## 2022-06-14 PROCEDURE — 6370000000 HC RX 637 (ALT 250 FOR IP): Performed by: INTERNAL MEDICINE

## 2022-06-14 PROCEDURE — 84145 PROCALCITONIN (PCT): CPT

## 2022-06-14 PROCEDURE — 82803 BLOOD GASES ANY COMBINATION: CPT

## 2022-06-14 PROCEDURE — 87040 BLOOD CULTURE FOR BACTERIA: CPT

## 2022-06-14 RX ORDER — ASPIRIN 81 MG/1
81 TABLET ORAL DAILY
Status: DISCONTINUED | OUTPATIENT
Start: 2022-06-15 | End: 2022-06-22 | Stop reason: HOSPADM

## 2022-06-14 RX ORDER — DEXTROSE MONOHYDRATE 50 MG/ML
100 INJECTION, SOLUTION INTRAVENOUS PRN
Status: DISCONTINUED | OUTPATIENT
Start: 2022-06-14 | End: 2022-06-22 | Stop reason: HOSPADM

## 2022-06-14 RX ORDER — POLYETHYLENE GLYCOL 3350 17 G/17G
17 POWDER, FOR SOLUTION ORAL DAILY PRN
Status: DISCONTINUED | OUTPATIENT
Start: 2022-06-14 | End: 2022-06-22 | Stop reason: HOSPADM

## 2022-06-14 RX ORDER — SODIUM CHLORIDE 0.9 % (FLUSH) 0.9 %
10 SYRINGE (ML) INJECTION PRN
Status: DISCONTINUED | OUTPATIENT
Start: 2022-06-14 | End: 2022-06-22 | Stop reason: HOSPADM

## 2022-06-14 RX ORDER — ACETAMINOPHEN 325 MG/1
650 TABLET ORAL EVERY 6 HOURS PRN
Status: DISCONTINUED | OUTPATIENT
Start: 2022-06-14 | End: 2022-06-22 | Stop reason: HOSPADM

## 2022-06-14 RX ORDER — HEPARIN SODIUM 5000 [USP'U]/ML
5000 INJECTION, SOLUTION INTRAVENOUS; SUBCUTANEOUS EVERY 8 HOURS SCHEDULED
Status: DISCONTINUED | OUTPATIENT
Start: 2022-06-14 | End: 2022-06-22 | Stop reason: HOSPADM

## 2022-06-14 RX ORDER — PREDNISOLONE ACETATE 10 MG/ML
1 SUSPENSION/ DROPS OPHTHALMIC 4 TIMES DAILY
COMMUNITY
End: 2022-07-20

## 2022-06-14 RX ORDER — ACETAMINOPHEN 500 MG
1000 TABLET ORAL ONCE
Status: COMPLETED | OUTPATIENT
Start: 2022-06-14 | End: 2022-06-14

## 2022-06-14 RX ORDER — NIFEDIPINE 30 MG/1
60 TABLET, EXTENDED RELEASE ORAL DAILY
Status: DISCONTINUED | OUTPATIENT
Start: 2022-06-15 | End: 2022-06-22 | Stop reason: HOSPADM

## 2022-06-14 RX ORDER — ONDANSETRON 4 MG/1
4 TABLET, ORALLY DISINTEGRATING ORAL EVERY 8 HOURS PRN
Status: DISCONTINUED | OUTPATIENT
Start: 2022-06-14 | End: 2022-06-22 | Stop reason: HOSPADM

## 2022-06-14 RX ORDER — MULTIVIT WITH MINERALS/LUTEIN
1000 TABLET ORAL DAILY
COMMUNITY

## 2022-06-14 RX ORDER — KETOROLAC TROMETHAMINE 5 MG/ML
1 SOLUTION OPHTHALMIC 4 TIMES DAILY
COMMUNITY
End: 2022-07-20

## 2022-06-14 RX ORDER — ACETAMINOPHEN 650 MG/1
650 SUPPOSITORY RECTAL EVERY 6 HOURS PRN
Status: DISCONTINUED | OUTPATIENT
Start: 2022-06-14 | End: 2022-06-22 | Stop reason: HOSPADM

## 2022-06-14 RX ORDER — INSULIN LISPRO 100 [IU]/ML
0-12 INJECTION, SOLUTION INTRAVENOUS; SUBCUTANEOUS EVERY 4 HOURS
Status: DISCONTINUED | OUTPATIENT
Start: 2022-06-14 | End: 2022-06-15

## 2022-06-14 RX ORDER — TIMOLOL MALEATE 5 MG/ML
1 SOLUTION OPHTHALMIC 2 TIMES DAILY
COMMUNITY
End: 2022-07-20

## 2022-06-14 RX ORDER — SODIUM CHLORIDE 0.9 % (FLUSH) 0.9 %
5-40 SYRINGE (ML) INJECTION EVERY 12 HOURS SCHEDULED
Status: DISCONTINUED | OUTPATIENT
Start: 2022-06-14 | End: 2022-06-22 | Stop reason: HOSPADM

## 2022-06-14 RX ORDER — CARVEDILOL 25 MG/1
25 TABLET ORAL 2 TIMES DAILY WITH MEALS
Status: DISCONTINUED | OUTPATIENT
Start: 2022-06-14 | End: 2022-06-22 | Stop reason: HOSPADM

## 2022-06-14 RX ORDER — KETOROLAC TROMETHAMINE 5 MG/ML
1 SOLUTION OPHTHALMIC 4 TIMES DAILY
Status: DISCONTINUED | OUTPATIENT
Start: 2022-06-14 | End: 2022-06-22 | Stop reason: HOSPADM

## 2022-06-14 RX ORDER — GABAPENTIN 300 MG/1
300 CAPSULE ORAL EVERY EVENING
Status: DISCONTINUED | OUTPATIENT
Start: 2022-06-14 | End: 2022-06-22 | Stop reason: HOSPADM

## 2022-06-14 RX ORDER — PREDNISOLONE ACETATE 10 MG/ML
1 SUSPENSION/ DROPS OPHTHALMIC 4 TIMES DAILY
Status: DISCONTINUED | OUTPATIENT
Start: 2022-06-14 | End: 2022-06-22 | Stop reason: HOSPADM

## 2022-06-14 RX ORDER — ATORVASTATIN CALCIUM 10 MG/1
10 TABLET, FILM COATED ORAL NIGHTLY
Status: DISCONTINUED | OUTPATIENT
Start: 2022-06-14 | End: 2022-06-22 | Stop reason: HOSPADM

## 2022-06-14 RX ORDER — BUTYROSPERMUM PARKII(SHEA BUTTER), SIMMONDSIA CHINENSIS (JOJOBA) SEED OIL, ALOE BARBADENSIS LEAF EXTRACT .01; 1; 3.5 G/100G; G/100G; G/100G
LIQUID TOPICAL
COMMUNITY

## 2022-06-14 RX ORDER — 0.9 % SODIUM CHLORIDE 0.9 %
1000 INTRAVENOUS SOLUTION INTRAVENOUS ONCE
Status: COMPLETED | OUTPATIENT
Start: 2022-06-14 | End: 2022-06-14

## 2022-06-14 RX ORDER — ONDANSETRON 2 MG/ML
4 INJECTION INTRAMUSCULAR; INTRAVENOUS EVERY 6 HOURS PRN
Status: DISCONTINUED | OUTPATIENT
Start: 2022-06-14 | End: 2022-06-22 | Stop reason: HOSPADM

## 2022-06-14 RX ORDER — MAGNESIUM SULFATE 1 G/100ML
1000 INJECTION INTRAVENOUS PRN
Status: DISCONTINUED | OUTPATIENT
Start: 2022-06-14 | End: 2022-06-22 | Stop reason: HOSPADM

## 2022-06-14 RX ORDER — SODIUM CHLORIDE 9 MG/ML
INJECTION, SOLUTION INTRAVENOUS PRN
Status: DISCONTINUED | OUTPATIENT
Start: 2022-06-14 | End: 2022-06-22 | Stop reason: HOSPADM

## 2022-06-14 RX ORDER — INSULIN GLARGINE 100 [IU]/ML
10 INJECTION, SOLUTION SUBCUTANEOUS EVERY MORNING
Status: DISCONTINUED | OUTPATIENT
Start: 2022-06-15 | End: 2022-06-22 | Stop reason: HOSPADM

## 2022-06-14 RX ORDER — LEVOTHYROXINE SODIUM 0.05 MG/1
50 TABLET ORAL DAILY
Status: DISCONTINUED | OUTPATIENT
Start: 2022-06-15 | End: 2022-06-22 | Stop reason: HOSPADM

## 2022-06-14 RX ORDER — TIMOLOL MALEATE 5 MG/ML
1 SOLUTION/ DROPS OPHTHALMIC 2 TIMES DAILY
Status: DISCONTINUED | OUTPATIENT
Start: 2022-06-14 | End: 2022-06-22 | Stop reason: HOSPADM

## 2022-06-14 RX ORDER — NIFEDIPINE 60 MG/1
60 TABLET, EXTENDED RELEASE ORAL DAILY
COMMUNITY

## 2022-06-14 RX ADMIN — TIMOLOL MALEATE 1 DROP: 5 SOLUTION/ DROPS OPHTHALMIC at 22:28

## 2022-06-14 RX ADMIN — ACETAMINOPHEN 1000 MG: 500 TABLET ORAL at 15:12

## 2022-06-14 RX ADMIN — ATORVASTATIN CALCIUM 10 MG: 10 TABLET, FILM COATED ORAL at 22:03

## 2022-06-14 RX ADMIN — PREDNISOLONE ACETATE 1 DROP: 10 SUSPENSION/ DROPS OPHTHALMIC at 22:28

## 2022-06-14 RX ADMIN — CARVEDILOL 25 MG: 25 TABLET, FILM COATED ORAL at 22:04

## 2022-06-14 RX ADMIN — Medication 1500 MG: at 16:53

## 2022-06-14 RX ADMIN — GABAPENTIN 300 MG: 300 CAPSULE ORAL at 22:04

## 2022-06-14 RX ADMIN — SODIUM CHLORIDE 1000 ML: 9 INJECTION, SOLUTION INTRAVENOUS at 16:25

## 2022-06-14 RX ADMIN — CEFEPIME 2000 MG: 2 INJECTION, POWDER, FOR SOLUTION INTRAVENOUS at 15:39

## 2022-06-14 RX ADMIN — HEPARIN SODIUM 5000 UNITS: 5000 INJECTION INTRAVENOUS; SUBCUTANEOUS at 22:06

## 2022-06-14 RX ADMIN — SODIUM CHLORIDE, PRESERVATIVE FREE 10 ML: 5 INJECTION INTRAVENOUS at 22:04

## 2022-06-14 RX ADMIN — KETOROLAC TROMETHAMINE 1 DROP: 5 SOLUTION OPHTHALMIC at 22:28

## 2022-06-14 ASSESSMENT — PAIN DESCRIPTION - DESCRIPTORS: DESCRIPTORS: ACHING

## 2022-06-14 ASSESSMENT — PAIN DESCRIPTION - ORIENTATION: ORIENTATION: LOWER

## 2022-06-14 ASSESSMENT — PAIN DESCRIPTION - FREQUENCY: FREQUENCY: INTERMITTENT

## 2022-06-14 ASSESSMENT — PAIN SCALES - GENERAL
PAINLEVEL_OUTOF10: 0
PAINLEVEL_OUTOF10: 3
PAINLEVEL_OUTOF10: 0

## 2022-06-14 ASSESSMENT — PAIN DESCRIPTION - PAIN TYPE: TYPE: ACUTE PAIN

## 2022-06-14 ASSESSMENT — PAIN DESCRIPTION - LOCATION: LOCATION: LEG

## 2022-06-14 NOTE — CONSULTS
IV Vanco for CAP in ED:  Please give 1,500mg IV Vanco x1 in ED to provide Gram+ organism coverage to include MRSA.     WILVER Saleem [facility] PharmD 6/14/2022 3:37 PM

## 2022-06-14 NOTE — CONSULTS
Cefepime per Pharmacy dosing  Aspiration PNA? X5 days of therapy indicated for Vanco per consult. Patient received a 2g dose of IV Cefepime this afternoon. Will continue dosing at 1g Q24hrs per renal dose policy. Will continue to monitor for HD or changes in renal function and dose accordingly.   Manny Simental [facility] PharmD 6/14/2022 7:21 PM

## 2022-06-14 NOTE — CONSULTS
Pharmacy to dose IV Vanco aspiration PNA x5 days:  Patient received 1,500mg this afternoon in ED to provide Gram+ organism coverage to include MRSA. Will pulse dose and replenish based on Random Vanco results. AM random Vanco on order. Est crcl is 9mL/min on admission. Will continue to monitor and dose adjust accordingly.   Lyndsey Diggs, [id number] [facility] PharmD 6/14/2022 7:24 PM

## 2022-06-14 NOTE — PROGRESS NOTES
Shift report given to nurse Katty Araujo at bedside. Patient care handed off in stable condition at this time.  Paramjit Cardona RN

## 2022-06-14 NOTE — ED NOTES
Pt's son was going home to get pt's medications because pt didn't know meds off the top of her head.      Lashanda Mensah RN  06/14/22 4460

## 2022-06-14 NOTE — ED NOTES
1536 - Perfect serve sent to Dr. Josias Da Silva  06/14/22 Td 27 PA called back.       Verna Millan  06/14/22 8285

## 2022-06-14 NOTE — PROGRESS NOTES
Patient admitted to room 328 from ER. Patient oriented to room, call light, bed rails, phone, lights and bathroom. Patient instructed about the schedule of the day including: vital sign frequency, lab draws, possible tests, frequency of MD and staff rounds, daily weights, I &O's and prescribed diet. Telemetry box in place, patient aware of placement and reason. Bed locked, in lowest position, side rails up 2/4, call light within reach. Recliner Assessment  Patient is able to demonstrate the ability to move from a reclining position to an upright position within the recliner. 4 Eyes Skin Assessment     The patient is being assess for   Admission    I agree that 2 RN's have performed a thorough Head to Toe Skin Assessment on the patient. ALL assessment sites listed below have been assessed. L calf   Red Rash R hip  Redness 2nd digit R foot  Scattered Bruising Bilat up extrem  Bruising/Redness L arm              Areas assessed for pressure by both nurses:   [x]   Head, Face, and Ears   [x]   Shoulders, Back, and Chest, Abdomen  [x]   Arms, Elbows, and Hands   [x]   Coccyx, Sacrum, and Ischium  [x]   Legs, Feet, and Heels        Skin Assessed Under all Medical Devices by both nurses:  O2 device tubing              All Mepilex Borders were peeled back and area peeked at by both nurses:  No: . Please list where Mepilex Borders are located:  N/A             **SHARE this note so that the co-signing nurse is able to place an eSignature**    Co-signer eSignature: Electronically signed by Gary Riedel, RN on 6/14/22 at 6:54 PM EDT    Does the Patient have Skin Breakdown related to pressure?              Lyle Prevention initiated:  No   Wound Care Orders initiated:  No      [facility] nurse consulted for Pressure Injury (Stage 3,4, Unstageable, DTI, NWPT, Complex wounds)and New or Established Ostomies:  No

## 2022-06-14 NOTE — ED PROVIDER NOTES
[address] ED      CHIEF COMPLAINT  Loss of Consciousness (Pt brought from home after being found for an unknown down time. EMS states that pts temp was 101.2 and that there was vomit. Pt is supposed to get dialysis 1-2 times per week.)       HISTORY OF PRESENT ILLNESS  Meg Davis is a 67 y.o. female  who presents to the ED complaining of concern for episode of unresponsiveness. Patient was found on her back porch unresponsive. She had reportedly vomited. She does not recall what had occurred. She states that she went to dialysis this morning was dialyzed as usual and when she got home she went outside to her back porch but then does not remember what happened from there. Patient denies any pain to her bilateral upper or lower extremities. She is noted bruises including to her left shoulder and elsewhere in her extremities but states that she has a history of neuropathy and bumps into things quite a bit. She is noted to have a history of end-stage renal disease, currently on dialysis and has a fistula to her left upper extremity. She had been in her usual state of health she felt like earlier today and denies any abdominal pain, chest pain, cough, neck or back pain. Patient was brought in by EMS in the state that she became more awake as they were transporting her. An IO was placed to the lower extremity for access. Her fingerstick blood sugar was 302. She was noted to be febrile. No other complaints, modifying factors or associated symptoms. I have reviewed the following from the nursing documentation.     Past Medical History:   Diagnosis Date    Arthritis     Chronic kidney disease     [facility]     [facility]ialysis patient [facility])     M W F    [facility]) [facility]     Hemodialysis patient ([facility]     M-W-F    Hyperkalemia 07/13/2016    Hyperlipidemia     Hypertension     OA (osteoarthritis)     Retinopathy     Sepsis ([facility]     Thyroid disease Services: Not on file    Active Member of Clubs or Organizations: Not on file    Attends Club or Organization Meetings: Not on file    Marital Status: Not on file   Intimate Partner Violence:     Fear of Current or Ex-Partner: Not on file    Emotionally Abused: Not on file    Physically Abused: Not on file    Sexually Abused: Not on file   Housing Stability:     Unable to Pay for Housing in the Last Year: Not on file    Number of [place] in the Last Year: Not on file    Unstable Housing in the Last Year: Not on file     Current Facility-Administered Medications   Medication Dose Route Frequency Provider Last Rate Last Admin    vancomycin 1500 mg in dextrose 5% 300 mL IVPB  1,500 mg IntraVENous Once Star Gordillo MD        0.9 % sodium chloride bolus  1,000 mL IntraVENous Once Star Gordillo MD 1,000 mL/hr at 06/14/22 1625 1,000 mL at 06/14/22 1625     Current Outpatient Medications   Medication Sig Dispense Refill    carvedilol (COREG) 12.5 MG tablet Take 12.5 mg by mouth 2 times daily (with meals)      Ferric Citrate (AURYXIA) 1  MG(Fe) TABS Take 3 tablets by mouth 3 times daily (before meals)      Lactobacillus-Inulin (CULTURELLE ADULT ULT BALANCE PO) Take 1 tablet by mouth daily as needed      levothyroxine (SYNTHROID) 50 MCG tablet Take 50 mcg by mouth Daily      losartan (COZAAR) 100 MG tablet Take 100 mg by mouth nightly       insulin glargine (LANTUS) 100 UNIT/ML injection vial Inject 8 Units into the skin every morning 20 units if bs 150 or above, 15 units if below bs is below. (Patient taking differently: Inject 15 Units into the skin every morning 18 units if bs 150 or above, 15 units if below bs is below.) 1 vial 0    aspirin 81 MG EC tablet Take 1 tablet by mouth daily Take with food 30 tablet 0    gabapentin (NEURONTIN) 300 MG capsule Take 300 mg by mouth every evening Take 1-2 tablets every evening before bed.       atorvastatin (LIPITOR) 10 MG tablet Take 10 mg by mouth nightly       sertraline (ZOLOFT) 25 MG tablet Take 25 mg by mouth daily       No Known Allergies    REVIEW OF SYSTEMS  10 systems reviewed, pertinent positives per HPI otherwise noted to be negative. PHYSICAL EXAM  BP (!) 130/55   Pulse 88   Temp 97.9 °F (36.6 °C) (Oral)   Resp 15   Wt 174 lb 2.6 oz (79 kg)   SpO2 97%   BMI 27.28 kg/m²    GENERAL APPEARANCE: Awake and alert. Cooperative. Mild respiratory distress. HENT: Normocephalic. Atraumatic. Mucous membranes are moist.  No drooling or stridor. NECK: Supple. No central C-spine tenderness or bony step-offs. EYES: PERRL. EOM's grossly intact. HEART/CHEST: RRR. III/VI systolic murmur. Left upper extremity with fistula in place with current bruit and thrill noted. LUNGS: Respirations mildly labored. Rhonchi noted bilaterally. Thomas Conover air exchange. Speaking comfortably in full sentences. ABDOMEN: No tenderness. Soft. Non-distended. No masses. No organomegaly. No guarding or rebound. MUSCULOSKELETAL: Compartments soft. No deformity. No tenderness in the extremities. However, there is a firm area of induration noted with swelling noted to the medial left lower leg below the knee but not involving the knee joint. There is erythema that extends from this distally to just above the ankle. No area of necrosis, fluctuance or crepitus. There are scattered bruises noted patient's bilateral extremities upper and lower, especially with a bruise noted to the left anterior shoulder without any focal tenderness at this area. All extremities neurovascularly intact. No thoracic or lumbar bony point tenderness or step-offs. SKIN: Warm and dry. No acute rashes. NEUROLOGICAL: Alert and oriented x3. CN's 2-12 intact. No gross facial drooping. Strength 5/5, sensation intact. No gross focal deficits. PSYCHIATRIC: Normal mood and affect. LABS  I have reviewed all labs for this visit.    Results for orders placed or performed during the hospital encounter of 06/14/22   COVID-19 & Influenza Combo    Specimen: Nasopharyngeal Swab   Result Value Ref Range    SARS-CoV-2 RNA, RT PCR NOT DETECTED NOT DETECTED    INFLUENZA A NOT DETECTED NOT DETECTED    INFLUENZA B NOT DETECTED NOT DETECTED   Blood gas, venous   Result Value Ref Range    pH, Jerry 7.388 7.350 - 7.450    pCO2, Jerry 39.3 (L) 40.0 - 50.0 mmHg    pO2, Jerry 91.0 (H) 25.0 - 40.0 mmHg    HCO3, Venous 23.1 23.0 - 29.0 mmol/L    Base Excess, Jerry -1.6 -3.0 - 3.0 mmol/L    O2 Sat, Jerry 97 Not Established %    Carboxyhemoglobin 2.1 (H) 0.0 - 1.5 %    MetHgb, Jerry 0.3 <1.5 %    TC02 (Calc), Jerry 24 Not Established mmol/L    O2 Content, Jerry 17 Not Established VOL %    O2 Therapy Unknown    Procalcitonin   Result Value Ref Range    Procalcitonin 11.26 (H) 0.00 - 0.15 ng/mL   Lactate, Sepsis   Result Value Ref Range    Lactic Acid, Sepsis 1.3 0.4 - 1.9 mmol/L   CBC with Auto Differential   Result Value Ref Range    WBC 11.3 (H) 4.0 - 11.0 K/uL    RBC 4.53 4.00 - 5.20 M/uL    Hemoglobin 11.2 (L) 12.0 - 16.0 g/dL    Hematocrit 36.1 36.0 - 48.0 %    MCV 79.8 (L) 80.0 - 100.0 fL    MCH 24.7 (L) 26.0 - 34.0 pg    MCHC 31.0 31.0 - 36.0 g/dL    RDW 21.9 (H) 12.4 - 15.4 %    Platelets 127 480 - 020 K/uL    MPV 9.0 5.0 - 10.5 fL    Neutrophils % 83.2 %    Lymphocytes % 9.5 %    Monocytes % 6.7 %    Eosinophils % 0.2 %    Basophils % 0.4 %    Neutrophils Absolute 9.4 (H) 1.7 - 7.7 K/uL    Lymphocytes Absolute 1.1 1.0 - 5.1 K/uL    Monocytes Absolute 0.8 0.0 - 1.3 K/uL    Eosinophils Absolute 0.0 0.0 - 0.6 K/uL    Basophils Absolute 0.0 0.0 - 0.2 K/uL   Comprehensive Metabolic Panel w/ Reflex to MG   Result Value Ref Range    Sodium 142 136 - 145 mmol/L    Potassium reflex Magnesium 5.2 (H) 3.5 - 5.1 mmol/L    Chloride 101 99 - 110 mmol/L    CO2 24 21 - 32 mmol/L    Anion Gap 17 (H) 3 - 16    Glucose 210 (H) 70 - 99 mg/dL    BUN 44 (H) 7 - 20 mg/dL    CREATININE 5.8 (HH) 0.6 - 1.2 mg/dL    GFR Non-African American 7 (A) >60    GFR  9 (A) >60    Calcium 9.5 8.3 - 10.6 mg/dL    Total Protein 8.0 6.4 - 8.2 g/dL    Albumin 4.4 3.4 - 5.0 g/dL    Albumin/Globulin Ratio 1.2 1.1 - 2.2    Total Bilirubin 0.4 0.0 - 1.0 mg/dL    Alkaline Phosphatase 105 40 - 129 U/L    ALT 8 (L) 10 - 40 U/L    AST 20 15 - 37 U/L   Troponin   Result Value Ref Range    Troponin 0.11 (H) <0.01 ng/mL   Ethanol   Result Value Ref Range    Ethanol Lvl None Detected mg/dL   CK   Result Value Ref Range    Total  26 - 192 U/L   TSH with Reflex   Result Value Ref Range    TSH 1.59 0.27 - 4.20 uIU/mL   POCT Glucose   Result Value Ref Range    POC Glucose 193 (H) 70 - 99 mg/dl    Performed on ACCU-CHEK    EKG 12 Lead   Result Value Ref Range    Ventricular Rate 109 BPM    Atrial Rate 109 BPM    P-R Interval 144 ms    QRS Duration 78 ms    Q-T Interval 334 ms    QTc Calculation (Bazett) 449 ms    P Axis 66 degrees    R Axis 43 degrees    T Axis 50 degrees    Diagnosis       Sinus tachycardiaPossible Left atrial enlargementNonspecific ST abnormalityAbnormal ECGWhen compared with ECG of 07-DEC-2021 12:28,No significant change was found       ECG  The Ekg interpreted by me shows  sinus tachycardia, exrf=[id number]  Axis is   Normal  QTc is  normal  Intervals and Durations are unremarkable. ST Segments: nonspecific changes  No significant change from prior EKG dated 12/7/21    RADIOLOGY  XR TIBIA FIBULA LEFT (2 VIEWS)    Result Date: 6/14/2022  EXAMINATION: 3 XRAY VIEWS OF THE LEFT TIBIA AND FIBULA 6/14/2022 2:53 pm COMPARISON: Left knee 01/07/2022. HISTORY: ORDERING SYSTEM PROVIDED HISTORY: swelling, redness TECHNOLOGIST PROVIDED HISTORY: Reason for exam:->swelling, redness Reason for Exam: swelling, redness FINDINGS: No acute fracture or other osseous abnormality. The visualized portion of the left knee prosthesis appears stable with no hardware complication. No focal soft tissue abnormality. The ankle joint is unremarkable as visualized.      Acute osseous paranasal sinus disease as described above. XR CHEST PORTABLE    Result Date: 6/14/2022  EXAMINATION: ONE XRAY VIEW OF THE CHEST 6/14/2022 2:53 pm COMPARISON: Chest x-ray dated 13 December 2021 HISTORY: ORDERING SYSTEM PROVIDED HISTORY: ams TECHNOLOGIST PROVIDED HISTORY: Reason for exam:->ams Reason for Exam: Loss of Consciousness (Pt brought from home after being found for an unknown down time. EMS states that pts temp was 101.2 and that there was vomit. Pt is supposed to get dialysis 1-2 times per week.) FINDINGS: Mild cardiomegaly. There is a small left-sided pleural effusion with associated left basilar airspace opacities. The right lung is clear. No pneumothorax. 1.  Small left pleural effusion with associated left basilar airspace disease representing either atelectasis or pneumonia. 2.  Mild cardiomegaly. ED COURSE/MDM  Patient seen and evaluated. Old records reviewed. Labs and imaging reviewed and results discussed with patient. Patient presenting after episode of unresponsiveness and is now awake and alert with no residual neurologic deficits. No evidence of any injuries from this episode or fall. CT scan of the head without any intracranial findings. She is noted to be septic though. Possible source include pneumonia which may be secondary to aspiration, as well as left lower extremity cellulitis. Her renal dysfunction is secondary to her end-stage renal disease which is currently on dialysis, but also she has noted new hypoxia and is currently on a nasal cannula to maintain her oxygen saturations above 92%. Will administer broad-spectrum antibiotics. I did not administer a full 30 mL/kg bolus secondary to her renal dysfunction and did give her 1 L of IV fluids and she does not currently have any hypotension. We will continue to closely monitor. Her temperature has normalized at this point.     I spoke with [place] with the hospitalist team. We thoroughly discussed the history, physical exam, laboratory and imaging studies, as well as, emergency department course. Based upon that discussion, we've decided to admit Tim Gatica for further observation and evaluation of Raoul Garcia's mental status change. As I have deemed necessary from their history, physical and studies, I have considered and evaluated Tim Gatica for the following diagnoses:  DIABETES, INTRACRANIAL HEMORRHAGE, MENINGITIS, SEPSIS SUBARACHNOID HEMORRHAGE, SUBDURAL HEMATOMA, & STROKE. Is this patient to be included in the SEP-1 Core Measure? Yes   SEP-1 CORE MEASURE DATA      Sepsis Criteria   Severe Sepsis Criteria   Septic Shock Criteria     Must be confirmed or suspected to move forward with diagnosis of sepsis. Must meet 2:    [x] Temperature > 100.9 F (38.3 C)        or < 96.8 F (36 C)  [x] HR > 90  [x] RR > 20  [] WBC > 12 or < 4 or 10% bands      AND:      [] Infection Confirmed or        Suspected. Must meet 1:    [] Lactate > 2       or   [x] Signs of Organ Dysfunction:    - SBP < 90 or MAP < 65  - Altered mental status  - Creatinine > 2 or increased from      baseline  - Urine Output < 0.5 ml/kg/hr  - Bilirubin > 2  - INR > 1.5 (not anticoagulated)  - Platelets < 011,358  - Acute Respiratory Failure as     evidenced by new need for NIPPV     or mechanical ventilation      [] No criteria met for Severe Sepsis. Must meet 1:    [] Lactate > 4        or   [] SBP < 90 or MAP < 65 for at        least two readings in the first        hour after fluid bolus        administration      [] Vasopressors initiated (if hypotension persists after fluid resuscitation)        [x] No criteria met for Septic Shock.    Patient Vitals for the past 6 hrs:   BP Temp Pulse Resp SpO2 Weight Weight Method Percent Weight Change   06/14/22 1409 (!) 152/57 (!) 102.8 °F (39.3 °C) (!) 106 27 (!) 76 % -- -- --   06/14/22 1531 (!) 130/59 97.9 °F (36.6 °C) 95 18 99 % 174 lb 2.6 oz (79 kg) Stated 0   06/14/22 1616 (!) 130/55 -- 88 15 97 % -- -- --      Recent Labs     06/14/22  1305   WBC 11.3*   CREATININE 5.8*   BILITOT 0.4            Time Severe Sepsis Identified: 1516    Fluid Resuscitation Rational: less than 30mL/kg because of a history of ESRD or stage IV/V CKD. Instead, 1L IVF was ordered. More fluid initially would be potentially detrimental to the patient    Repeat lactate level: not indicated due to initial lactate < 2    Reassessment Exam:   Not applicable. Patient does not have septic shock. During the patient's ED course, the patient was given:  Medications   vancomycin 1500 mg in dextrose 5% 300 mL IVPB (has no administration in time range)   0.9 % sodium chloride bolus (1,000 mLs IntraVENous New Bag 6/14/22 1625)   acetaminophen (TYLENOL) tablet 1,000 mg (1,000 mg Oral Given 6/14/22 1512)   cefepime (MAXIPIME) 2000 mg IVPB minibag in NS (2,000 mg IntraVENous New Bag 6/14/22 1539)        CLINICAL IMPRESSION  1. Episode of unresponsiveness    2. Hyperthermia associated with heat    3. ESRD on dialysis ([facility]    4. Left leg cellulitis    5. Pneumonia of left lower lobe due to infectious organism    6. Acute respiratory failure with hypoxia (HCC)    7. Sepsis with acute hypoxic respiratory failure without septic shock, due to unspecified organism (HCC)        Blood pressure (!) 130/55, pulse 88, temperature 97.9 °F (36.6 °C), temperature source Oral, resp. rate 15, weight 174 lb 2.6 oz (79 kg), SpO2 97 %, not currently breastfeeding. Michelle Bradford was admitted in stable condition. DISCLAIMER: This chart was created using Dragon dictation software. Efforts were made by me to ensure accuracy, however some errors may be present due to limitations of this technology and occasionally words are not transcribed correctly.         Flor Burk MD  06/14/22 0523

## 2022-06-14 NOTE — PLAN OF CARE
66 y/o female with pmhx of ESRD on HD, DM, HTN, HLD   Presented after found outside on her porch unresponsive   Had episode of vomiting   Pt had HD today  Also with lower leg cellulitis     Alert and oriented in ED  Hypoxic on 4 L O2  Febrile     Cr at baseline   Lactic acid WNLs   procalcitonin 11.26  Trop 0.11 --> chronic elevation   Mild leukocytosis   CT head negative   CXR with PNA and pleural effusion     Possible aspiration pneumonia due to unresponsive event and episode of vomiting       On IV vanc and cefepime for empiric aspiration coverage, sepsis, and cellulitis   Blood cultures, sputum, and wound cultures pending  Given IVF bolus in ED  Nephrology consulted     Home meds not reconciled, did not reorder, nursing staff to reconcile    Admit to PCU on telemetry     Denise Shin, [address]  06/14/22  4:24 PM

## 2022-06-15 LAB
ANION GAP SERPL CALCULATED.3IONS-SCNC: 15 MMOL/L (ref 3–16)
BASOPHILS ABSOLUTE: 0.1 K/UL (ref 0–0.2)
BASOPHILS RELATIVE PERCENT: 0.7 %
BUN BLDV-MCNC: 55 MG/DL (ref 7–20)
CALCIUM SERPL-MCNC: 8.8 MG/DL (ref 8.3–10.6)
CHLORIDE BLD-SCNC: 102 MMOL/L (ref 99–110)
CO2: 22 MMOL/L (ref 21–32)
CREAT SERPL-MCNC: 6.9 MG/DL (ref 0.6–1.2)
EOSINOPHILS ABSOLUTE: 0.3 K/UL (ref 0–0.6)
EOSINOPHILS RELATIVE PERCENT: 2.9 %
GFR AFRICAN AMERICAN: 7
GFR NON-AFRICAN AMERICAN: 6
GLUCOSE BLD-MCNC: 129 MG/DL (ref 70–99)
GLUCOSE BLD-MCNC: 129 MG/DL (ref 70–99)
GLUCOSE BLD-MCNC: 130 MG/DL (ref 70–99)
GLUCOSE BLD-MCNC: 190 MG/DL (ref 70–99)
GLUCOSE BLD-MCNC: 211 MG/DL (ref 70–99)
HCT VFR BLD CALC: 31.2 % (ref 36–48)
HEMOGLOBIN: 10 G/DL (ref 12–16)
LYMPHOCYTES ABSOLUTE: 1.2 K/UL (ref 1–5.1)
LYMPHOCYTES RELATIVE PERCENT: 13.2 %
MCH RBC QN AUTO: 25.8 PG (ref 26–34)
MCHC RBC AUTO-ENTMCNC: 32.1 G/DL (ref 31–36)
MCV RBC AUTO: 80.5 FL (ref 80–100)
MONOCYTES ABSOLUTE: 0.8 K/UL (ref 0–1.3)
MONOCYTES RELATIVE PERCENT: 8.3 %
NEUTROPHILS ABSOLUTE: 6.8 K/UL (ref 1.7–7.7)
NEUTROPHILS RELATIVE PERCENT: 74.9 %
PDW BLD-RTO: 21.1 % (ref 12.4–15.4)
PERFORMED ON: ABNORMAL
PLATELET # BLD: 176 K/UL (ref 135–450)
PMV BLD AUTO: 7.8 FL (ref 5–10.5)
POTASSIUM REFLEX MAGNESIUM: 4.7 MMOL/L (ref 3.5–5.1)
RBC # BLD: 3.87 M/UL (ref 4–5.2)
SODIUM BLD-SCNC: 139 MMOL/L (ref 136–145)
TROPONIN: 0.11 NG/ML
VANCOMYCIN RANDOM: 22.6 UG/ML
WBC # BLD: 9 K/UL (ref 4–11)

## 2022-06-15 PROCEDURE — 6370000000 HC RX 637 (ALT 250 FOR IP)

## 2022-06-15 PROCEDURE — 6360000002 HC RX W HCPCS

## 2022-06-15 PROCEDURE — 99232 SBSQ HOSP IP/OBS MODERATE 35: CPT

## 2022-06-15 PROCEDURE — 87641 MR-STAPH DNA AMP PROBE: CPT

## 2022-06-15 PROCEDURE — 5A1D70Z PERFORMANCE OF URINARY FILTRATION, INTERMITTENT, LESS THAN 6 HOURS PER DAY: ICD-10-PCS | Performed by: INTERNAL MEDICINE

## 2022-06-15 PROCEDURE — 97530 THERAPEUTIC ACTIVITIES: CPT

## 2022-06-15 PROCEDURE — 83036 HEMOGLOBIN GLYCOSYLATED A1C: CPT

## 2022-06-15 PROCEDURE — 2580000003 HC RX 258

## 2022-06-15 PROCEDURE — 36415 COLL VENOUS BLD VENIPUNCTURE: CPT

## 2022-06-15 PROCEDURE — 84484 ASSAY OF TROPONIN QUANT: CPT

## 2022-06-15 PROCEDURE — 87070 CULTURE OTHR SPECIMN AEROBIC: CPT

## 2022-06-15 PROCEDURE — 80202 ASSAY OF VANCOMYCIN: CPT

## 2022-06-15 PROCEDURE — 94761 N-INVAS EAR/PLS OXIMETRY MLT: CPT

## 2022-06-15 PROCEDURE — 97166 OT EVAL MOD COMPLEX 45 MIN: CPT

## 2022-06-15 PROCEDURE — 2700000000 HC OXYGEN THERAPY PER DAY

## 2022-06-15 PROCEDURE — 97162 PT EVAL MOD COMPLEX 30 MIN: CPT

## 2022-06-15 PROCEDURE — 6370000000 HC RX 637 (ALT 250 FOR IP): Performed by: INTERNAL MEDICINE

## 2022-06-15 PROCEDURE — 85025 COMPLETE CBC W/AUTO DIFF WBC: CPT

## 2022-06-15 PROCEDURE — 80048 BASIC METABOLIC PNL TOTAL CA: CPT

## 2022-06-15 PROCEDURE — 2060000000 HC ICU INTERMEDIATE R&B

## 2022-06-15 RX ORDER — INSULIN LISPRO 100 [IU]/ML
0-6 INJECTION, SOLUTION INTRAVENOUS; SUBCUTANEOUS NIGHTLY
Status: DISCONTINUED | OUTPATIENT
Start: 2022-06-15 | End: 2022-06-22 | Stop reason: HOSPADM

## 2022-06-15 RX ORDER — INSULIN LISPRO 100 [IU]/ML
0-12 INJECTION, SOLUTION INTRAVENOUS; SUBCUTANEOUS
Status: DISCONTINUED | OUTPATIENT
Start: 2022-06-15 | End: 2022-06-22 | Stop reason: HOSPADM

## 2022-06-15 RX ADMIN — SODIUM CHLORIDE, PRESERVATIVE FREE 10 ML: 5 INJECTION INTRAVENOUS at 22:09

## 2022-06-15 RX ADMIN — HEPARIN SODIUM 5000 UNITS: 5000 INJECTION INTRAVENOUS; SUBCUTANEOUS at 22:08

## 2022-06-15 RX ADMIN — KETOROLAC TROMETHAMINE 1 DROP: 5 SOLUTION OPHTHALMIC at 09:25

## 2022-06-15 RX ADMIN — GABAPENTIN 300 MG: 300 CAPSULE ORAL at 22:07

## 2022-06-15 RX ADMIN — INSULIN GLARGINE 10 UNITS: 100 INJECTION, SOLUTION SUBCUTANEOUS at 09:20

## 2022-06-15 RX ADMIN — PREDNISOLONE ACETATE 1 DROP: 10 SUSPENSION/ DROPS OPHTHALMIC at 17:11

## 2022-06-15 RX ADMIN — HEPARIN SODIUM 5000 UNITS: 5000 INJECTION INTRAVENOUS; SUBCUTANEOUS at 06:19

## 2022-06-15 RX ADMIN — TIMOLOL MALEATE 1 DROP: 5 SOLUTION/ DROPS OPHTHALMIC at 21:53

## 2022-06-15 RX ADMIN — HEPARIN SODIUM 5000 UNITS: 5000 INJECTION INTRAVENOUS; SUBCUTANEOUS at 14:42

## 2022-06-15 RX ADMIN — ACETAMINOPHEN 650 MG: 325 TABLET ORAL at 22:07

## 2022-06-15 RX ADMIN — ACETAMINOPHEN 650 MG: 325 TABLET ORAL at 00:12

## 2022-06-15 RX ADMIN — CEFEPIME HYDROCHLORIDE 1000 MG: 1 INJECTION, POWDER, FOR SOLUTION INTRAMUSCULAR; INTRAVENOUS at 22:06

## 2022-06-15 RX ADMIN — PREDNISOLONE ACETATE 1 DROP: 10 SUSPENSION/ DROPS OPHTHALMIC at 09:19

## 2022-06-15 RX ADMIN — LEVOTHYROXINE SODIUM 50 MCG: 50 TABLET ORAL at 06:19

## 2022-06-15 RX ADMIN — KETOROLAC TROMETHAMINE 1 DROP: 5 SOLUTION OPHTHALMIC at 21:54

## 2022-06-15 RX ADMIN — SODIUM CHLORIDE, PRESERVATIVE FREE 10 ML: 5 INJECTION INTRAVENOUS at 09:26

## 2022-06-15 RX ADMIN — PREDNISOLONE ACETATE 1 DROP: 10 SUSPENSION/ DROPS OPHTHALMIC at 13:37

## 2022-06-15 RX ADMIN — KETOROLAC TROMETHAMINE 1 DROP: 5 SOLUTION OPHTHALMIC at 17:28

## 2022-06-15 RX ADMIN — KETOROLAC TROMETHAMINE 1 DROP: 5 SOLUTION OPHTHALMIC at 13:58

## 2022-06-15 RX ADMIN — PREDNISOLONE ACETATE 1 DROP: 10 SUSPENSION/ DROPS OPHTHALMIC at 21:53

## 2022-06-15 RX ADMIN — INSULIN LISPRO 4 UNITS: 100 INJECTION, SOLUTION INTRAVENOUS; SUBCUTANEOUS at 12:06

## 2022-06-15 RX ADMIN — TIMOLOL MALEATE 1 DROP: 5 SOLUTION/ DROPS OPHTHALMIC at 09:32

## 2022-06-15 RX ADMIN — ATORVASTATIN CALCIUM 10 MG: 10 TABLET, FILM COATED ORAL at 22:07

## 2022-06-15 ASSESSMENT — PAIN DESCRIPTION - LOCATION: LOCATION: LEG

## 2022-06-15 NOTE — H&P
Hospital Medicine History & Physical      PCP: Es De La Cruz, APRN - CNP    Date of Admission: 6/14/2022    Date of Service: Pt seen/examined on 6/14/2022    Pt seen/examined face to face on and admitted as inpatient with expected LOS greater than two midnights due to medical therapy        Chief Complaint:    Chief Complaint   Patient presents with    Loss of Consciousness     Pt brought from home after being found for an unknown down time. EMS states that pts temp was 101.2 and that there was vomit. Pt is supposed to get dialysis 1-2 times per week. History Of Present Illness:      67 y.o. female who presented to [facility] with past medical history of type 2 diabetes, hypertension, hyperlipidemia, sepsis, hypothyroidism, end-stage renal disease Monday Wednesday Friday presented to the ED due to patient being encephalopathic loss of conscious. Patient was found in the back porch unresponsive. In the scene patient was noted to be vomited EMS was called and was noted to have a fever 101.2. Patient went to dialysis this morning was dialyzed her usual dose and then went home. Patient does not member what occurred after dialysis. Patient denies having abdominal pain chest pain cough phlegm production back pain. On my evaluation patient mentation resolved. Patient reports that she had dialysis yesterday went well reported that she might have gotten fluid more than usual.  Patient reports she went home and then the next morning patient went to the porch and fell asleep under the sun. Patient then was found by the neighbor unconscious and EMS was called. Patient noted to have fever. Patient reports that she felt very hot and improved significantly after giving fluids. Patient otherwise denied having any fever any chills cough phlegm production chest pain abdominal pain or dysuria. Patient reports she produces minimal urine otherwise.   Patient reports that she did not regain consciousness until 2 PM today    Past Medical History:          Diagnosis Date    Arthritis     Chronic kidney disease     [facility]     Dialysis patient [facility])     M W F    ESRD (end stage renal disease) ([facility]     Hemodialysis patient ([facility]     M-W-F    Hyperkalemia 07/13/2016    Hyperlipidemia     Hypertension     OA (osteoarthritis)     Retinopathy     Sepsis ([facility]     Thyroid disease     Wears dentures        Past Surgical History:          Procedure Laterality Date    DIALYSIS FISTULA CREATION Left 08/10/2016    Dr. Anahy Ascencio - upper arm, basilic vein transposition    EYE SURGERY Left 04/03/2018    catarct removal with lens implant     OTHER SURGICAL HISTORY Right 01/11/2019    partial foot amputation    TOE AMPUTATION Right 1/11/2019    PARTIAL FOOT AMPUTATION WITH GRAFT APPLICATION performed by Karen Whalen DPM at [place] [id number] Left 12/8/2021    LEFT TOTAL KNEE ARTHROPLASTY performed by Robb Kimble MD at [place] [id number]       Medications Prior to Admission:      Prior to Admission medications    Medication Sig Start Date End Date Taking? Authorizing Provider   carvedilol (COREG) 12.5 MG tablet Take 12.5 mg by mouth 2 times daily (with meals)    Historical Provider, MD   Ferric Citrate (AURYXIA) 1  MG(Fe) TABS Take 3 tablets by mouth 3 times daily (before meals)    Historical Provider, MD   Lactobacillus-Inulin (CULTURELLE ADULT ULT BALANCE PO) Take 1 tablet by mouth daily as needed    Historical Provider, MD   levothyroxine (SYNTHROID) 50 MCG tablet Take 50 mcg by mouth Daily    Historical Provider, MD   losartan (COZAAR) 100 MG tablet Take 100 mg by mouth nightly     Historical Provider, MD   insulin glargine (LANTUS) 100 UNIT/ML injection vial Inject 8 Units into the skin every morning 20 units if bs 150 or above, 15 units if below bs is below.   Patient taking differently: Inject 15 Units into the skin every morning 18 units if bs 150 or above, 15 units if below bs is below. 8/16/16   Maria L Figueroa MD   aspirin 81 MG EC tablet Take 1 tablet by mouth daily Take with food 1/11/16   Al Lloyd MD   gabapentin (NEURONTIN) 300 MG capsule Take 300 mg by mouth every evening Take 1-2 tablets every evening before bed. Historical Provider, MD   atorvastatin (LIPITOR) 10 MG tablet Take 10 mg by mouth nightly     Historical Provider, MD   sertraline (ZOLOFT) 25 MG tablet Take 25 mg by mouth daily    Historical Provider, MD       Allergies:  Patient has no known allergies. Social History:          TOBACCO:   reports that she quit smoking about 7 years ago. She has never used smokeless tobacco.  ETOH:   reports no history of alcohol use. E-Cigarettes/Vaping Use     Questions Responses    E-Cigarette/Vaping Use Never User    Start Date     Passive Exposure     Quit Date     Counseling Given     Comments             Family History:      Reviewed in detail, and positive for heart disease    REVIEW OF SYSTEMS:     Constitutional:  No Fever, No Chills, No Night Sweats  ENT/Mouth:  No Nasal Congestion,  No Hoarseness, No new mouth lesion  Eyes:  No Eye Pain, No Redness, No Discharge  Cardiovascular:  No Chest Pain, No Orthopnea, No Palpitations  Respiratory:  No Cough, No Sputum, No Dyspnea  Gastrointestinal: No Vomiting, No Diarrhea, No abdominal pain  Genitourinary: No Urinary Frequency, No Hematuria, No Urinary pain  Musculoskeletal:  No worsening Arthralgias, No worsening Myalgias  Skin:  No new Skin Lesions, No new skin rash  Neuro:  No new weakness, No new numbness. Psych:  No suicial ideation, No Violence ideation    PHYSICAL EXAM PERFORMED:    BP (!) 154/63   Pulse 86   Temp 97.4 °F (36.3 °C) (Axillary)   Resp 18   Wt 174 lb 2.6 oz (79 kg)   SpO2 97%   BMI 27.28 kg/m²     General appearance:  mild acute distress, appears older than stated age  HEENT:   atraumatic, sclera anicteric, Conjunctivae clear.   Neck: Supple,Trachea midline, no goiter  Respiratory:minimal accessory muscle usage, Normal respiratory effort. Minimal basilar crackles otherwise CTA  cardiovascular: Sinus tachycardia, capillary refill 2 seconds  Abdomen: Soft, non-tender, non-distended with normal bowel sounds. Musculoskeletal:  No clubbing, cyanosis. trace edema LE bilaterally. Skin: turgor normal.  No new rashes or lesions. Left lower leg induration and erythema without necrosis or cyanosis. Bruises noted. Neurologic: Alert and oriented x4, no new focal sensory/motor deficits. Labs:     Recent Labs     06/14/22  1305   WBC 11.3*   HGB 11.2*   HCT 36.1        Recent Labs     06/14/22  1305      K 5.2*      CO2 24   BUN 44*   CREATININE 5.8*   CALCIUM 9.5     Recent Labs     06/14/22  1305   AST 20   ALT 8*   BILITOT 0.4   ALKPHOS 105     No results for input(s): INR in the last 72 hours. Recent Labs     06/14/22  1305   CKTOTAL 110   TROPONINI 0.11*       Urinalysis:      Lab Results   Component Value Date    NITRU Negative 09/14/2021    WBCUA >100 09/14/2021    BACTERIA 3+ 09/14/2021    RBCUA 11-20 09/14/2021    BLOODU MODERATE 09/14/2021    SPECGRAV 1.015 09/14/2021    GLUCOSEU Negative 09/14/2021       Radiology:     CXR: I have reviewed the CXR with the following interpretation:   Left pleural effusion  EKG:  I have reviewed the EKG with the following interpretation:   Sinus tachycardia  CT Head WO Contrast   Preliminary Result   1. No evidence of acute intracranial abnormality. 2. Mild paranasal sinus disease as described above. XR CHEST PORTABLE   Final Result   1. Small left pleural effusion with associated left basilar airspace disease   representing either atelectasis or pneumonia. 2.  Mild cardiomegaly. XR TIBIA FIBULA LEFT (2 VIEWS)   Final Result   Acute osseous abnormality of the left tib-fib.              ASSESSMENT AND PLAN:    Active Hospital Problems    Diagnosis Date Noted    Sepsis ([facility] [A41.9] 06/14/2022     Priority: Medium     Acute encephalopathy suspected secondary to nonexertional heat stroke:  Patient slept outside under the sun overheated by time patient brought in by EMS fever dropped down to 101.4. CT head negative  Labs reviewed  Resolved on my examination    Suspected sepsis in the ED: Fever, tachycardia  Blood cultures obtained x2  Normal lactic  Chest x-ray showing pleural effusion left side, UA patient is oliguric and end-stage renal disease, COVID-19 flu negative  Currently IV Vanco and cefepime, continued for now    Suspected left pneumonia: Patient denied having cough nonproduction  Antibiotic as above    Chronic medical conditions:  Essential Hypertension: Continue home medication  Hyperlipidemia: Controlled on home Statin.  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

## 2022-06-15 NOTE — CONSULTS
Thank you to requesting provider: BRIGETTE Valencia   , for asking us to see Nithya Cortés  Reason for consultation:  ESRD  Chief Complaint:  Syncope     History of Presenting Illness      66 y/o female with history of ESRD admitted with syncope and found down at home. She has been on dialysis for 5 years. She has a left upper arm transposed basilic fistula and it works well for dialysis, on schedule for MWF. She says that she was out in the sun and fell asleep or passed out and then found by neighbor. She has come around now and his complete alert and feeling well.        Past Medical/Surgical History      Active Ambulatory Problems     Diagnosis Date Noted    Cellulitis 01/06/2016    MEY (acute kidney injury) ([address] 01/06/2016    Type 2 diabetes mellitus with complication, without long-term current use of insulin ([address] 01/06/2016    Secondary hypertension 01/07/2016    Hyperlipidemia 01/07/2016    Hypoglycemia 01/07/2016    Anxiety about health 01/07/2016    Nephrotic syndrome 01/08/2016    Essential hypertension     Hyperkalemia 07/13/2016    Diabetic ulcer of left foot associated with type 2 diabetes mellitus ([address] 07/18/2016    CKD (chronic kidney disease), stage IV ([address] 91/17/1727    Diastolic dysfunction with acute on chronic heart failure ([address] 08/07/2016    Acute on chronic diastolic congestive heart failure ([address]     ESRD on dialysis ([address]     Hypoxia     Hypervolemia     Anemia due to chronic kidney disease 04/08/2016    Chronic kidney disease (CKD), stage V ([address] 08/30/2016    Toe osteomyelitis, right ([address] 01/10/2019    Cellulitis of right foot 01/10/2019    Cellulitis of right leg     Primary osteoarthritis of left knee 05/25/2021    Acute pain of left knee 05/25/2021    Lung mass 12/14/2021    Lung nodule 12/14/2021    Granulomatous lung disease ([address] 12/14/2021    Former smoker 12/14/2021    Intrahepatic bile duct dilation 12/14/2021    Hyponatremia 2021    DM (diabetes mellitus), secondary uncontrolled ([facility] 2021    Elevated parathyroid hormone 2021    Status post total left knee replacement 2021    Pulmonary HTN ([facility] 2021    Postoperative anemia due to acute blood loss 2021     Resolved Ambulatory Problems     Diagnosis Date Noted    Acute systolic CHF (congestive heart failure) ([facility] 2016    Elevated troponin 2016    Sepsis due to methicillin susceptible Staphylococcus aureus ([facility] 2016     Past Medical History:   Diagnosis Date    Arthritis     Chronic kidney disease     Diabetes mellitus ([facility]     Dialysis patient ([facility]     ESRD (end stage renal disease) ([facility]     Hemodialysis patient ([facility]     Hypertension     OA (osteoarthritis)     Retinopathy     Sepsis ([facility]     Thyroid disease     Wears dentures          Review of Systems     Constitutional:  No weight loss, no fever/chills  Eyes:  No eye pain, no eye redness  Cardiovascular:  No chest pain, no worsening of edema  Respiratory:  No hemoptysis, no stridor  Gastrointestinal:  No blood in stool, no n/v, no diarrhea  Genitoruinary:  No hematuria, no difficulty with urination  Musculoskeletal:  No joint swelling, no redness  Integumentary:  No Rash, no itching  Neurological:  No focal weakness, No new sensory deficit  Psychiatric:  No depression, no confusion  Endocrine:  No polyuria, no polydipsia       Medications      Reviewed in EMR     Allergies     Patient has no known allergies.       Family History       Negative for Kidney Disease    Social History      Social History     Socioeconomic History    Marital status:      Spouse name: None    Number of children: None    Years of education: None    Highest education level: None   Occupational History    None   Tobacco Use    Smoking status: Former Smoker     Quit date: 2014     Years since quittin.9    Smokeless tobacco: Never Used   Vaping Use    Vaping Use: Never used   Substance and Sexual Activity    Alcohol use: No    Drug use: No    Sexual activity: Never   Other Topics Concern    None   Social History Narrative    None     Social Determinants of Health     Financial Resource Strain:     Difficulty of Paying Living Expenses: Not on file   Food Insecurity:     Worried About Running Out of Food in the Last Year: Not on file    Felicia of Food in the Last Year: Not on file   Transportation Needs:     Lack of Transportation (Medical): Not on file    Lack of Transportation (Non-Medical): Not on file   Physical Activity:     Days of Exercise per Week: Not on file    Minutes of Exercise per Session: Not on file   Stress:     Feeling of Stress : Not on file   Social Connections:     Frequency of Communication with Friends and Family: Not on file    Frequency of Social Gatherings with Friends and Family: Not on file    Attends Nondenominational Services: Not on file    Active Member of [address] [place] [address] or Organizations: Not on file    Attends Club or Organization Meetings: Not on file    Marital Status: Not on file   Intimate Partner Violence:     Fear of Current or Ex-Partner: Not on file    Emotionally Abused: Not on file    Physically Abused: Not on file    Sexually Abused: Not on file   Housing Stability:     Unable to Pay for Housing in the Last Year: Not on file    Number of [place] in the Last Year: Not on file    Unstable Housing in the Last Year: Not on file       Physical Exam     Blood pressure (!) 153/73, pulse 81, temperature 98.9 °F (37.2 °C), temperature source Oral, resp. rate 18, height 5' 7\" (1.702 m), weight 171 lb 6.4 oz (77.7 kg), SpO2 95 %, not currently breastfeeding.     General:  NAD, A+Ox3  HEENT:  PERRL, EOMI  Neck:  Supple, normal range of movement  Chest:  CTAB, good respiratory effort, good air movement  CV:  Regular, no rub   Abdomen:  NTND, soft, +BS, no hepatosplenomegaly  Extremities:  No peripheral edema  Neurological: Moving all four extremities, CN II-XII grossly intact  Lymphatics:  No palpable lymph nodes  Skin:  No rash, no jaundice  Psychiatric:  Normal insight and judgement, good recall  Access:  Left upper arm fistula with good thrill     Data     Recent Labs     06/14/22  1305 06/15/22  0513   WBC 11.3* 9.0   HGB 11.2* 10.0*   HCT 36.1 31.2*   MCV 79.8* 80.5    176     Recent Labs     06/14/22  1305 06/15/22  0513    139   K 5.2* 4.7    102   CO2 24 22   GLUCOSE 210* 129*   BUN 44* 55*   CREATININE 5.8* 6.9*   LABGLOM 7* 6*   GFRAA 9* 7*       Assessment:    ESRD:  On HD MWF with working AVF    Anemia of Chronic Disease-CKD:  Hb at target    Encephalopathy:  Possible heat stroke, fell asleep out on her deck. Possible infectious and cultures are pending     Fever:   On antibiotics, cultures pending     Plan:    HD today  UF to target weight as tolerated  Follow labs  Follow up cultures   Antibiotics     Thank you for asking us to participate in the management of your patient, please do not hesitate to contact me for any concerns regarding my recommendations as outlined above.    -----------------------------  Xiao Azul M.D.   [facility]

## 2022-06-15 NOTE — PROGRESS NOTES
Morning meds given, pt A&O x 4. Pt had BM x 2 assist to [facility]. Pt denies any further needs.

## 2022-06-15 NOTE — PROGRESS NOTES
Progress Note    Admit Date:  6/14/2022    67 y.o. female who presented to [facility] with past medical history of type 2 diabetes, hypertension, hyperlipidemia, sepsis, hypothyroidism, end-stage renal disease Monday Wednesday Friday presented to the ED due to patient being encephalopathic loss of conscious. Subjective:  Ms. Rafia Mora is doing well today. Completely alert and oriented. States she feels like she was overdiuresd Monday. Emesis x2-3 episodes Monday night. Went to lay out on her deck Tuesday morning to \"beat the sun\" and was found by neighbor unresponsive lying on her deck in the sun. Unknown amount of time. Presently she denies sxs and feels much improved. Objective:   No data found. Intake/Output Summary (Last 24 hours) at 6/15/2022 0757  Last data filed at 6/14/2022 1733  Gross per 24 hour   Intake 1050 ml   Output --   Net 1050 ml       Physical Exam:    Gen: No distress. Alert. Eyes: PERRL. No sclera icterus. No conjunctival injection. ENT: No discharge. Pharynx clear. Neck: No JVD. Trachea midline. Resp: No accessory muscle use. + Right basilar crackles +Left sided crackles appreciated diffusely. No wheezes. No rhonchi. CV: Regular rate. Regular rhythm. No murmur. No rub. No edema. Capillary Refill: Brisk,< 3 seconds   Peripheral Pulses: +2 palpable, equal bilaterally   GI: Non-tender. Non-distended. Normal bowel sounds. Skin: Warm and dry. No nodule on exposed extremities. Multiple large loose fluid filled blisters with surrounding erythema on entire medial aspect of left leg. Surrounding blanchable erythema which is mild. Patient is quite tan which may have reduced severity. M/S: No cyanosis. No joint deformity. No clubbing. Neuro: Awake. Grossly nonfocal    Psych: Oriented x 3. No anxiety or agitation.       Scheduled Meds:   sodium chloride flush  5-40 mL IntraVENous 2 times per day    heparin (porcine)  5,000 Units SubCUTAneous 3 times per day    vancomycin (VANCOCIN) intermittent dosing (placeholder)   Other RX Placeholder    cefepime  1,000 mg IntraVENous Q24H    atorvastatin  10 mg Oral Nightly    aspirin  81 mg Oral Daily    carvedilol  25 mg Oral BID WC    ferric citrate  630 mg Oral TID AC    gabapentin  300 mg Oral QPM    insulin glargine  10 Units SubCUTAneous QAM    levothyroxine  50 mcg Oral Daily    NIFEdipine  60 mg Oral Daily    prednisoLONE acetate  1 drop Left Eye 4x Daily    sertraline  25 mg Oral Daily    timolol  1 drop Both Eyes BID    ketorolac  1 drop Left Eye 4x Daily    insulin lispro  0-12 Units SubCUTAneous Q4H       Continuous Infusions:   sodium chloride      dextrose         PRN Meds:  sodium chloride flush, sodium chloride, magnesium sulfate, ondansetron **OR** ondansetron, polyethylene glycol, acetaminophen **OR** acetaminophen, glucose, dextrose bolus **OR** dextrose bolus, glucagon (rDNA), dextrose      Data:  CBC:   Recent Labs     06/14/22  1305 06/15/22  0513   WBC 11.3* 9.0   HGB 11.2* 10.0*   HCT 36.1 31.2*   MCV 79.8* 80.5    176     BMP:   Recent Labs     06/14/22  1305 06/15/22  0513    139   K 5.2* 4.7    102   CO2 24 22   BUN 44* 55*   CREATININE 5.8* 6.9*     LIVER PROFILE:   Recent Labs     06/14/22  1305   AST 20   ALT 8*   BILITOT 0.4   ALKPHOS 105       CULTURES  Results for Luli Mosquera (MRN [id number]) as of 6/15/2022 07:55   Ref. Range 6/14/2022 15:42   INFLUENZA A Latest Ref Range: NOT DETECTED  NOT DETECTED   INFLUENZA B Latest Ref Range: NOT DETECTED  NOT DETECTED   SARS-CoV-2 RNA, RT PCR Latest Ref Range: NOT DETECTED  NOT DETECTED        RADIOLOGY  CT Head WO Contrast   Preliminary Result   1. No evidence of acute intracranial abnormality. 2. Mild paranasal sinus disease as described above. XR CHEST PORTABLE   Final Result   1. Small left pleural effusion with associated left basilar airspace disease   representing either atelectasis or pneumonia. 2.  Mild cardiomegaly. XR TIBIA FIBULA LEFT (2 VIEWS)   Final Result   Acute osseous abnormality of the left tib-fib. Assessment/Plan:  #Acute encephalopathy  -suspected secondary to nonexertional heat stroke:  -Patient slept outside under the sun overheated by time patient brought in by EMS reported 101.4 and 102.8 on arrival  -Temp 97.5 deg F today  -CT head negative  -Resolved A&O x 3 today      #Sepsis criteria met POA  -Temp 102.8, RR 27, , WBC 11.3, pulm source  -Blood cultures obtained x2  -Normal lactic  -BP not requiring pressors  -Abx below     #Possible pneumonia; left sided  -? aspiration pna; several episodes of emesis Monday night and found by EMS with emesis as well  -Patient denied sxs of fevers, chills, cough, SOB or pleuritic CP prior to emesis and denies these now as well.  -She has been hypoxic, low 90s on room air now  -Left sided rales noted on exam  -Chest x-ray above   -Leukocytosis resolved  -PCT 11.26  -Strep and legionella ordered; patient is anuric and will not likely be able to provide sample  -Check respiratory culture  -Check MRSA nasal swab  -Currently IV Vanco and cefepime; deescalate coverage pending resp cx and MRSA swab    #Hypoxia  -No home O2  -Denies SOB  -Requiring 2-4L since arrival  -91% on room air during my interview  -Wean as tolerated    #Sunburn; 1st degree  -Found lying on deck in the sun for unknown amount of time  -Multiple large loose fluid filled blisters with surrounding erythema on entire medial aspect of left leg  -Surrounding blanchable erythema which is mild. Patient is quite tan which may have reduced severity.    -supportive care, monitor for s/s of infection; none at this time    #ESRD  -HD MWF  -Received HD as planned Monday  -Nephrology consulted  --HD planned for today    #Elevated troponin  -Chronic appears at baseline  -Likely 2/2 ESRD  -No CP     #DMII nephropathy and neuropathy  -Lantus 10 U every morining  -Medium dose SSI  -Continue gabapentin    #Essential Hypertension  -BP elevated  -Continue procardia and coreg    #HLD  -Continue Statin    #Hypothyroidism   -Continue home synthroid    DVT Prophylaxis: Heparin  Diet: ADULT DIET; Regular; 3 carb choices (45 gm/meal); Low Fat/Low Chol/High Fiber/2 gm Na; Low Sodium (2 gm); Low Potassium (Less than 3000 mg/day);  Low Phosphorus (Less than 1000 mg); 1500 ml  Code Status: Full Code    Giovanna Chua PA-C  6/15/2022 9:12 AM

## 2022-06-15 NOTE — PROGRESS NOTES
Requested by patient, Call placed to patients outpatient service regarding HD appointments MWF @ 0900, call placed to [phone], spoke with Aretha Salazar of [facility]. Made aware patient will not be at HD today or until further notice.

## 2022-06-15 NOTE — PROGRESS NOTES
Vitals:    06/15/22 0200   BP: 138/62   Pulse: 75   Resp: 16   Temp: 97.5 °F (36.4 °C)   SpO2: 93%        Pt alert, pt intermittently wearing 2L of O2, continuous pulse ox applied. Pt A&O x 4, pt denies any needs. Call light in hand.

## 2022-06-15 NOTE — PROGRESS NOTES
Physician Progress Note      Mary Leonard  [facility] #:                  [id number]  :                       1950  ADMIT DATE:       2022 2:05 PM  [address] [place] DATE:  RESPONDING  PROVIDER #:        Jagruti Doll        QUERY TEXT:    Type of Encephalopathy: Please provide further specificity, if known. Clinical indicators include: sepsis, end-stage renal disease, influenza,   acute, encephalopathy, fevers, infection, esrd  Options provided:  -- Anoxic/hypoxic encephalopathy  -- Metabolic encephalopathy  -- Toxic encephalopathy  -- Hepatic encephalopathy  -- Hypertensive encephalopathy  -- Other - I will add my own diagnosis  -- Disagree - Not applicable / Not valid  -- Disagree - Clinically Unable to determine / Unknown        PROVIDER RESPONSE TEXT:    The patient has metabolic encephalopathy.       Electronically signed by:  Jagruti Doll 6/15/2022 12:12 PM

## 2022-06-15 NOTE — PROGRESS NOTES
Attempted to remove oxygen from pt. Spo2 decreased to 78%. Replaced at 2L via nc, spo2 increased to 92%.

## 2022-06-15 NOTE — PROGRESS NOTES
Inpatient Occupational Therapy  Evaluation and Treatment    Unit: PCU  Date:  6/15/2022  Patient Name:    Jae Estevez  Admitting diagnosis:  ESRD on dialysis [facility]) [N18.6, Z99.2]  Left leg cellulitis [X11.875]  Acute respiratory failure with hypoxia ([facility] [J96.01]  Episode of unresponsiveness [R41.89]  Sepsis ([address] [A41.9]  Pneumonia of left lower lobe due to infectious organism [J18.9]  Hyperthermia associated with heat [T67.01XA]  Sepsis with acute hypoxic respiratory failure without septic shock, due to unspecified organism ([facility] [address] [address] [A41.9, R65.20, J96.01]  Admit Date:  6/14/2022  Precautions/Restrictions/WB Status/ Lines/ Wounds/ Oxygen: fall risk, IV, bed/chair alarm, supplemental O2 (2L), telemetry and continuous pulse ox, blisters on left leg    Treatment Time:  8793-0658  Treatment Number: 1     Billable Treatment Time: 16 minutes   Total Treatment Time:   26   minutes    Patient Goals for Therapy:  \" go home \"      Discharge Recommendations: To be determined  DME needs for discharge: to be determined       Therapy recommendations for staff:   Assist of 2 with use of KAMILA STEESTHER for all transfers to/from BSC/chair    History of Present Illness: Per H&P 67 y.o. female who presented to [facility] with past medical history of type 2 diabetes, hypertension, hyperlipidemia, sepsis, hypothyroidism, end-stage renal disease Monday Wednesday Friday presented to the ED due to patient being encephalopathic loss of conscious.     Patient was found in the back porch unresponsive. In the scene patient was noted to be vomited EMS was called and was noted to have a fever 101.2. Patient went to dialysis this morning was dialyzed her usual dose and then went home. Patient does not member what occurred after dialysis. Patient denies having abdominal pain chest pain cough phlegm production back pain.     On my evaluation patient mentation resolved.   Patient reports that she had dialysis yesterday went well reported that she might have gotten fluid more than usual.  Patient reports she went home and then the next morning patient [place] and [place] under the sun. Patient then was found by the neighbor unconscious and EMS was called. Patient noted to have fever. Patient reports that she felt very hot and improved significantly after giving fluids. Patient otherwise denied having any fever any chills cough phlegm production chest pain abdominal pain or dysuria. Patient reports she produces minimal urine otherwise. Patient reports that she did not regain consciousness until 2 PM today    Home Health S4 Level Recommendation:  Level 1 Standard  AM-PAC Score: AM-PAC Inpatient Daily Activity Raw Score: 20    Preadmission Environment    Pt. Lives Alone  Home environment:            one story home  Steps to enter first floor: ramp  Steps to second floor:         N/A  Bathroom: tub/shower unit, comfort height toilet, grab bars and shower chair with back  Equipment owned: cane (SPC), walker (RW), wheelchair (manual w/c) and BSC     Preadmission Status:  Pt. Able to drive: Yes,just to MD and HS  Pt Fully independent with ADLs: Yes  Pt sleeps in flat bed  Pt. Required assistance from family for: Independent PTA  Pt. independent for transfers and gait and walked with No Device  History of falls          no     Pain   Yes  Location: left leg - blisters  Rating: moderate /10  Pain Medicine Status: No request made     Cognition    A&O x4   Able to follow 2 step commands     Subjective  Patient lying supine in bed with no family present. Pt agreeable to this OT eval & tx. Upper Extremity ROM:    WFL,  pt able to perform all bed mobility, transfers, and gait without ROM limitation.     Upper Extremity Strength:    BUE strength WFL, but not formally assessed w/ MMT    Upper Extremity Sensation    WFL    Upper Extremity Proprioception:  WFL    Coordination and Tone  WFL    Balance  Functional Sitting Balance:  Impaired CGA at EOB  Functional Standing Balance:Impaired poor balance- min/mod assist \"wobbly\"    Bed mobility:    Supine to sit:   CGA  Sit to supine:   CGA  Rolling:    Not Tested  Scooting in sitting:  SBA  Scooting to head of bed:   Not Tested    Bridging:   Not Tested    Transfers:  Took one step from edge of bed with Min/Mod assist with RW, very unsteady and returned back to bed. Sit to stand:  Min A  Stand to sit:  Min A  Bed to chair:   Not Tested  Standard toilet: Not Tested  Bed to [facility]:  Not Tested    Dressing:      UE:   Not Tested  LE:    Mod A    Bathing:    UE:  Not Tested  LE:  Not Tested    Eating:   Not Tested    Toileting:  Not Tested    Grooming: Not Tested    Activity Tolerance   Pt completed therapy session with No adverse symptoms noted w/activity    BP HR SpO2 Patient Comments   Supine, at rest 150/70 82bpm 88-96 on RA Replaced o2   Seated at /75        End of session  bpm %         Positioning Needs: In bed, call light and needs in reach. Alarm Set    Exercise / Activities Initiated:   N/A    Patient/Family Education:   Role of OT  Recommendations for DC    Assessment of Deficits: Pt seen for Occupational therapy evaluation in acute care setting. Pt demonstrated decreased Activity tolerance, ADLs, IADLs, Balance , Bed mobility, Safety Awareness and Transfers. Pt functioning below baseline and will likely benefit from skilled occupational therapy services to maximize safety and independence. Goal(s) : To be met in 3 Visits:  1). Bed to toilet/BSC: SBA    To be met in 5 Visits:  1). Supine to/from Sit:  SBA  2). Upper Body Bathing:   SBA  3). Lower Body Bathing:   SBA  4). Upper Body Dressing:  SBA  5). Lower Body Dressing:  SBA  6). Pt to demonstrate UE exs x 15 reps with minimal cues    Rehabilitation Potential:  Good for goals listed above.     Strengths for achieving goals include: Pt motivated and PLOF  Barriers to achieving goals include:  Complexity of condition and Pain     Plan: To be seen 3-5 x/wk while in acute care setting for therapeutic exercises, bed mobility, transfers, dressing, bathing, family/patient education, ADL/IADL retraining, energy conservation training.      Louisa Oro, OTR/L [id number]      If patient discharges from this facility prior to next visit, this note will serve as the Discharge Summary

## 2022-06-15 NOTE — CARE COORDINATION
Case Management Assessment  Initial Evaluation      Patient Name: Chepe Almonte  YOB: 1950  Diagnosis: ESRD on dialysis [facility]) [N18.6, Z99.2]  Left leg cellulitis [L03.116]  Acute respiratory failure with hypoxia ([address] [J96.01]  Episode of unresponsiveness [R41.89]  Sepsis ([address] [A41.9]  Pneumonia of left lower lobe due to infectious organism [J18.9]  Hyperthermia associated with heat [T67.01XA]  Sepsis with acute hypoxic respiratory failure without septic shock, due to unspecified organism ([address] [A41.9, R65.20, J96.01]  Date / Time: 6/14/2022  2:05 PM    Admission status/Date:  06/14/2022  Chart Reviewed: Yes      Patient Interviewed: Yes   Family Interviewed:  No      Hospitalization in the last 30 days:  [address] :   Primary Decision Maker: Otis Porter - Other - [phone]    (CM - must 1st enter selection under Navigator - emergency contact- [place] Relationship and pick relationship)   Who do you trust or have selected to make healthcare decisions for you    Current PCP: Chidi Denny, [address]  Precert required for SNF : Y, N          3 night stay required - Y, N    ADLS  Support Systems/Care Needs: Children  Transportation: self    Meal Preparation: self    Housing  Living Arrangements: [facility], alone.  Family nearby Steps: ramp  Intent for return to present living arrangements: Yes  Identified Issues:     [address] with [address] : No Agency:(Services)  Type of Home Care Services: None  Passport/Waiver : No  :                      Phone Number:    Passport/Waiver Services: NA          Durable Medical Equiptment   DME Provider: GIANFRANCO  Equipment:   Walker_X__Cane_X__RTS___ BSC___Shower Chair___Hospital Bed___W/C____Other________  02 at ____Liter(s)---wears(frequency)_______ [facility] ___ CPAP___ BiPap___   N/A____      Home O2 Use :  No      Community Service Affiliation  Dialysis: No    · Agency:  · Location:  · Dialysis Schedule:  · Phone:   · Fax: Other Community Services: (ex:PT/OT,Mental Health,Wound Clinic, Cardio/Pul [address])    DISCHARGE PLAN: Explained Case Management role/services. Chart reviewed. Met with pt and explained the role of the CM. Plans to return home. IPTA. Daughter lives close and can provide 24/7 if needed. [facility] (M[facility], 10am chair time) CM will follow and assess for additional needs.

## 2022-06-15 NOTE — FLOWSHEET NOTE
06/15/22 0917   Vital Signs   Temp 97.3 °F (36.3 °C)   Temp Source Oral   Heart Rate 85   Heart Rate Source Monitor   Resp 16   BP (!) 147/65   BP Location Right upper arm   BP Method Automatic   MAP (Calculated) 92.33   Patient Position Semi fowlers   Level of Consciousness Alert (0)   MEWS Score 1   Pain Assessment   Pain Assessment None - Denies Pain   Oxygen Therapy   SpO2 97 %   Pulse Oximeter Device Mode Continuous   Pulse Oximeter Device Location Finger;Right   O2 Device None (Room air)   Skin Assessment Clean, dry, & intact     AM assessment complete. Pt a&o x4. Nephrology in to see pt. Set for dialysis this afternoon. Fistula to LUE. Blisters and edema to LLE. Anuric. Call light and bedside table within reach. Will continue to monitor.

## 2022-06-15 NOTE — PROGRESS NOTES
Pharmacy Vancomycin Consult     Vancomycin Day: 2 of 5  Current Dosing: Pulse  Current indication: Aspiration PNA    Recent Labs     06/14/22  1305 06/15/22  0513   BUN 44* 55*   CREATININE 5.8* 6.9*   WBC 11.3* 9.0       Estimated Creatinine Clearance: 8 mL/min (A) (based on SCr of 6.9 mg/dL [facility])). Trough: 22.4    Assessment/Plan:  No dose today. Will recheck a level with AM labs tomorrow. Pt on HD at home- waiting to see what schedule will be while inpatient.      Seb Cabral, [place], [facility], 6/15/2022 6:47 AM

## 2022-06-15 NOTE — PROGRESS NOTES
Admission complete, shift assessment complete. All meds given per [facility]. Pt is A&O x 4, VSS. Pt had turkey box lunch and water to drink. Pt aware of plan of care. Bed in lowest position, call light within reach. All pt needs addressed.

## 2022-06-15 NOTE — PROGRESS NOTES
Inpatient Physical Therapy Evaluation and Treatment    Unit: PCU  Date:  6/15/2022  Patient Name:    Babatunde Horne  Admitting diagnosis:  ESRD on dialysis [facility]) [N18.6, Z99.2]  Left leg cellulitis [U45.837]  Acute respiratory failure with hypoxia ([facility] [J96.01]  Episode of unresponsiveness [R41.89]  Sepsis ([facility] [address] [A41.9]  Pneumonia of left lower lobe due to infectious organism [J18.9]  Hyperthermia associated with heat [T67.01XA]  Sepsis with acute hypoxic respiratory failure without septic shock, due to unspecified organism ([facility] [address] [A41.9, R65.20, J96.01]  Admit Date:  6/14/2022  Precautions/Restrictions/WB Status/ Lines/ Wounds/ Oxygen: Fall risk, Bed/chair alarm, Lines -IV, Telemetry and Continuous pulse oximetry  fistula LUE,no BP  Large blisters LLE  Clarified LLE x-ray with BRIGETTE Keene. No abnormality present  Treatment Time:  10:19-10:48  Treatment Number:  1   Timed Code Treatment Minutes: 18 minutes  Total Treatment Minutes:  28  minutes    Patient Goals for Therapy: \" to go home \"          Discharge Recommendations: To be determined  DME needs for discharge: assessment ongoing       Therapy recommendation for EMS Transport: requires transport by cot due to requires increased assist to transfer    Therapy recommendations for staff:   Assist of 2 with use of KAMILA STEDY and gait belt for all transfers to/from [facility]  to/from chair    History of Present Illness: Per H&P 67 y. o. female who presented to [facility] with past medical history of type 2 diabetes, hypertension, hyperlipidemia, sepsis, hypothyroidism, end-stage renal disease Monday Wednesday Friday presented to the ED due to patient being encephalopathic loss of conscious.     Patient was found in the back porch unresponsive.  In the scene patient was noted to be vomited EMS was called and was noted to have a fever 101. 2.  Patient went to dialysis this morning was dialyzed her usual dose and then went home.  Patient does not member what occurred after dialysis. Nelson Delvalle denies having abdominal pain chest pain cough phlegm production back pain.     On my evaluation patient mentation resolved. Nelson Delvalle reports that she had dialysis yesterday went well reported that she might have gotten fluid more than usual.  Patient reports she went home and then the next morning patient went to the [facility] and fell asleep under the sun.  Patient then was found by the neighbor unconscious and EMS was called. Nelson Delvalle noted to have fever.  Patient reports that she felt very hot and improved significantly after giving fluids.  Patient otherwise denied having any fever any chills cough phlegm production chest pain abdominal pain or dysuria.  Patient reports she produces minimal urine otherwise.  Patient reports that she did not regain consciousness until 2 PM today    Home Health S4 Level Recommendation:  Level 1 Standard  AM-PAC Mobility Score    AM-PAC Inpatient Mobility Raw Score : 13     Nithya Cortés scored a 13/24 on the AM-PAC short mobility form. Current research shows that an AM-PAC score of 17 or less is typically not associated with a discharge to the patient's home setting    If patient discharges prior to next session this note will serve as a discharge summary. Please see below for the latest assessment towards goals. Preadmission Environment    Pt. Lives Alone, could stay with family id 24/7 needed  Home environment:  one story home  Steps to enter first floor: ramp  Steps to second floor: N/A  Bathroom: tub/shower unit, comfort height toilet, grab bars and shower chair with back  Equipment owned: cane (SPC), walker (RW), wheelchair (manual w/c) and BSC    Preadmission Status:  Pt. Able to drive: Yes,just to MD and HS  Pt Fully independent with ADLs: Yes  Pt sleeps in flat bed  Pt. Required assistance from family for:  Independent PTA  Pt. independent for transfers and gait and walked with No Device  History of falls No    Pain   Yes  Location: LLE due to large blisters through LE  Rating: moderate /10  Pain Medicine Status: No request made    Cognition    A&O x4   Able to follow 2 step commands    Subjective  Patient lying supine in bed with no family present. Pt agreeable to this PT eval & tx. Upper Extremity ROM/Strength  Please see OT evaluation. Lower Extremity ROM / Strength   AROM WFL: Yes,but painful LLE, especially knee flexion/ext due to blisters  LLE strength is WFL to complete bed mobility and transfers     Lower Extremity Sensation    Impaired, numbness In B feet    Lower Extremity Proprioception:   NT    Coordination and Tone  Shakiness and mild tremors with trasnfers and at EOB    Balance  Sitting:  Fair +; CGA  Comments: occasional posterior lean sitting EOB    Standing: Poor; Mod A  and 2 persons  Comments: felt wobbly, remained shaky ,with occasional posterior lean,requiring assist to correct; used RW and gait belt    Bed Mobility   Supine to Sit:    CGA  Sit to Supine:   CGA  Rolling:   Not Tested  Scooting in sitting: SBA  Scooting in supine:  Not Tested    Transfer Training  Patient took one step from EOB with Mod assist using RW and gait belt. PAtient was unsteady-assisted back to bed   Sit to stand:   Min A   Stand to sit:   Min A   Bed to Chair:   Not Tested with use of N/A    Gait gait deferred due to difficulty with transfers; pt ambulated 0 ft. Stair Training deferred, pt unsafe/ not appropriate to complete stairs at this time      Activity Tolerance   Pt completed therapy session with No adverse symptoms noted with activity   BP HR SpO2 Patient Comments   Supine, at rest 150/70 82bpm 88-96 on RA Replaced o2-2L   Seated at /75   Reported no dizziness      Positioning Needs   Pt in bed, alarm set, positioned in proper neutral alignment and pressure relief provided.    Call light provided and all needs within reach    Other  RN aware of level of assist    Patient/Family Education   Pt educated on role of inpatient PT,

## 2022-06-15 NOTE — ACP (ADVANCE CARE PLANNING)
Advance Care Planning     General Advance Care Planning (ACP) Conversation    Date of Conversation: 6/14/2022  Conducted with: Patient with Decision Making Capacity    Healthcare Decision Maker:    Primary Decision Maker: Laya Rene - Shannan - [phone]  Click here to complete [address] including selection of the Healthcare Decision Maker Relationship (ie \"Primary\"). Today we documented Decision Maker(s) consistent with Legal Next of Kin hierarchy.     Content/Action Overview:    Reviewed DNR/DNI and patient elects Full Code (Attempt Resuscitation)      Length of Voluntary ACP Conversation in minutes:  <16 minutes (Non-Billable)    Alhaji Root RN

## 2022-06-16 LAB
ANION GAP SERPL CALCULATED.3IONS-SCNC: 16 MMOL/L (ref 3–16)
BASOPHILS ABSOLUTE: 0.1 K/UL (ref 0–0.2)
BASOPHILS RELATIVE PERCENT: 1.2 %
BUN BLDV-MCNC: 24 MG/DL (ref 7–20)
CALCIUM SERPL-MCNC: 9.4 MG/DL (ref 8.3–10.6)
CHLORIDE BLD-SCNC: 100 MMOL/L (ref 99–110)
CO2: 20 MMOL/L (ref 21–32)
CREAT SERPL-MCNC: 4.4 MG/DL (ref 0.6–1.2)
EOSINOPHILS ABSOLUTE: 0.5 K/UL (ref 0–0.6)
EOSINOPHILS RELATIVE PERCENT: 6.4 %
ESTIMATED AVERAGE GLUCOSE: 151.3 MG/DL
GFR AFRICAN AMERICAN: 12
GFR NON-AFRICAN AMERICAN: 10
GLUCOSE BLD-MCNC: 115 MG/DL (ref 70–99)
GLUCOSE BLD-MCNC: 153 MG/DL (ref 70–99)
GLUCOSE BLD-MCNC: 171 MG/DL (ref 70–99)
GLUCOSE BLD-MCNC: 206 MG/DL (ref 70–99)
GLUCOSE BLD-MCNC: 233 MG/DL (ref 70–99)
HBA1C MFR BLD: 6.9 %
HCT VFR BLD CALC: 34 % (ref 36–48)
HEMOGLOBIN: 10.8 G/DL (ref 12–16)
LYMPHOCYTES ABSOLUTE: 1 K/UL (ref 1–5.1)
LYMPHOCYTES RELATIVE PERCENT: 12.5 %
MCH RBC QN AUTO: 26 PG (ref 26–34)
MCHC RBC AUTO-ENTMCNC: 31.9 G/DL (ref 31–36)
MCV RBC AUTO: 81.6 FL (ref 80–100)
MONOCYTES ABSOLUTE: 0.6 K/UL (ref 0–1.3)
MONOCYTES RELATIVE PERCENT: 7.7 %
MRSA SCREEN RT-PCR: NORMAL
NEUTROPHILS ABSOLUTE: 5.5 K/UL (ref 1.7–7.7)
NEUTROPHILS RELATIVE PERCENT: 72.2 %
PDW BLD-RTO: 21.3 % (ref 12.4–15.4)
PERFORMED ON: ABNORMAL
PLATELET # BLD: 189 K/UL (ref 135–450)
PMV BLD AUTO: 8.3 FL (ref 5–10.5)
POTASSIUM REFLEX MAGNESIUM: 4.6 MMOL/L (ref 3.5–5.1)
RBC # BLD: 4.17 M/UL (ref 4–5.2)
SODIUM BLD-SCNC: 136 MMOL/L (ref 136–145)
VANCOMYCIN RANDOM: 13 UG/ML
WBC # BLD: 7.6 K/UL (ref 4–11)

## 2022-06-16 PROCEDURE — 6360000002 HC RX W HCPCS: Performed by: INTERNAL MEDICINE

## 2022-06-16 PROCEDURE — 80202 ASSAY OF VANCOMYCIN: CPT

## 2022-06-16 PROCEDURE — 2580000003 HC RX 258: Performed by: INTERNAL MEDICINE

## 2022-06-16 PROCEDURE — 85025 COMPLETE CBC W/AUTO DIFF WBC: CPT

## 2022-06-16 PROCEDURE — 36415 COLL VENOUS BLD VENIPUNCTURE: CPT

## 2022-06-16 PROCEDURE — 97530 THERAPEUTIC ACTIVITIES: CPT

## 2022-06-16 PROCEDURE — 6370000000 HC RX 637 (ALT 250 FOR IP)

## 2022-06-16 PROCEDURE — 2060000000 HC ICU INTERMEDIATE R&B

## 2022-06-16 PROCEDURE — 6370000000 HC RX 637 (ALT 250 FOR IP): Performed by: INTERNAL MEDICINE

## 2022-06-16 PROCEDURE — 2580000003 HC RX 258

## 2022-06-16 PROCEDURE — 80048 BASIC METABOLIC PNL TOTAL CA: CPT

## 2022-06-16 PROCEDURE — 6360000002 HC RX W HCPCS

## 2022-06-16 PROCEDURE — 99233 SBSQ HOSP IP/OBS HIGH 50: CPT | Performed by: INTERNAL MEDICINE

## 2022-06-16 RX ADMIN — PREDNISOLONE ACETATE 1 DROP: 10 SUSPENSION/ DROPS OPHTHALMIC at 21:10

## 2022-06-16 RX ADMIN — VANCOMYCIN HYDROCHLORIDE 1000 MG: 1 INJECTION, POWDER, LYOPHILIZED, FOR SOLUTION INTRAVENOUS at 09:10

## 2022-06-16 RX ADMIN — INSULIN LISPRO 4 UNITS: 100 INJECTION, SOLUTION INTRAVENOUS; SUBCUTANEOUS at 11:48

## 2022-06-16 RX ADMIN — TIMOLOL MALEATE 1 DROP: 5 SOLUTION/ DROPS OPHTHALMIC at 21:11

## 2022-06-16 RX ADMIN — ASPIRIN 81 MG: 81 TABLET, COATED ORAL at 09:11

## 2022-06-16 RX ADMIN — INSULIN LISPRO 1 UNITS: 100 INJECTION, SOLUTION INTRAVENOUS; SUBCUTANEOUS at 21:12

## 2022-06-16 RX ADMIN — HEPARIN SODIUM 5000 UNITS: 5000 INJECTION INTRAVENOUS; SUBCUTANEOUS at 21:08

## 2022-06-16 RX ADMIN — LEVOTHYROXINE SODIUM 50 MCG: 50 TABLET ORAL at 05:03

## 2022-06-16 RX ADMIN — ATORVASTATIN CALCIUM 10 MG: 10 TABLET, FILM COATED ORAL at 21:08

## 2022-06-16 RX ADMIN — CARVEDILOL 25 MG: 25 TABLET, FILM COATED ORAL at 09:11

## 2022-06-16 RX ADMIN — INSULIN GLARGINE 10 UNITS: 100 INJECTION, SOLUTION SUBCUTANEOUS at 09:20

## 2022-06-16 RX ADMIN — PREDNISOLONE ACETATE 1 DROP: 10 SUSPENSION/ DROPS OPHTHALMIC at 08:54

## 2022-06-16 RX ADMIN — KETOROLAC TROMETHAMINE 1 DROP: 5 SOLUTION OPHTHALMIC at 09:15

## 2022-06-16 RX ADMIN — SODIUM CHLORIDE: 9 INJECTION, SOLUTION INTRAVENOUS at 09:07

## 2022-06-16 RX ADMIN — NIFEDIPINE 60 MG: 30 TABLET, FILM COATED, EXTENDED RELEASE ORAL at 09:11

## 2022-06-16 RX ADMIN — KETOROLAC TROMETHAMINE 1 DROP: 5 SOLUTION OPHTHALMIC at 12:59

## 2022-06-16 RX ADMIN — TIMOLOL MALEATE 1 DROP: 5 SOLUTION/ DROPS OPHTHALMIC at 09:11

## 2022-06-16 RX ADMIN — INSULIN LISPRO 4 UNITS: 100 INJECTION, SOLUTION INTRAVENOUS; SUBCUTANEOUS at 16:52

## 2022-06-16 RX ADMIN — SODIUM CHLORIDE, PRESERVATIVE FREE 10 ML: 5 INJECTION INTRAVENOUS at 08:55

## 2022-06-16 RX ADMIN — INSULIN LISPRO 2 UNITS: 100 INJECTION, SOLUTION INTRAVENOUS; SUBCUTANEOUS at 08:16

## 2022-06-16 RX ADMIN — CEFEPIME HYDROCHLORIDE 1000 MG: 1 INJECTION, POWDER, FOR SOLUTION INTRAMUSCULAR; INTRAVENOUS at 21:07

## 2022-06-16 RX ADMIN — SERTRALINE HYDROCHLORIDE 25 MG: 50 TABLET ORAL at 09:11

## 2022-06-16 RX ADMIN — PREDNISOLONE ACETATE 1 DROP: 10 SUSPENSION/ DROPS OPHTHALMIC at 12:52

## 2022-06-16 RX ADMIN — PREDNISOLONE ACETATE 1 DROP: 10 SUSPENSION/ DROPS OPHTHALMIC at 16:51

## 2022-06-16 RX ADMIN — KETOROLAC TROMETHAMINE 1 DROP: 5 SOLUTION OPHTHALMIC at 16:55

## 2022-06-16 RX ADMIN — HEPARIN SODIUM 5000 UNITS: 5000 INJECTION INTRAVENOUS; SUBCUTANEOUS at 05:03

## 2022-06-16 RX ADMIN — HEPARIN SODIUM 5000 UNITS: 5000 INJECTION INTRAVENOUS; SUBCUTANEOUS at 15:02

## 2022-06-16 RX ADMIN — KETOROLAC TROMETHAMINE 1 DROP: 5 SOLUTION OPHTHALMIC at 21:10

## 2022-06-16 RX ADMIN — ACETAMINOPHEN 650 MG: 325 TABLET ORAL at 21:08

## 2022-06-16 ASSESSMENT — PAIN - FUNCTIONAL ASSESSMENT: PAIN_FUNCTIONAL_ASSESSMENT: ACTIVITIES ARE NOT PREVENTED

## 2022-06-16 ASSESSMENT — PAIN SCALES - GENERAL: PAINLEVEL_OUTOF10: 0

## 2022-06-16 NOTE — FLOWSHEET NOTE
06/16/22 0853   Vital Signs   Temp 98.9 °F (37.2 °C)   Temp Source Oral   Heart Rate 79   Heart Rate Source Monitor   Resp 16   /67   BP Location Right upper arm   BP Method Automatic   MAP (Calculated) 90.67   Patient Position Semi fowlers   Level of Consciousness Alert (0)   MEWS Score 1   Pain Assessment   Pain Assessment None - Denies Pain   Oxygen Therapy   SpO2 98 %   Pulse Oximeter Device Mode Continuous   Pulse Oximeter Device Location Right;Finger   O2 Device Nasal cannula   Skin Assessment Clean, dry, & intact   O2 Flow Rate (L/min) 2 L/min     AM assessment complete. Pt a&o x4  C/o intermittent pain to LLE d/t blisters/sunburn. Denies any sob or cough. Continues on 2L via nc. Unable to tolerate RA at this time. Edema to ble L>R. Crackles to LLL. Call light and bedside table within reach. Will continue to monitor.

## 2022-06-16 NOTE — FLOWSHEET NOTE
Treatment time: 3 hours  Net UF: 1500 ml     Pre weight: 80.2 kg   Post weight: 78.7 kg  EDW: 79 kg     Access used: KIRK AVF  Access function: Good with  ml/min     Medications or blood products given: N/A     Regular outpatient schedule: MWF     Summary of response to treatment:      06/15/22 1754 06/15/22 2054   Vital Signs   BP (!) 181/78 (!) 181/77   Temp 97.5 °F (36.4 °C) 97.6 °F (36.4 °C)   Heart Rate 80 81   Resp 16 16   Weight 176 lb 12.9 oz (80.2 kg) 173 lb 8 oz (78.7 kg)   Percent Weight Change 3.16 -1.87   Pain Assessment   Pain Assessment None - Denies Pain None - Denies Pain   Post-Hemodialysis Assessment   Post-Treatment Procedures  --  Blood returned; Access bleeding time < 10 minutes   Machine Disinfection Process  --  Acid/Vinegar Clean;Heat Disinfect; Exterior Machine Disinfection   Rinseback Volume (ml)  --  400 ml   Total Liters Processed (l/min)  --  67.1 l/min   Dialyzer Clearance  --  Moderately streaked   Duration of Treatment (minutes)  --  180 minutes   Heparin amount administered during treatment (units)  --  0 units   Hemodialysis Intake (ml)  --  400 ml   Hemodialysis Output (ml)  --  1900 ml   NET Removed (ml)  --  1500   Tolerated Treatment  --  Good   Copy of dialysis treatment record placed in chart, to be scanned into EMR.  Report called to Larry Madrid RN.

## 2022-06-16 NOTE — PROGRESS NOTES
Progress Note    Admit Date:  6/14/2022    67 y.o. female who presented to [facility] with past medical history of type 2 diabetes, hypertension, hyperlipidemia, sepsis, hypothyroidism, end-stage renal disease Monday Wednesday Friday presented to the ED due to patient being encephalopathic loss of conscious. Subjective:    Awake alert and oriented. Left leg blisters persisting. Objective:   Patient Vitals for the past 4 hrs:   BP Temp Temp src Pulse Resp SpO2   06/16/22 1504 (!) 115/55 98.9 °F (37.2 °C) Oral 76 18 97 %            Intake/Output Summary (Last 24 hours) at 6/16/2022 1521  Last data filed at 6/16/2022 0910  Gross per 24 hour   Intake 580 ml   Output 1900 ml   Net -1320 ml       Physical Exam:    Gen: No distress. Alert. Eyes: PERRL. No sclera icterus. No conjunctival injection. ENT: No discharge. Pharynx clear. Neck: No JVD. Trachea midline. Resp: No accessory muscle use. + Right basilar crackles +Left sided crackles appreciated diffusely. No wheezes. No rhonchi. CV: Regular rate. Regular rhythm. No murmur. No rub. No edema. Capillary Refill: Brisk,< 3 seconds   Peripheral Pulses: +2 palpable, equal bilaterally   GI: Non-tender. Non-distended. Normal bowel sounds. Skin: Warm and dry. No nodule on exposed extremities. Multiple large loose fluid filled blisters with surrounding erythema on entire medial aspect of left leg. Surrounding blanchable erythema which is mild. Patient is quite tan which may have reduced severity. M/S: No cyanosis. No joint deformity. No clubbing. Neuro: Awake. Grossly nonfocal    Psych: Oriented x 3. No anxiety or agitation.       Scheduled Meds:   insulin lispro  0-12 Units SubCUTAneous TID WC    insulin lispro  0-6 Units SubCUTAneous Nightly    sodium chloride flush  5-40 mL IntraVENous 2 times per day    heparin (porcine)  5,000 Units SubCUTAneous 3 times per day    vancomycin (VANCOCIN) intermittent dosing (placeholder)   Other RX Placeholder    cefepime  1,000 mg IntraVENous Q24H    atorvastatin  10 mg Oral Nightly    aspirin  81 mg Oral Daily    carvedilol  25 mg Oral BID WC    ferric citrate  630 mg Oral TID AC    gabapentin  300 mg Oral QPM    insulin glargine  10 Units SubCUTAneous QAM    levothyroxine  50 mcg Oral Daily    NIFEdipine  60 mg Oral Daily    prednisoLONE acetate  1 drop Left Eye 4x Daily    sertraline  25 mg Oral Daily    timolol  1 drop Both Eyes BID    ketorolac  1 drop Left Eye 4x Daily       Continuous Infusions:   sodium chloride 5 mL/hr at 06/16/22 0907    dextrose         PRN Meds:  sodium chloride flush, sodium chloride, magnesium sulfate, ondansetron **OR** ondansetron, polyethylene glycol, acetaminophen **OR** acetaminophen, glucose, dextrose bolus **OR** dextrose bolus, glucagon (rDNA), dextrose      Data:  CBC:   Recent Labs     06/14/22  1305 06/15/22  0513 06/16/22  0502   WBC 11.3* 9.0 7.6   HGB 11.2* 10.0* 10.8*   HCT 36.1 31.2* 34.0*   MCV 79.8* 80.5 81.6    176 189     BMP:   Recent Labs     06/14/22  1305 06/15/22  0513 06/16/22  0502    139 136   K 5.2* 4.7 4.6    102 100   CO2 24 22 20*   BUN 44* 55* 24*   CREATININE 5.8* 6.9* 4.4*     LIVER PROFILE:   Recent Labs     06/14/22  1305   AST 20   ALT 8*   BILITOT 0.4   ALKPHOS 105       CULTURES  Results for Julissa Pan (MRN [id number]) as of 6/15/2022 07:55   Ref. Range 6/14/2022 15:42   INFLUENZA A Latest Ref Range: NOT DETECTED  NOT DETECTED   INFLUENZA B Latest Ref Range: NOT DETECTED  NOT DETECTED   SARS-CoV-2 RNA, RT PCR Latest Ref Range: NOT DETECTED  NOT DETECTED        RADIOLOGY  CT Head WO Contrast   Preliminary Result   1. No evidence of acute intracranial abnormality. 2. Mild paranasal sinus disease as described above. XR CHEST PORTABLE   Final Result   1. Small left pleural effusion with associated left basilar airspace disease   representing either atelectasis or pneumonia.       2.  Mild cardiomegaly. XR TIBIA FIBULA LEFT (2 VIEWS)   Final Result   Addendum 1 of 1   ADDENDUM:   The impression should read no acute osseous abnormality of the left    tib-fib. Final   Acute osseous abnormality of the left tib-fib. Assessment/Plan:      #Acute encephalopathy resolved  -suspected secondary to nonexertional heat stroke:  -Patient slept outside under the sun overheated by time patient brought in by EMS reported 101.4 and 102.8 on arrival  - body temp now normal.   -CT head negative         #Sepsis criteria met POA  -Temp 102.8, RR 27, , WBC 11.3, pulm source  -Blood cultures obtained x2  -Normal lactic  -BP not requiring pressors  -Abx below       #Possible pneumonia; left sided  - strong suspect aspiration pna; several episodes of emesis Monday night and found by EMS with emesis and AMS  -Left sided rales noted on exam  -Chest x-ray above   -Leukocytosis resolved  -PCT 11.26  -Strep and legionella ordered; patient is anuric and will not likely be able to provide sample  -Check respiratory culture  -Check MRSA nasal swab  -Currently IV Vanco and cefepime; deescalate coverage pending resp cx and MRSA swab      #Hypoxia  -No home O2  -Denies SOB  -Requiring 2-4L since arrival  -91% on room air during my interview  -Wean as tolerated      #Sunburn; 2nd degree burns to left LE POA. -Multiple large loose fluid filled blisters with surrounding erythema on entire medial aspect of left leg  -Surrounding blanchable erythema which is mild. Patient is quite tan which may have reduced severity.    -supportive care, monitor for s/s of infection; none at this time      #ESRD  -HD MWF  -Received HD as planned Monday  -Nephrology consulted  --HD planned for today      #Elevated troponin  -Chronic appears at baseline  -Likely 2/2 ESRD  -No CP       #DMII nephropathy and neuropathy  -Lantus 10 U every morining  -Medium dose SSI  -Continue gabapentin      #Essential Hypertension  -BP elevated  -Continue procardia and coreg      #HLD  -Continue Statin      #Hypothyroidism   -Continue home synthroid      DVT Prophylaxis: Heparin  Diet: ADULT DIET; Regular; 3 carb choices (45 gm/meal); Low Fat/Low Chol/High Fiber/2 gm Na; Low Sodium (2 gm); Low Potassium (Less than 3000 mg/day);  Low Phosphorus (Less than 1000 mg); 1500 ml  Code Status: Full Code    Dolores Jimenez MD  6/16/2022 3:21 PM

## 2022-06-16 NOTE — PROGRESS NOTES
the porch and fell asleep under the sun.  Patient then was found by the neighbor unconscious and EMS was called. Fifi Parr noted to have fever.  Patient reports that she felt very hot and improved significantly after giving fluids.  Patient otherwise denied having any fever any chills cough phlegm production chest pain abdominal pain or dysuria.  Patient reports she produces minimal urine otherwise.  Patient reports that she did not regain consciousness until 2 PM today    Subjective:  Pt agreeable to attempt to get up to chair    Pain   Yes  Rating: mild  Location:LLE blisters  Pain Medicine Status: No request made      Bed Mobility:   Supine to Sit:  Mod A  Sit to Supine: Mod A  Rolling:           Not Tested  Scooting:        Min A    Transfer Training: Pt became lethargic with jerky movements sitting at EOB and unable to tolerate sitting up in chair at this time  Sit to stand:   Not Tested  Stand to sit:  Not Tested  Bed to Chair:  Not Tested  Bed to [facility]:   Not Tested  Standard toilet:   Not Tested    Activity Tolerance   Pt completed therapy session with Dizziness noted with sitting at EOB  SpO2: 98% 1 LO  HR: 76  BP: 113/50    Balance  Sitting Balance :     Min A   Standing Balance   Not Tested    ADL Training:   Upper body dressing:  Not Tested  Upper body bathing:  Not Tested  Lower body dressing: Mod A  Lower body bathing:  Not Tested  Toileting:   Not Tested  Grooming/Hygiene:  Not Tested  Feeding :   Not Tested    Therapeutic Exercise:   Elbow flex/ext  Shoulder flex/ext:  x15  shoulder elevation:  x15    Patient Education:   Role of OT  Recommendations for DC    Positioning Needs: In bed, call light and needs in reach. Alarm Set    Family Present:  No    Assessment: Pt agreeable to therapy. Pt perform supine to sit with mod A. Pt needing min A initially sitting at EOB. Pt noted to have jerky movements in her body while sitting at EOB. Pt closed eyes several times during sitting and lethargic.  BP unable to read sitting. Pt returned to bed. BP supine 113/50 and became more alert. Recommend continued acute OT and therapy at SNF to increase functional independence to return to baseline functioning. GOALS  To be met in 3 Visits:  1). Bed to toilet/BSC: SBA     To be met in 5 Visits:  1). Supine to/from Sit:             SBA  2). Upper Body Bathing:         SBA  3). Lower Body Bathing:         SBA  4). Upper Body Dressing:       SBA  5). Lower Body Dressing:       SBA  6).  Pt to demonstrate UE exs x 15 reps with minimal cues      Plan: cont with POC      Louisa Oro, OTR/L [id number]        If patient discharges from this facility prior to next visit, this note will serve as the Discharge Summary

## 2022-06-16 NOTE — PROGRESS NOTES
Pt assisted onto bed pan, pt had large BM, pt also given bath, gown changed, clean linens. Phone plugged in, hot tea provided. Garbage emptied. Pt A&O x 4. Pt very appreciative. Pt also got a little tearful because she feels like she is losing her independence. Bed in lowest position, call light within reach. Will continue to monitor.

## 2022-06-16 NOTE — PROGRESS NOTES
6/16  Vanc random = 13 mcg/mL at 0502. Give vancomycin 1000 mg x1. Continue to check vanc random in the AM (6/17 at 0600).   Dennise Conroy, PharmD  6/16/2022 7:11 AM

## 2022-06-16 NOTE — PROGRESS NOTES
Pt states she feels sleepy today. She states that she does this with dialysis sometimes. VSS. Held gabapentin. Will monitor.

## 2022-06-16 NOTE — PLAN OF CARE
Problem: Discharge Planning  Goal: Discharge to home or other facility with appropriate resources  Outcome: Progressing     Problem: Safety - Adult  Goal: Free from fall injury  Outcome: Progressing  Flowsheets  Taken 6/15/2022 1053 by Cary Gill RN  Free From Fall Injury: Instruct family/caregiver on patient safety  Taken 6/15/2022 0912 by Cary Gill RN  Free From Fall Injury: Instruct family/caregiver on patient safety

## 2022-06-16 NOTE — PROGRESS NOTES
Progress Note    HISTORY     CC:   Found down             We are following for ESRD       Subjective/   HPI:  Dialysis yesterday. Access worked well. Post weight of 78.7 kg and target of 79 kg. Blisters on the left leg.   Fevers have improved     ROS:  Constitutional:  No fevers, No Chills, + weakness  Cardiovascular:  No palpations, + edema  Respiratory:  No wheezing, no cough  Skin:  No rash, no itching  :  No hematuria, no dysuria     Social Hx:  No Family at the bedside     Past Medical and Surgical History:  - Reviewed, no changes     EXAM       Objective/     Vitals:    06/15/22 2145 06/16/22 0132 06/16/22 0245 06/16/22 0853   BP: (!) 165/71 (!) 189/74 (!) 160/74 138/67   Pulse: 82 79 78 79   Resp: 16 16 16   Temp: 98.6 °F (37 °C) 97 °F (36.1 °C)  98.9 °F (37.2 °C)   TempSrc:  Oral  Oral   SpO2: 96% 96% 97% 98%   Weight:  173 lb 1 oz (78.5 kg)     Height:         24HR INTAKE/OUTPUT:      Intake/Output Summary (Last 24 hours) at 6/16/2022 1030  Last data filed at 6/16/2022 0910  Gross per 24 hour   Intake 580 ml   Output 1900 ml   Net -1320 ml     Constitutional:  Alert, awake, no apparent distress  Eyes:  Pupils reactive, sclera clear   Neck:  Normal thyroid, no masses   Cardiovascular:  Regular, no rub  Respiratory:  No distress, no wheezing  Psychiatry:  Appropriate mood/affect, alert  Abdomen: +bs, soft, nt, no masses   Musculoskeletal:+ LE edema, blistering to the inside of the left leg, no erythema, no clubbing   Lymphatics:  No LAD in neck, no supraclavicular nodes   Access:  Left upper arm AVF       MEDICAL DECISION MAKING       Data/  Recent Labs     06/14/22  1305 06/15/22  0513 06/16/22  0502   WBC 11.3* 9.0 7.6   HGB 11.2* 10.0* 10.8*   HCT 36.1 31.2* 34.0*   MCV 79.8* 80.5 81.6    176 189     Recent Labs     06/14/22  1305 06/15/22  0513 06/16/22  0502    139 136   K 5.2* 4.7 4.6    102 100   CO2 24 22 20*   GLUCOSE 210* 129* 115*   BUN 44* 55* 24*   CREATININE 5. 8* 6.9* 4.4*   LABGLOM 7* 6* 10*   GFRAA 9* 7* 12*       Assessment/     ESRD:  On HD MWF with working AVF     Anemia of Chronic Disease-CKD:  Hb at target     Encephalopathy:  Possible heat stroke, fell asleep out on her deck. Possible infectious and cultures are pending      Fever: On antibiotics, cultures NGTD. Fevers improving.      Plan/     Continue HD MWF schedule  Labs on HD days   Follow up cultures     -----------------------------  Yinka Kiran M.D.   [facility]

## 2022-06-16 NOTE — PROGRESS NOTES
Vitals:    06/16/22 0245   BP: (!) 160/74   Pulse: 78   Resp:    Temp:    SpO2: 97%     2L NC placed on pt, pt desat to mid 80s, and as low as 70% while sleeping.

## 2022-06-16 NOTE — PROGRESS NOTES
Pt settled back into room. Pt A&O x 4, VSS. Vitals:    06/15/22 2145   BP: (!) 165/71   Pulse: 82   Resp: 16   Temp: 98.6 °F (37 °C)   SpO2: 96%     Shift assessment complete and all meds given per MAR. Gabapentin given late, (not given at 1800, pt was in HD). Tylenol given. Insulin refused, pt afraid of being too low in the morning. Pt repositioned in bed. Water and snacks provided. Bed in lowest position, call light in hand with all personal belongings. All pt needs addressed. Will continue to monitor.

## 2022-06-16 NOTE — PROGRESS NOTES
Pt arrived back to floor from , [facility] aware. 1.5L removed. BP's elevated.

## 2022-06-17 LAB
ANION GAP SERPL CALCULATED.3IONS-SCNC: 13 MMOL/L (ref 3–16)
BASOPHILS ABSOLUTE: 0.1 K/UL (ref 0–0.2)
BASOPHILS RELATIVE PERCENT: 0.9 %
BUN BLDV-MCNC: 36 MG/DL (ref 7–20)
CALCIUM SERPL-MCNC: 9.1 MG/DL (ref 8.3–10.6)
CHLORIDE BLD-SCNC: 101 MMOL/L (ref 99–110)
CO2: 20 MMOL/L (ref 21–32)
CREAT SERPL-MCNC: 6.1 MG/DL (ref 0.6–1.2)
CULTURE, RESPIRATORY: NORMAL
EOSINOPHILS ABSOLUTE: 0.4 K/UL (ref 0–0.6)
EOSINOPHILS RELATIVE PERCENT: 4.6 %
GFR AFRICAN AMERICAN: 8
GFR NON-AFRICAN AMERICAN: 7
GLUCOSE BLD-MCNC: 115 MG/DL (ref 70–99)
GLUCOSE BLD-MCNC: 118 MG/DL (ref 70–99)
GLUCOSE BLD-MCNC: 153 MG/DL (ref 70–99)
GLUCOSE BLD-MCNC: 164 MG/DL (ref 70–99)
GLUCOSE BLD-MCNC: 180 MG/DL (ref 70–99)
GRAM STAIN RESULT: NORMAL
HCT VFR BLD CALC: 33.2 % (ref 36–48)
HEMOGLOBIN: 10.6 G/DL (ref 12–16)
LYMPHOCYTES ABSOLUTE: 1.2 K/UL (ref 1–5.1)
LYMPHOCYTES RELATIVE PERCENT: 14.6 %
MCH RBC QN AUTO: 26.1 PG (ref 26–34)
MCHC RBC AUTO-ENTMCNC: 31.8 G/DL (ref 31–36)
MCV RBC AUTO: 81.9 FL (ref 80–100)
MONOCYTES ABSOLUTE: 0.7 K/UL (ref 0–1.3)
MONOCYTES RELATIVE PERCENT: 8.7 %
NEUTROPHILS ABSOLUTE: 5.9 K/UL (ref 1.7–7.7)
NEUTROPHILS RELATIVE PERCENT: 71.2 %
PDW BLD-RTO: 21.3 % (ref 12.4–15.4)
PERFORMED ON: ABNORMAL
PLATELET # BLD: 183 K/UL (ref 135–450)
PMV BLD AUTO: 8.3 FL (ref 5–10.5)
POTASSIUM REFLEX MAGNESIUM: 5.1 MMOL/L (ref 3.5–5.1)
RBC # BLD: 4.06 M/UL (ref 4–5.2)
SODIUM BLD-SCNC: 134 MMOL/L (ref 136–145)
VANCOMYCIN RANDOM: 23.7 UG/ML
WBC # BLD: 8.3 K/UL (ref 4–11)

## 2022-06-17 PROCEDURE — 2060000000 HC ICU INTERMEDIATE R&B

## 2022-06-17 PROCEDURE — 6370000000 HC RX 637 (ALT 250 FOR IP)

## 2022-06-17 PROCEDURE — 36415 COLL VENOUS BLD VENIPUNCTURE: CPT

## 2022-06-17 PROCEDURE — 94761 N-INVAS EAR/PLS OXIMETRY MLT: CPT

## 2022-06-17 PROCEDURE — 80048 BASIC METABOLIC PNL TOTAL CA: CPT

## 2022-06-17 PROCEDURE — 6360000002 HC RX W HCPCS

## 2022-06-17 PROCEDURE — 2580000003 HC RX 258

## 2022-06-17 PROCEDURE — 80202 ASSAY OF VANCOMYCIN: CPT

## 2022-06-17 PROCEDURE — 2700000000 HC OXYGEN THERAPY PER DAY

## 2022-06-17 PROCEDURE — 99233 SBSQ HOSP IP/OBS HIGH 50: CPT | Performed by: INTERNAL MEDICINE

## 2022-06-17 PROCEDURE — 85025 COMPLETE CBC W/AUTO DIFF WBC: CPT

## 2022-06-17 PROCEDURE — 6370000000 HC RX 637 (ALT 250 FOR IP): Performed by: INTERNAL MEDICINE

## 2022-06-17 PROCEDURE — 90935 HEMODIALYSIS ONE EVALUATION: CPT

## 2022-06-17 RX ADMIN — CEFEPIME HYDROCHLORIDE 1000 MG: 1 INJECTION, POWDER, FOR SOLUTION INTRAMUSCULAR; INTRAVENOUS at 20:51

## 2022-06-17 RX ADMIN — GABAPENTIN 300 MG: 300 CAPSULE ORAL at 17:38

## 2022-06-17 RX ADMIN — HEPARIN SODIUM 5000 UNITS: 5000 INJECTION INTRAVENOUS; SUBCUTANEOUS at 14:34

## 2022-06-17 RX ADMIN — INSULIN GLARGINE 10 UNITS: 100 INJECTION, SOLUTION SUBCUTANEOUS at 12:54

## 2022-06-17 RX ADMIN — PREDNISOLONE ACETATE 1 DROP: 10 SUSPENSION/ DROPS OPHTHALMIC at 17:07

## 2022-06-17 RX ADMIN — PREDNISOLONE ACETATE 1 DROP: 10 SUSPENSION/ DROPS OPHTHALMIC at 12:51

## 2022-06-17 RX ADMIN — INSULIN LISPRO 2 UNITS: 100 INJECTION, SOLUTION INTRAVENOUS; SUBCUTANEOUS at 17:09

## 2022-06-17 RX ADMIN — INSULIN LISPRO 1 UNITS: 100 INJECTION, SOLUTION INTRAVENOUS; SUBCUTANEOUS at 20:59

## 2022-06-17 RX ADMIN — HEPARIN SODIUM 5000 UNITS: 5000 INJECTION INTRAVENOUS; SUBCUTANEOUS at 07:01

## 2022-06-17 RX ADMIN — SODIUM CHLORIDE, PRESERVATIVE FREE 10 ML: 5 INJECTION INTRAVENOUS at 20:57

## 2022-06-17 RX ADMIN — INSULIN LISPRO 2 UNITS: 100 INJECTION, SOLUTION INTRAVENOUS; SUBCUTANEOUS at 12:52

## 2022-06-17 RX ADMIN — TIMOLOL MALEATE 1 DROP: 5 SOLUTION/ DROPS OPHTHALMIC at 20:53

## 2022-06-17 RX ADMIN — ACETAMINOPHEN 650 MG: 325 TABLET ORAL at 20:53

## 2022-06-17 RX ADMIN — ACETAMINOPHEN 650 MG: 325 TABLET ORAL at 13:23

## 2022-06-17 RX ADMIN — LEVOTHYROXINE SODIUM 50 MCG: 50 TABLET ORAL at 07:01

## 2022-06-17 RX ADMIN — HEPARIN SODIUM 5000 UNITS: 5000 INJECTION INTRAVENOUS; SUBCUTANEOUS at 20:52

## 2022-06-17 RX ADMIN — KETOROLAC TROMETHAMINE 1 DROP: 5 SOLUTION OPHTHALMIC at 20:53

## 2022-06-17 RX ADMIN — PREDNISOLONE ACETATE 1 DROP: 10 SUSPENSION/ DROPS OPHTHALMIC at 20:53

## 2022-06-17 RX ADMIN — KETOROLAC TROMETHAMINE 1 DROP: 5 SOLUTION OPHTHALMIC at 17:17

## 2022-06-17 RX ADMIN — KETOROLAC TROMETHAMINE 1 DROP: 5 SOLUTION OPHTHALMIC at 12:53

## 2022-06-17 RX ADMIN — ATORVASTATIN CALCIUM 10 MG: 10 TABLET, FILM COATED ORAL at 20:53

## 2022-06-17 RX ADMIN — CARVEDILOL 25 MG: 25 TABLET, FILM COATED ORAL at 17:38

## 2022-06-17 ASSESSMENT — PAIN SCALES - GENERAL
PAINLEVEL_OUTOF10: 0
PAINLEVEL_OUTOF10: 4

## 2022-06-17 ASSESSMENT — PAIN - FUNCTIONAL ASSESSMENT: PAIN_FUNCTIONAL_ASSESSMENT: ACTIVITIES ARE NOT PREVENTED

## 2022-06-17 NOTE — PROGRESS NOTES
Pt is lying in bed with their eyes closed. Respirations are easy and even. Call light within reach bed in lowest position with the wheels locked. Will continue to monitor.  Clair Gordon RN

## 2022-06-17 NOTE — FLOWSHEET NOTE
06/16/22 2055   Vital Signs   Temp 100 °F (37.8 °C)   Temp Source Oral   Heart Rate 81   Heart Rate Source Monitor   Resp 16   BP (!) 120/59   BP Location Right upper arm   MAP (Calculated) 79.33   Level of Consciousness Alert (0)   MEWS Score 1   Oxygen Therapy   SpO2 96 %   O2 Device Nasal cannula   O2 Flow Rate (L/min) 1 L/min   Pt assessment complete. Pt lying in bed quietly. Lung sounds clear. Pt on 02 1 liter via nasal canula. Pt NSR per monitor with HR of 81. Nightly medications given. PRN tylenol given for temp. Blisters to LLE intact at this time. Pt denies needs. Call light within reach. Bed exit alarm on.

## 2022-06-17 NOTE — PROGRESS NOTES
[facility] called to report spo2 82%. Entered room to find pt sleeping with cannula out of nose. Reapplied ans spo2 increased to 93% via 1L. Will monitor.

## 2022-06-17 NOTE — FLOWSHEET NOTE
06/17/22 1249   Vital Signs   Temp 99.4 °F (37.4 °C)   Temp Source Oral   Heart Rate 89   Heart Rate Source Monitor   Resp 18   BP (!) 166/75   BP Location Right upper arm   BP Method Automatic   MAP (Calculated) 105.33   Patient Position Semi fowlers   Level of Consciousness Alert (0)   MEWS Score 1   Pain Assessment   Pain Assessment None - Denies Pain   Oxygen Therapy   SpO2 95 %   Pulse Oximeter Device Mode Continuous   Pulse Oximeter Device Location Right;Finger   O2 Device None (Room air)     Pt returned from dialysis in stable condition. Assessment complete. VSS. Blisters to LLE continue some at the top of thigh have started to leak. Pt reports blister on lateral RLE. Blister noted with non blanchable redness around. Pt states they noticed this morning on way to dialysis. Encouraged to elevate and reposition however pt will not rotate either legs as instructed. Educated and pt states understanding. Call light and bedside table within reach. Will continue to monitor.

## 2022-06-17 NOTE — PROGRESS NOTES
Dr Guajardo Guest aware of blisters leaking, he states that he does not need a wound culture of the fluid.

## 2022-06-17 NOTE — PROGRESS NOTES
Progress Note    HISTORY     CC:   Found down             We are following for ESRD       Subjective/   HPI:  Seen on dialysis. Mild cramping. Access worked well. Still with blistering to her LEs, more on the left than right. No erythema.       ROS:  Constitutional:  No fevers, No Chills, + weakness  Cardiovascular:  No palpations, + edema  Respiratory:  No wheezing, no cough  Skin:  No rash, no itching  :  No hematuria, no dysuria     Social Hx:  No Family at the bedside     Past Medical and Surgical History:  - Reviewed, no changes     EXAM       Objective/     Vitals:    06/17/22 0645 06/17/22 0725 06/17/22 1052 06/17/22 1249   BP:  129/64 (!) 153/66 (!) 166/75   Pulse:  80 84 89   Resp:  16 16 18   Temp:  97.8 °F (36.6 °C) 98.2 °F (36.8 °C) 99.4 °F (37.4 °C)   TempSrc:    Oral   SpO2:  98% 99% 95%   Weight: 176 lb 4.8 oz (80 kg) 176 lb 4.8 oz (80 kg)     Height:         24HR INTAKE/OUTPUT:      Intake/Output Summary (Last 24 hours) at 6/17/2022 1343  Last data filed at 6/17/2022 1302  Gross per 24 hour   Intake 360 ml   Output 0 ml   Net 360 ml     Constitutional:  Alert, awake, no apparent distress  Eyes:  Pupils reactive, sclera clear   Neck:  Normal thyroid, no masses   Cardiovascular:  Regular, no rub  Respiratory:  No distress, no wheezing  Psychiatry:  Appropriate mood/affect, alert  Abdomen: +bs, soft, nt, no masses   Musculoskeletal:+ LE edema, blistering to the inside of the left leg, no erythema, no clubbing   Lymphatics:  No LAD in neck, no supraclavicular nodes   Access:  Left upper arm AVF       MEDICAL DECISION MAKING       Data/  Recent Labs     06/15/22  0513 06/16/22  0502 06/17/22  0437   WBC 9.0 7.6 8.3   HGB 10.0* 10.8* 10.6*   HCT 31.2* 34.0* 33.2*   MCV 80.5 81.6 81.9    189 183     Recent Labs     06/15/22  0513 06/16/22  0502 06/17/22  0436    136 134*   K 4.7 4.6 5.1    100 101   CO2 22 20* 20*   GLUCOSE 129* 115* 118*   BUN 55* 24* 36*   CREATININE 6. 9* 4.4* 6.1*   LABGLOM 6* 10* 7*   GFRAA 7* 12* 8*       Assessment/     ESRD:  On HD MWF with working AVF     Anemia of Chronic Disease-CKD:  Hb at target     Encephalopathy:  Possible heat stroke, fell asleep out on her deck. Culture NGTD. Has resolved      Fever: On antibiotics, cultures NGTD. Fevers improving.   Likely heat stroke      Plan/     Continue HD MWF schedule  Labs on HD days   Does not appear that we can challenge her target weight to help with LE edema blistering as she was cramping today   Monitor for secondary infection    -----------------------------  Rekha Ferro M.D.   [facility]

## 2022-06-17 NOTE — PROGRESS NOTES
6/17  Vanc random = 23.7 mcg/mL at 0436. Hold vancomycin today. Recheck vanc random tomorrow in the AM (6/18 at 0600).   Noy Baez, PharmD  6/17/2022 7:31 AM

## 2022-06-17 NOTE — PROGRESS NOTES
Physical Therapy/Occupational Therapy    Attempted to see pt for PT/OT tx. Pt declining therapy at this time 2/2 recently returning from dialysis, feeling fatigued, and having pain in LE. Will follow-up as PT/OT schedule and pt status permit. No Charge.     Toshia Yusuf, PT, DPT, OMT-C # [id number]  Louisa Oor, OTR/L

## 2022-06-17 NOTE — PROGRESS NOTES
Removed oxygen from pt to trial RA. Spo2 has maintained >90% with this. Continuous pulse ox in place. Will monitor.

## 2022-06-17 NOTE — CARE COORDINATION
INTERDISCIPLINARY PLAN OF CARE CONFERENCE    Date/Time: 6/17/2022 3:10 PM  Completed by: Nabila Wright RN, Case Management      Patient Name:  Suellen Lares  YOB: 1950  Admitting Diagnosis: ESRD on dialysis [facility]) [N18.6, Z99.2]  Left leg cellulitis [L03.116]  Acute respiratory failure with hypoxia ([address] [J96.01]  Episode of unresponsiveness [R41.89]  Sepsis ([address] [A41.9]  Pneumonia of left lower lobe due to infectious organism [J18.9]  Hyperthermia associated with heat [T67.01XA]  Sepsis with acute hypoxic respiratory failure without septic shock, due to unspecified organism ([address] [A41.9, R65.20, J96.01]     Admit Date/Time:  6/14/2022  2:05 PM    Chart reviewed. Interdisciplinary team contacted or reviewed plan related to patient progress and discharge plans. Disciplines included Case Management, Nursing, and Dietitian. Current Status:ongoing; had HD today    PT/OT recommendation for discharge plan of care: SNF    Expected D/C Disposition:  Skilled nursing facility  Confirmed plan with patient and/or family Yes confirmed with: (name) pt  Met with:pt  Discharge Plan Comments: Chart reviewed. Met with pt at bedside. Pt is from home alone and is agreeable to STR and wants referral to [facility] as she has been there before. Referral to Omero Bhatt at [facility] at this time. Pt will need precert if accepted. [facility] 10[facility]     Home O2 in place on admit: No    ADDENDUM Spoke with Omero Bhatt from [place] and she is trying to work on transport for HD before she will accept the pt.

## 2022-06-17 NOTE — PROGRESS NOTES
Progress Note    Admit Date:  6/14/2022    67 y.o. female who presented to [facility] with past medical history of type 2 diabetes, hypertension, hyperlipidemia, sepsis, hypothyroidism, end-stage renal disease Monday Wednesday Friday presented to the ED due to patient being encephalopathic loss of conscious. Subjective:    Awake alert and oriented feels better and starts making progress with PTOT, though still unable to ambulate independently. O2 sats drop to 82% without O2 - usually on RA at baseline. Objective:   Patient Vitals for the past 4 hrs:   BP Temp Temp src Pulse Resp SpO2   06/17/22 1249 (!) 166/75 99.4 °F (37.4 °C) Oral 89 18 95 %            Intake/Output Summary (Last 24 hours) at 6/17/2022 1455  Last data filed at 6/17/2022 1302  Gross per 24 hour   Intake 360 ml   Output 0 ml   Net 360 ml       Physical Exam:    Gen: No distress. Alert. Eyes: PERRL. No sclera icterus. No conjunctival injection. ENT: No discharge. Pharynx clear. Neck: No JVD. Trachea midline. Resp: No accessory muscle use. + Right basilar crackles +Left sided crackles appreciated diffusely. No wheezes. No rhonchi. CV: Regular rate. Regular rhythm. No murmur. No rub. No edema. Capillary Refill: Brisk,< 3 seconds   Peripheral Pulses: +2 palpable, equal bilaterally   GI: Non-tender. Non-distended. Normal bowel sounds. Skin: Warm and dry. No nodule on exposed extremities. Multiple large loose fluid filled blisters with surrounding erythema on entire medial aspect of left leg. Surrounding blanchable erythema which is mild. Patient is quite tan which may have reduced severity. M/S: No cyanosis. No joint deformity. No clubbing. Neuro: Awake. Grossly nonfocal    Psych: Oriented x 3. No anxiety or agitation.             Scheduled Meds:   insulin lispro  0-12 Units SubCUTAneous TID WC    insulin lispro  0-6 Units SubCUTAneous Nightly    sodium chloride flush  5-40 mL IntraVENous 2 times per day    heparin (porcine)  5,000 Units SubCUTAneous 3 times per day    cefepime  1,000 mg IntraVENous Q24H    atorvastatin  10 mg Oral Nightly    aspirin  81 mg Oral Daily    carvedilol  25 mg Oral BID WC    ferric citrate  630 mg Oral TID AC    gabapentin  300 mg Oral QPM    insulin glargine  10 Units SubCUTAneous QAM    levothyroxine  50 mcg Oral Daily    NIFEdipine  60 mg Oral Daily    prednisoLONE acetate  1 drop Left Eye 4x Daily    sertraline  25 mg Oral Daily    timolol  1 drop Both Eyes BID    ketorolac  1 drop Left Eye 4x Daily       Continuous Infusions:   sodium chloride 5 mL/hr at 06/16/22 0907    dextrose         PRN Meds:  sodium chloride flush, sodium chloride, magnesium sulfate, ondansetron **OR** ondansetron, polyethylene glycol, acetaminophen **OR** acetaminophen, glucose, dextrose bolus **OR** dextrose bolus, glucagon (rDNA), dextrose      Data:  CBC:   Recent Labs     06/15/22  0513 06/16/22  0502 06/17/22  0437   WBC 9.0 7.6 8.3   HGB 10.0* 10.8* 10.6*   HCT 31.2* 34.0* 33.2*   MCV 80.5 81.6 81.9    189 183     BMP:   Recent Labs     06/15/22  0513 06/16/22  0502 06/17/22  0436    136 134*   K 4.7 4.6 5.1    100 101   CO2 22 20* 20*   BUN 55* 24* 36*   CREATININE 6.9* 4.4* 6.1*     LIVER PROFILE:   No results for input(s): AST, ALT, LIPASE, BILIDIR, BILITOT, ALKPHOS in the last 72 hours. Invalid input(s): AMYLASE,  ALB    CULTURES  Results for Alta Merlin (MRN [id number]) as of 6/15/2022 07:55   Ref. Range 6/14/2022 15:42   INFLUENZA A Latest Ref Range: NOT DETECTED  NOT DETECTED   INFLUENZA B Latest Ref Range: NOT DETECTED  NOT DETECTED   SARS-CoV-2 RNA, RT PCR Latest Ref Range: NOT DETECTED  NOT DETECTED        RADIOLOGY  CT Head WO Contrast   Preliminary Result   1. No evidence of acute intracranial abnormality. 2. Mild paranasal sinus disease as described above. XR CHEST PORTABLE   Final Result   1.   Small left pleural effusion with associated left basilar airspace disease   representing either atelectasis or pneumonia. 2.  Mild cardiomegaly. XR TIBIA FIBULA LEFT (2 VIEWS)   Final Result   Addendum 1 of 1   ADDENDUM:   The impression should read no acute osseous abnormality of the left    tib-fib. Final   Acute osseous abnormality of the left tib-fib. Assessment/Plan:      #Acute encephalopathy resolved  -suspected secondary to nonexertional heat stroke:  -Patient slept outside under the sun overheated by time patient brought in by EMS reported 101.4 and 102.8 on arrival  - body temp now normal.   -CT head negative         #Sepsis criteria met POA  -Temp 102.8, RR 27, , WBC 11.3, pulm source  -Blood cultures obtained x2  -Normal lactic  -BP not requiring pressors  -Abx below       #Possible pneumonia; left sided  - strong suspect aspiration pna; several episodes of emesis Monday night and found by EMS with emesis and AMS  -Left sided rales noted on exam  -Chest x-ray above   -Leukocytosis resolved  -PCT 11.26 - repeat tomorrow am (ordered for Saturday am check). -Strep and legionella ordered; patient is anuric and will not likely be able to provide sample  -Check respiratory culture  -Check MRSA nasal swab - negative - stop Vanc 6/17  - continue cefepime. #Hypoxia - without signs of overt resp failure  -No home O2  -Denies SOB  -Requiring 2-4L since arrival  - drops to 82% per nursing report with minimal activity.   -Wean as tolerated      #Sunburn; 2nd degree burns to left LE POA. -Multiple large loose fluid filled blisters with surrounding erythema on entire medial aspect of left leg and some to the lateral right LE.   -supportive care, monitor for s/s of infection   - wound care follow up.          #ESRD  -HD MWF        #Elevated troponin  -Chronic appears at baseline  -Likely 2/2 ESRD  -No CP       #DMII nephropathy and neuropathy  -Lantus 10 U every morining  -Medium dose SSI  -Continue gabapentin      #Essential Hypertension  -BP elevated  -Continue procardia and coreg      #HLD  -Continue Statin      #Hypothyroidism   -Continue home synthroid      DVT Prophylaxis: Heparin  Diet: ADULT DIET; Regular; 3 carb choices (45 gm/meal); Low Fat/Low Chol/High Fiber/2 gm Na; Low Sodium (2 gm); Low Potassium (Less than 3000 mg/day); Low Phosphorus (Less than 1000 mg); 1500 ml  Code Status: Full Code      Pt making slow progress with PTOT. Weaning O2. If improves - she would prefer to discharge home with family. If ambulation remains challenging, she understands short term rehab may be helpful prior to returning to independent living.  She has not made a decision yet - she wants to talk to family first.         Halie Lambert MD  6/17/2022 2:55 PM

## 2022-06-17 NOTE — FLOWSHEET NOTE
Treatment time: 195min  Net UF: 1000ml    Pre weight: 79.9k  Post weight: Pt difficult to standup due to blisters in leg. Bed scale 5lbs less pre tx compare to standing scale  EDW: 79k     Access used: KIRK AVF  Access function: Good    Medications or blood products given: None    Regular outpatient schedule: MWF/C [place]    Summary of response to treatment: Tolerated well. Copy of dialysis treatment record placed in chart, to be scanned into EMR.

## 2022-06-17 NOTE — PROGRESS NOTES
Attempted to reposition bilateral legs to help relieve pressure but pt continues to roll ble back to origional position which is causing more pressure to areas. She states understanding with wounds and preventatives for issue but states it hard to do. Staff continues to go in and remind pt and help her reposition.

## 2022-06-18 LAB
ANION GAP SERPL CALCULATED.3IONS-SCNC: 16 MMOL/L (ref 3–16)
BASOPHILS ABSOLUTE: 0.1 K/UL (ref 0–0.2)
BASOPHILS RELATIVE PERCENT: 1 %
BLOOD CULTURE, ROUTINE: NORMAL
BUN BLDV-MCNC: 25 MG/DL (ref 7–20)
CALCIUM SERPL-MCNC: 9.1 MG/DL (ref 8.3–10.6)
CHLORIDE BLD-SCNC: 98 MMOL/L (ref 99–110)
CO2: 22 MMOL/L (ref 21–32)
CREAT SERPL-MCNC: 4.8 MG/DL (ref 0.6–1.2)
CULTURE, BLOOD 2: NORMAL
EOSINOPHILS ABSOLUTE: 0.2 K/UL (ref 0–0.6)
EOSINOPHILS RELATIVE PERCENT: 3.2 %
GFR AFRICAN AMERICAN: 11
GFR NON-AFRICAN AMERICAN: 9
GLUCOSE BLD-MCNC: 141 MG/DL (ref 70–99)
GLUCOSE BLD-MCNC: 142 MG/DL (ref 70–99)
GLUCOSE BLD-MCNC: 155 MG/DL (ref 70–99)
GLUCOSE BLD-MCNC: 170 MG/DL (ref 70–99)
GLUCOSE BLD-MCNC: 172 MG/DL (ref 70–99)
GLUCOSE BLD-MCNC: 248 MG/DL (ref 70–99)
HCT VFR BLD CALC: 30.8 % (ref 36–48)
HEMOGLOBIN: 9.9 G/DL (ref 12–16)
LYMPHOCYTES ABSOLUTE: 1.3 K/UL (ref 1–5.1)
LYMPHOCYTES RELATIVE PERCENT: 16.7 %
MCH RBC QN AUTO: 25.9 PG (ref 26–34)
MCHC RBC AUTO-ENTMCNC: 32.3 G/DL (ref 31–36)
MCV RBC AUTO: 80.2 FL (ref 80–100)
MONOCYTES ABSOLUTE: 0.7 K/UL (ref 0–1.3)
MONOCYTES RELATIVE PERCENT: 9.6 %
NEUTROPHILS ABSOLUTE: 5.2 K/UL (ref 1.7–7.7)
NEUTROPHILS RELATIVE PERCENT: 69.5 %
PDW BLD-RTO: 21.6 % (ref 12.4–15.4)
PERFORMED ON: ABNORMAL
PLATELET # BLD: 187 K/UL (ref 135–450)
PMV BLD AUTO: 8.1 FL (ref 5–10.5)
POTASSIUM REFLEX MAGNESIUM: 4.3 MMOL/L (ref 3.5–5.1)
PROCALCITONIN: 15.41 NG/ML (ref 0–0.15)
RBC # BLD: 3.84 M/UL (ref 4–5.2)
SODIUM BLD-SCNC: 136 MMOL/L (ref 136–145)
WBC # BLD: 7.5 K/UL (ref 4–11)

## 2022-06-18 PROCEDURE — 97116 GAIT TRAINING THERAPY: CPT

## 2022-06-18 PROCEDURE — 6370000000 HC RX 637 (ALT 250 FOR IP)

## 2022-06-18 PROCEDURE — 97530 THERAPEUTIC ACTIVITIES: CPT

## 2022-06-18 PROCEDURE — 84145 PROCALCITONIN (PCT): CPT

## 2022-06-18 PROCEDURE — 2060000000 HC ICU INTERMEDIATE R&B

## 2022-06-18 PROCEDURE — 80048 BASIC METABOLIC PNL TOTAL CA: CPT

## 2022-06-18 PROCEDURE — 36415 COLL VENOUS BLD VENIPUNCTURE: CPT

## 2022-06-18 PROCEDURE — 6360000002 HC RX W HCPCS

## 2022-06-18 PROCEDURE — 2580000003 HC RX 258

## 2022-06-18 PROCEDURE — 6370000000 HC RX 637 (ALT 250 FOR IP): Performed by: INTERNAL MEDICINE

## 2022-06-18 PROCEDURE — 85025 COMPLETE CBC W/AUTO DIFF WBC: CPT

## 2022-06-18 RX ADMIN — INSULIN LISPRO 1 UNITS: 100 INJECTION, SOLUTION INTRAVENOUS; SUBCUTANEOUS at 21:37

## 2022-06-18 RX ADMIN — PREDNISOLONE ACETATE 1 DROP: 10 SUSPENSION/ DROPS OPHTHALMIC at 08:26

## 2022-06-18 RX ADMIN — KETOROLAC TROMETHAMINE 1 DROP: 5 SOLUTION OPHTHALMIC at 08:26

## 2022-06-18 RX ADMIN — INSULIN GLARGINE 10 UNITS: 100 INJECTION, SOLUTION SUBCUTANEOUS at 08:27

## 2022-06-18 RX ADMIN — PREDNISOLONE ACETATE 1 DROP: 10 SUSPENSION/ DROPS OPHTHALMIC at 16:25

## 2022-06-18 RX ADMIN — HEPARIN SODIUM 5000 UNITS: 5000 INJECTION INTRAVENOUS; SUBCUTANEOUS at 15:04

## 2022-06-18 RX ADMIN — KETOROLAC TROMETHAMINE 1 DROP: 5 SOLUTION OPHTHALMIC at 21:39

## 2022-06-18 RX ADMIN — HEPARIN SODIUM 5000 UNITS: 5000 INJECTION INTRAVENOUS; SUBCUTANEOUS at 21:37

## 2022-06-18 RX ADMIN — SODIUM CHLORIDE, PRESERVATIVE FREE 10 ML: 5 INJECTION INTRAVENOUS at 21:39

## 2022-06-18 RX ADMIN — PREDNISOLONE ACETATE 1 DROP: 10 SUSPENSION/ DROPS OPHTHALMIC at 21:39

## 2022-06-18 RX ADMIN — ATORVASTATIN CALCIUM 10 MG: 10 TABLET, FILM COATED ORAL at 21:37

## 2022-06-18 RX ADMIN — TIMOLOL MALEATE 1 DROP: 5 SOLUTION/ DROPS OPHTHALMIC at 21:39

## 2022-06-18 RX ADMIN — CARVEDILOL 25 MG: 25 TABLET, FILM COATED ORAL at 09:44

## 2022-06-18 RX ADMIN — INSULIN LISPRO 4 UNITS: 100 INJECTION, SOLUTION INTRAVENOUS; SUBCUTANEOUS at 11:25

## 2022-06-18 RX ADMIN — GABAPENTIN 300 MG: 300 CAPSULE ORAL at 17:37

## 2022-06-18 RX ADMIN — CARVEDILOL 25 MG: 25 TABLET, FILM COATED ORAL at 17:37

## 2022-06-18 RX ADMIN — KETOROLAC TROMETHAMINE 1 DROP: 5 SOLUTION OPHTHALMIC at 13:19

## 2022-06-18 RX ADMIN — LEVOTHYROXINE SODIUM 50 MCG: 50 TABLET ORAL at 06:40

## 2022-06-18 RX ADMIN — NIFEDIPINE 60 MG: 30 TABLET, FILM COATED, EXTENDED RELEASE ORAL at 09:44

## 2022-06-18 RX ADMIN — PREDNISOLONE ACETATE 1 DROP: 10 SUSPENSION/ DROPS OPHTHALMIC at 13:19

## 2022-06-18 RX ADMIN — TIMOLOL MALEATE 1 DROP: 5 SOLUTION/ DROPS OPHTHALMIC at 08:26

## 2022-06-18 RX ADMIN — CEFEPIME HYDROCHLORIDE 1000 MG: 1 INJECTION, POWDER, FOR SOLUTION INTRAMUSCULAR; INTRAVENOUS at 21:37

## 2022-06-18 RX ADMIN — SODIUM CHLORIDE, PRESERVATIVE FREE 10 ML: 5 INJECTION INTRAVENOUS at 09:45

## 2022-06-18 RX ADMIN — ASPIRIN 81 MG: 81 TABLET, COATED ORAL at 09:44

## 2022-06-18 RX ADMIN — SERTRALINE HYDROCHLORIDE 25 MG: 50 TABLET ORAL at 09:44

## 2022-06-18 RX ADMIN — HEPARIN SODIUM 5000 UNITS: 5000 INJECTION INTRAVENOUS; SUBCUTANEOUS at 06:41

## 2022-06-18 RX ADMIN — INSULIN LISPRO 2 UNITS: 100 INJECTION, SOLUTION INTRAVENOUS; SUBCUTANEOUS at 16:27

## 2022-06-18 RX ADMIN — KETOROLAC TROMETHAMINE 1 DROP: 5 SOLUTION OPHTHALMIC at 16:25

## 2022-06-18 ASSESSMENT — PAIN SCALES - GENERAL
PAINLEVEL_OUTOF10: 0
PAINLEVEL_OUTOF10: 0

## 2022-06-18 NOTE — PROGRESS NOTES
Pt is lying in bed with their eyes closed. Respirations are easy and even. Call light within reach bed in lowest position with the wheels locked. Will continue to monitor.  Eran Diego RN

## 2022-06-18 NOTE — PROGRESS NOTES
Progress Note    Admit Date:  6/14/2022    67 y.o. female who presented to [facility] with past medical history of type 2 diabetes, hypertension, hyperlipidemia, sepsis, hypothyroidism, end-stage renal disease Monday Wednesday Friday presented to the ED due to patient being encephalopathic loss of conscious. Subjective:  Patient is continuing to improve daily. Making progress with PT OT.    awake, alert, oriented    As per discussions yesterday, plan was to go to SNF. Plan to start pre-CERT on Monday. Patient currently starting to feel even better, continue to monitor over the weekend and discuss DC plans on Monday. Patient had high fevers on presentation, T-max improving daily. T-max overnight was 101.4 °F.  Afebrile today. Cultures are negative so far. Procalcitonin still elevated   Oxygen saturations improving, O2 requirement down to 1 L now. She is not on home oxygen. .      Objective:   Patient Vitals for the past 4 hrs:   BP Temp Temp src Pulse Resp SpO2   06/18/22 0930 139/67 97.1 °F (36.2 °C) Oral 80 17 96 %            Intake/Output Summary (Last 24 hours) at 6/18/2022 1017  Last data filed at 6/18/2022 0841  Gross per 24 hour   Intake 540 ml   Output --   Net 540 ml       Physical Exam:    Gen: No distress. Alert. Eyes: PERRL. No sclera icterus. No conjunctival injection. ENT: No discharge. Pharynx clear. Neck: No JVD. Trachea midline. Resp: No accessory muscle use. + Right basilar crackles +Left sided crackles appreciated diffusely. No wheezes. No rhonchi. CV: Regular rate. Regular rhythm. No murmur. No rub. No edema. Capillary Refill: Brisk,< 3 seconds   Peripheral Pulses: +2 palpable, equal bilaterally   GI: Non-tender. Non-distended. Normal bowel sounds. Skin: Warm and dry. No nodule on exposed extremities. Multiple large loose fluid filled blisters with surrounding erythema on entire medial aspect of left leg. Surrounding blanchable erythema which is mild.  Patient is quite tan which may have reduced severity. M/S: No cyanosis. No joint deformity. No clubbing. Neuro: Awake. Grossly nonfocal    Psych: Oriented x 3. No anxiety or agitation. Scheduled Meds:   insulin lispro  0-12 Units SubCUTAneous TID WC    insulin lispro  0-6 Units SubCUTAneous Nightly    sodium chloride flush  5-40 mL IntraVENous 2 times per day    heparin (porcine)  5,000 Units SubCUTAneous 3 times per day    cefepime  1,000 mg IntraVENous Q24H    atorvastatin  10 mg Oral Nightly    aspirin  81 mg Oral Daily    carvedilol  25 mg Oral BID WC    ferric citrate  630 mg Oral TID AC    gabapentin  300 mg Oral QPM    insulin glargine  10 Units SubCUTAneous QAM    levothyroxine  50 mcg Oral Daily    NIFEdipine  60 mg Oral Daily    prednisoLONE acetate  1 drop Left Eye 4x Daily    sertraline  25 mg Oral Daily    timolol  1 drop Both Eyes BID    ketorolac  1 drop Left Eye 4x Daily       Continuous Infusions:   sodium chloride 5 mL/hr at 06/16/22 0907    dextrose         PRN Meds:  sodium chloride flush, sodium chloride, magnesium sulfate, ondansetron **OR** ondansetron, polyethylene glycol, acetaminophen **OR** acetaminophen, glucose, dextrose bolus **OR** dextrose bolus, glucagon (rDNA), dextrose      Data:  CBC:   Recent Labs     06/16/22  0502 06/17/22  0437 06/18/22  0423   WBC 7.6 8.3 7.5   HGB 10.8* 10.6* 9.9*   HCT 34.0* 33.2* 30.8*   MCV 81.6 81.9 80.2    183 187     BMP:   Recent Labs     06/16/22  0502 06/17/22  0436 06/18/22  0423    134* 136   K 4.6 5.1 4.3    101 98*   CO2 20* 20* 22   BUN 24* 36* 25*   CREATININE 4.4* 6.1* 4.8*     LIVER PROFILE:   No results for input(s): AST, ALT, LIPASE, BILIDIR, BILITOT, ALKPHOS in the last 72 hours. Invalid input(s): AMYLASE,  ALB    CULTURES  Results for Finn Wright (MRN [id number]) as of 6/15/2022 07:55   Ref.  Range 6/14/2022 15:42   INFLUENZA A Latest Ref Range: NOT DETECTED  NOT DETECTED   INFLUENZA B Latest Ref Range: NOT DETECTED  NOT DETECTED   SARS-CoV-2 RNA, RT PCR Latest Ref Range: NOT DETECTED  NOT DETECTED     Sputum cultures negative so far  MRSA screen not detected  Blood cultures no growth to date       RADIOLOGY  CT Head WO Contrast   Final Result   1. No evidence of acute intracranial abnormality. 2. Mild paranasal sinus disease as described above. XR CHEST PORTABLE   Final Result   1. Small left pleural effusion with associated left basilar airspace disease   representing either atelectasis or pneumonia. 2.  Mild cardiomegaly. XR TIBIA FIBULA LEFT (2 VIEWS)   Final Result   Addendum 1 of 1   ADDENDUM:   The impression should read no acute osseous abnormality of the left    tib-fib. Final   Acute osseous abnormality of the left tib-fib. Assessment/Plan:      #Acute encephalopathy resolved  -suspected secondary to nonexertional heat stroke:  -Patient slept outside under the sun overheated by time patient brought in by EMS reported 101.4 and 102.8 on arrival  - body temp now normal.   -CT head negative  -Afebrile today, mental status is clear    #Sepsis  - criteria met POA  -Temp 102.8, RR 27, , WBC 11.3, pulm source  -Blood cultures obtained x2  -Normal lactic  -BP not requiring pressors  -Abx below  Procalcitonin 11.26 on presentation, repeat procalcitonin today 15.41. Continue to monitor     #Pneumonia; left sided  -Appears to be due to aspiration pneumonia; patient had several episodes of emesis Monday night and found by EMS with emesis and AMS  -Chest x-ray above   -Leukocytosis resolved  -Procalcitonin still elevated  -Was on antibiotics vancomycin and cefepime. Vancomycin stopped now as MRSA nasal swab negative. Continue cefepime  -Strep and legionella ordered-results pending  -Check respiratory culture-negative so far    #Hypoxia - without signs of overt resp failure  -No home O2  -Secondary to pneumonia  -Denies SOB.   Patient was desaturating quickly on room air  -Patient is required 2 to 4 L of oxygen since admission. O2 requirement improving daily. Currently weaned down to2L -> 1L   -Her O2 sats on room air with ambulation  -Wean as tolerated    #Sunburn; 2nd degree burns to left LE POA. -Multiple large loose fluid filled blisters with surrounding erythema on entire medial aspect of left leg and some to the lateral right LE.   -supportive care, monitor for s/s of infection   - wound care follow up. #ESRD  -HD MWF    #Elevated troponin  -Chronic appears at baseline  -Likely 2/2 ESRD  -No CP     #DMII nephropathy and neuropathy  -Lantus 10 U every morining  -Medium dose SSI  -Continue gabapentin    #Essential Hypertension  -BP elevated  -Continue procardia and coreg    #HLD  -Continue Statin    #Hypothyroidism   -Continue home synthroid      DVT Prophylaxis: Heparin  Diet: ADULT DIET; Regular; 3 carb choices (45 gm/meal); Low Fat/Low Chol/High Fiber/2 gm Na; Low Sodium (2 gm); Low Potassium (Less than 3000 mg/day); Low Phosphorus (Less than 1000 mg); 1500 ml  Code Status: Full Code      Pt making slow progress with PTOT. Weaning O2. If improves - she would prefer to discharge home with family. If ambulation remains challenging, she understands short term rehab may be helpful prior to returning to independent living.         Ijeoma Dumont MD  6/18/2022 10:17 AM

## 2022-06-18 NOTE — FLOWSHEET NOTE
06/18/22 1315   Vital Signs   Temp 97.9 °F (36.6 °C)   Temp Source Oral   Heart Rate 75   Heart Rate Source Monitor   Resp 18   BP (!) 112/57   MAP (Calculated) 75.33   Patient Position Semi fowlers   Level of Consciousness Alert (0)   MEWS Score 1   Pain Assessment   Pain Assessment None - Denies Pain   Pain Level 0   Patient's Stated Pain Goal 0 - No pain   Oxygen Therapy   SpO2 94 %   Pulse Oximeter Device Mode Continuous   O2 Flow Rate (L/min) 0 L/min       Afternoon vitals completed. Meds given per MAR.     Jo Ann Vasquez RN

## 2022-06-18 NOTE — PROGRESS NOTES
Progress Note    HISTORY     CC:   Found down             We are following for ESRD       Subjective/   HPI:  No issues with dialysis. Mild cramping. Access worked well. Still with blistering to her LEs, more on the left than right. No erythema.        ROS:  Constitutional:  No fevers, No Chills, + weakness  Cardiovascular:  No palpations, + edema  Respiratory:  No wheezing, no cough  Skin:  No rash, no itching  :  No hematuria, no dysuria     Social Hx:  No Family at the bedside     Past Medical and Surgical History:  - Reviewed, no changes     EXAM       Objective/     Vitals:    06/18/22 0000 06/18/22 0145 06/18/22 0930 06/18/22 1315   BP:  121/60 139/67 (!) 112/57   Pulse:  73 80 75   Resp:  16 17 18   Temp:  97.8 °F (36.6 °C) 97.1 °F (36.2 °C) 97.9 °F (36.6 °C)   TempSrc:  Oral Oral Oral   SpO2:  95% 96% 94%   Weight: 171 lb 9 oz (77.8 kg)      Height:         24HR INTAKE/OUTPUT:      Intake/Output Summary (Last 24 hours) at 6/18/2022 1438  Last data filed at 6/18/2022 1246  Gross per 24 hour   Intake 360 ml   Output --   Net 360 ml     Constitutional:  Alert, awake, no apparent distress  Eyes:  Pupils reactive, sclera clear   Neck:  Normal thyroid, no masses   Cardiovascular:  Regular, no rub  Respiratory:  No distress, no wheezing  Psychiatry:  Appropriate mood/affect, alert  Abdomen: +bs, soft, nt, no masses   Musculoskeletal: +mild LE edema, blistering to the inside of the left leg, no erythema, no clubbing   Lymphatics:  No LAD in neck, no supraclavicular nodes   Access:  Left upper arm AVF       MEDICAL DECISION MAKING       Data/  Recent Labs     06/16/22  0502 06/17/22  0437 06/18/22  0423   WBC 7.6 8.3 7.5   HGB 10.8* 10.6* 9.9*   HCT 34.0* 33.2* 30.8*   MCV 81.6 81.9 80.2    183 187     Recent Labs     06/16/22  0502 06/17/22  0436 06/18/22  0423    134* 136   K 4.6 5.1 4.3    101 98*   CO2 20* 20* 22   GLUCOSE 115* 118* 141*   BUN 24* 36* 25*   CREATININE 4.4* 6.1* 4.8*   LABGLOM 10* 7* 9*   GFRAA 12* 8* 11*       Assessment/     ESRD:  On HD MWF with working AVF     Anemia of Chronic Disease-CKD:  Hgb near target     Encephalopathy:  Possible heat stroke, fell asleep out on her deck. Culture NGTD. Has resolved      Fever: On antibiotics, cultures NGTD. Fevers variable. Possible heat stroke. Procalcitonin is elevated. Plan/     Continue HD MWF schedule   Labs on HD days   Does not appear that we can challenge her target weight to help with LE edema blistering as she was cramping today   Monitor for secondary infection  May need to increase EPO     -----------------------------  RADHA Harding MD   [facility]

## 2022-06-18 NOTE — FLOWSHEET NOTE
06/17/22 2039   Vital Signs   Temp (!) 101.4 °F (38.6 °C)   Temp Source Oral   Heart Rate 86   Heart Rate Source Monitor   Resp 18   BP (!) 149/70   BP Location Right upper arm   MAP (Calculated) 96.33   Level of Consciousness Alert (0)   MEWS Score 3   Oxygen Therapy   SpO2 92 %   O2 Device None (Room air)   Pt assessment complete. Pt lying in bed quietly. Pt NSR per monitor with HR of 86. Lung sounds clear. Pt on room air. Nightly medications given. PRN tylenol given for temp of 101.4. Pt to administer her own eye drops. Denies needs. Call light within reach. Bed exit alarm on.

## 2022-06-18 NOTE — PROGRESS NOTES
Inpatient Physical Therapy Daily Treatment Note    Unit: PCU  Date:  6/18/2022  Patient Name:    Mallika Rollins  Admitting diagnosis:  ESRD on dialysis [facility]) [N18.6, Z99.2]  Left leg cellulitis [U61.236]  Acute respiratory failure with hypoxia ([address] [J96.01]  Episode of unresponsiveness [R41.89]  Sepsis ([address] [A41.9]  Pneumonia of left lower lobe due to infectious organism [J18.9]  Hyperthermia associated with heat [T67.01XA]  Sepsis with acute hypoxic respiratory failure without septic shock, due to unspecified organism ([address] [A41.9, R65.20, J96.01]  Admit Date:  6/14/2022  Precautions/Restrictions:  Fall risk, Bed/chair alarm, Lines -IV and Telemetry, Blisters BLE-wrapped by nursing prior to transfers and ambulation      Discharge Recommendations: SNF  DME needs for discharge: defer to facility       Therapy recommendation for EMS Transport: requires transport by cot due to impaired balance with transfers,blisteres BLE    Therapy recommendations for staff:   Assist of 2 with use of rolling walker (RW) and gait belt for all transfers to/from [facility]  to/from chair  Could use  stedy if fatigued      Home Health S4 Level Recommendation: NA  AM-PAC Mobility Score History of Present Illness: Per H&P Chely  \" 89 y. o. female who presented to [facility] with past medical history of type 2 diabetes, hypertension, hyperlipidemia, sepsis, hypothyroidism, end-stage renal disease Monday Wednesday Friday presented to the ED due to patient being encephalopathic loss of conscious.     Patient was found in the back porch unresponsive.  In the scene patient was noted to be vomited EMS was called and was noted to have a fever 101. 2.  Patient went to dialysis this morning was dialyzed her usual dose and then went home.  Patient does not member what occurred after dialysis. Shaila  denies having abdominal pain chest pain cough phlegm production back pain.     On my evaluation patient mentation resolved. Shaila  reports that she had dialysis yesterday went well reported that she might have gotten fluid more than usual.  Patient reports she went home and then the next morning patient went to the [facility] and fell asleep under the sun.  Patient then was found by the neighbor unconscious and EMS was called. Steffanie Homans noted to have fever.  Patient reports that she felt very hot and improved significantly after giving fluids.  Patient otherwise denied having any fever any chills cough phlegm production chest pain abdominal pain or dysuria.  Patient reports she produces minimal urine otherwise.  Patient reports that she did not regain consciousness until 2 PM today\"  AM-PAC Inpatient Mobility Raw Score : Earnest Llamas scored a 16/24 on the AM-PAC short mobility form. Current research shows that an AM-PAC score of 17 or less is typically not associated with a discharge to the patient's home setting. If patient discharges prior to next session this note will serve as a discharge summary. Please see below for the latest assessment towards goals. Treatment Time:  14:30-14:55  Treatment number: 2  Timed Code Treatment Minutes: 25 minutes  Total Treatment Minutes:  25  minutes    Cognition    A&O orientation not directly assessed. Able to follow 2 step commands    Subjective  Patient sitting EOB with no family present   Pt agreeable to this PT tx.      Pain   No    Bed Mobility   Supine to Sit:    Supervision  Sit to Supine:   Not Tested  Rolling:   Not Tested  Scooting:   CGA    Transfer Training     Sit to stand:   Min A ,VC for hand placement on RW  Stand to sit:   Min A ,VC for safety,hand placement on chair  Bed to Chair:   Mod A  with use of gait belt and rolling walker (RW)    Gait Training gait completed as indicated below  Distance:      10 ft  Deviations (firm surface/linoleum):  decreased sydnie, narrow JIMMY, forward flexed posture and decreased step length bilaterally  Assistive Device Used:    gait belt and rolling walker (RW)  Level of Assist:    Mod A   Comment: mod A for balance,which did improve with practice    Stair Training deferred, pt unsafe/not appropriate to complete stairs at this time      Balance  Sitting:  Fair ; CGA  Comments: 1 posterior LOB    Standing: Fair -; Mod A   Comments: using RW,gait belt,improved with time. Patient Education      Role of PT, POC, Discharge recommendations, DC recommendations, safety awareness, transfer techniques and calling for assist with mobility. Positioning Needs       Pt up in chair, alarm set, positioned in proper neutral alignment and pressure relief provided. Call light provided and all needs within reach    Activity Tolerance   Pt completed therapy session with No adverse symptoms noted w/activity. Other  RN aware of level of assist    Assessment :  Patient made good progress this date, able to progress transfers and short distance ambulation. Recommending SNF upon discharge as patient functioning well below baseline, demonstrates good rehab potential and unable to return home due to limited or no family support, inability to negotiate stairs to enter home/bedroom/bathroom, burden of care beyond caregiver ability and home environment not conducive to patient recovery. Goals : To be met in 3 visits:  1). Independent with LE Ex x 10 reps  2.) Bed to chair: CGA     To be met in 6 visits:  1). Supine to/from sit: Independent  2). Sit to/from stand: Independent  3). Bed to chair: Independent  4). Gait: Ambulate 125 ft.   with  SBA and use of LRAD (least restrictive assistive device)  5). Tolerate B LE exercises 3 sets of 10-15 reps  6). Ascend/descend 1 steps with SBA with use of no handrail and LRAD (least restrictive assistive device)    Plan   Continue with plan of care. Signature: Camila Triplett, PT #[id number]    If patient discharges from this facility prior to next visit, this note will serve as the Discharge Summary.

## 2022-06-18 NOTE — PROGRESS NOTES
Occupational Therapy Daily Treatment Note    Unit: PCU  Date:  6/18/2022  Patient Name:    Matt Campbell  Admitting diagnosis:  ESRD on dialysis [facility]) [N18.6, Z99.2]  Left leg cellulitis [W02.908]  Acute respiratory failure with hypoxia ([address] [J96.01]  Episode of unresponsiveness [R41.89]  Sepsis ([address] [A41.9]  Pneumonia of left lower lobe due to infectious organism [J18.9]  Hyperthermia associated with heat [T67.01XA]  Sepsis with acute hypoxic respiratory failure without septic shock, due to unspecified organism ([facility] [A41.9, R65.20, J96.01]  Admit Date:  6/14/2022  Precautions/Restrictions:  fall risk, bed/chair alarm, supplemental O2 (1L), telemetry and continuous pulse ox    Treatment Time:  4273-6672  Treatment number:  3   Total Treatment Time:   25 minutes    Patient Goals for Session:  \" to get up \"      Discharge Recommendations: SNF  DME needs for discharge: defer to facility       Therapy recommendations for staff:  Assist of 1 with use of rolling walker and gait belt for all transfers to/from BSC/chair    History of Present Illness: Per H&P 72 y. o. female who presented to [facility] with past medical history of type 2 diabetes, hypertension, hyperlipidemia, sepsis, hypothyroidism, end-stage renal disease Monday Wednesday Friday presented to the ED due to patient being encephalopathic loss of conscious.     Patient was found in the back porch unresponsive.  In the scene patient was noted to be vomited EMS was called and was noted to have a fever 101. 2.  Patient went to dialysis this morning was dialyzed her usual dose and then went home.  Patient does not member what occurred after dialysis. Yvon Do denies having abdominal pain chest pain cough phlegm production back pain.     On my evaluation patient mentation resolved. Yvon Do reports that she had dialysis yesterday went well reported that she might have gotten fluid more than usual.  Patient reports she went home and then the next morning patient went to the [facility] and fell asleep under the sun.  Patient then was found by the neighbor unconscious and EMS was called. Kaylynn Lindsay noted to have fever.  Patient reports that she felt very hot and improved significantly after giving fluids.  Patient otherwise denied having any fever any chills cough phlegm production chest pain abdominal pain or dysuria.  Patient reports she produces minimal urine otherwise.  Patient reports that she did not regain consciousness until 2 PM today    Home Health S4 Level Recommendation:  NA    AM-PAC Score: AM-PAC Inpatient Daily Activity Raw Score: 20  Pt scored a 20/24 on the AM-PAC ADL Inpatient form. Current research shows that an AM-PAC score of 18 or greater is associated with a discharge to the patient's home setting. Subjective:  Pt supine in bed upon therapist arrival. Pt agreeable to work with therapy this date. Pain   No  Rating: NA  Location:   Pain Medicine Status: No request made      Cognition:    A&O Person, Place, Time and Situation   Able to follow 2 step commands, consistently. Balance:  Functional Sitting Balance:  Diminished   Comments: Good - at EOB, 1 posterior LOB   Functional Standing Balance:Impaired   Comments:  Mod A for static standing, improved over time     Bed mobility:    Supine to sit:   supervision  Sit to supine:   Not Tested  Rolling:    Not Tested  Scooting in sitting:  CGA  Scooting to head of bed:   Not Tested   Bridging:   Not tested    Transfers:    Sit to stand:  minimal assistance (25%)  Stand to sit:  minimal assistance (25%)  Bed to chair:   moderate assistance (50%) and with rolling walker   Standard toilet: Not Tested  Bed to [facility]:  Not Tested    Activities of Daily Living:   UB Dressing:   Not Tested  LB Dressing:    Not Tested  UB Bathing:  Not Tested  LB Bathing:  Not Tested  Feeding:  Not Tested  Grooming:   Not Tested  Toileting:  Not Tested    Activity Tolerance:   Pt completed therapy session with No adverse symptoms noted w/activity    Therapeutic Exercise:   N/A    Patient Education:   Role of OT  Recommendations for DC    Positioning Needs:   Up in chair, call light and needs in reach. Alarm Set    Family Present:  No    Assessment: Pt with good progress this date. Able to tolerate transfer to chair and demonstrated improved sitting balance. Pt may benefit from continued skilled occupational therapy while in the hospital in order to progress to a safe and more indpt level of functioning. GOALS  To be met in 3 Visits:  1). Bed to toilet/BSC: SBA     To be met in 5 Visits:  1). Supine to/from Sit:             SBA  2). Upper Body Bathing:         SBA  3). Lower Body Bathing:         SBA  4). Upper Body Dressing:       SBA  5). Lower Body Dressing:       SBA  6).  Pt to demonstrate UE exs x 15 reps with minimal cues      Plan: cont with POC      Sharmila Rivero, KEVIN, OTR/L   [id number]       If patient discharges from this facility prior to next visit, this note will serve as the Discharge Summary

## 2022-06-18 NOTE — PROGRESS NOTES
[facility]side [facility] and [facility] Pt currently resting in bed with the call light within reach. Pt denies any other care needs at this time. Pt stable at this time.     Patricia Mayberry RN

## 2022-06-19 LAB
ANION GAP SERPL CALCULATED.3IONS-SCNC: 14 MMOL/L (ref 3–16)
BASOPHILS ABSOLUTE: 0.1 K/UL (ref 0–0.2)
BASOPHILS RELATIVE PERCENT: 1 %
BUN BLDV-MCNC: 40 MG/DL (ref 7–20)
CALCIUM SERPL-MCNC: 9.1 MG/DL (ref 8.3–10.6)
CHLORIDE BLD-SCNC: 96 MMOL/L (ref 99–110)
CO2: 22 MMOL/L (ref 21–32)
CREAT SERPL-MCNC: 6.4 MG/DL (ref 0.6–1.2)
EOSINOPHILS ABSOLUTE: 0.3 K/UL (ref 0–0.6)
EOSINOPHILS RELATIVE PERCENT: 4.6 %
GFR AFRICAN AMERICAN: 8
GFR NON-AFRICAN AMERICAN: 6
GLUCOSE BLD-MCNC: 128 MG/DL (ref 70–99)
GLUCOSE BLD-MCNC: 133 MG/DL (ref 70–99)
GLUCOSE BLD-MCNC: 173 MG/DL (ref 70–99)
GLUCOSE BLD-MCNC: 173 MG/DL (ref 70–99)
GLUCOSE BLD-MCNC: 224 MG/DL (ref 70–99)
HCT VFR BLD CALC: 29.7 % (ref 36–48)
HEMOGLOBIN: 9.7 G/DL (ref 12–16)
LYMPHOCYTES ABSOLUTE: 1.1 K/UL (ref 1–5.1)
LYMPHOCYTES RELATIVE PERCENT: 16.1 %
MCH RBC QN AUTO: 26.1 PG (ref 26–34)
MCHC RBC AUTO-ENTMCNC: 32.7 G/DL (ref 31–36)
MCV RBC AUTO: 79.8 FL (ref 80–100)
MONOCYTES ABSOLUTE: 0.6 K/UL (ref 0–1.3)
MONOCYTES RELATIVE PERCENT: 8.1 %
NEUTROPHILS ABSOLUTE: 4.8 K/UL (ref 1.7–7.7)
NEUTROPHILS RELATIVE PERCENT: 70.2 %
PDW BLD-RTO: 21.4 % (ref 12.4–15.4)
PERFORMED ON: ABNORMAL
PLATELET # BLD: 201 K/UL (ref 135–450)
PMV BLD AUTO: 8.2 FL (ref 5–10.5)
POTASSIUM REFLEX MAGNESIUM: 4.4 MMOL/L (ref 3.5–5.1)
RBC # BLD: 3.72 M/UL (ref 4–5.2)
SODIUM BLD-SCNC: 132 MMOL/L (ref 136–145)
WBC # BLD: 6.8 K/UL (ref 4–11)

## 2022-06-19 PROCEDURE — 94761 N-INVAS EAR/PLS OXIMETRY MLT: CPT

## 2022-06-19 PROCEDURE — 80048 BASIC METABOLIC PNL TOTAL CA: CPT

## 2022-06-19 PROCEDURE — 85025 COMPLETE CBC W/AUTO DIFF WBC: CPT

## 2022-06-19 PROCEDURE — 2060000000 HC ICU INTERMEDIATE R&B

## 2022-06-19 PROCEDURE — 6370000000 HC RX 637 (ALT 250 FOR IP): Performed by: INTERNAL MEDICINE

## 2022-06-19 PROCEDURE — 6370000000 HC RX 637 (ALT 250 FOR IP)

## 2022-06-19 PROCEDURE — 2700000000 HC OXYGEN THERAPY PER DAY

## 2022-06-19 PROCEDURE — 6360000002 HC RX W HCPCS

## 2022-06-19 PROCEDURE — 2580000003 HC RX 258

## 2022-06-19 PROCEDURE — 36415 COLL VENOUS BLD VENIPUNCTURE: CPT

## 2022-06-19 RX ORDER — CEPHALEXIN 250 MG/1
500 CAPSULE ORAL EVERY 8 HOURS SCHEDULED
Status: DISCONTINUED | OUTPATIENT
Start: 2022-06-19 | End: 2022-06-22 | Stop reason: HOSPADM

## 2022-06-19 RX ADMIN — INSULIN GLARGINE 10 UNITS: 100 INJECTION, SOLUTION SUBCUTANEOUS at 08:34

## 2022-06-19 RX ADMIN — ASPIRIN 81 MG: 81 TABLET, COATED ORAL at 08:33

## 2022-06-19 RX ADMIN — KETOROLAC TROMETHAMINE 1 DROP: 5 SOLUTION OPHTHALMIC at 08:37

## 2022-06-19 RX ADMIN — INSULIN LISPRO 2 UNITS: 100 INJECTION, SOLUTION INTRAVENOUS; SUBCUTANEOUS at 12:14

## 2022-06-19 RX ADMIN — PREDNISOLONE ACETATE 1 DROP: 10 SUSPENSION/ DROPS OPHTHALMIC at 17:15

## 2022-06-19 RX ADMIN — KETOROLAC TROMETHAMINE 1 DROP: 5 SOLUTION OPHTHALMIC at 14:24

## 2022-06-19 RX ADMIN — SODIUM CHLORIDE, PRESERVATIVE FREE 10 ML: 5 INJECTION INTRAVENOUS at 08:32

## 2022-06-19 RX ADMIN — HEPARIN SODIUM 5000 UNITS: 5000 INJECTION INTRAVENOUS; SUBCUTANEOUS at 22:44

## 2022-06-19 RX ADMIN — INSULIN LISPRO 2 UNITS: 100 INJECTION, SOLUTION INTRAVENOUS; SUBCUTANEOUS at 22:47

## 2022-06-19 RX ADMIN — TIMOLOL MALEATE 1 DROP: 5 SOLUTION/ DROPS OPHTHALMIC at 08:37

## 2022-06-19 RX ADMIN — ATORVASTATIN CALCIUM 10 MG: 10 TABLET, FILM COATED ORAL at 22:42

## 2022-06-19 RX ADMIN — PREDNISOLONE ACETATE 1 DROP: 10 SUSPENSION/ DROPS OPHTHALMIC at 14:24

## 2022-06-19 RX ADMIN — CEPHALEXIN 500 MG: 250 CAPSULE ORAL at 14:20

## 2022-06-19 RX ADMIN — PREDNISOLONE ACETATE 1 DROP: 10 SUSPENSION/ DROPS OPHTHALMIC at 08:37

## 2022-06-19 RX ADMIN — TIMOLOL MALEATE 1 DROP: 5 SOLUTION/ DROPS OPHTHALMIC at 22:42

## 2022-06-19 RX ADMIN — SERTRALINE HYDROCHLORIDE 25 MG: 50 TABLET ORAL at 08:33

## 2022-06-19 RX ADMIN — CEPHALEXIN 500 MG: 250 CAPSULE ORAL at 22:42

## 2022-06-19 RX ADMIN — NIFEDIPINE 60 MG: 30 TABLET, FILM COATED, EXTENDED RELEASE ORAL at 08:34

## 2022-06-19 RX ADMIN — KETOROLAC TROMETHAMINE 1 DROP: 5 SOLUTION OPHTHALMIC at 22:40

## 2022-06-19 RX ADMIN — CARVEDILOL 25 MG: 25 TABLET, FILM COATED ORAL at 08:33

## 2022-06-19 RX ADMIN — GABAPENTIN 300 MG: 300 CAPSULE ORAL at 17:11

## 2022-06-19 RX ADMIN — KETOROLAC TROMETHAMINE 1 DROP: 5 SOLUTION OPHTHALMIC at 17:15

## 2022-06-19 RX ADMIN — LEVOTHYROXINE SODIUM 50 MCG: 50 TABLET ORAL at 06:39

## 2022-06-19 RX ADMIN — SODIUM CHLORIDE, PRESERVATIVE FREE 10 ML: 5 INJECTION INTRAVENOUS at 22:47

## 2022-06-19 RX ADMIN — HEPARIN SODIUM 5000 UNITS: 5000 INJECTION INTRAVENOUS; SUBCUTANEOUS at 14:21

## 2022-06-19 RX ADMIN — HEPARIN SODIUM 5000 UNITS: 5000 INJECTION INTRAVENOUS; SUBCUTANEOUS at 06:39

## 2022-06-19 RX ADMIN — PREDNISOLONE ACETATE 1 DROP: 10 SUSPENSION/ DROPS OPHTHALMIC at 22:41

## 2022-06-19 RX ADMIN — INSULIN LISPRO 2 UNITS: 100 INJECTION, SOLUTION INTRAVENOUS; SUBCUTANEOUS at 17:17

## 2022-06-19 NOTE — PROGRESS NOTES
cephALEXin  500 mg Oral 3 times per day    insulin lispro  0-12 Units SubCUTAneous TID WC    insulin lispro  0-6 Units SubCUTAneous Nightly    sodium chloride flush  5-40 mL IntraVENous 2 times per day    heparin (porcine)  5,000 Units SubCUTAneous 3 times per day    atorvastatin  10 mg Oral Nightly    aspirin  81 mg Oral Daily    carvedilol  25 mg Oral BID WC    ferric citrate  630 mg Oral TID AC    gabapentin  300 mg Oral QPM    insulin glargine  10 Units SubCUTAneous QAM    levothyroxine  50 mcg Oral Daily    NIFEdipine  60 mg Oral Daily    prednisoLONE acetate  1 drop Left Eye 4x Daily    sertraline  25 mg Oral Daily    timolol  1 drop Both Eyes BID    ketorolac  1 drop Left Eye 4x Daily       Continuous Infusions:   sodium chloride 5 mL/hr at 06/16/22 0907    dextrose         PRN Meds:  sodium chloride flush, sodium chloride, magnesium sulfate, ondansetron **OR** ondansetron, polyethylene glycol, acetaminophen **OR** acetaminophen, glucose, dextrose bolus **OR** dextrose bolus, glucagon (rDNA), dextrose      Data:  CBC:   Recent Labs     06/17/22  0437 06/18/22  0423 06/19/22  0431   WBC 8.3 7.5 6.8   HGB 10.6* 9.9* 9.7*   HCT 33.2* 30.8* 29.7*   MCV 81.9 80.2 79.8*    187 201     BMP:   Recent Labs     06/17/22  0436 06/18/22  0423 06/19/22  0431   * 136 132*   K 5.1 4.3 4.4    98* 96*   CO2 20* 22 22   BUN 36* 25* 40*   CREATININE 6.1* 4.8* 6.4*     LIVER PROFILE:   No results for input(s): AST, ALT, LIPASE, BILIDIR, BILITOT, ALKPHOS in the last 72 hours. Invalid input(s): AMYLASE,  ALB    CULTURES  Results for Earl Whalen (MRN [id number]) as of 6/15/2022 07:55   Ref.  Range 6/14/2022 15:42   INFLUENZA A Latest Ref Range: NOT DETECTED  NOT DETECTED   INFLUENZA B Latest Ref Range: NOT DETECTED  NOT DETECTED   SARS-CoV-2 RNA, RT PCR Latest Ref Range: NOT DETECTED  NOT DETECTED     Sputum cultures negative so far  MRSA screen not detected  Blood cultures no growth to date       RADIOLOGY  CT Head WO Contrast   Final Result   1. No evidence of acute intracranial abnormality. 2. Mild paranasal sinus disease as described above. XR CHEST PORTABLE   Final Result   1. Small left pleural effusion with associated left basilar airspace disease   representing either atelectasis or pneumonia. 2.  Mild cardiomegaly. XR TIBIA FIBULA LEFT (2 VIEWS)   Final Result   Addendum 1 of 1   ADDENDUM:   The impression should read no acute osseous abnormality of the left    tib-fib. Final   Acute osseous abnormality of the left tib-fib. Assessment/Plan:      #Acute encephalopathy   - resolved  -suspected secondary to nonexertional heat stroke:  -Patient slept outside under the sun overheated by time patient brought in by EMS reported 101.4 and 102.8 on arrival  - body temp now normal.   -CT head negative  -Afebrile today, mental status is clear    #Sepsis  - criteria met POA  -Temp 102.8, RR 27, , WBC 11.3, pulm source  -Blood cultures neg   -Normal lactic  -BP not requiring pressors  -Abx below  Procalcitonin 11.26 on presentation, repeat procalcitonin today 15.41. Continue to monitor. recheck in AM      #Pneumonia; left sided  -Appears to be due to aspiration pneumonia; patient had several episodes of emesis Monday night and found by EMS with emesis and AMS  -Chest x-ray above   -Leukocytosis resolved  -Procalcitonin still elevated  -Was on antibiotics vancomycin and cefepime. Vancomycin stopped now as MRSA nasal swab negative. Continue cefepime #5. Will switch to PO abx today   -Strep and legionella ordered-results pending  -Check respiratory culture-negative so far    #Hypoxia - without signs of overt resp failure  -No home O2  -Secondary to pneumonia  -Denies SOB. Patient was desaturating quickly on room air  -Patient is required 2 to 4 L of oxygen since admission. O2 requirement improving daily.   Currently weaned down to2L -> 1L -Her O2 sats on room air with ambulation  -Wean as tolerated    #Sunburn; 2nd degree burns to bilateral LE  - POA. -Multiple large loose fluid filled blisters with surrounding erythema on entire medial aspect of left leg and some to the lateral right LE.   -supportive care, monitor for s/s of infection   - wound care follow up. #ESRD  -HD MWF    #Elevated troponin  -Chronic appears at baseline  -Likely 2/2 ESRD  -No CP     #DMII nephropathy and neuropathy  -Lantus 10 U every morining  -Medium dose SSI  -Continue gabapentin    #Essential Hypertension  -BP elevated  -Continue procardia and coreg    #HLD  -Continue Statin    #Hypothyroidism   -Continue home synthroid    # Debility   - seen by  PT,OT   needs SNF         DVT Prophylaxis: Heparin  Diet: ADULT DIET; Regular; 3 carb choices (45 gm/meal); Low Fat/Low Chol/High Fiber/2 gm Na; Low Sodium (2 gm); Low Potassium (Less than 3000 mg/day); Low Phosphorus (Less than 1000 mg); 1500 ml  Code Status: Full Code        Plan DC to SNF in AM . Needs precert .    HD due for Monday       Saad Cortes MD  6/19/2022 11:08 AM

## 2022-06-19 NOTE — FLOWSHEET NOTE
06/18/22 2056   Vital Signs   Temp 98.2 °F (36.8 °C)   Temp Source Oral   Heart Rate 79   Heart Rate Source Monitor   Resp 16   BP (!) 113/58   BP Location Right upper arm   BP Method Automatic   MAP (Calculated) 76.33   Patient Position Semi fowlers   Level of Consciousness Alert (0)   MEWS Score 1   Oxygen Therapy   SpO2 96 %   O2 Device Nasal cannula   O2 Flow Rate (L/min) 1 L/min   Pt assessment complete. Pt lying in bed quietly. Lung sounds clear. Pt NSR per monitor with HR 79. Nightly medications given. Dressing to BLLE changed using ABD pads and Kerlex. (sunburn Blisters). Denies any needs at this time. Call light within reach. Bed exit alarm on.

## 2022-06-19 NOTE — FLOWSHEET NOTE
06/19/22 0823   Vital Signs   Temp 97.7 °F (36.5 °C)   Temp Source Oral   Heart Rate 74   Heart Rate Source Monitor   Resp 16   BP (!) 126/59   BP Location Right upper arm   BP Method Automatic   MAP (Calculated) 81.33   Patient Position High fowlers   Level of Consciousness Alert (0)   MEWS Score 1   Oxygen Therapy   SpO2 93 %   O2 Device None (Room air)       Shift assessment complete, see flowsheet. Pt A&O x4. Upon entering room pt had removed 1L of oxygen, remains off at this time. Pt denies any feelings of SOB. Scheduled morning medications administered as ordered. Leg dressings removed at this time for MD to access areas. Pt denies any further needs. Call light left within reach.

## 2022-06-19 NOTE — PROGRESS NOTES
Pt is lying in bed with their eyes closed. Respirations are easy and even. Call light within reach bed in lowest position with the wheels locked. Will continue to monitor.  Fauzia Mccurdy RN

## 2022-06-19 NOTE — PROGRESS NOTES
Telemonitor removed at this time per order. Tele #5 cleaned and returned to [facility].

## 2022-06-19 NOTE — PROGRESS NOTES
Leg wounds/blisters cleansed with saline wound wash at this time. Pat dry with gauze. ABD pad applied over areas and wrapped with kerlix.

## 2022-06-19 NOTE — PROGRESS NOTES
working AVF     Anemia of Chronic Disease-CKD:  Hgb near target     Encephalopathy:  Possible heat stroke, fell asleep out on her deck. Culture NGTD. Has resolved      Fever: On antibiotics, cultures NGTD. Fevers variable. Possible heat stroke. Procalcitonin is elevated. Plan/     Continue HD MWF schedule   Labs on HD days   Does not appear that we can challenge her target weight to help with LE edema blistering as she was cramping today   Monitor for secondary infection  May need to increase EPO     -----------------------------  RADHA Bell MD   [facility]

## 2022-06-19 NOTE — PROGRESS NOTES
Shift report given to Thalia Rain RN. Pt stable. Denies any further needs at this time. Care of pt transferred.

## 2022-06-20 LAB
ANION GAP SERPL CALCULATED.3IONS-SCNC: 18 MMOL/L (ref 3–16)
BASOPHILS ABSOLUTE: 0.1 K/UL (ref 0–0.2)
BASOPHILS RELATIVE PERCENT: 1.1 %
BUN BLDV-MCNC: 56 MG/DL (ref 7–20)
CALCIUM SERPL-MCNC: 9 MG/DL (ref 8.3–10.6)
CHLORIDE BLD-SCNC: 94 MMOL/L (ref 99–110)
CO2: 19 MMOL/L (ref 21–32)
CREAT SERPL-MCNC: 7.6 MG/DL (ref 0.6–1.2)
EOSINOPHILS ABSOLUTE: 0.4 K/UL (ref 0–0.6)
EOSINOPHILS RELATIVE PERCENT: 6.1 %
GFR AFRICAN AMERICAN: 6
GFR NON-AFRICAN AMERICAN: 5
GLUCOSE BLD-MCNC: 110 MG/DL (ref 70–99)
GLUCOSE BLD-MCNC: 131 MG/DL (ref 70–99)
GLUCOSE BLD-MCNC: 142 MG/DL (ref 70–99)
GLUCOSE BLD-MCNC: 165 MG/DL (ref 70–99)
GLUCOSE BLD-MCNC: 208 MG/DL (ref 70–99)
HCT VFR BLD CALC: 29.6 % (ref 36–48)
HEMOGLOBIN: 9.6 G/DL (ref 12–16)
LYMPHOCYTES ABSOLUTE: 0.9 K/UL (ref 1–5.1)
LYMPHOCYTES RELATIVE PERCENT: 13.6 %
MCH RBC QN AUTO: 26.2 PG (ref 26–34)
MCHC RBC AUTO-ENTMCNC: 32.6 G/DL (ref 31–36)
MCV RBC AUTO: 80.3 FL (ref 80–100)
MONOCYTES ABSOLUTE: 0.5 K/UL (ref 0–1.3)
MONOCYTES RELATIVE PERCENT: 7.8 %
NEUTROPHILS ABSOLUTE: 4.6 K/UL (ref 1.7–7.7)
NEUTROPHILS RELATIVE PERCENT: 71.4 %
PDW BLD-RTO: 21.9 % (ref 12.4–15.4)
PERFORMED ON: ABNORMAL
PLATELET # BLD: 220 K/UL (ref 135–450)
PMV BLD AUTO: 8.4 FL (ref 5–10.5)
POTASSIUM REFLEX MAGNESIUM: 4.7 MMOL/L (ref 3.5–5.1)
PROCALCITONIN: 13.98 NG/ML (ref 0–0.15)
RBC # BLD: 3.68 M/UL (ref 4–5.2)
SODIUM BLD-SCNC: 131 MMOL/L (ref 136–145)
WBC # BLD: 6.5 K/UL (ref 4–11)

## 2022-06-20 PROCEDURE — 90935 HEMODIALYSIS ONE EVALUATION: CPT

## 2022-06-20 PROCEDURE — 84145 PROCALCITONIN (PCT): CPT

## 2022-06-20 PROCEDURE — 6370000000 HC RX 637 (ALT 250 FOR IP): Performed by: INTERNAL MEDICINE

## 2022-06-20 PROCEDURE — 97530 THERAPEUTIC ACTIVITIES: CPT

## 2022-06-20 PROCEDURE — 85025 COMPLETE CBC W/AUTO DIFF WBC: CPT

## 2022-06-20 PROCEDURE — 99232 SBSQ HOSP IP/OBS MODERATE 35: CPT | Performed by: INTERNAL MEDICINE

## 2022-06-20 PROCEDURE — 2580000003 HC RX 258

## 2022-06-20 PROCEDURE — 6360000002 HC RX W HCPCS

## 2022-06-20 PROCEDURE — 2060000000 HC ICU INTERMEDIATE R&B

## 2022-06-20 PROCEDURE — 6370000000 HC RX 637 (ALT 250 FOR IP)

## 2022-06-20 PROCEDURE — 36415 COLL VENOUS BLD VENIPUNCTURE: CPT

## 2022-06-20 PROCEDURE — 80048 BASIC METABOLIC PNL TOTAL CA: CPT

## 2022-06-20 RX ADMIN — CEPHALEXIN 500 MG: 250 CAPSULE ORAL at 21:02

## 2022-06-20 RX ADMIN — KETOROLAC TROMETHAMINE 1 DROP: 5 SOLUTION OPHTHALMIC at 12:30

## 2022-06-20 RX ADMIN — CEPHALEXIN 500 MG: 250 CAPSULE ORAL at 15:29

## 2022-06-20 RX ADMIN — PREDNISOLONE ACETATE 1 DROP: 10 SUSPENSION/ DROPS OPHTHALMIC at 12:30

## 2022-06-20 RX ADMIN — HEPARIN SODIUM 5000 UNITS: 5000 INJECTION INTRAVENOUS; SUBCUTANEOUS at 06:02

## 2022-06-20 RX ADMIN — GABAPENTIN 300 MG: 300 CAPSULE ORAL at 17:16

## 2022-06-20 RX ADMIN — NIFEDIPINE 60 MG: 30 TABLET, FILM COATED, EXTENDED RELEASE ORAL at 12:27

## 2022-06-20 RX ADMIN — PREDNISOLONE ACETATE 1 DROP: 10 SUSPENSION/ DROPS OPHTHALMIC at 21:00

## 2022-06-20 RX ADMIN — KETOROLAC TROMETHAMINE 1 DROP: 5 SOLUTION OPHTHALMIC at 21:02

## 2022-06-20 RX ADMIN — INSULIN LISPRO 1 UNITS: 100 INJECTION, SOLUTION INTRAVENOUS; SUBCUTANEOUS at 21:08

## 2022-06-20 RX ADMIN — KETOROLAC TROMETHAMINE 1 DROP: 5 SOLUTION OPHTHALMIC at 17:20

## 2022-06-20 RX ADMIN — PREDNISOLONE ACETATE 1 DROP: 10 SUSPENSION/ DROPS OPHTHALMIC at 17:20

## 2022-06-20 RX ADMIN — LEVOTHYROXINE SODIUM 50 MCG: 50 TABLET ORAL at 06:01

## 2022-06-20 RX ADMIN — CEPHALEXIN 500 MG: 250 CAPSULE ORAL at 06:01

## 2022-06-20 RX ADMIN — ATORVASTATIN CALCIUM 10 MG: 10 TABLET, FILM COATED ORAL at 21:02

## 2022-06-20 RX ADMIN — INSULIN LISPRO 4 UNITS: 100 INJECTION, SOLUTION INTRAVENOUS; SUBCUTANEOUS at 17:19

## 2022-06-20 RX ADMIN — HEPARIN SODIUM 5000 UNITS: 5000 INJECTION INTRAVENOUS; SUBCUTANEOUS at 21:06

## 2022-06-20 RX ADMIN — SERTRALINE HYDROCHLORIDE 25 MG: 50 TABLET ORAL at 12:27

## 2022-06-20 RX ADMIN — CARVEDILOL 25 MG: 25 TABLET, FILM COATED ORAL at 17:16

## 2022-06-20 RX ADMIN — TIMOLOL MALEATE 1 DROP: 5 SOLUTION/ DROPS OPHTHALMIC at 21:10

## 2022-06-20 RX ADMIN — HEPARIN SODIUM 5000 UNITS: 5000 INJECTION INTRAVENOUS; SUBCUTANEOUS at 15:29

## 2022-06-20 RX ADMIN — SODIUM CHLORIDE, PRESERVATIVE FREE 10 ML: 5 INJECTION INTRAVENOUS at 21:06

## 2022-06-20 RX ADMIN — ASPIRIN 81 MG: 81 TABLET, COATED ORAL at 12:27

## 2022-06-20 ASSESSMENT — PAIN SCALES - GENERAL
PAINLEVEL_OUTOF10: 0
PAINLEVEL_OUTOF10: 0

## 2022-06-20 NOTE — PLAN OF CARE
Problem: Discharge Planning  Goal: Discharge to home or other facility with appropriate resources  6/19/2022 2229 by Amparo Lutz RN  Outcome: Progressing  6/19/2022 1447 by Diana Ramsey RN  Outcome: Progressing     Problem: Pain  Goal: Verbalizes/displays adequate comfort level or baseline comfort level  6/19/2022 1447 by Diana Ramsey RN  Outcome: Progressing     Problem: Safety - Adult  Goal: Free from fall injury  6/19/2022 2229 by Amparo Lutz RN  Outcome: Progressing  6/19/2022 1447 by Diana Ramsey RN  Outcome: Progressing     Problem: ABCDS Injury Assessment  Goal: Absence of physical injury  6/19/2022 1447 by Diana Ramsey RN  Outcome: Progressing     Problem: Skin/Tissue Integrity  Goal: Absence of new skin breakdown  Description: 1. Monitor for areas of redness and/or skin breakdown  2. Assess vascular access sites hourly  3. Every 4-6 hours minimum:  Change oxygen saturation probe site  4. Every 4-6 hours:  If on nasal continuous positive airway pressure, respiratory therapy assess nares and determine need for appliance change or resting period.   6/19/2022 1447 by Diana Ramsey RN  Outcome: Progressing     Problem: Chronic Conditions and Co-morbidities  Goal: Patient's chronic conditions and co-morbidity symptoms are monitored and maintained or improved  6/19/2022 1447 by Diana Ramsey RN  Outcome: Progressing

## 2022-06-20 NOTE — FLOWSHEET NOTE
06/20/22 1215   Vital Signs   Temp 97.8 °F (36.6 °C)   Temp Source Oral   Heart Rate 78   Heart Rate Source Monitor   Resp 18   BP (!) 158/64   BP Location Right upper arm   MAP (Calculated) 95.33   Patient Position Semi fowlers   Level of Consciousness Alert (0)   MEWS Score 1   Pain Assessment   Pain Assessment None - Denies Pain   Pain Level 0   Patient's Stated Pain Goal 0 - No pain   Oxygen Therapy   SpO2 96 %   Pulse Oximeter Device Mode Continuous   Skin Assessment Clean, dry, & intact     Pt returned from dialysis. Pt stable. Meds caught up from this morning.     Sarah Sheest RN

## 2022-06-20 NOTE — CARE COORDINATION
INTERDISCIPLINARY PLAN OF CARE CONFERENCE    Date/Time: 6/20/2022 1:52 PM  Completed by: Lance Fonseca RN, Case Management      Patient Name:  Yamileth Ely  YOB: 1950  Admitting Diagnosis: ESRD on dialysis [facility]) [N18.6, Z99.2]  Left leg cellulitis [L03.116]  Acute respiratory failure with hypoxia ([facility] [address] [J96.01]  Episode of unresponsiveness [R41.89]  Sepsis ([facility] [A41.9]  Pneumonia of left lower lobe due to infectious organism [J18.9]  Hyperthermia associated with heat [T67.01XA]  Sepsis with acute hypoxic respiratory failure without septic shock, due to unspecified organism ([facility] [address] [A41.9, R65.20, J96.01]     Admit Date/Time:  6/14/2022  2:05 PM    Chart reviewed. Interdisciplinary team contacted or reviewed plan related to patient progress and discharge plans. Disciplines included Case Management, Nursing, and Dietitian. Current Status:ongoing  PT/OT recommendation for discharge plan of care: SNF    Expected D/C Disposition:  Skilled nursing facility  Confirmed plan with patient and/or family Yes confirmed with: (name) pt  Met with:pt  Discharge Plan Comments: Reviewed chart. Role of discharge planner explained and patient verbalized understanding. Pt is alert and oriented. Pt is from home alone. Pt is aware that PT/OT recommends SNF. Pt wanted [facility]. CM spoke with Cameron Chaney with [facility] and she states she cannot find HD transport for pt to [facility] and the [facility] for 10am timeslot. Cameron Chaney states she has called [facility] [place] and they are booked until 7/11 and [facility] are 14 days booked out. Pt cannot pay $100 per trip. CM let Cameron Chaney know this. [facility] cannot accept. Cameron Chaney states that they do not have a bus. Therefore pt is notgoing to [facility]. Pt asked for CM to make a referral to [facility]. CM sent referral and left referral with Beverly at [facility]. Awaiting to hear if can accept. Per Vasyl Blackmon with [facility], they are not in network. CM mentioned places with in house HD.

## 2022-06-20 NOTE — PROGRESS NOTES
Progress Note    HISTORY     CC:   Found down             We are following for ESRD         Subjective/     HPI:    Seen on dialysis. Orders confirmed. Pre-weight at HD today 79.1 kg. Access working well. Still with blistering to her LEs, more on the left than right. No erythema.          EXAM       Objective/     Vitals:    06/19/22 1940 06/20/22 0315 06/20/22 0745 06/20/22 0815   BP: 137/61 131/65 135/67 (!) 136/57   Pulse: 77 74 73 72   Resp: 18 16 17 18   Temp: 97.7 °F (36.5 °C) 97.4 °F (36.3 °C) 97.2 °F (36.2 °C) 98.1 °F (36.7 °C)   TempSrc: Oral Oral Oral Oral   SpO2: 93% 92% 95%    Weight:  169 lb 3.2 oz (76.7 kg)  174 lb 6.1 oz (79.1 kg)   Height:         24HR INTAKE/OUTPUT:      Intake/Output Summary (Last 24 hours) at 6/20/2022 1130  Last data filed at 6/20/2022 0784  Gross per 24 hour   Intake 0 ml   Output --   Net 0 ml     Constitutional:  Alert, awake, no apparent distress  Eyes:  Pupils reactive, sclera clear   Neck:  Normal thyroid, no masses   Cardiovascular:  Regular, no rub  Respiratory:  No distress, no wheezing  Psychiatry:  Appropriate mood/affect, alert  Abdomen: +bs, soft, nt, no masses   Musculoskeletal: +mild LE edema, blistering to the inside of the left leg, no erythema, no clubbing   Lymphatics:  No LAD in neck, no supraclavicular nodes   Access:  Left upper arm AVF       MEDICAL DECISION MAKING       Data/  Recent Labs     06/18/22 0423 06/19/22  0431 06/20/22  0446   WBC 7.5 6.8 6.5   HGB 9.9* 9.7* 9.6*   HCT 30.8* 29.7* 29.6*   MCV 80.2 79.8* 80.3    201 220     Recent Labs     06/18/22 0423 06/19/22  0431 06/20/22  0446    132* 131*   K 4.3 4.4 4.7   CL 98* 96* 94*   CO2 22 22 19*   GLUCOSE 141* 133* 142*   BUN 25* 40* 56*   CREATININE 4.8* 6.4* 7.6*   LABGLOM 9* 6* 5*   GFRAA 11* 8* 6*       Assessment/     ESRD:  On HD MWF with working AVF     Anemia of Chronic Disease-CKD:  Hgb near target     Encephalopathy:  Possible heat stroke, fell asleep out on her deck. Culture NGTD. Has resolved      Fever: On antibiotics, cultures NGTD. Possible heat stroke. Procalcitonin is elevated.      Plan/     Continue HD MWF schedule - at EDW of 79 kg today  Does not appear that we can challenge her target weight to help with LE edema blistering as she has been cramping   Monitor for secondary infection  Monitor H/H - not on epogen

## 2022-06-20 NOTE — PROGRESS NOTES
Occupational Therapy Daily Treatment Note    Unit: PCU  Date:  6/20/2022  Patient Name:    Babatunde Horne  Admitting diagnosis:  ESRD on dialysis [facility]) [N18.6, Z99.2]  Left leg cellulitis [L03.116]  Acute respiratory failure with hypoxia ([facility] [J96.01]  Episode of unresponsiveness [R41.89]  Sepsis ([facility] [A41.9]  Pneumonia of left lower lobe due to infectious organism [J18.9]  Hyperthermia associated with heat [T67.01XA]  Sepsis with acute hypoxic respiratory failure without septic shock, due to unspecified organism ([facility] [A41.9, R65.20, J96.01]  Admit Date:  6/14/2022  Precautions/Restrictions:  Precautions/Restrictions:  fall risk, bed/chair alarm, supplemental O2 (1L), telemetry and continuous pulse ox         Discharge Recommendations: SNF  DME needs for discharge: defer to facility       Therapy recommendations for staff:   Assist of 1 (contact guard assist) with use of rolling walker (RW) for all ambulation within room    AM-PAC Score: AM-PAC Inpatient Daily Activity Raw Score: 19  Home Health S4 Level: NA       Treatment Time:  1783-5634  Treatment number:  3  Total Treatment Time:   40 minutes    History of Present Illness: History of Present Illness: Per H&P 72 y. o. female who presented to [facility] with past medical history of type 2 diabetes, hypertension, hyperlipidemia, sepsis, hypothyroidism, end-stage renal disease Monday Wednesday Friday presented to the ED due to patient being encephalopathic loss of conscious.     Patient was found in the back porch unresponsive.  In the scene patient was noted to be vomited EMS was called and was noted to have a fever 101. 2.  Patient went to dialysis this morning was dialyzed her usual dose and then went home.  Patient does not member what occurred after dialysis. Jeanie Pichardo denies having abdominal pain chest pain cough phlegm production back pain.     On my evaluation patient mentation resolved. Jeanie Pichardo reports that she had dialysis yesterday went well reported that she might have gotten fluid more than usual.  Patient reports she went home and then the next morning patient went to the [facility] and fell asleep under the sun.  Patient then was found by the neighbor unconscious and EMS was called. Karen Bach noted to have fever.  Patient reports that she felt very hot and improved significantly after giving fluids.  Patient otherwise denied having any fever any chills cough phlegm production chest pain abdominal pain or dysuria.  Patient reports she produces minimal urine otherwise.  Patient reports that she did not regain consciousness until 2 PM today       Subjective:  Patient presents supine in bed and agreeable to therapy despite having dialysis today. Pain   No  Rating: NA  Location:NA  Pain Medicine Status: Denies need      Bed Mobility:   Supine to Sit:  Supervision- IND  Sit to Supine:  NT  Rolling:           IND  Scooting:        SBA    Transfer Training:   Sit to stand:   SBA  Stand to sit:  SBA  Bed to Chair:  CGA   Bed to [facility]:   Not Tested  Standard toilet:   CGA with decreased balance at times     Activity Tolerance   Pt completed therapy session with decreased balance     BP: 135/62 No change with activity. Balance  Sitting Balance :     Supervision  Standing Balance   SBA- CGA      ADL Training:   Upper body dressing:  NT  Upper body bathing:  Not Tested  Lower body dressing:  Supervision to CGA when standing to pull up underwear over hips   Lower body bathing:  Not Tested  Toileting:   Not Tested  Grooming/Hygiene:  Modified Independent  Feeding :   Not tested     Therapeutic Exercise:   N/A    Patient Education:   Role of OT  Recommendations for DC  Safe RW use/hand placement    Positioning Needs:   Up in chair, call light and needs in reach. Alarm Set    Family Present:  No    Assessment: Patient  was able to ambulate using RW to the chair with cues on hand placement on RW.   She was able to demonstrate increased standing balance and participation today however balance was impaired at times. Supervision to CGA when standing to pull up underwear over hips  Pt needs continued OT to increase functional IND. GOALS  To be met in 3 Visits:  1). Bed to toilet/BSC: SBA     To be met in 5 Visits:  1). Supine to/from Sit:             SBA  2). Upper Body Bathing:         SBA  3). Lower Body Bathing:         SBA  4). Upper Body Dressing:       SBA  5). Lower Body Dressing:       SBA  6).  Pt to demonstrate UE exs x 15 reps with minimal cues  Plan: cont with POC    Alexx Adler OTR/L [id number]      If patient discharges from this facility prior to next visit, this note will serve as the Discharge Summary

## 2022-06-20 NOTE — PROGRESS NOTES
Pt awakened for vitals. Pt A&O x 4, VSS. Vitals:    06/20/22 0315   BP: 131/65   Pulse: 74   Resp: 16   Temp: 97.4 °F (36.3 °C)   SpO2: 92%     Call light in hand. Pt denied any further needs, and went straight back to sleep.

## 2022-06-20 NOTE — CONSULTS
[address] [id number] Continence Nurse  Consult Note       NAME:  Nasim Lopez  MEDICAL RECORD NUMBER:  [id number]  AGE: 67 y.o. GENDER: female  : 1950  TODAY'S DATE:  2022    Subjective   Reason for WOCN Evaluation and Assessment: Blisters to BLE      Nasim Lopez is a 67 y.o. female referred by:   [x] Physician  [x] Nursing  [] Other:     Wound Identification:  Wound Type: burn  Contributing Factors: edema, venous stasis, diabetes and excessive sun exposure    Pt seen for wound care to the BLE. Pt was found on her porch unconscious for an unknown amount of time in the excessive heat and fully exposed to the sun. Per pt, the blisters developed the next day. Photo taken on admission of blister and surrounding discoloration to her RLE. She has a pattern jean of the chair she was sitting in on her left posterior thigh. Pt is unclear of her position when she was found.       Patient Goal of Care:  [x] Wound Healing  [] Odor Control  [] Palliative Care  [] Pain Control   [] Other:         PAST MEDICAL HISTORY        Diagnosis Date    Arthritis     Chronic kidney disease     Diabetes mellitus ([facility]     Dialysis patient ([facility]     M W F    ESRD (end stage renal disease) ([facility]     Hemodialysis patient ([facility]     M-W-F    Hyperkalemia 2016    Hyperlipidemia     Hypertension     OA (osteoarthritis)     Retinopathy     Sepsis ([facility]     Thyroid disease     Wears dentures        PAST SURGICAL HISTORY    Past Surgical History:   Procedure Laterality Date    DIALYSIS FISTULA CREATION Left 08/10/2016    Dr. Tricia Aschoff - upper arm, basilic vein transposition    EYE SURGERY Left 2018    catarct removal with lens implant     OTHER SURGICAL HISTORY Right 2019    partial foot amputation    TOE AMPUTATION Right 2019    PARTIAL FOOT AMPUTATION WITH GRAFT APPLICATION performed by Elida Barbosa DPM at [address] [place] [address] Left 2021    LEFT TOTAL KNEE ARTHROPLASTY performed by Meagan Wood MD at [address] [facility]    History reviewed. No pertinent family history. SOCIAL HISTORY    Social History     Tobacco Use    Smoking status: Former Smoker     Quit date: 2014     Years since quittin.9    Smokeless tobacco: Never Used   Vaping Use    Vaping Use: Never used   Substance Use Topics    Alcohol use: No    Drug use: No       ALLERGIES    No Known Allergies    MEDICATIONS    No current facility-administered medications on file prior to encounter. Current Outpatient Medications on File Prior to Encounter   Medication Sig Dispense Refill    NIFEdipine (NIFEDICAL XL) 60 MG extended release tablet Take 60 mg by mouth daily      B Complex-C-Folic Acid (KIMMY-EDIE PO) Take by mouth      Cholecalciferol (D3 2000) 50 MCG ( UT) CAPS Take by mouth      Ascorbic Acid (VITAMIN C) 1000 MG tablet Take 1,000 mg by mouth daily      ketorolac (ACULAR) 0.5 % ophthalmic solution Place 1 drop into the left eye 4 times daily      timolol (TIMOPTIC-XE) 0.5 % ophthalmic gel-forming Place 1 drop into both eyes 2 times daily      prednisoLONE acetate (PRED FORTE) 1 % ophthalmic suspension Place 1 drop into the left eye 4 times daily      ACIDOPHILUS LACTOBACILLUS PO Take 1 capsule by mouth daily      carvedilol (COREG) 12.5 MG tablet Take 25 mg by mouth 2 times daily (with meals)       Ferric Citrate (AURYXIA) 1  MG(Fe) TABS Take 3 tablets by mouth 3 times daily (before meals)      Lactobacillus-Inulin (CULTURELLE ADULT ULT BALANCE PO) Take 1 tablet by mouth daily as needed      levothyroxine (SYNTHROID) 50 MCG tablet Take 50 mcg by mouth Daily      losartan (COZAAR) 100 MG tablet Take 100 mg by mouth nightly       insulin glargine (LANTUS) 100 UNIT/ML injection vial Inject 8 Units into the skin every morning 20 units if bs 150 or above, 15 units if below bs is below.  (Patient taking differently: Inject 15 Units into the skin every morning 18 units if bs 150 or above, 15 units if below bs is below.) 1 vial 0    aspirin 81 MG EC tablet Take 1 tablet by mouth daily Take with food 30 tablet 0    gabapentin (NEURONTIN) 300 MG capsule Take 300 mg by mouth every evening Take 1-2 tablets every evening before bed.       atorvastatin (LIPITOR) 10 MG tablet Take 10 mg by mouth nightly       sertraline (ZOLOFT) 25 MG tablet Take 25 mg by mouth daily         Objective    BP (!) 158/64   Pulse 78   Temp 97.8 °F (36.6 °C) (Oral)   Resp 18   Ht 5' 7\" (1.702 m)   Wt 173 lb 15.1 oz (78.9 kg)   SpO2 96%   BMI 27.24 kg/m²     LABS:  WBC:    Lab Results   Component Value Date    WBC 6.5 06/20/2022     H/H:    Lab Results   Component Value Date    HGB 9.6 06/20/2022    HCT 29.6 06/20/2022     PTT:    Lab Results   Component Value Date    APTT 35.9 11/22/2021   [APTT}  PT/INR:    Lab Results   Component Value Date    PROTIME 10.6 11/22/2021    INR 0.94 11/22/2021     HgBA1c:    Lab Results   Component Value Date    LABA1C 6.9 06/15/2022       Assessment   Lyle Risk Score: Lyle Scale Score: 20    Patient Active Problem List   Diagnosis Code    Cellulitis L03.90    [facility]) [facility] N17.9    Type 2 diabetes mellitus with complication, without long-term current use of insulin ([facility] E11.8    Secondary hypertension I15.9    Hyperlipidemia E78.5    Hypoglycemia E16.2    Anxiety about health F41.8    Nephrotic syndrome N04.9    Essential hypertension I10    Hyperkalemia E87.5    Diabetic ulcer of left foot associated with type 2 diabetes mellitus (HCC) E11.621, L97.529    CKD (chronic kidney disease), stage IV (HCC) L76.2    Diastolic dysfunction with acute on chronic heart failure (HCC) I50.33    Acute on chronic diastolic congestive heart failure (HCC) I50.33    ESRD on dialysis ([facility] N18.6, Z99.2    Hypoxia R09.02    Hypervolemia E87.70    Anemia due to chronic kidney disease N18.9, D63.1    Chronic kidney disease (CKD), stage V ([facility] N18.5    Toe osteomyelitis, right ([facility] M86.9    Cellulitis of right foot L03.115    Cellulitis of right leg L03.115    Primary osteoarthritis of left knee M17.12    Acute pain of left knee M25.562    Lung mass R91.8    Lung nodule R91.1    Granulomatous lung disease ([facility] J84.10    Former smoker Z87.891    Intrahepatic bile duct dilation K83.8    Hyponatremia E87.1    DM (diabetes mellitus), secondary uncontrolled ([facility] E13.65    Elevated parathyroid hormone E34.9    Status post total left knee replacement Z96.652    Pulmonary HTN (HCC) I27.20    Postoperative anemia due to acute blood loss D62    Sepsis (HCC) A41.9    Episode of unresponsiveness R41.89    Hyperthermia associated with heat T67. 01XA    Pneumonia of left lower lobe due to infectious organism J18.9    First degree sunburn L55.0       Measurements:  Wound 01/07/16 Abrasion(s) Leg Lower; Posterior (Active)   Number of days: 2356       Incision 08/10/16 Arm Left;Medial (Active)   Number of days: 2140       Wound 06/14/22 Pretibial Left; Anterior blister present on admission, sun exposure, wound culture ordered (Active)   Wound Etiology Other 06/20/22 0745   Dressing Status Clean;Dry; Intact 06/20/22 0745   Dressing/Treatment Other (comment) 06/20/22 0745   Wound Assessment Pink/red; Other (Comment) 06/20/22 0745   Number of days: 5      Left posterior chair:  Pattern from rot iron chair pt was sitting in on porch when found. left medial LE:  Intact serous filled bullas, serous drainage from proximal bullas on pad under pt. Purple hue at bulla edges on thigh. Large bulla near knee with black discoloration from 8-10 o'clock. Left thigh is taut and edematous. Right lateral LE:  Serous filled bulla, reabsorbing somewhat. Purple nonblanchable discoloration and red nonblanchable skin surrounding with irregular pattern and edges. Response to treatment:  Well tolerated by patient.      Pain Assessment:  Severity:  0 / 10  Quality of pain: N/A  Wound Pain Timing/Severity: none  Premedicated: No    Plan  BLE:  Apply Hydraguard cream to dry skin surrounding blisters. Apply Xeroform Gauze over intact and ruptured blisters, cover with dry 4x4's and secure with conform roll gauze and tape. Change daily. Plan of Care: Wound 06/14/22 Pretibial Left; Anterior blister present on admission, sun exposure, wound culture ordered-Dressing/Treatment: Other (comment) (abd pad/kerlex )    Specialty Bed Required : N/A   [] Low Air Loss   [] Pressure Redistribution  [] Fluid Immersion  [] Bariatric  [] Total Pressure Relief  [] Other:     Current Diet: ADULT DIET; Regular; 3 carb choices (45 gm/meal); Low Fat/Low Chol/High Fiber/2 gm Na; Low Sodium (2 gm); Low Potassium (Less than 3000 mg/day);  Low Phosphorus (Less than 1000 mg); 1500 ml  Dietician consult:  Yes    Discharge Plan:  Placement for patient upon discharge: skilled nursing    Patient appropriate for Outpatient [address]: Yes    Referrals:  [x]   [] [facility]  [] Supplies  [] Other    Patient/Caregiver Teaching:  Level of patient/caregiver understanding able to:   [] Indicates understanding       [] Needs reinforcement  [] Unsuccessful      [x] Verbal Understanding  [] Demonstrated understanding       [] No evidence of learning  [] Refused teaching         [] N/A       Electronically signed by Kevon Holcomb RN, CWOCN on 6/20/2022 at 4:52 PM

## 2022-06-20 NOTE — PROGRESS NOTES
IM Progress Note    Admit Date:  6/14/2022    67 y.o. female  with past medical history of type 2 diabetes, hypertension, hyperlipidemia, sepsis, hypothyroidism, end-stage renal disease Monday Wednesday Friday presented to the ED due to patient being encephalopathic, + loss of conscious. She had passed out laying in the heat. She had high fevers on presentation      Subjective:    6/20  Patient is back from dialysis today. Continue to improve.   looks and feels much better and is getting stronger. Patient is awake alert and oriented now. She has multiple blisters on bilateral lower extremities. Afebrile now. Oxygen saturation stable on 1 L. Not on home oxygen. Cultures are negative so far. Procalcitonin improved       Objective:   Patient Vitals for the past 4 hrs:   BP Temp Temp src Pulse Resp SpO2 Weight   06/20/22 1215 (!) 158/64 97.8 °F (36.6 °C) Oral 78 18 96 % --   06/20/22 1155 (!) 170/68 96.8 °F (36 °C) Temporal 75 18 -- 173 lb 15.1 oz (78.9 kg)            Intake/Output Summary (Last 24 hours) at 6/20/2022 1259  Last data filed at 6/20/2022 1155  Gross per 24 hour   Intake 900 ml   Output 1000 ml   Net -100 ml       Physical Exam:    Gen: No distress. Alert. Awake and well-oriented. Looks tanned. Eyes: PERRL. No sclera icterus. No conjunctival injection. ENT: No discharge. Pharynx clear. Neck: No JVD. Trachea midline. Resp: No accessory muscle use. No wheezes rales or rhonchi . CV: Regular rate. Regular rhythm. No murmur. No rub. No edema. Capillary Refill: Brisk,< 3 seconds   Peripheral Pulses: +2 palpable, equal bilaterally   GI: Non-tender. Non-distended. Normal bowel sounds. Skin: Warm and dry. No nodule on exposed extremities. Multiple large loose fluid filled blisters with surrounding erythema on entire medial aspect of left leg. And the lateral aspect of the right leg.   surrounding blanchable erythema which is mild. M/S: No cyanosis. No joint deformity. No clubbing. Neuro: Awake. Grossly nonfocal    Psych: Oriented x 3. No anxiety or agitation. Scheduled Meds:   cephALEXin  500 mg Oral 3 times per day    insulin lispro  0-12 Units SubCUTAneous TID WC    insulin lispro  0-6 Units SubCUTAneous Nightly    sodium chloride flush  5-40 mL IntraVENous 2 times per day    heparin (porcine)  5,000 Units SubCUTAneous 3 times per day    atorvastatin  10 mg Oral Nightly    aspirin  81 mg Oral Daily    carvedilol  25 mg Oral BID WC    ferric citrate  630 mg Oral TID AC    gabapentin  300 mg Oral QPM    insulin glargine  10 Units SubCUTAneous QAM    levothyroxine  50 mcg Oral Daily    NIFEdipine  60 mg Oral Daily    prednisoLONE acetate  1 drop Left Eye 4x Daily    sertraline  25 mg Oral Daily    timolol  1 drop Both Eyes BID    ketorolac  1 drop Left Eye 4x Daily       Continuous Infusions:   sodium chloride 5 mL/hr at 06/16/22 0907    dextrose         PRN Meds:  sodium chloride flush, sodium chloride, magnesium sulfate, ondansetron **OR** ondansetron, polyethylene glycol, acetaminophen **OR** acetaminophen, glucose, dextrose bolus **OR** dextrose bolus, glucagon (rDNA), dextrose      Data:  CBC:   Recent Labs     06/18/22  0423 06/19/22  0431 06/20/22  0446   WBC 7.5 6.8 6.5   HGB 9.9* 9.7* 9.6*   HCT 30.8* 29.7* 29.6*   MCV 80.2 79.8* 80.3    201 220     BMP:   Recent Labs     06/18/22  0423 06/19/22  0431 06/20/22  0446    132* 131*   K 4.3 4.4 4.7   CL 98* 96* 94*   CO2 22 22 19*   BUN 25* 40* 56*   CREATININE 4.8* 6.4* 7.6*     LIVER PROFILE:   No results for input(s): AST, ALT, LIPASE, BILIDIR, BILITOT, ALKPHOS in the last 72 hours. Invalid input(s): AMYLASE,  ALB    CULTURES  Results for Tegan Thomson (MRN [id number]) as of 6/15/2022 07:55   Ref.  Range 6/14/2022 15:42   INFLUENZA A Latest Ref Range: NOT DETECTED  NOT DETECTED   INFLUENZA B Latest Ref Range: NOT DETECTED  NOT DETECTED   SARS-CoV-2 RNA, RT PCR Latest Ref Range: NOT DETECTED  NOT DETECTED     Sputum cultures negative so far  MRSA screen not detected  Blood cultures no growth to date       RADIOLOGY  CT Head WO Contrast   Final Result   1. No evidence of acute intracranial abnormality. 2. Mild paranasal sinus disease as described above. XR CHEST PORTABLE   Final Result   1. Small left pleural effusion with associated left basilar airspace disease   representing either atelectasis or pneumonia. 2.  Mild cardiomegaly. XR TIBIA FIBULA LEFT (2 VIEWS)   Final Result   Addendum 1 of 1   ADDENDUM:   The impression should read no acute osseous abnormality of the left    tib-fib. Final   Acute osseous abnormality of the left tib-fib. Assessment/Plan:      #Acute encephalopathy   - resolved  -suspected secondary to nonexertional heat stroke:  -Patient slept outside under the sun overheated by time patient brought in by EMS reported 101.4 and 102.8 on arrival  - body temp now normal.   -CT head negative  -Afebrile now   mental status is clear    #Sepsis  - criteria met POA  -Temp 102.8, RR 27, , WBC 11.3, pulm source  -Blood cultures neg   -Normal lactic  -BP not requiring pressors  -Abx below  Procalcitonin 11.26 on presentation, repeat procalcitonin  15.41--> 13.98. Continue to monitor. #Pneumonia; left sided  -Appears to be due to aspiration pneumonia; patient had several episodes of emesis Monday night and found by EMS with emesis and AMS  -Chest x-ray above   -Leukocytosis resolved  -Procalcitonin elevated  -Was on antibiotics vancomycin and cefepime. Vancomycin stopped now as MRSA nasal swab negative. Continued cefepime #5->  switched to PO abx Keflex  -Strep and legionella ordered-results pending  -Check respiratory culture-negative so far    #Hypoxia - without signs of overt resp failure  -No home O2  -Secondary to pneumonia  -Denies SOB.   Patient was desaturating quickly on room air  -Patient is required 2 to 4 L of

## 2022-06-20 NOTE — PROGRESS NOTES
Pt resting in bed this evening, A&O x 4, VSS. Vitals:    06/19/22 1940   BP: 137/61   Pulse: 77   Resp: 18   Temp: 97.7 °F (36.5 °C)   SpO2: 93%     Shift assessment complete and all meds given per MAR. Beverage and snack offered. Pt is on room air, no telemetry. Call light in hand. Bed in lowest position with all belongings within reach. Pt denies any further needs.

## 2022-06-20 NOTE — FLOWSHEET NOTE
06/20/22 0745   Vital Signs   Temp 97.2 °F (36.2 °C)   Temp Source Oral   Heart Rate 73   Heart Rate Source Monitor   Resp 17   /67   BP Location Right upper arm   MAP (Calculated) 89.67   Patient Position Semi fowlers   Level of Consciousness Alert (0)   MEWS Score 1   Pain Assessment   Pain Assessment None - Denies Pain   Pain Level 0   Patient's Stated Pain Goal 0 - No pain   Oxygen Therapy   SpO2 95 %   O2 Device None (Room air)   O2 Flow Rate (L/min) 0 L/min     Pt assessment complete; see flow sheets. Vitals completed. Pt going down to dialysis. Morning meds held at this time. Pt stable.     Axel Lu RN

## 2022-06-20 NOTE — PROGRESS NOTES
Treatment time: 3:15    Net UF: 100    Pre weight: 79.1  Post weight: 79  EDW: 79    Access used: avf  Access function: well    Medications or blood products given: none    Regular outpatient schedule: mwf    Summary of response to treatment: tolerated well. No issues noted/reported t/o tx. Report called to primary RN. Pt stable upon leaving unit    Copy of dialysis treatment record placed in chart, to be scanned into EMR.

## 2022-06-21 LAB
ANION GAP SERPL CALCULATED.3IONS-SCNC: 14 MMOL/L (ref 3–16)
BASOPHILS ABSOLUTE: 0.1 K/UL (ref 0–0.2)
BASOPHILS RELATIVE PERCENT: 1.5 %
BUN BLDV-MCNC: 28 MG/DL (ref 7–20)
CALCIUM SERPL-MCNC: 9.2 MG/DL (ref 8.3–10.6)
CHLORIDE BLD-SCNC: 102 MMOL/L (ref 99–110)
CO2: 19 MMOL/L (ref 21–32)
CREAT SERPL-MCNC: 5.1 MG/DL (ref 0.6–1.2)
EOSINOPHILS ABSOLUTE: 0.4 K/UL (ref 0–0.6)
EOSINOPHILS RELATIVE PERCENT: 5.8 %
GFR AFRICAN AMERICAN: 10
GFR NON-AFRICAN AMERICAN: 8
GLUCOSE BLD-MCNC: 131 MG/DL (ref 70–99)
GLUCOSE BLD-MCNC: 138 MG/DL (ref 70–99)
GLUCOSE BLD-MCNC: 145 MG/DL (ref 70–99)
GLUCOSE BLD-MCNC: 162 MG/DL (ref 70–99)
GLUCOSE BLD-MCNC: 281 MG/DL (ref 70–99)
HCT VFR BLD CALC: 29.5 % (ref 36–48)
HEMOGLOBIN: 9.5 G/DL (ref 12–16)
LYMPHOCYTES ABSOLUTE: 0.7 K/UL (ref 1–5.1)
LYMPHOCYTES RELATIVE PERCENT: 11.8 %
MCH RBC QN AUTO: 26.4 PG (ref 26–34)
MCHC RBC AUTO-ENTMCNC: 32.1 G/DL (ref 31–36)
MCV RBC AUTO: 82.2 FL (ref 80–100)
MONOCYTES ABSOLUTE: 0.5 K/UL (ref 0–1.3)
MONOCYTES RELATIVE PERCENT: 7.9 %
NEUTROPHILS ABSOLUTE: 4.5 K/UL (ref 1.7–7.7)
NEUTROPHILS RELATIVE PERCENT: 73 %
PDW BLD-RTO: 21.6 % (ref 12.4–15.4)
PERFORMED ON: ABNORMAL
PLATELET # BLD: 230 K/UL (ref 135–450)
PMV BLD AUTO: 8.2 FL (ref 5–10.5)
POTASSIUM REFLEX MAGNESIUM: 4.7 MMOL/L (ref 3.5–5.1)
RBC # BLD: 3.59 M/UL (ref 4–5.2)
SODIUM BLD-SCNC: 135 MMOL/L (ref 136–145)
WBC # BLD: 6.1 K/UL (ref 4–11)

## 2022-06-21 PROCEDURE — 6370000000 HC RX 637 (ALT 250 FOR IP)

## 2022-06-21 PROCEDURE — 80048 BASIC METABOLIC PNL TOTAL CA: CPT

## 2022-06-21 PROCEDURE — 97535 SELF CARE MNGMENT TRAINING: CPT

## 2022-06-21 PROCEDURE — 6370000000 HC RX 637 (ALT 250 FOR IP): Performed by: INTERNAL MEDICINE

## 2022-06-21 PROCEDURE — 36415 COLL VENOUS BLD VENIPUNCTURE: CPT

## 2022-06-21 PROCEDURE — 85025 COMPLETE CBC W/AUTO DIFF WBC: CPT

## 2022-06-21 PROCEDURE — 6360000002 HC RX W HCPCS

## 2022-06-21 PROCEDURE — 2580000003 HC RX 258

## 2022-06-21 PROCEDURE — 2060000000 HC ICU INTERMEDIATE R&B

## 2022-06-21 PROCEDURE — 97530 THERAPEUTIC ACTIVITIES: CPT

## 2022-06-21 PROCEDURE — 99232 SBSQ HOSP IP/OBS MODERATE 35: CPT | Performed by: INTERNAL MEDICINE

## 2022-06-21 RX ADMIN — KETOROLAC TROMETHAMINE 1 DROP: 5 SOLUTION OPHTHALMIC at 07:35

## 2022-06-21 RX ADMIN — LEVOTHYROXINE SODIUM 50 MCG: 50 TABLET ORAL at 06:30

## 2022-06-21 RX ADMIN — CARVEDILOL 25 MG: 25 TABLET, FILM COATED ORAL at 09:37

## 2022-06-21 RX ADMIN — CEPHALEXIN 500 MG: 250 CAPSULE ORAL at 06:30

## 2022-06-21 RX ADMIN — KETOROLAC TROMETHAMINE 1 DROP: 5 SOLUTION OPHTHALMIC at 16:24

## 2022-06-21 RX ADMIN — ATORVASTATIN CALCIUM 10 MG: 10 TABLET, FILM COATED ORAL at 21:19

## 2022-06-21 RX ADMIN — INSULIN LISPRO 2 UNITS: 100 INJECTION, SOLUTION INTRAVENOUS; SUBCUTANEOUS at 16:24

## 2022-06-21 RX ADMIN — HEPARIN SODIUM 5000 UNITS: 5000 INJECTION INTRAVENOUS; SUBCUTANEOUS at 21:19

## 2022-06-21 RX ADMIN — GABAPENTIN 300 MG: 300 CAPSULE ORAL at 18:05

## 2022-06-21 RX ADMIN — INSULIN GLARGINE 10 UNITS: 100 INJECTION, SOLUTION SUBCUTANEOUS at 09:23

## 2022-06-21 RX ADMIN — CARVEDILOL 25 MG: 25 TABLET, FILM COATED ORAL at 18:05

## 2022-06-21 RX ADMIN — PREDNISOLONE ACETATE 1 DROP: 10 SUSPENSION/ DROPS OPHTHALMIC at 07:35

## 2022-06-21 RX ADMIN — SODIUM CHLORIDE, PRESERVATIVE FREE 10 ML: 5 INJECTION INTRAVENOUS at 21:25

## 2022-06-21 RX ADMIN — SERTRALINE HYDROCHLORIDE 25 MG: 50 TABLET ORAL at 09:23

## 2022-06-21 RX ADMIN — TIMOLOL MALEATE 1 DROP: 5 SOLUTION/ DROPS OPHTHALMIC at 07:35

## 2022-06-21 RX ADMIN — INSULIN LISPRO 6 UNITS: 100 INJECTION, SOLUTION INTRAVENOUS; SUBCUTANEOUS at 11:30

## 2022-06-21 RX ADMIN — CEPHALEXIN 500 MG: 250 CAPSULE ORAL at 14:33

## 2022-06-21 RX ADMIN — PREDNISOLONE ACETATE 1 DROP: 10 SUSPENSION/ DROPS OPHTHALMIC at 21:17

## 2022-06-21 RX ADMIN — ACETAMINOPHEN 650 MG: 325 TABLET ORAL at 21:58

## 2022-06-21 RX ADMIN — ASPIRIN 81 MG: 81 TABLET, COATED ORAL at 09:22

## 2022-06-21 RX ADMIN — CEPHALEXIN 500 MG: 250 CAPSULE ORAL at 21:19

## 2022-06-21 RX ADMIN — HEPARIN SODIUM 5000 UNITS: 5000 INJECTION INTRAVENOUS; SUBCUTANEOUS at 06:32

## 2022-06-21 RX ADMIN — PREDNISOLONE ACETATE 1 DROP: 10 SUSPENSION/ DROPS OPHTHALMIC at 11:28

## 2022-06-21 RX ADMIN — NIFEDIPINE 60 MG: 30 TABLET, FILM COATED, EXTENDED RELEASE ORAL at 09:23

## 2022-06-21 RX ADMIN — KETOROLAC TROMETHAMINE 1 DROP: 5 SOLUTION OPHTHALMIC at 21:18

## 2022-06-21 RX ADMIN — PREDNISOLONE ACETATE 1 DROP: 10 SUSPENSION/ DROPS OPHTHALMIC at 16:23

## 2022-06-21 RX ADMIN — KETOROLAC TROMETHAMINE 1 DROP: 5 SOLUTION OPHTHALMIC at 11:30

## 2022-06-21 RX ADMIN — HEPARIN SODIUM 5000 UNITS: 5000 INJECTION INTRAVENOUS; SUBCUTANEOUS at 14:33

## 2022-06-21 RX ADMIN — TIMOLOL MALEATE 1 DROP: 5 SOLUTION/ DROPS OPHTHALMIC at 21:18

## 2022-06-21 ASSESSMENT — PAIN DESCRIPTION - ORIENTATION: ORIENTATION: LEFT

## 2022-06-21 ASSESSMENT — PAIN - FUNCTIONAL ASSESSMENT: PAIN_FUNCTIONAL_ASSESSMENT: ACTIVITIES ARE NOT PREVENTED

## 2022-06-21 ASSESSMENT — PAIN SCALES - GENERAL
PAINLEVEL_OUTOF10: 0
PAINLEVEL_OUTOF10: 0
PAINLEVEL_OUTOF10: 4

## 2022-06-21 ASSESSMENT — PAIN DESCRIPTION - LOCATION: LOCATION: LEG

## 2022-06-21 ASSESSMENT — PAIN DESCRIPTION - DESCRIPTORS: DESCRIPTORS: ACHING;DISCOMFORT
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
## 2022-06-21 NOTE — PROGRESS NOTES
Progress Note    HISTORY     CC:   Found down             We are following for ESRD         Subjective/     HPI:    Last HD on 6/20 with 100 ml removed, post-weight of 79 kg. Access working well. Still with blistering to her LEs, more on the left than right. No erythema. ROS:  Denies any shortness of breath, intake better.     EXAM       Objective/     Vitals:    06/20/22 1715 06/20/22 2044 06/21/22 0210 06/21/22 0849   BP: (!) 162/72 114/61 (!) 112/55 (!) 147/67   Pulse: 80 75 74 73   Resp:  18 18 18   Temp:  97.5 °F (36.4 °C) 97.5 °F (36.4 °C) 97.7 °F (36.5 °C)   TempSrc:  Oral Oral Oral   SpO2:  93% 94% 93%   Weight:   169 lb 5 oz (76.8 kg)    Height:         24HR INTAKE/OUTPUT:      Intake/Output Summary (Last 24 hours) at 6/21/2022 1008  Last data filed at 6/20/2022 1815  Gross per 24 hour   Intake 1200 ml   Output 1000 ml   Net 200 ml     Constitutional:  Alert, awake, no apparent distress  Eyes:  Pupils reactive, sclera clear   Neck:  Normal thyroid, no masses   Cardiovascular:  Regular, no rub  Respiratory:  No distress, no wheezing  Psychiatry:  Appropriate mood/affect, alert  Abdomen: +bs, soft, nt, no masses   Musculoskeletal: +mild LE edema, blistering to the inside of the left leg, no erythema, no clubbing   Lymphatics:  No LAD in neck, no supraclavicular nodes   Access:  Left upper arm AVF       MEDICAL DECISION MAKING       Data/  Recent Labs     06/19/22  0431 06/20/22  0446 06/21/22  0456   WBC 6.8 6.5 6.1   HGB 9.7* 9.6* 9.5*   HCT 29.7* 29.6* 29.5*   MCV 79.8* 80.3 82.2    220 230     Recent Labs     06/19/22  0431 06/20/22  0446 06/21/22  0456   * 131* 135*   K 4.4 4.7 4.7   CL 96* 94* 102   CO2 22 19* 19*   GLUCOSE 133* 142* 145*   BUN 40* 56* 28*   CREATININE 6.4* 7.6* 5.1*   LABGLOM 6* 5* 8*   GFRAA 8* 6* 10*       Assessment/     ESRD:  On HD MWF with working AVF     Anemia of Chronic Disease-CKD:  Hgb near target     Encephalopathy:  Possible heat stroke, fell asleep out on her deck. Culture NGTD. Has resolved      Fever: On antibiotics, cultures NGTD. Possible heat stroke. Procalcitonin is elevated.      Plan/     Continue HD MWF schedule - at EDW of 79 kg   Does not appear that we can challenge her target weight to help with LE edema blistering as she has been cramping   Monitor for secondary infection  Monitor H/H - not on epogen

## 2022-06-21 NOTE — PROGRESS NOTES
Bedside report and transfer of care given to [facility]. Pt currently resting in bed with the call light within reach. Pt denies any other care needs at this time. Pt stable at this time.     Harika Ford RN

## 2022-06-21 NOTE — PLAN OF CARE
Problem: Discharge Planning  Goal: Discharge to home or other facility with appropriate resources  Outcome: Progressing     Problem: Safety - Adult  Goal: Free from fall injury  Outcome: Progressing  Flowsheets (Taken 6/20/2022 1105 by Vincent Rockwell RN)  Free From Fall Injury: Instruct family/caregiver on patient safety

## 2022-06-21 NOTE — FLOWSHEET NOTE
06/21/22 0210   Vital Signs   Temp 97.5 °F (36.4 °C)   Temp Source Oral   Heart Rate 74   Heart Rate Source Monitor   Resp 18   BP (!) 112/55   BP Location Right upper arm   BP Method Automatic   MAP (Calculated) 74   Patient Position Semi fowlers   Level of Consciousness Alert (0)   MEWS Score 1   Oxygen Therapy   SpO2 94 %   O2 Device None (Room air)

## 2022-06-21 NOTE — PROGRESS NOTES
Pt resting in bed this evening, A&O x 4, VSS. Vitals:    06/20/22 2044   BP: 114/61   Pulse: 75   Resp: 18   Temp: 97.5 °F (36.4 °C)   SpO2: 93%     Shift assessment complete and all meds given per MAR. Beverage and snack offered. Pt is on room air, no telemetry. Call light in hand. Bed in lowest position with all belongings within reach. Pt denies any further needs.

## 2022-06-21 NOTE — CARE COORDINATION
0930: GILES spoke with Reva Gleason with [facility] who states that they have not been able to secure wheelchair transportation to HD as of yet. Reva Gleason states that he will reach out to family re possibility of family providing transportation to HD.     1024: Rec'd return call from Reva Gleason with [facility] who states that they have secured wheelchair transportation to HD and he will start precert at this time.

## 2022-06-21 NOTE — PROGRESS NOTES
Pt A&O x 4 this morning. Morning meds given per MAR. Snack provided. Pt denies any further needs. Call light in hand.

## 2022-06-21 NOTE — FLOWSHEET NOTE
06/21/22 1430   Vital Signs   Temp 97.2 °F (36.2 °C)   Temp Source Oral   Heart Rate 73   Heart Rate Source Monitor   Resp 18   BP (!) 141/69   BP Location Right upper arm   MAP (Calculated) 93   Patient Position Semi fowlers   Level of Consciousness Alert (0)   MEWS Score 1   Pain Assessment   Pain Assessment None - Denies Pain   Pain Level 0   Patient's Stated Pain Goal 0 - No pain   Oxygen Therapy   SpO2 93 %   Pulse Oximeter Device Mode Continuous   Skin Assessment Clean, dry, & intact   O2 Flow Rate (L/min) 0 L/min     Afternoon vitals completed. Meds given per MAR. Wound care completed at this time.     Sunny Starr RN

## 2022-06-21 NOTE — PROGRESS NOTES
Inpatient Physical Therapy Daily Treatment Note    Unit: PCU  Date:  6/22/2022  Patient Name:    Mallika Rollins  Admitting diagnosis:  ESRD on dialysis [facility]) [N18.6, Z99.2]  Left leg cellulitis [E40.595]  Acute respiratory failure with hypoxia ([address] [J96.01]  Episode of unresponsiveness [R41.89]  Sepsis ([address] [A41.9]  Pneumonia of left lower lobe due to infectious organism [J18.9]  Hyperthermia associated with heat [T67.01XA]  Sepsis with acute hypoxic respiratory failure without septic shock, due to unspecified organism ([address] [A41.9, R65.20, J96.01]  Admit Date:  6/14/2022  Precautions/Restrictions:  Fall risk, Bed/chair alarm, Lines -IV and Telemetry, Blisters BLE-wrapped by nursing prior to transfers and ambulation      Discharge Recommendations: Home PRN assist   DME needs for discharge: Needs Met       Therapy recommendation for EMS Transport: can transport by wheelchair    Therapy recommendations for staff:   Supervision with use of rolling walker (RW) and gait belt for all transfers and ambulation within room     Home Health S4 Level Recommendation: NA  AM-PAC Mobility Score History of Present Illness: Per H&P Chely  \" 44 y. o. female who presented to [facility] with past medical history of type 2 diabetes, hypertension, hyperlipidemia, sepsis, hypothyroidism, end-stage renal disease Monday Wednesday Friday presented to the ED due to patient being encephalopathic loss of conscious.     Patient was found in the back porch unresponsive.  In the scene patient was noted to be vomited EMS was called and was noted to have a fever 101. 2.  Patient went to dialysis this morning was dialyzed her usual dose and then went home.  Patient does not member what occurred after dialysis. Shaila  denies having abdominal pain chest pain cough phlegm production back pain.     On my evaluation patient mentation resolved. Shaila  reports that she had dialysis yesterday went well reported that she might have gotten fluid more than usual. Karen Bach reports she went home and then the next morning patient went to the [facility] and fell asleep under the sun.  Patient then was found by the neighbor unconscious and EMS was called. Karen Bach noted to have fever.  Patient reports that she felt very hot and improved significantly after giving fluids.  Patient otherwise denied having any fever any chills cough phlegm production chest pain abdominal pain or dysuria.  Patient reports she produces minimal urine otherwise.  Patient reports that she did not regain consciousness until 2 PM today\"    Home Health S4 Level Recommendation: NA  AM-PAC Mobility Score   AM-PAC Inpatient Mobility without Stair Climbing Raw Score : 18    Treatment Time:  12:55- 13:35  Treatment number: 2  Timed Code Treatment Minutes: 40 minutes  Total Treatment Minutes:  40  minutes    Cognition    A&O orientation not directly assessed. WFL. Able to follow 2 step commands    Subjective   Patient lying supine in bed with no family present. RN in room. Pt agreeable to this PT mady Agustin Pay to participate. Pleased to be going home soon. Says granddaughter will be staying with her at night. Says she intends to be very careful with wound care and dressing changes to prevent infection. Pain   No    Bed Mobility   Supine to Sit:    Modified Independent  Sit to Supine:   Not Tested   (up to chair to end session)  Rolling:   Not Tested  Scooting:   Independent   Comments: extra time to complete 2/2 care with blisters.      Transfer Training  (to/from EOB, recliner, and std height toilet)   Sit to stand:   Supervision  Stand to sit:   Supervision  Bed to Chair:   Supervision with use of gait belt and rolling walker (RW)    Gait Training gait completed as indicated below   Distance:      25 ft + 30 ft + 60 ft  Deviations (firm surface/linoleum):  decreased sydnie, decreased step length bilaterally and externally rotated feet bilat  Assistive Device Used:    gait belt and rolling walker (RW)  Level of Assist:    Supervision  Comment: practiced aspects of home mobility such as opening doors, transferring small objects point to point, backstepping, turning, navigating narrow passageways. Stair Training deferred, pt unsafe/not appropriate to complete stairs at this time    Balance   Sitting:  Normal; Independent  Comments:     Standing: Fair +; Supervision  Comments: using RW. Light use of UEs for support. Patient Education      Role of PT, POC, Discharge recommendations, DC recommendations, safety awareness, transfer techniques and calling for assist with mobility. Positioning Needs       Pt up in chair, alarm set, positioned in proper neutral alignment and pressure relief provided. Call light provided and all needs within reach    Activity Tolerance   Pt completed therapy session with No adverse symptoms noted w/activity. Other  None    Assessment :  Patient has progressed well with all aspects of functional mobility during her stay here. She is mobilizing safely with walker at household level. She has a good awareness of her limitations and deficits. Good/progressive functional return expected in accordance with healing of her LE wounds and dec pain. Recommending Home PRN assist upon discharge as patient functioning close to baseline level. Goals : To be met in 3 visits:  1). Independent with LE Ex x 10 reps  2.) Bed to chair: CGA     To be met in 6 visits:  1). Supine to/from sit: Independent  2). Sit to/from stand: Independent  3). Bed to chair: Independent  4). Gait: Ambulate 125 ft.   with  SBA and use of LRAD (least restrictive assistive device)  5). Tolerate B LE exercises 3 sets of 10-15 reps  6). Ascend/descend 1 steps with SBA with use of no handrail and LRAD (least restrictive assistive device)    Plan   Continue with plan of care.     Signature: Jennifer Sarmiento, PT, DPT    If patient discharges from this facility prior to next visit, this note will serve as the Discharge Summary.

## 2022-06-21 NOTE — FLOWSHEET NOTE
06/21/22 0849   Vital Signs   Temp 97.7 °F (36.5 °C)   Temp Source Oral   Heart Rate 73   Heart Rate Source Monitor   Resp 18   BP (!) 147/67   BP Location Right upper arm   BP Method Automatic   MAP (Calculated) 93.67   Patient Position Semi fowlers   Level of Consciousness Alert (0)   MEWS Score 1   Pain Assessment   Pain Assessment None - Denies Pain   Pain Level 0   Patient's Stated Pain Goal 0 - No pain   Oxygen Therapy   SpO2 93 %   O2 Device None (Room air)   O2 Flow Rate (L/min) 0 L/min     Pt assessment complete; see flow sheets. Vitals completed. Meds completed. Pt stable at this time.     Alfonso Moore, RN

## 2022-06-22 VITALS
OXYGEN SATURATION: 93 % | TEMPERATURE: 97.1 F | DIASTOLIC BLOOD PRESSURE: 75 MMHG | RESPIRATION RATE: 18 BRPM | HEART RATE: 77 BPM | SYSTOLIC BLOOD PRESSURE: 163 MMHG | HEIGHT: 67 IN | WEIGHT: 174.16 LBS | BODY MASS INDEX: 27.34 KG/M2

## 2022-06-22 PROBLEM — A41.9 SEPSIS (HCC): Status: RESOLVED | Noted: 2022-06-14 | Resolved: 2022-06-22

## 2022-06-22 LAB
ANION GAP SERPL CALCULATED.3IONS-SCNC: 13 MMOL/L (ref 3–16)
BASOPHILS ABSOLUTE: 0.1 K/UL (ref 0–0.2)
BASOPHILS RELATIVE PERCENT: 1.8 %
BUN BLDV-MCNC: 44 MG/DL (ref 7–20)
CALCIUM SERPL-MCNC: 9 MG/DL (ref 8.3–10.6)
CHLORIDE BLD-SCNC: 102 MMOL/L (ref 99–110)
CO2: 20 MMOL/L (ref 21–32)
CREAT SERPL-MCNC: 6.6 MG/DL (ref 0.6–1.2)
EOSINOPHILS ABSOLUTE: 0.4 K/UL (ref 0–0.6)
EOSINOPHILS RELATIVE PERCENT: 6.1 %
GFR AFRICAN AMERICAN: 7
GFR NON-AFRICAN AMERICAN: 6
GLUCOSE BLD-MCNC: 115 MG/DL (ref 70–99)
GLUCOSE BLD-MCNC: 147 MG/DL (ref 70–99)
HCT VFR BLD CALC: 29.3 % (ref 36–48)
HEMOGLOBIN: 9.3 G/DL (ref 12–16)
LYMPHOCYTES ABSOLUTE: 1 K/UL (ref 1–5.1)
LYMPHOCYTES RELATIVE PERCENT: 16.2 %
MCH RBC QN AUTO: 25.9 PG (ref 26–34)
MCHC RBC AUTO-ENTMCNC: 31.6 G/DL (ref 31–36)
MCV RBC AUTO: 81.7 FL (ref 80–100)
MONOCYTES ABSOLUTE: 0.5 K/UL (ref 0–1.3)
MONOCYTES RELATIVE PERCENT: 8 %
NEUTROPHILS ABSOLUTE: 4.2 K/UL (ref 1.7–7.7)
NEUTROPHILS RELATIVE PERCENT: 67.9 %
PDW BLD-RTO: 21.2 % (ref 12.4–15.4)
PERFORMED ON: ABNORMAL
PLATELET # BLD: 239 K/UL (ref 135–450)
PMV BLD AUTO: 8.2 FL (ref 5–10.5)
POTASSIUM REFLEX MAGNESIUM: 4.9 MMOL/L (ref 3.5–5.1)
RBC # BLD: 3.58 M/UL (ref 4–5.2)
SODIUM BLD-SCNC: 135 MMOL/L (ref 136–145)
WBC # BLD: 6.2 K/UL (ref 4–11)

## 2022-06-22 PROCEDURE — 6370000000 HC RX 637 (ALT 250 FOR IP): Performed by: INTERNAL MEDICINE

## 2022-06-22 PROCEDURE — 97116 GAIT TRAINING THERAPY: CPT

## 2022-06-22 PROCEDURE — 85025 COMPLETE CBC W/AUTO DIFF WBC: CPT

## 2022-06-22 PROCEDURE — 36415 COLL VENOUS BLD VENIPUNCTURE: CPT

## 2022-06-22 PROCEDURE — 6360000002 HC RX W HCPCS

## 2022-06-22 PROCEDURE — 90935 HEMODIALYSIS ONE EVALUATION: CPT

## 2022-06-22 PROCEDURE — 2580000003 HC RX 258

## 2022-06-22 PROCEDURE — 99239 HOSP IP/OBS DSCHRG MGMT >30: CPT | Performed by: INTERNAL MEDICINE

## 2022-06-22 PROCEDURE — 97530 THERAPEUTIC ACTIVITIES: CPT

## 2022-06-22 PROCEDURE — 80048 BASIC METABOLIC PNL TOTAL CA: CPT

## 2022-06-22 RX ORDER — CARVEDILOL 25 MG/1
25 TABLET ORAL 2 TIMES DAILY WITH MEALS
Qty: 60 TABLET | Refills: 0 | Status: SHIPPED | OUTPATIENT
Start: 2022-06-22

## 2022-06-22 RX ORDER — CEPHALEXIN 500 MG/1
500 CAPSULE ORAL EVERY 8 HOURS SCHEDULED
Qty: 6 CAPSULE | Refills: 0 | Status: SHIPPED | OUTPATIENT
Start: 2022-06-22 | End: 2022-06-24

## 2022-06-22 RX ADMIN — TIMOLOL MALEATE 1 DROP: 5 SOLUTION/ DROPS OPHTHALMIC at 11:40

## 2022-06-22 RX ADMIN — PREDNISOLONE ACETATE 1 DROP: 10 SUSPENSION/ DROPS OPHTHALMIC at 11:39

## 2022-06-22 RX ADMIN — CEPHALEXIN 500 MG: 250 CAPSULE ORAL at 13:52

## 2022-06-22 RX ADMIN — HEPARIN SODIUM 5000 UNITS: 5000 INJECTION INTRAVENOUS; SUBCUTANEOUS at 13:52

## 2022-06-22 RX ADMIN — CEPHALEXIN 500 MG: 250 CAPSULE ORAL at 05:30

## 2022-06-22 RX ADMIN — KETOROLAC TROMETHAMINE 1 DROP: 5 SOLUTION OPHTHALMIC at 11:39

## 2022-06-22 RX ADMIN — INSULIN GLARGINE 10 UNITS: 100 INJECTION, SOLUTION SUBCUTANEOUS at 11:47

## 2022-06-22 RX ADMIN — KETOROLAC TROMETHAMINE 1 DROP: 5 SOLUTION OPHTHALMIC at 13:55

## 2022-06-22 RX ADMIN — NIFEDIPINE 60 MG: 30 TABLET, FILM COATED, EXTENDED RELEASE ORAL at 11:34

## 2022-06-22 RX ADMIN — CARVEDILOL 25 MG: 25 TABLET, FILM COATED ORAL at 11:45

## 2022-06-22 RX ADMIN — PREDNISOLONE ACETATE 1 DROP: 10 SUSPENSION/ DROPS OPHTHALMIC at 13:55

## 2022-06-22 RX ADMIN — SODIUM CHLORIDE, PRESERVATIVE FREE 10 ML: 5 INJECTION INTRAVENOUS at 11:41

## 2022-06-22 RX ADMIN — LEVOTHYROXINE SODIUM 50 MCG: 50 TABLET ORAL at 05:28

## 2022-06-22 RX ADMIN — SERTRALINE HYDROCHLORIDE 25 MG: 50 TABLET ORAL at 11:34

## 2022-06-22 RX ADMIN — HEPARIN SODIUM 5000 UNITS: 5000 INJECTION INTRAVENOUS; SUBCUTANEOUS at 05:30

## 2022-06-22 RX ADMIN — ASPIRIN 81 MG: 81 TABLET, COATED ORAL at 11:34

## 2022-06-22 ASSESSMENT — PAIN SCALES - GENERAL
PAINLEVEL_OUTOF10: 0

## 2022-06-22 NOTE — PROGRESS NOTES
Wound clinic appt. made for patient soonest available on Wed. July 6 @ 2:30 pm for follow up on wound healing.  Dawson Dias RN

## 2022-06-22 NOTE — PROGRESS NOTES
Patient educated on discharge instructions as well as new medications use, dosage, administration and possible side effects. Patient verified knowledge. IV removed without difficulty and dry dressing in place. Telemetry monitor removed and returned to [address] [place] [address]. Pt left facility in stable condition to with all of their personal belongings.  Marely Jaime RN

## 2022-06-22 NOTE — DISCHARGE INSTR - COC
Continuity of Care Form    Patient Name: Fabian Hays   :  1950  MRN:  [id number]    Admit date:  2022  Discharge date:  2022    Code Status Order: Full Code   Advance Directives:      Admitting Physician:  Julia Vanessa MD  PCP: ELANA De La Fuente CNP    Discharging Nurse:  [facility]/Room#: /4379-58  Discharging Unit Phone Number: [phone]    Emergency Contact:   Extended Emergency Contact Information  Primary Emergency Contact: Hamlet Bryan [address] Phone: [phone]  Mobile Phone: [phone]  Relation: Other  Secondary Emergency Contact: Remy Hurtado  Mobile Phone: [phone]  Relation: Child    Past Surgical History:  Past Surgical History:   Procedure Laterality Date    DIALYSIS FISTULA CREATION Left 08/10/2016    Dr. Edin Luna - upper arm, basilic vein transposition    EYE SURGERY Left 2018    catarct removal with lens implant     OTHER SURGICAL HISTORY Right 2019    partial foot amputation    TOE AMPUTATION Right 2019    PARTIAL FOOT AMPUTATION WITH GRAFT APPLICATION performed by Keaton Barker DPM at [facility] [address] Left 2021    LEFT TOTAL KNEE ARTHROPLASTY performed by Albert Grider MD at [facility]       Immunization History:   Immunization History   Administered Date(s) Administered    Influenza Virus Vaccine 2018       Active Problems:  Patient Active Problem List   Diagnosis Code    Cellulitis L03.90    MEY (acute kidney injury) ([facility] N17.9    Type 2 diabetes mellitus with complication, without long-term current use of insulin ([address] E11.8    Secondary hypertension I15.9    Hyperlipidemia E78.5    Hypoglycemia E16.2    Anxiety about health F41.8    Nephrotic syndrome N04.9    Essential hypertension I10    Hyperkalemia E87.5    Diabetic ulcer of left foot associated with type 2 diabetes mellitus ([address] E11.621, L97.529    CKD (chronic kidney disease), stage IV ([address] G61.2    Diastolic dysfunction with acute on chronic heart failure (HCC) I50.33    Acute on chronic diastolic congestive heart failure (HCC) I50.33    ESRD on dialysis (HCC) N18.6, Z99.2    Hypoxia R09.02    Hypervolemia E87.70    Anemia due to chronic kidney disease N18.9, D63.1    Chronic kidney disease (CKD), stage V (HCC) N18.5    Toe osteomyelitis, right (HCC) M86.9    Cellulitis of right foot L03.115    Cellulitis of right leg L03.115    Primary osteoarthritis of left knee M17.12    Acute pain of left knee M25.562    Lung mass R91.8    Lung nodule R91.1    Granulomatous lung disease ([address] J84.10    Former smoker Z87.891    Intrahepatic bile duct dilation K83.8    Hyponatremia E87.1    DM (diabetes mellitus), secondary uncontrolled (HCC) E13.65    Elevated parathyroid hormone E34.9    Status post total left knee replacement Z96.652    Pulmonary HTN (HCC) I27.20    Postoperative anemia due to acute blood loss D62    Pneumonia of left lower lobe due to infectious organism J18.9    First degree sunburn L55.0       Isolation/Infection:   Isolation            No Isolation          Patient Infection Status       Infection Onset Added Last Indicated Last Indicated By Review Planned Expiration Resolved Resolved By    None active    Resolved    COVID-19 (Rule Out) 06/14/22 06/14/22 06/14/22 COVID-19 & Influenza Combo (Ordered)   06/14/22 Rule-Out Test Resulted    COVID-19 (Rule Out) 12/16/21 12/16/21 12/16/21 COVID-19, Rapid (Ordered)   12/16/21 Rule-Out Test Resulted    COVID-19 (Rule Out) 12/13/21 12/13/21 12/13/21 COVID-19, Rapid (Ordered)   12/13/21 Rule-Out Test Resulted    COVID-19 (Rule Out) 11/30/21 11/30/21 11/30/21 COVID-19 (Ordered)   12/01/21 Rule-Out Test Resulted            Nurse Assessment:  Last Vital Signs: BP (!) 181/72   Pulse 77   Temp (!) 96.6 °F (35.9 °C) (Oral)   Resp 18   Ht 5' 7\" (1.702 m)   Wt 174 lb 2.6 oz (79 kg)   SpO2 98%   BMI 27.28 kg/m²     Last documented pain score (0-10 scale): Pain Level: 0  Last Weight:   Wt Readings from Last 1 Encounters:   06/22/22 174 lb 2.6 oz (79 kg)     Mental Status:  oriented    IV Access:  - None    Nursing Mobility/ADLs:  Walking   Assisted  Transfer  Assisted  Bathing  Assisted  Dressing  Assisted  Toileting  Assisted  Feeding  Assisted  Med Admin  Assisted  Med Delivery   whole    Wound Care Documentation and Therapy:  Wound 01/07/16 Abrasion(s) Leg Lower; Posterior (Active)   Number of days: 7807       Incision 08/10/16 Arm Left;Medial (Active)   Number of days: 2142       Wound 06/14/22 Pretibial Left; Anterior blister present on admission, sun exposure, wound culture ordered (Active)   Wound Etiology Other 06/22/22 1158   Dressing Status Intact; Old drainage noted 06/22/22 1158   Dressing/Treatment Other (comment) 06/22/22 1158   Wound Assessment Pink/red; Other (Comment) 06/22/22 1158   Drainage Amount Small 06/22/22 1158   Odor None 06/22/22 1158   Number of days: 7        Elimination:  Continence: Bowel: Yes  Bladder: Yes  Urinary Catheter: None   Colostomy/Ileostomy/Ileal Conduit: No       Date of Last BM: unknown    Intake/Output Summary (Last 24 hours) at 6/22/2022 1349  Last data filed at 6/22/2022 1055  Gross per 24 hour   Intake 812 ml   Output 1400 ml   Net -588 ml     I/O last 3 completed shifts: In: 592 [P. O.:582; I.V.:10]  Out: -     Safety Concerns: At Risk for Falls    Impairments/Disabilities:      None    Nutrition Therapy:  Current Nutrition Therapy:   - Oral Diet:  Renal    Routes of Feeding: Oral  Liquids: No Restrictions  Daily Fluid Restriction: no  Last Modified Barium Swallow with Video (Video Swallowing Test): not done    Treatments at the Time of Hospital Discharge:   Respiratory Treatments:   Oxygen Therapy:  is not on home oxygen therapy.   Ventilator:    - No ventilator support    Rehab Therapies:   Weight Bearing Status/Restrictions: No weight bearing restrictions  Other Medical Equipment (for information only, NOT a DME order):  walker  Other Treatments: patient needs wounds dressed daily, wash with normal saline, dry with gauze cover with Xeroform and gauze ;apply Hydraguard to skin surrounding wound but not wound bed;cover with roll gauze and tape    Patient's personal belongings (please select all that are sent with patient):  None    RN SIGNATURE:  Electronically signed by Porsha Batista RN on 6/22/22 at 2:30 PM EDT    CASE MANAGEMENT/SOCIAL WORK SECTION    Inpatient Status Date: 6/14/2022    Readmission Risk Assessment Score:  Readmission Risk              Risk of Unplanned Readmission:  30           Discharging to Facility/ Agency   Name: [facility].  Address:  Phone:  Fax:    Dialysis Facility (if applicable)   Name:  Address:  Dialysis Schedule:  Phone:  Fax:    / signature: Electronically signed by Nabila Wright RN on 6/22/22 at 2:20 PM EDT    PHYSICIAN SECTION    Prognosis: Good    Condition at Discharge: Stable    Rehab Potential (if transferring to Rehab): Good    Recommended Labs or Other Treatments After Discharge: Apply Hydraguard cream to dry skin surrounding blisters. Apply Xeroform Gauze over intact and ruptured blisters, cover with dry 4x4's and secure with conform roll gauze and tape. Change daily. Physician Certification: I certify the above information and transfer of Suellen Lares  is necessary for the continuing treatment of the diagnosis listed and that she requires Home Care for less 30 days.      Update Admission H&P: No change in H&P    PHYSICIAN SIGNATURE:  Electronically signed by Ina Laughlin MD on 6/22/22 at 1:50 PM EDT

## 2022-06-22 NOTE — PROGRESS NOTES
Preformed wound care with patient to educate on how to care for wounds upon discharge, all questions addressed patient affirmed knowledge in order to do at home.  Manuel Barajas RN

## 2022-06-22 NOTE — PROGRESS NOTES
Shift assessment complete, morning medications given, patient resting with no complaints of pain, discussed Michelle with patient and patient would prefer to go home due to issues with dialysis transportation, case management aware, will continue to monitor.  Camila Villalba RN
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
## 2022-06-22 NOTE — PROGRESS NOTES
Bedside report and transfer of care given to [facility]. Pt currently resting in bed with the call light within reach. Pt denies any other care needs at this time. Pt stable at this time.     Sunny Starr RN

## 2022-06-22 NOTE — FLOWSHEET NOTE
Treatment time: 3.5 hours  Net UF: 1400 ml     Pre weight: 80 kg   Post weight: 79 kg  EDW: 79 kg     Access used: KIRK AVF  Access function: Good with  ml/min     Medications or blood products given: N/A     Regular outpatient schedule: MWF     Summary of response to treatment:      06/22/22 0720 06/22/22 1055   Vital Signs   BP (!) 133/59 (!) 171/76   Temp 97.4 °F (36.3 °C) 97.1 °F (36.2 °C)   Heart Rate 71 73   Resp 18 18   Weight 176 lb 5.9 oz (80 kg) 174 lb 2.6 oz (79 kg)   Weight Method Bed scale Bed scale   Percent Weight Change 2.48 -1.25   Dry Weight 174 lb 2.6 oz (79 kg)  --    Post-Hemodialysis Assessment   Post-Treatment Procedures  --  Blood returned; Access bleeding time > 10 minutes   Machine Disinfection Process  --  Acid/Vinegar Clean;Heat Disinfect   Rinseback Volume (ml)  --  300 ml   Total Liters Processed (l/min)  --  74 l/min   Dialyzer Clearance  --  Lightly streaked   Duration of Treatment (minutes)  --  195 minutes   Hemodialysis Intake (ml)  --  400 ml   Hemodialysis Output (ml)  --  1400 ml   NET Removed (ml)  --  1000   Tolerated Treatment  --  Good   Time Off  --  1043   Copy of dialysis treatment record placed in chart, to be scanned into EMR.  Report called to Victoriano Gordon RN.

## 2022-06-22 NOTE — PROGRESS NOTES
Pt awake in bed. Assessment completed and medications given. VS as charted. A&Ox4. Pt states pain as a 4 out of 10 PRN tylenol given at this time. Pt denies any further needs at this time. Call light in reach and bed in lowest position.

## 2022-06-22 NOTE — CARE COORDINATION
[facility]  Received referral regarding HC services from [facility]. Sent request to [facility] for [facility] coverage with wound care. [facility] is not able to cover a SOC with wound care in pt's zipcode. Telephone call to "[employer] [facility]. Spoke with [employer]. [employer] is able to service pt for SN, PT/OT and wound care. [employer] has epic access and can pull HC orders. Aware of dc home today.           Electronically signed by Dennis Gomez RN on 6/22/2022 at 2:38 PM

## 2022-06-22 NOTE — CARE COORDINATION
[phone]  · Fax: [phone]      Date of Last IMM Given: 6/20/2022    Reviewed chart. Role of discharge planner explained and patient verbalized understanding. Discharge order is noted. Has Home O2 in place on admit:  No    Pt is being d/c'd to home today. Pt's O2 sats are 989% on RA. Discharge timeout done with [facility]. All discharge needs and concerns addressed.

## 2022-06-22 NOTE — PROGRESS NOTES
Verbal report and transfer of care given to [facility]. Pt currently in HD.

## 2022-07-06 ENCOUNTER — HOSPITAL ENCOUNTER (OUTPATIENT)
Dept: WOUND CARE | Age: 72
Discharge: HOME | End: 2022-07-06
Payer: MEDICARE

## 2022-07-06 VITALS
SYSTOLIC BLOOD PRESSURE: 172 MMHG | RESPIRATION RATE: 20 BRPM | WEIGHT: 175.13 LBS | TEMPERATURE: 98.5 F | HEART RATE: 85 BPM | HEIGHT: 67 IN | DIASTOLIC BLOOD PRESSURE: 72 MMHG | BODY MASS INDEX: 27.49 KG/M2

## 2022-07-06 DIAGNOSIS — T24.232A SECOND DEGREE BURN OF LEFT LOWER LEG, INITIAL ENCOUNTER: ICD-10-CM

## 2022-07-06 DIAGNOSIS — R09.89 DECREASED PEDAL PULSES: ICD-10-CM

## 2022-07-06 DIAGNOSIS — T24.212A SECOND DEGREE BURN OF LEFT THIGH, INITIAL ENCOUNTER: ICD-10-CM

## 2022-07-06 DIAGNOSIS — T24.231A SECOND DEGREE BURN OF RIGHT LOWER LEG, INITIAL ENCOUNTER: Primary | ICD-10-CM

## 2022-07-06 PROCEDURE — 16020 DRESS/DEBRID P-THICK BURN S: CPT

## 2022-07-06 PROCEDURE — 99214 OFFICE O/P EST MOD 30 MIN: CPT

## 2022-07-06 PROCEDURE — 99203 OFFICE O/P NEW LOW 30 MIN: CPT | Performed by: INTERNAL MEDICINE

## 2022-07-06 RX ORDER — BACITRACIN ZINC AND POLYMYXIN B SULFATE 500; 1000 [USP'U]/G; [USP'U]/G
OINTMENT TOPICAL ONCE
Status: DISCONTINUED | OUTPATIENT
Start: 2022-07-06 | End: 2022-07-07 | Stop reason: HOSPADM

## 2022-07-06 RX ORDER — LIDOCAINE HYDROCHLORIDE 40 MG/ML
SOLUTION TOPICAL ONCE
Status: CANCELLED | OUTPATIENT
Start: 2022-07-06 | End: 2022-07-06

## 2022-07-06 RX ORDER — LIDOCAINE 40 MG/G
CREAM TOPICAL ONCE
Status: CANCELLED | OUTPATIENT
Start: 2022-07-06 | End: 2022-07-06

## 2022-07-06 RX ORDER — LIDOCAINE 50 MG/G
OINTMENT TOPICAL ONCE
Status: DISCONTINUED | OUTPATIENT
Start: 2022-07-06 | End: 2022-07-07 | Stop reason: HOSPADM

## 2022-07-06 RX ORDER — LIDOCAINE 50 MG/G
OINTMENT TOPICAL ONCE
Status: CANCELLED | OUTPATIENT
Start: 2022-07-06 | End: 2022-07-06

## 2022-07-06 RX ORDER — LIDOCAINE HYDROCHLORIDE 40 MG/ML
SOLUTION TOPICAL ONCE
Status: DISCONTINUED | OUTPATIENT
Start: 2022-07-06 | End: 2022-07-07 | Stop reason: HOSPADM

## 2022-07-06 RX ORDER — BACITRACIN ZINC AND POLYMYXIN B SULFATE 500; 1000 [USP'U]/G; [USP'U]/G
OINTMENT TOPICAL ONCE
Status: CANCELLED | OUTPATIENT
Start: 2022-07-06 | End: 2022-07-06

## 2022-07-06 RX ORDER — LIDOCAINE 40 MG/G
CREAM TOPICAL ONCE
Status: DISCONTINUED | OUTPATIENT
Start: 2022-07-06 | End: 2022-07-07 | Stop reason: HOSPADM

## 2022-07-06 ASSESSMENT — PAIN - FUNCTIONAL ASSESSMENT: PAIN_FUNCTIONAL_ASSESSMENT: ACTIVITIES ARE NOT PREVENTED

## 2022-07-06 ASSESSMENT — PAIN DESCRIPTION - DESCRIPTORS: DESCRIPTORS: SHOOTING;SHARP;STABBING

## 2022-07-06 ASSESSMENT — PAIN DESCRIPTION - PAIN TYPE: TYPE: ACUTE PAIN

## 2022-07-06 ASSESSMENT — PAIN DESCRIPTION - LOCATION: LOCATION: LEG

## 2022-07-06 ASSESSMENT — PAIN SCALES - GENERAL: PAINLEVEL_OUTOF10: 3

## 2022-07-06 ASSESSMENT — PAIN DESCRIPTION - ORIENTATION: ORIENTATION: RIGHT;LEFT

## 2022-07-06 ASSESSMENT — PAIN DESCRIPTION - FREQUENCY: FREQUENCY: INTERMITTENT
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
## 2022-07-06 NOTE — PLAN OF CARE
Pt. New to [address] [facility] today after recent hospitalization. Wounds noted on bilateral legs. DIONNA noted. Dr Jonelle Lemon discussed options to further assess arterial disease, pt. Agreeable to wait for further diagnostic testing at this time. Will re-visit if wound healing not progressing in the next few weeks. Wounds stable, no infection noted, no debridement today. Will change to using Triad/PSO to wounds, cont. To cover with xeroform, kerlix, ace wrap to help hold dressings. F/u in [address] in 2 weeks as ordered, pt. Aware to call sooner with any changes or questions/concerns. Discharge instructions reviewed with patient, all questions answered, copy given to patient. Dressings were applied to all wounds per M.D. Instructions at this visit.

## 2022-07-12 PROBLEM — M17.12 PRIMARY OSTEOARTHRITIS OF LEFT KNEE: Status: RESOLVED | Noted: 2021-05-25 | Resolved: 2022-07-12

## 2022-07-12 PROBLEM — R09.89 DECREASED PEDAL PULSES: Status: ACTIVE | Noted: 2022-07-12

## 2022-07-12 PROBLEM — D62 POSTOPERATIVE ANEMIA DUE TO ACUTE BLOOD LOSS: Status: RESOLVED | Noted: 2021-12-14 | Resolved: 2022-07-12

## 2022-07-12 PROBLEM — L03.115 CELLULITIS OF RIGHT FOOT: Status: RESOLVED | Noted: 2019-01-10 | Resolved: 2022-07-12

## 2022-07-12 PROBLEM — R91.1 LUNG NODULE: Status: RESOLVED | Noted: 2021-12-14 | Resolved: 2022-07-12

## 2022-07-12 PROBLEM — M86.9 TOE OSTEOMYELITIS, RIGHT (HCC): Status: RESOLVED | Noted: 2019-01-10 | Resolved: 2022-07-12

## 2022-07-12 PROBLEM — M25.562 ACUTE PAIN OF LEFT KNEE: Status: RESOLVED | Noted: 2021-05-25 | Resolved: 2022-07-12
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
## 2022-07-20 ENCOUNTER — HOSPITAL ENCOUNTER (OUTPATIENT)
Dept: WOUND CARE | Age: 72
Discharge: HOME | End: 2022-07-20
Payer: MEDICARE

## 2022-07-20 VITALS
WEIGHT: 175 LBS | HEART RATE: 94 BPM | TEMPERATURE: 98.6 F | DIASTOLIC BLOOD PRESSURE: 55 MMHG | HEIGHT: 67 IN | SYSTOLIC BLOOD PRESSURE: 139 MMHG | BODY MASS INDEX: 27.47 KG/M2 | RESPIRATION RATE: 22 BRPM

## 2022-07-20 DIAGNOSIS — T24.232A SECOND DEGREE BURN OF LEFT LOWER LEG, INITIAL ENCOUNTER: ICD-10-CM

## 2022-07-20 DIAGNOSIS — T24.231A SECOND DEGREE BURN OF RIGHT LOWER LEG, INITIAL ENCOUNTER: Primary | ICD-10-CM

## 2022-07-20 DIAGNOSIS — T24.212A SECOND DEGREE BURN OF LEFT THIGH, INITIAL ENCOUNTER: ICD-10-CM

## 2022-07-20 PROCEDURE — 16020 DRESS/DEBRID P-THICK BURN S: CPT | Performed by: INTERNAL MEDICINE

## 2022-07-20 PROCEDURE — 16020 DRESS/DEBRID P-THICK BURN S: CPT

## 2022-07-20 RX ORDER — BACITRACIN ZINC AND POLYMYXIN B SULFATE 500; 1000 [USP'U]/G; [USP'U]/G
OINTMENT TOPICAL ONCE
Status: DISCONTINUED | OUTPATIENT
Start: 2022-07-20 | End: 2022-07-21 | Stop reason: HOSPADM

## 2022-07-20 RX ORDER — BACITRACIN ZINC AND POLYMYXIN B SULFATE 500; 1000 [USP'U]/G; [USP'U]/G
OINTMENT TOPICAL ONCE
Status: CANCELLED | OUTPATIENT
Start: 2022-07-20 | End: 2022-07-20

## 2022-07-20 RX ORDER — LIDOCAINE HYDROCHLORIDE 40 MG/ML
SOLUTION TOPICAL ONCE
Status: DISCONTINUED | OUTPATIENT
Start: 2022-07-20 | End: 2022-07-21 | Stop reason: HOSPADM

## 2022-07-20 RX ORDER — LIDOCAINE 50 MG/G
OINTMENT TOPICAL ONCE
Status: DISCONTINUED | OUTPATIENT
Start: 2022-07-20 | End: 2022-07-21 | Stop reason: HOSPADM

## 2022-07-20 RX ORDER — LIDOCAINE 50 MG/G
OINTMENT TOPICAL ONCE
Status: CANCELLED | OUTPATIENT
Start: 2022-07-20 | End: 2022-07-20

## 2022-07-20 RX ORDER — LIDOCAINE HYDROCHLORIDE 40 MG/ML
SOLUTION TOPICAL ONCE
Status: CANCELLED | OUTPATIENT
Start: 2022-07-20 | End: 2022-07-20

## 2022-07-20 RX ORDER — LIDOCAINE 40 MG/G
CREAM TOPICAL ONCE
Status: DISCONTINUED | OUTPATIENT
Start: 2022-07-20 | End: 2022-07-21 | Stop reason: HOSPADM

## 2022-07-20 RX ORDER — LIDOCAINE 40 MG/G
CREAM TOPICAL ONCE
Status: CANCELLED | OUTPATIENT
Start: 2022-07-20 | End: 2022-07-20

## 2022-07-20 ASSESSMENT — PAIN SCALES - GENERAL: PAINLEVEL_OUTOF10: 0

## 2022-07-20 NOTE — PLAN OF CARE
Pt. Noted to have a new skin tear on left hand, stable, no debridement, will apply antibiotic ointment, cover with bordered dressing, leave in place for 3-4 days. If wound remains open after removing dressing, then apply antibiotic ointment, cover with bandaid, change daily or every other day. Leg wounds stable & showing some improvement, debridement per MD & pt. Tolerated well. Will cont. With current wound care regime with dressings. Pt. States she is doing good with glucose control & eating more protein in diet to assist with wound healing. F/u in [address] [place] [address],[address] in 1 week as ordered, pt. Aware to call sooner with any changes or questions/concerns. Discharge instructions reviewed with patient, all questions answered, copy given to patient. Dressings were applied to all wounds per M.D. Instructions at this visit.
help hold in place  Change dressings daily or every other day if not draining much           Please note, all wounds (unless stated otherwise here) were mechanically debrided at the time of cleansing here in the wound-care center today, so a small amount of pain, drainage or bleeding from that process might be expected, and is normal.      All products for home use, including multiple products for a single wound if applicable, are medically necessary in order to achieve the best chance at timely wound healing. See provider documentation for details if needed. Substituted dressings applied in the [address] [place] [address],[address] today, if applicable:     N/a     New orders for this week (labs, imaging, medications, etc.):     You may shower. Wash your legs last, pat dry & apply new dressings. Call wound care center if you notice any concerning changes or have any questions/concerns. Additional instructions for specific diagnoses:     N/a        F/U Appointment is with Dr. Steve Ashford in 1 week, on                                   at                       .     Your nurse  is TOM Alexander. If we applied slip-resistant hospital socks today, be sure to remove them at least once a day to inspect your toes or feet, even if you're not changing the wraps or dressings underneath. If you see anything concerning (redness, excess moisture, etc), please call and let us know right away. Should you experience any significant changes in your wound(s) (including redness, increased warmth, increased pain, increased drainage, odor, or fever) or have questions about your wound care, please contact the [employer] at [phone] Monday-Thursday from 8:00 am - 4:30 pm, or Friday from 8:00 am - 2:30 pm.  If you need help with your wound outside these hours and cannot wait until we are again available, contact your home-care company (if applicable), your PCP, or go to the nearest emergency room.

## 2022-07-27 ENCOUNTER — HOSPITAL ENCOUNTER (OUTPATIENT)
Dept: WOUND CARE | Age: 72
Discharge: HOME | End: 2022-07-27
Payer: MEDICARE

## 2022-07-27 VITALS
WEIGHT: 174.13 LBS | TEMPERATURE: 98 F | RESPIRATION RATE: 20 BRPM | BODY MASS INDEX: 27.33 KG/M2 | HEIGHT: 67 IN | DIASTOLIC BLOOD PRESSURE: 50 MMHG | HEART RATE: 85 BPM | SYSTOLIC BLOOD PRESSURE: 115 MMHG

## 2022-07-27 DIAGNOSIS — T24.212A SECOND DEGREE BURN OF LEFT THIGH, INITIAL ENCOUNTER: ICD-10-CM

## 2022-07-27 DIAGNOSIS — T24.331A THIRD DEGREE BURN OF RIGHT LOWER LEG, INITIAL ENCOUNTER: ICD-10-CM

## 2022-07-27 DIAGNOSIS — T24.232A SECOND DEGREE BURN OF LEFT LOWER LEG, INITIAL ENCOUNTER: ICD-10-CM

## 2022-07-27 DIAGNOSIS — T24.231A SECOND DEGREE BURN OF RIGHT LOWER LEG, INITIAL ENCOUNTER: Primary | ICD-10-CM

## 2022-07-27 PROCEDURE — 16020 DRESS/DEBRID P-THICK BURN S: CPT | Performed by: INTERNAL MEDICINE

## 2022-07-27 PROCEDURE — 16020 DRESS/DEBRID P-THICK BURN S: CPT

## 2022-07-27 RX ORDER — BACITRACIN ZINC AND POLYMYXIN B SULFATE 500; 1000 [USP'U]/G; [USP'U]/G
OINTMENT TOPICAL ONCE
Status: DISCONTINUED | OUTPATIENT
Start: 2022-07-27 | End: 2022-07-28 | Stop reason: HOSPADM

## 2022-07-27 RX ORDER — LIDOCAINE 40 MG/G
CREAM TOPICAL ONCE
Status: DISCONTINUED | OUTPATIENT
Start: 2022-07-27 | End: 2022-07-28 | Stop reason: HOSPADM

## 2022-07-27 RX ORDER — LIDOCAINE HYDROCHLORIDE 40 MG/ML
SOLUTION TOPICAL ONCE
Status: DISCONTINUED | OUTPATIENT
Start: 2022-07-27 | End: 2022-07-28 | Stop reason: HOSPADM

## 2022-07-27 RX ORDER — BACITRACIN ZINC AND POLYMYXIN B SULFATE 500; 1000 [USP'U]/G; [USP'U]/G
OINTMENT TOPICAL ONCE
Status: CANCELLED | OUTPATIENT
Start: 2022-07-27 | End: 2022-07-27

## 2022-07-27 RX ORDER — LIDOCAINE 40 MG/G
CREAM TOPICAL ONCE
Status: CANCELLED | OUTPATIENT
Start: 2022-07-27 | End: 2022-07-27

## 2022-07-27 RX ORDER — LIDOCAINE 50 MG/G
OINTMENT TOPICAL ONCE
Status: DISCONTINUED | OUTPATIENT
Start: 2022-07-27 | End: 2022-07-28 | Stop reason: HOSPADM

## 2022-07-27 RX ORDER — LIDOCAINE 50 MG/G
OINTMENT TOPICAL ONCE
Status: CANCELLED | OUTPATIENT
Start: 2022-07-27 | End: 2022-07-27

## 2022-07-27 RX ORDER — LIDOCAINE HYDROCHLORIDE 40 MG/ML
SOLUTION TOPICAL ONCE
Status: CANCELLED | OUTPATIENT
Start: 2022-07-27 | End: 2022-07-27
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
## 2022-07-27 NOTE — PLAN OF CARE
Wounds stable & showing improvement, debridement per MD & pt. Tolerated well. Will cont. With current wound care regime with dressings. F/u in [facility] in 1 week as ordered, pt. Aware to call sooner with any changes or questions/concerns. Discharge instructions reviewed with patient, all questions answered, copy given to patient. Dressings were applied to all wounds per M.D. Instructions at this visit.
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
## 2022-08-02 PROBLEM — T24.331A: Status: ACTIVE | Noted: 2022-07-06
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
## 2022-08-03 ENCOUNTER — HOSPITAL ENCOUNTER (OUTPATIENT)
Dept: WOUND CARE | Age: 72
Discharge: HOME | End: 2022-08-03
Payer: MEDICARE

## 2022-08-03 VITALS
DIASTOLIC BLOOD PRESSURE: 65 MMHG | BODY MASS INDEX: 27.37 KG/M2 | TEMPERATURE: 99.2 F | HEART RATE: 90 BPM | HEIGHT: 67 IN | WEIGHT: 174.4 LBS | RESPIRATION RATE: 20 BRPM | SYSTOLIC BLOOD PRESSURE: 126 MMHG

## 2022-08-03 DIAGNOSIS — T24.212A SECOND DEGREE BURN OF LEFT THIGH, INITIAL ENCOUNTER: ICD-10-CM

## 2022-08-03 DIAGNOSIS — T24.232A SECOND DEGREE BURN OF LEFT LOWER LEG, INITIAL ENCOUNTER: ICD-10-CM

## 2022-08-03 DIAGNOSIS — T24.331A THIRD DEGREE BURN OF RIGHT LOWER LEG, INITIAL ENCOUNTER: Primary | ICD-10-CM

## 2022-08-03 PROCEDURE — 16020 DRESS/DEBRID P-THICK BURN S: CPT | Performed by: INTERNAL MEDICINE

## 2022-08-03 PROCEDURE — 16020 DRESS/DEBRID P-THICK BURN S: CPT

## 2022-08-03 RX ORDER — BACITRACIN ZINC AND POLYMYXIN B SULFATE 500; 1000 [USP'U]/G; [USP'U]/G
OINTMENT TOPICAL ONCE
Status: CANCELLED | OUTPATIENT
Start: 2022-08-03 | End: 2022-08-03

## 2022-08-03 RX ORDER — LIDOCAINE 40 MG/G
CREAM TOPICAL ONCE
Status: DISCONTINUED | OUTPATIENT
Start: 2022-08-03 | End: 2022-08-04 | Stop reason: HOSPADM

## 2022-08-03 RX ORDER — LIDOCAINE 40 MG/G
CREAM TOPICAL ONCE
Status: CANCELLED | OUTPATIENT
Start: 2022-08-03 | End: 2022-08-03

## 2022-08-03 RX ORDER — LIDOCAINE HYDROCHLORIDE 40 MG/ML
SOLUTION TOPICAL ONCE
Status: CANCELLED | OUTPATIENT
Start: 2022-08-03 | End: 2022-08-03

## 2022-08-03 RX ORDER — LIDOCAINE 50 MG/G
OINTMENT TOPICAL ONCE
Status: CANCELLED | OUTPATIENT
Start: 2022-08-03 | End: 2022-08-03

## 2022-08-03 ASSESSMENT — PAIN SCALES - GENERAL: PAINLEVEL_OUTOF10: 0

## 2022-08-03 NOTE — PLAN OF CARE
No changes in overall wound care regime. Wounds debrided and all but 3 are noted as healed. Patient will follow up as ordered on AVS.  Discharge instructions reviewed with patient, all questions answered, copy given to patient. Dressings were applied to all wounds per M.D. Instructions at this visit.
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
## 2022-08-10 ENCOUNTER — HOSPITAL ENCOUNTER (OUTPATIENT)
Dept: WOUND CARE | Age: 72
Discharge: HOME | End: 2022-08-10
Payer: MEDICARE

## 2022-08-10 VITALS
HEART RATE: 87 BPM | DIASTOLIC BLOOD PRESSURE: 66 MMHG | SYSTOLIC BLOOD PRESSURE: 147 MMHG | RESPIRATION RATE: 18 BRPM | TEMPERATURE: 99.4 F

## 2022-08-10 DIAGNOSIS — T24.232A SECOND DEGREE BURN OF LEFT LOWER LEG, INITIAL ENCOUNTER: ICD-10-CM

## 2022-08-10 DIAGNOSIS — T24.212A SECOND DEGREE BURN OF LEFT THIGH, INITIAL ENCOUNTER: ICD-10-CM

## 2022-08-10 DIAGNOSIS — T24.331A THIRD DEGREE BURN OF RIGHT LOWER LEG, INITIAL ENCOUNTER: Primary | ICD-10-CM

## 2022-08-10 DIAGNOSIS — T24.312A: ICD-10-CM

## 2022-08-10 PROCEDURE — 16020 DRESS/DEBRID P-THICK BURN S: CPT

## 2022-08-10 PROCEDURE — 16020 DRESS/DEBRID P-THICK BURN S: CPT | Performed by: INTERNAL MEDICINE

## 2022-08-10 RX ORDER — LIDOCAINE HYDROCHLORIDE 40 MG/ML
SOLUTION TOPICAL ONCE
Status: DISCONTINUED | OUTPATIENT
Start: 2022-08-10 | End: 2022-08-11 | Stop reason: HOSPADM

## 2022-08-10 RX ORDER — LIDOCAINE HYDROCHLORIDE 40 MG/ML
SOLUTION TOPICAL ONCE
Status: CANCELLED | OUTPATIENT
Start: 2022-08-10 | End: 2022-08-10

## 2022-08-10 RX ORDER — LIDOCAINE 50 MG/G
OINTMENT TOPICAL ONCE
Status: DISCONTINUED | OUTPATIENT
Start: 2022-08-10 | End: 2022-08-11 | Stop reason: HOSPADM

## 2022-08-10 RX ORDER — LIDOCAINE 50 MG/G
OINTMENT TOPICAL ONCE
Status: CANCELLED | OUTPATIENT
Start: 2022-08-10 | End: 2022-08-10

## 2022-08-10 RX ORDER — BACITRACIN ZINC AND POLYMYXIN B SULFATE 500; 1000 [USP'U]/G; [USP'U]/G
OINTMENT TOPICAL ONCE
Status: CANCELLED | OUTPATIENT
Start: 2022-08-10 | End: 2022-08-10

## 2022-08-10 RX ORDER — LIDOCAINE 40 MG/G
CREAM TOPICAL ONCE
Status: DISCONTINUED | OUTPATIENT
Start: 2022-08-10 | End: 2022-08-11 | Stop reason: HOSPADM

## 2022-08-10 RX ORDER — BACITRACIN ZINC AND POLYMYXIN B SULFATE 500; 1000 [USP'U]/G; [USP'U]/G
OINTMENT TOPICAL ONCE
Status: DISCONTINUED | OUTPATIENT
Start: 2022-08-10 | End: 2022-08-11 | Stop reason: HOSPADM

## 2022-08-10 RX ORDER — LIDOCAINE 40 MG/G
CREAM TOPICAL ONCE
Status: CANCELLED | OUTPATIENT
Start: 2022-08-10 | End: 2022-08-10
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
## 2022-08-10 NOTE — PLAN OF CARE
1 wound healed, 2 remaining wounds showing improvement, debridement per MD & pt. Tolerated well. Will cont. With current wound care regime with dressings. F/u in [address] [place] [address],[address] in 1 week as ordered, pt. Aware to call sooner with any changes or questions/concerns. Discharge instructions reviewed with patient, all questions answered, copy given to patient. Dressings were applied to all wounds per M.D. Instructions at this visit.
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
## 2022-08-17 ENCOUNTER — HOSPITAL ENCOUNTER (OUTPATIENT)
Dept: WOUND CARE | Age: 72
Discharge: HOME | End: 2022-08-17
Payer: MEDICARE

## 2022-08-17 VITALS
BODY MASS INDEX: 27.7 KG/M2 | RESPIRATION RATE: 18 BRPM | HEIGHT: 67 IN | HEART RATE: 89 BPM | WEIGHT: 176.5 LBS | DIASTOLIC BLOOD PRESSURE: 63 MMHG | SYSTOLIC BLOOD PRESSURE: 119 MMHG | TEMPERATURE: 97.9 F

## 2022-08-17 DIAGNOSIS — T24.212A SECOND DEGREE BURN OF LEFT THIGH, INITIAL ENCOUNTER: ICD-10-CM

## 2022-08-17 DIAGNOSIS — T24.331A THIRD DEGREE BURN OF RIGHT LOWER LEG, INITIAL ENCOUNTER: Primary | ICD-10-CM

## 2022-08-17 PROCEDURE — 16020 DRESS/DEBRID P-THICK BURN S: CPT

## 2022-08-17 PROCEDURE — 16020 DRESS/DEBRID P-THICK BURN S: CPT | Performed by: INTERNAL MEDICINE

## 2022-08-17 RX ORDER — LIDOCAINE 40 MG/G
CREAM TOPICAL ONCE
OUTPATIENT
Start: 2022-08-17 | End: 2022-08-17

## 2022-08-17 RX ORDER — BACITRACIN ZINC AND POLYMYXIN B SULFATE 500; 1000 [USP'U]/G; [USP'U]/G
OINTMENT TOPICAL ONCE
Status: DISCONTINUED | OUTPATIENT
Start: 2022-08-17 | End: 2022-08-18 | Stop reason: HOSPADM

## 2022-08-17 RX ORDER — LIDOCAINE HYDROCHLORIDE 40 MG/ML
SOLUTION TOPICAL ONCE
Status: DISCONTINUED | OUTPATIENT
Start: 2022-08-17 | End: 2022-08-18 | Stop reason: HOSPADM

## 2022-08-17 RX ORDER — BACITRACIN ZINC AND POLYMYXIN B SULFATE 500; 1000 [USP'U]/G; [USP'U]/G
OINTMENT TOPICAL ONCE
OUTPATIENT
Start: 2022-08-17 | End: 2022-08-17

## 2022-08-17 RX ORDER — LIDOCAINE 50 MG/G
OINTMENT TOPICAL ONCE
Status: DISCONTINUED | OUTPATIENT
Start: 2022-08-17 | End: 2022-08-18 | Stop reason: HOSPADM

## 2022-08-17 RX ORDER — LIDOCAINE 50 MG/G
OINTMENT TOPICAL ONCE
OUTPATIENT
Start: 2022-08-17 | End: 2022-08-17

## 2022-08-17 RX ORDER — LIDOCAINE 40 MG/G
CREAM TOPICAL ONCE
Status: DISCONTINUED | OUTPATIENT
Start: 2022-08-17 | End: 2022-08-18 | Stop reason: HOSPADM

## 2022-08-17 RX ORDER — LIDOCAINE HYDROCHLORIDE 40 MG/ML
SOLUTION TOPICAL ONCE
OUTPATIENT
Start: 2022-08-17 | End: 2022-08-17

## 2022-08-17 ASSESSMENT — PAIN SCALES - GENERAL: PAINLEVEL_OUTOF10: 0

## 2022-08-17 NOTE — PLAN OF CARE
Left leg wound small, but more depth this week after debridement, will start 1/4\" Iodoform gently tucked into depth, otherwise cont. with current wound care regime with dressings. Right leg wound showing improvement, debridement per MD & pt. Tolerated well. Will cont. With current wound care regime with dressings. F/u in [facility] in 1 week as ordered, pt. Aware to call sooner with any changes or questions/concerns. Discharge instructions reviewed with patient, all questions answered, copy given to patient. Dressings were applied to all wounds per M.D. Instructions at this visit.
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
## 2022-08-20 PROBLEM — T24.312A: Status: ACTIVE | Noted: 2022-07-06
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
## 2022-09-02 ENCOUNTER — HOSPITAL ENCOUNTER (OUTPATIENT)
Dept: WOUND CARE | Age: 72
Discharge: HOME | End: 2022-09-02

## 2024-10-30 NOTE — PROGRESS NOTES
Nasal MRSA swab sent to lab, pt aware of urine needed for testing (mostly anuric), respiratory specimen needed, and if wound on LLE opens to to notify nurse so we can culture the fluid from the blister. Seizure pads placed on the bed

## 2025-01-13 NOTE — TELEPHONE ENCOUNTER
FAXED OVER ABNORMAL URINE RESULT TO [facility]. SCANNED IN MEDIA. warm and dry/color normal warm and dry

## (undated) DEVICE — SUTURE VCRL + SZ 0 L18IN ABSRB CLR VCP112G

## (undated) DEVICE — DRESSING FOAM W4XL12IN AG SIL ADH ANTIMIC POSTOP OPTIFOAM

## (undated) DEVICE — SMARTGOWN SURGICAL GOWN, LARGE: Brand: CONVERTORS

## (undated) DEVICE — SUTURE ETHLN SZ 3-0 L30IN NONABSORBABLE BLK FSL L30MM 3/8 1671H

## (undated) DEVICE — X-RAY CASSETTE COVER: Brand: CONVERTORS

## (undated) DEVICE — 3 BONE CEMENT MIXER: Brand: MIXEVAC

## (undated) DEVICE — COVER,TABLE,HEAVY DUTY,50"X90",STRL: Brand: MEDLINE

## (undated) DEVICE — SUTURE VCRL SZ 0 L36IN ABSRB UD CT-1 L36MM 1/2 CIR TAPR PNT VCP946H

## (undated) DEVICE — GAUZE,SPONGE,4"X4",8PLY,STRL,LF,10/TRAY: Brand: MEDLINE

## (undated) DEVICE — ADHESIVE SKIN CLSR 0.7ML TOP DERMBND ADV

## (undated) DEVICE — MAJOR SET UP PK

## (undated) DEVICE — SOLUTION IV IRRIG 500ML 0.9% SODIUM CHL 2F7123

## (undated) DEVICE — PENCIL SMK EVAC ALL IN 1 DSGN ENH VISIBILITY IMPROVED AIR

## (undated) DEVICE — SHOE, POST-OPERATIVE: Brand: DEROYAL

## (undated) DEVICE — SYRINGE MED 10ML LUERLOCK TIP W/O SFTY DISP

## (undated) DEVICE — SUTURE VCRL + SZ 2-0 L36IN ABSRB UD L36MM CT-1 1/2 CIR VCP945H

## (undated) DEVICE — COVER LT HNDL PLAS RIG 2 PER PK

## (undated) DEVICE — BANDAGE ROLL,100% COTTON, 8 PLY, LARGE: Brand: KERLIX

## (undated) DEVICE — COTTON UNDERCAST PADDING,CRIMPED FINISH: Brand: WEBRIL

## (undated) DEVICE — Device

## (undated) DEVICE — BOOT POS LEG DEMAYO

## (undated) DEVICE — Z CONVERTED USE 2276073 ROLL BNDG L W4.5INXL4 1/8YD WHT COT 6 PLY CNFRM KERLIX

## (undated) DEVICE — GLOVE ORANGE PI 7 1/2   MSG9075

## (undated) DEVICE — MEPILEX POST OP 4X12 5BX

## (undated) DEVICE — DRILL BIT 3.2MM (1/8'') X 128.0MM

## (undated) DEVICE — SOLUTION IRRIG 2000ML 0.9% SOD CHL USP UROMATIC PLAS CONT

## (undated) DEVICE — ESMARK: Brand: DEROYAL

## (undated) DEVICE — CURITY ABDOMINAL PAD: Brand: CURITY

## (undated) DEVICE — HANDPIECE SET WITH HIGH FLOW TIP AND SUCTION TUBE: Brand: INTERPULSE

## (undated) DEVICE — 3M™ COBAN™ NL STERILE NON-LATEX SELF-ADHERENT WRAP, 2084S, 4 IN X 5 YD (10 CM X 4,5 M), 18 ROLLS/CASE: Brand: 3M™ COBAN™

## (undated) DEVICE — GLOVE ORTHO 7   MSG9470

## (undated) DEVICE — ELECTRODE PT RET AD L9FT HI MOIST COND ADH HYDRGEL CORDED

## (undated) DEVICE — RIMMED SPEED PIN 45MM STERILE

## (undated) DEVICE — SUTURE MCRYL SZ 3-0 L27IN ABSRB UD L26MM SH 1/2 CIR Y416H

## (undated) DEVICE — PAD,ABDOMINAL,8"X10",ST,LF: Brand: MEDLINE

## (undated) DEVICE — BLADE SURG SAW SAG S STL AGG 905MM LEN 185MM W 127MM THCK

## (undated) DEVICE — SUTURE MCRYL SZ 3-0 L27IN ABSRB UD L19MM PS-2 3/8 CIR PRIM Y427H

## (undated) DEVICE — GENESIS TROCAR PIN

## (undated) DEVICE — GOWN SIRUS NONREIN XL W/TWL: Brand: MEDLINE INDUSTRIES, INC.

## (undated) DEVICE — HOOD, PEEL-AWAY: Brand: FLYTE

## (undated) DEVICE — GLOVE SURG SZ 75 L12IN FNGR THK79MIL GRN LTX FREE

## (undated) DEVICE — APPLICATOR PREP 26ML 0.7% IOD POVACRYLEX 74% ISO ALC ST

## (undated) DEVICE — PACK ORTH LO EXT VI

## (undated) DEVICE — SUTURE STRATAFIX SYMMETRIC SZ 1 L18IN ABSRB VLT CT1 L36CM SXPP1A404

## (undated) DEVICE — OCCLUSIVE GAUZE STRIP,3% BISMUTH TRIBROMOPHENATE IN PETROLATUM BLEND: Brand: XEROFORM

## (undated) DEVICE — TIP SUCT DIA12FR W STYL CTRL VENT DISPOSABLE FRAZ

## (undated) DEVICE — NEEDLE HYPO 25GA L1.5IN BLU POLYPR HUB S STL REG BVL STR

## (undated) DEVICE — INTENDED FOR TISSUE SEPARATION, AND OTHER PROCEDURES THAT REQUIRE A SHARP SURGICAL BLADE TO PUNCTURE OR CUT.: Brand: BARD-PARKER ® STAINLESS STEEL BLADES

## (undated) DEVICE — NEEDLE HYPO 20GA L1.5IN YEL POLYPR HUB S STL REG BVL STR

## (undated) DEVICE — OPTIFOAM GENTLE SA, POSTOP, 4X10: Brand: MEDLINE

## (undated) DEVICE — SUTURE MCRYL SZ 2-0 L18IN ABSRB VLT L36MM CT-1 1/2 CIR Y739D